# Patient Record
Sex: MALE | Race: WHITE | Employment: OTHER | URBAN - METROPOLITAN AREA
[De-identification: names, ages, dates, MRNs, and addresses within clinical notes are randomized per-mention and may not be internally consistent; named-entity substitution may affect disease eponyms.]

---

## 2017-01-13 ENCOUNTER — OFFICE VISIT (OUTPATIENT)
Dept: PALLIATIVE MEDICINE | Facility: CLINIC | Age: 62
End: 2017-01-13
Payer: COMMERCIAL

## 2017-01-13 VITALS — HEART RATE: 69 BPM | SYSTOLIC BLOOD PRESSURE: 126 MMHG | OXYGEN SATURATION: 98 % | DIASTOLIC BLOOD PRESSURE: 80 MMHG

## 2017-01-13 DIAGNOSIS — Z98.1 S/P LUMBAR FUSION: Primary | ICD-10-CM

## 2017-01-13 DIAGNOSIS — F41.9 ANXIETY: ICD-10-CM

## 2017-01-13 DIAGNOSIS — R53.81 PHYSICAL DECONDITIONING: ICD-10-CM

## 2017-01-13 DIAGNOSIS — G89.4 CHRONIC PAIN SYNDROME: ICD-10-CM

## 2017-01-13 PROCEDURE — 99214 OFFICE O/P EST MOD 30 MIN: CPT | Performed by: NURSE PRACTITIONER

## 2017-01-13 RX ORDER — MORPHINE SULFATE 30 MG/1
30 TABLET, FILM COATED, EXTENDED RELEASE ORAL EVERY 8 HOURS
Qty: 90 TABLET | Refills: 0 | Status: SHIPPED | OUTPATIENT
Start: 2017-01-13 | End: 2017-02-14

## 2017-01-13 RX ORDER — HYDROMORPHONE HYDROCHLORIDE 4 MG/1
4-6 TABLET ORAL EVERY 4 HOURS PRN
Qty: 210 TABLET | Refills: 0 | Status: SHIPPED | OUTPATIENT
Start: 2017-01-13 | End: 2017-02-14

## 2017-01-13 RX ORDER — HYDROXYZINE PAMOATE 25 MG/1
25-50 CAPSULE ORAL 3 TIMES DAILY PRN
Qty: 60 CAPSULE | Refills: 3 | Status: SHIPPED | OUTPATIENT
Start: 2017-01-13 | End: 2018-11-04

## 2017-01-13 ASSESSMENT — PAIN SCALES - GENERAL: PAINLEVEL: MODERATE PAIN (4)

## 2017-01-13 NOTE — MR AVS SNAPSHOT
After Visit Summary   1/13/2017    Usama Harris    MRN: [id number]           Patient Information     Date Of Birth          1955        Visit Information        Provider Department      1/13/2017 11:00 AM Shital Anderson APRN CNP [facility]        Today's Diagnoses     S/P lumbar fusion    -  1     Anxiety         Chronic pain syndrome         Physical deconditioning           Care Instructions    1. Dilaudid 4 mg take 1- 1.5 mg tablet(s) every 3 hours as needed limit 7 tablet(s) per day. In 2 week if pain is tolerable then reduce to limit of 6 per day. 30 day supply to 01/14/17   2. Then start  Ms Contin to 30 mg every 8 hrs, new RX given today to start 01/19/17  3. Renewed hydroxyzine capsule as needed for pain, anxiety and sleep    4. Narcan nasal spray for unintended overdose of narcotics   5. Continue all other medications    6. Opioid compliance stick to one pharmacy and within dosing instructions on bottle  7.  Aquatic Physical Therapy as is less likely to flare his pain.   8. Return visit with Shital Anderson CNP  4-6 weeks   GOAL setting with activity and pain medication use.      Nurse Triage line:  [phone]   Call this number with any questions or concerns. You may leave a detailed message anytime. Calls are typically returned Monday through Friday between 8 AM and 4:30 PM. We usually get back to you within 2 business days depending on the issue/request.       Medication refills:    For non-narcotic medications, call your pharmacy directly to request a refill. The pharmacy will contact the [facility] for authorization. Please allow 3-4 days for these refills to be processed.     For narcotic refills, call the nurse triage line or send a Razient message. Please contact us 7-10 days before your refill is due. The message MUST include the name of the specific medication(s) requested and how you would like to receive the  prescription(s). The options are as follows:    Pain Clinic staff can mail the prescription to your pharmacy. Please tell us the name of the pharmacy.    You may pick the prescription up at the Pain Clinic (tell us the location) or during a clinic visit with your pain provider    Pain Clinic staff can deliver the prescription to the [place] pharmacy in the clinic building. Please tell us the location.      Scheduling number: [phone].  Call this number to schedule or change appointments.    We believe regular attendance is key to your success in our program.    Any time you are unable to keep your appointment we ask that you call us at least 24 hours in advance to let us know. This will allow us to offer the appointment time to another patient.             Follow-ups after your visit        Your next 10 appointments already scheduled     Jan 17, 2017  1:00 PM   Pool Evaluation with Susan Nichole PT   [facility] ([facility])    [address] [address]   [phone]              Who to contact     If you have questions or need follow up information about today's clinic visit or your schedule please contact [facility] directly at [phone].  Normal or non-critical lab and imaging results will be communicated to you by MyChart, letter or phone within 4 business days after the clinic has received the results. If you do not hear from us within 7 days, please contact the clinic through MyChart or phone. If you have a critical or abnormal lab result, we will notify you by phone as soon as possible.  Submit refill requests through Stickybits or call your pharmacy and they will forward the refill request to us. Please allow 3 business days for your refill to be completed.          Additional Information About Your Visit        MyChart Information     Stickybits gives you secure access to your electronic health record. If you see a primary care  provider, you can also send messages to your care team and make appointments. If you have questions, please call your primary care clinic.  If you do not have a primary care provider, please call [phone] and they will assist you.        Care EveryWhere ID     This is your Care EveryWhere ID. This could be used by other organizations to access your [facility] medical records  [id number]        Your Vitals Were     Pulse Pulse Oximetry                69 98%           Blood Pressure from Last 3 Encounters:   01/13/17 126/80   12/19/16 149/98   12/14/16 110/70    Weight from Last 3 Encounters:   12/19/16 114.306 kg (252 lb)   12/01/16 114.76 kg (253 lb)   11/15/16 112.038 kg (247 lb)              Today, you had the following     No orders found for display         Today's Medication Changes          These changes are accurate as of: 1/13/17 11:56 AM.  If you have any questions, ask your nurse or doctor.               These medicines have changed or have updated prescriptions.        Dose/Directions    HYDROmorphone 4 MG tablet   Commonly known as:  DILAUDID   This may have changed:    - when to take this  - additional instructions   Used for:  S/P lumbar fusion        Dose:  4-6 mg   Take 1-1.5 tablets (4-6 mg) by mouth every 4 hours as needed for moderate to severe pain Limit 7 tablet(s) per day. 30 day. Start 01/14/17. Dispense on or after 01/13/17   Quantity:  210 tablet   Refills:  0       morphine 30 MG 12 hr tablet   Commonly known as:  MS CONTIN   This may have changed:  additional instructions   Used for:  S/P lumbar fusion        Dose:  30 mg   Take 1 tablet (30 mg) by mouth every 8 hours Start 01/19/17 Dispense on or after 01/17/17   Quantity:  90 tablet   Refills:  0       * VISTARIL PO   This may have changed:  Another medication with the same name was added. Make sure you understand how and when to take each.        Dose:  25 mg   Take 25 mg by mouth At Bedtime   Refills:  0       * hydrOXYzine 25 MG  capsule   Commonly known as:  VISTARIL   This may have changed:  You were already taking a medication with the same name, and this prescription was added. Make sure you understand how and when to take each.   Used for:  S/P lumbar fusion, Chronic pain syndrome, Physical deconditioning        Dose:  25-50 mg   Take 1-2 capsules (25-50 mg) by mouth 3 times daily as needed for itching   Quantity:  60 capsule   Refills:  3       * Notice:  This list has 2 medication(s) that are the same as other medications prescribed for you. Read the directions carefully, and ask your doctor or other care provider to review them with you.         Where to get your medicines      These medications were sent to [facility] - [place], [place] - [address], [place] [address]     Phone:  [phone]    - hydrOXYzine 25 MG capsule      Some of these will need a paper prescription and others can be bought over the counter.  Ask your nurse if you have questions.     Bring a paper prescription for each of these medications    - HYDROmorphone 4 MG tablet  - morphine 30 MG 12 hr tablet             Primary Care Provider Office Phone # Fax #    Nikko Tang [phone]       [address] [address]        Thank you!     Thank you for choosing [facility]  for your care. Our goal is always to provide you with excellent care. Hearing back from our patients is one way we can continue to improve our services. Please take a few minutes to complete the written survey that you may receive in the mail after your visit with us. Thank you!             Your Updated Medication List - Protect others around you: Learn how to safely use, store and throw away your medicines at www.disposemymeds.org.          This list is accurate as of: 1/13/17 11:56 AM.  Always use your most recent med list.                   Brand Name Dispense Instructions for use     carboxymethylcellulose 0.5 % Soln ophthalmic solution    REFRESH PLUS     Place 1 drop into both eyes daily as needed for dry eyes       diazepam 5 MG tablet    VALIUM    60 tablet    Take 1 tablet (5 mg) by mouth every 6 hours as needed for other (muscle spasms)       EQL SENNA-S 8.6-50 MG per tablet   Generic drug:  senna-docusate     100 tablet    TAKES 5 TABLETS DAILY.       * GABAPENTIN PO      Take 300 mg by mouth 2 times daily (Morning and Afternoon)       * GABAPENTIN PO      Take 600 mg by mouth every evening       HYDROmorphone 4 MG tablet    DILAUDID    210 tablet    Take 1-1.5 tablets (4-6 mg) by mouth every 4 hours as needed for moderate to severe pain Limit 7 tablet(s) per day. 30 day. Start 01/14/17. Dispense on or after 01/13/17       latanoprost 0.005 % ophthalmic solution    XALATAN     Place 1 drop into both eyes At Bedtime       morphine 30 MG 12 hr tablet    MS CONTIN    90 tablet    Take 1 tablet (30 mg) by mouth every 8 hours Start 01/19/17 Dispense on or after 01/17/17       Multi-vitamin Tabs tablet      Take 1 tablet by mouth daily       NARCAN NA      Spray in nostril daily as needed (opioid reversal)       * order for DME     1 each    Equipment being ordered: Walker () and Walker Extension w/Wheels (, ) Treatment Diagnosis: Difficulty walking       * order for DME     1 Device    Equipment being ordered: Orthofix lumbar bone growth stimulator       * VISTARIL PO      Take 25 mg by mouth At Bedtime       * hydrOXYzine 25 MG capsule    VISTARIL    60 capsule    Take 1-2 capsules (25-50 mg) by mouth 3 times daily as needed for itching       * Notice:  This list has 6 medication(s) that are the same as other medications prescribed for you. Read the directions carefully, and ask your doctor or other care provider to review them with you.

## 2017-01-13 NOTE — PROGRESS NOTES
"                      [facility]  INTERVAL VISIT    This a  follow up examination  for Usama Harris is a 60 year old male.  Primary Care provider:  Prince Tang MD    Today's visit: 01/13/2017  Last visit: 12/04/16      Chief Complaint   Patient presents with     Pain     Feels :\"lost\" Not sure if he should be progressing the way Neurosurgery advised. Saw MIRELLA MOLINA weight restriction lifted to 20 Lbs.  Starts aquatic Physical Therapy in 3 days.  Liver enzyme normal,  Had problem with mood and weepy stopping Gabapentin at last visit ( was on taper). Now mood is improving.    Pain across  low back, right thigh lateral numbness and knee  Standing to sitting increases pain on low back, radiating pain and symptoms episodic   Increase activity with going to opera, now has increase pain    Comfortable sitting today,   Walking tolerance improving, but still sedentary  No new weakness  Taking Dilaudid as scheduled on MS Contin       MEDICAL DECISION MAKING:   Usama Harris is a 60 year anxious old male who present in follow up with a decade plus 2 years of chronic right radicular low back pain with lumbar disc herniation of L4-5 in 2004 and then L3-4 in 2016,both occurring after an inciting injury  He is chronic pain treatment naive in regards sustainable life long management of his pain difficulties.   He is post lumbar laminectomyL3-4 revision 3 month ago.   The patient continues with high medication.  He is  opioid focused with past documented history of self dose escalation and use of multiple pharmacies. Today, he is demonstrating improved compliance and has less pain behaviors   Pain rehabilitation potential and receptiveness to learn more functional and adaptive pain strategies continues to be guarded. The patient's high opioid utilization and use of multiple pharmacies has put him at risk as his insurance has limited his access. He is at risk for unintended side effects such as opioids withdrawal and " respiratory suppression. We will restrict his opioid quantity, require more frequent visit and have order Narcan for home use.  His readiness for change and participation in our program is better today. We will begin gradual activity advancement through aquatic Physical Therapy.  His past level of anxiety and impulsive behavior are significant treatment barriers.  If the patient does not demonstrate readiness for change by lack of participation or payton resistance in further opioid tapering ( more modest dose), then recommendations will be given to the primary care provider for his chronic pain care.       ASSESSMENT:  1. Chronic pain Syndrome    1. Chronic right radicular low back pain L3-4    1. S/p L3-4 decompression laminectomy 5/18/16 persistent right radiculopathy  2. S/p L4-5 laminectomy 2004  2. Degenerative Disc Disease Lumbar L4-5 no central stenosis mild foraminal stenosis.     back pain post op 7/22/16 lumbar discectomy RIGHT L3-L4 REVISION MICRODISCECTOMY    2. Chronic opioid analgesia high opioid tolerance compliance concerns with self dose escalation,resulting in early run out of medication .  3. Limiting treatment factors  1. Medication reliant /chemical coping / self dose escalation.  Better today,    2. Mild to moderate depressive features    3. Anxiety and impulsiveness, better today   4. Non restorative sleep    PLAN:  1. Dilaudid 4 mg take 1- 1.5 mg tablet(s) every 3 hours as needed limit 7 tablet(s) per day. In 2 week if pain is tolerable then reduce to limit of 6 per day. 30 day supply to 01/14/17   2. Then start  Ms Contin to 30 mg every 8 hrs, new RX given today to start 01/19/17  3. Renewed hydroxyzine capsule as needed for pain, anxiety and sleep    4. Narcan nasal spray for unintended overdose of narcotics   5. Continue all other medications    6. Opioid compliance stick to one pharmacy and within dosing instructions on bottle  7.  Aquatic Physical Therapy, as is less likely to flare his  pain.   8. Return visit with Shital Anderson CNP  4-6 weeks   GOAL setting with activity and pain medication use.          PAIN HISTORY:  Patient reports back injury after diving on vacation this past February.  Back pain at that time was not severe, but by that evening he was noting progressively increasing back and right leg pain. He went see his PCP after failing  conservative treatment he was referred to Dr. Prince Govea at [facility] showed a large L3-4 disc herniation.  On 5/18/16  the patient underwent a decompression laminectomy at L3-4.  This is in the setting of a history of chronic right radicular low back pain again after a back injury. He is post  L4-5 microdiscectomy in 2004 and chronic opioid use. He report that since his first back injury he suffered episodic exacerbations, mostly through the pelvis with mild right foot pain and weakness.     He is here under the advise of his PCP to see pain management for his chronic low back and right leg pain and to develop different strategies to manage his pain more effectively.     PAST MEDICATION TRIALS:                                                   OPIOIDS: Tylenol #3, hydrocodone( Vicodin), Morphine ER, percocet, OxyContin, tramadol   NSAID'S/ANALEGESICS: Celbrex, Ibuprofen, naprosyn,      ANTIMIGRAINE: none   STEROIDS: yes dose pack     MUSCLE  RELAXERS:  Flexeril, tizanidine, Skelaxin   ANTIDEPRESSANTS: Bupropion   ANTIANXIETY: Xanax   HYPNOTICS: Hydroxyzine   ANTICONVULSANTS: none , not wanting Gabapentin, Lyrica     TOPICALS:  None     PAST PAIN TREATMENTS:              Y/N       HELPED           NO HELP         WORSE             PREVIOUS PAIN CLINIC:                 Yes                                                      1990's X  SURGERY:                                         Yes                                                      X 2007 L3-4  Microdisectomy                                                                                                                                                                                  2016 decompression lami L3-4 and L4-5  INJECTIONS:                                     Yes                                                      1990, 2007,2015                          PHYSICAL THERAPY:                       Yes                                                      X            EXERCISE:                                        Yes                                                      X  INTEGRATIVE MEDICINE              CHIROPRACTOR:                  No                    ACUPUNCTURE:                    No              AROMATHERAPY:                 No              RELAXATION THERAPY:       No  COUNSELING:                                    No          INTERVAL HISTORY:  Pain Description aching, burning, shooting, sharp, stabbing, numb, tender    Location:  Low back and right leg    Pain rating 4, average 5/10, Range 3-7/10  Quality: worse in morning and at times in evening   Aggravating factors:  Walking and driving   Relieving factors:  Lying down, elevating legs and gabapentin helps burning, but concerned with liver enzymes     Current Pain Medications: MsContin 30 mg tid. Dilaudid 4 mg (8)  , Gabapentin stopped 2 days ago.  Hydroxyzine 25 mg Q 6 hrs.prn, takes at night.  Valium 2.5 mg once a day   Medication side effects: some sleepiness    Progress in pain program:  None   Impact on function:severe  Working  Yes as [employer]    Quality of life:improved, feels he is doing better.   Self care no  Exercise no,relaxation no, body awareness no   Less medication focused.  .    INVESTIGATIONAL:  05/06/16 MRI Lumbar ( pre op)    T12-L1:  No disc herniation or stenosis. Facet joints are  unremarkable.     L1-L2:  No disc herniation or stenosis. Facet joints are unremarkable.         L2-L3:   Mild disc bulge with central annular fissure. No stenosis.  Facet joints are unremarkable.     L3-L4:  Moderate-to-large right central disc extrusion extends into  the subarticular recess with posterior displacement and probable  compression of the descending right L4 nerve root. This is new since  the prior study. Mild-to-moderate central stenosis. Neural foramina  are patent.     L4-L5:  There has likely been previous surgery at this level. I  suspect a previous right laminotomy. There is advanced degenerative  disc disease with mild disc bulge and osteophytic ridging. No central  stenosis. Mild bilateral foraminal stenosis.     L5-S1:  Mild disc dehydration and disc bulge. No central stenosis.  Mild bilateral foraminal stenosis.     Paraspinous soft tissues:  Unremarkable.         IMPRESSION:     1. There is a new moderate-to-large right central disc extrusion at  the L3-L4 level that extends into the right subarticular recess with  posterior displacement and compression of the descending right L4  nerve root. Mild-to-moderate central stenosis.  2. At L5-S1 there is mild bilateral foraminal stenosis    PMHX:  Past Medical History   Diagnosis Date     Backache, unspecified      Acute hepatitis C without mention of hepatic coma      Hep C - Treated     DDD (degenerative disc disease), lumbar      s/p back surgery     Hypertension      PAST SURGICAL HISTORY  Past Surgical History   Procedure Laterality Date     Surgical history of -        ligation of left spermatic vein     Laminect/discectomy, lumbar       Laminectomy/Discectomy Cervical     Decompression lumbar one level Right 5/18/2016     Procedure: DECOMPRESSION LUMBAR ONE LEVEL;  Surgeon: Prince Govea MD;  Location: [facility] OR     Discectomy lumbar posterior microscopic one level Right 7/22/2016     Procedure: DISCECTOMY LUMBAR POSTERIOR MICROSCOPIC ONE LEVEL;  Surgeon: Iggy Issa MD;  Location: [facility] OR     Laminectomy, fusion lumbar two levels,  combined Right 11/15/2016     Procedure: COMBINED LAMINECTOMY, FUSION LUMBAR TWO LEVELS;  Surgeon: Iggy Issa MD;  Location: [facility] OR       CURRENT MEDICATIONS:   Current Outpatient Prescriptions   Medication     HYDROmorphone (DILAUDID) 4 MG tablet     morphine (MS CONTIN) 30 MG 12 hr tablet     multivitamin, therapeutic with minerals (MULTI-VITAMIN) TABS tablet     order for DME     senna-docusate (EQL SENNA-S) 8.6-50 MG per tablet     diazepam (VALIUM) 5 MG tablet     order for DME     Naloxone HCl (NARCAN NA)     carboxymethylcellulose (REFRESH PLUS) 0.5 % SOLN     latanoprost (XALATAN) 0.005 % ophthalmic solution     GABAPENTIN PO     GABAPENTIN PO     HydrOXYzine Pamoate (VISTARIL PO)     No current facility-administered medications for this visit.       ROS: twelve systems review negative and included in interval except for: pain,mental fogginess, ankle swelling   Since last visit: changes in mood: No, suicide thoughts No  Any changes in your medical condition, illness, injury or hospitalizations: Yes elevated liver enzymes.  Sleep: Restorative: No Current  Some better with middle sleep insomnia      PHYSICAL EXAM:  Constitutional:Blood pressure 126/80, pulse 69, SpO2 98 %., : There is no weight on file to calculate BMI.   Psyche:  Fully oriented, Behavioral Observations: Eye contact  Poor. Mood  and spirits are good. Staying on tract better .  Musculoskeletal exam:  Gait:  Steady reciprocating with cane  ROM within normal limits,  MCCORMICK x 4,  Neuro exam:   Alert,  KAM @  3 mm, Speech clear fluent and appropriate. Strength 5/5   Skin/Vascular/ Autonomic:  warm, dry and intact    OTHER: Opioid risk for ongoing opioid management for non malignant chronic pain   DIRE Score for ongoing opioid management is calculated as follows:    Diagnosis =  2    Intractability = 2    Risk: Psych = 1  Chem Hlth = 1  Reliability = 2  Social = 2    Efficacy = 12    Total DIRE Score = 12 (14 or higher predicts good candidate  for ongoing opioid management; 13 or lower predicts poor candidate for opioid management)   Oscar Mckeon 2006,The Journal of  Pain.,The DIRE Score: Predicting Outcomes of Opioid Prescribing for Chronic Pain Vol.7,No.9, pp 671-681.  MNPMP : Reviewed and as expected without evidence of abuse or misuse? No  URINE DRUG SCREEN  Yes, DATE:6/8/16 as expected . OPIOID AGREEMENT: Yes DATE:06/22/16  OPOID TOLERANCE: high, Morphine  EQ:  218 mg/day   Analgesia good  Adverse effects some with Gabapentin  Activity fair ( lack self care)  Adherence poor     Opioid management will continue with [facility]      PROCEDURES none     Time spent: 30 minutes 100 % face to face including  20  minutes counseling and coordinating care for the above identified medical problems    Signed: BASSEM Mckeon, C.N.P.,   [facility]

## 2017-01-13 NOTE — PATIENT INSTRUCTIONS
1. Dilaudid 4 mg take 1- 1.5 mg tablet(s) every 3 hours as needed limit 7 tablet(s) per day. In 2 week if pain is tolerable then reduce to limit of 6 per day. 30 day supply to 01/14/17   2. Then start  Ms Contin to 30 mg every 8 hrs, new RX given today to start 01/19/17  3. Renewed hydroxyzine capsule as needed for pain, anxiety and sleep    4. Narcan nasal spray for unintended overdose of narcotics   5. Continue all other medications    6. Opioid compliance stick to one pharmacy and within dosing instructions on bottle  7.  Aquatic Physical Therapy as is less likely to flare his pain.   8. Return visit with Shital Anderson CNP  4-6 weeks   GOAL setting with activity and pain medication use.      Nurse Triage line:  [phone]   Call this number with any questions or concerns. You may leave a detailed message anytime. Calls are typically returned Monday through Friday between 8 AM and 4:30 PM. We usually get back to you within 2 business days depending on the issue/request.       Medication refills:    For non-narcotic medications, call your pharmacy directly to request a refill. The pharmacy will contact the [facility] for authorization. Please allow 3-4 days for these refills to be processed.     For narcotic refills, call the nurse triage line or send a [employer] message. Please contact us 7-10 days before your refill is due. The message MUST include the name of the specific medication(s) requested and how you would like to receive the prescription(s). The options are as follows:    Pain Clinic staff can mail the prescription to your pharmacy. Please tell us the name of the pharmacy.    You may pick the prescription up at the Pain Clinic (tell us the location) or during a clinic visit with your pain provider    Pain Clinic staff can deliver the prescription to the [facility] in the clinic building. Please tell us the location.      Scheduling number: [phone].  Call this number to  schedule or change appointments.    We believe regular attendance is key to your success in our program.    Any time you are unable to keep your appointment we ask that you call us at least 24 hours in advance to let us know. This will allow us to offer the appointment time to another patient.

## 2017-01-17 ENCOUNTER — HOSPITAL ENCOUNTER (OUTPATIENT)
Dept: PHYSICAL THERAPY | Facility: CLINIC | Age: 62
Setting detail: THERAPIES SERIES
End: 2017-01-17
Attending: PHYSICIAN ASSISTANT
Payer: COMMERCIAL

## 2017-01-17 DIAGNOSIS — Z98.1 HISTORY OF LUMBAR FUSION: Primary | ICD-10-CM

## 2017-01-17 PROCEDURE — 97110 THERAPEUTIC EXERCISES: CPT | Mod: GP | Performed by: PHYSICAL THERAPIST

## 2017-01-17 PROCEDURE — 97112 NEUROMUSCULAR REEDUCATION: CPT | Mod: GP | Performed by: PHYSICAL THERAPIST

## 2017-01-17 PROCEDURE — 40000185 ZZHC STATISTIC PT OUTPT VISIT: Performed by: PHYSICAL THERAPIST

## 2017-01-17 PROCEDURE — 97161 PT EVAL LOW COMPLEX 20 MIN: CPT | Mod: GP | Performed by: PHYSICAL THERAPIST

## 2017-01-23 NOTE — ADDENDUM NOTE
Encounter addended by: Susan Nichole, PT on: 1/23/2017  8:05 AM<BR>     Documentation filed: Clinical Notes, Inpatient Document Flowsheet

## 2017-01-23 NOTE — PROGRESS NOTES
01/17/17 1300   Quick Adds   Type of Visit Initial OP PT Evaluation   General Information   Start of Care Date 01/17/17   Referring Physician Eyad Cm PA-C    Orders Evaluate and Treat as Indicated   Order Date 12/19/16   Medical Diagnosis History of lumbar fusion   Onset of illness/injury or Date of Surgery (11/15/16)   Precautions/Limitations no known precautions/limitations   Special Instructions aquatic therapy   Surgical/Medical history reviewed Yes   Pertinent history of current problem (include personal factors and/or comorbidities that impact the POC) Pt reports 27 years of chronic LBP. He is post lumbar fusion L3-4 revision 11/15/16. Pt reports back injury after trying to stand with weight of diving tank on back while on vacation 02/2016 with progressively worse inc back and R LE pain. Imaging showing large L3-4 disc herniation. L3-4 decompression laminectomy 5/18/16 with persistent R LE radiculopathy. Felt like he was recovering and returned to work but still had pain, then 6 weeks post-op walking down street heard a click and felt sharp pain down leg. He was unable to get of bed in pain with his spouse caring for him.  Post op 7/22/16 lumbar discectomy with R L3-4 revision microdiscectomy. PMH includes lumbar disc herniation L4-5 in 2004 s/p L4-5 laminectomy 2004, L3-4 DDD, chronic opioid analgesia and high opioid tolerance. Continues with high medications. Per chart review, past level of anxiety and impulsive behavior have been significant treatment barriers to learn more functionally adaptive pain strategies. Pt reports pain across low back, R thigh lateral numbness and knee. Received home PT following surgery, was using walker until 1 month ago, now only uses SPC outdoors on icy surfaces. Reports he has been increasing his activity, assisting a friend installing doors yesterday in his new duplex, but avoided heavy lifting. Continues to wear post-op back brace 24/7; most nights doesn't sleep in  brace the past couple of weeks. Standing for cooking big meal, about 1 hour needs to take breaks. Legs feel weak going down stairs uses railing. No falls. Walks a couple blocks a day per day not limited by pain. Used to need cart in stores, doesn t now. Recent outing to [employer] with glut pain afterwords.   Prior level of function comment Working as  for 15 years until 1990. 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 Pt reports he continued to work with pain but decreased Morphine dosage to 2x/day prior to injury in 02/2016.   Diagnostic Tests X-ray;MRI   Previous/Current Treatment Physical Therapy  (home PT Nov-Dec, 2016)   Patient role/Employment history Employed  (RN)   Living environment Apartment/condo  (alone)   Home/Community Accessibility Comments 13 stairs to enter with railing   Current Assistive Devices Standard Cane   Assistive Devices Comments uses SPC for outdoor/slippery surfaces only; started using SPC ~1 month ago; using walker prior to that post-op   Patient/Family Goals Statement normal muscular alignment and improved symptoms with decreased pain   General Information Comments Reports having pool pass in [facility], eager to get gym membership. Interested in filippo chi.   Fall Risk Screen   Fall screen completed by PT   Per patient - Fall 2 or more times in past year? No   Per patient - Fall with injury in past year? No   Is patient a fall risk? No   Functional Scales   Functional Scales and Outcomes Oswestry: 22.22%    Pain   Patient currently in pain Yes   Pain location down R LE, low back   Pain rating Currently: 4-5/10, reports this is higher because of inc activity (8-9 hours) yesterday and nearing pain medications, typically: 2/10. Reports some back pain with repeated sit <> stand, negligible LE pain compared to back.    Pain description Sharp;Ache;Burning;Dull  (tingling)   Pain description comment always   Pain  comments Medications: Morphine long acting 90 mg 3x/day; pt reports 30 mg 2x/day before injury. Pain exacerbated: bending, walking > 3-4 blocks, relief: laying down, sitting for < 3 hours   Cognitive Status Examination   Orientation orientation to person, place and time   Level of Consciousness alert   Follows Commands and Answers Questions 100% of the time;able to follow multistep instructions   Personal Safety and Judgment intact   Memory intact   Integumentary   Integumentary Other   Integumentary Comments healed lumbar surgical scar   Posture   Posture Comments slight L trunk shift   Range of Motion (ROM)   ROM Comment trunk ROM grossly WNL in all planes and pain-free   Strength   Strength Comments B LE MMT grossly WNL B, R ankle 4-/5 L ankle 5/5. Demos good TA actviation, able to sustain with bridge without pain   Bed Mobility   Bed Mobility Comments log roll technique   Transfer Skills   Transfer Comments inc forward trunk lean, no brace, no UE support   Gait   Gait Comments WNL   Balance Special Tests Timed Up and Go   Seconds 9.86 Seconds   Balance Special Tests Single Leg Stance Right,   Right, seconds 2 Seconds   Balance Special Tests Single Leg Stance Left   Left, seconds 13 Seconds   Balance Special Tests Sit to Stand Reps in 30 Seconds   Reps in 30 seconds 6  (60-63 yo M avg 16 reps)   Height 18   Comments reports no significant inc pain, performed without back brace, inc quad muscle fatigue   Sensory Examination   Sensory Perception no deficits were identified   Coordination   Coordination no deficits were identified   Muscle Tone   Muscle Tone no deficits were identified   Planned Therapy Interventions   Planned Therapy Interventions Comment aquatic therapy   Clinical Impression   Criteria for Skilled Therapeutic Interventions Met yes, treatment indicated   PT Diagnosis impaired functional mobility 2/2 chronic pain   Influenced by the following impairments Chronic pain with high opioid use. Dec R  ankle strength. impaired postural stability with dec LIZETH per SLS R LE, impaired functional LE strength per 30 sec sit <> stand.   Functional limitations due to impairments Impaired participation in work activities, sitting, standing, walking tolerance with inc pain   Clinical Presentation Stable/Uncomplicated   Clinical Presentation Rationale pt report of progressively improved mobility/decreased pain since most recent surgery   Clinical Decision Making (Complexity) Low complexity   Therapy Frequency 2 times/Week   Predicted Duration of Therapy Intervention (days/wks) 4 weeks   Risk & Benefits of therapy have been explained Yes   Patient, Family & other staff in agreement with plan of care Yes   Clinical Impression Comments Pt does not demonstrate pain response during assessment or report high pain with gradually inc activity following most recent surgery, however he continues with high pain medications and hx of high opioid tolerance with chronic use. He hopes to continue to taper his reliance on inc medications and post-surgical back brace with inc activity and eventual return to work. Given pt s pain hx he would benefit from initial aquatic therapy monitoring tolerance to exercise with progress towards land based exercise as indicated with plan for incorporating pain neuroscience education into POC.    Education Assessment   Barriers to Learning Emotional   GOALS   PT Eval Goals 1;2;3;4   Goal 1   Goal Identifier HEP   Goal Description Pt to demonstrate (I) with aquatic based HEP for progress towards all goals and continued self-maintenance of chronic condition following d/c from physical therapy.    Target Date 03/10/17   Goal 2   Goal Identifier Oswestry (baseline: 22.22 percent, 21-40% considered moderate disability)   Goal Description Pt to score </= 11.11 percent to achieve min detectable change and be considered lesser range of disability for improved participation in activities of daily living.    Target  Date 03/10/17   Goal 3   Goal Identifier Daily activities and pain management (baseline: Pain exacerbated: bending, walking > 3-4 blocks; wearing post-op back brace 24/7, Morphine long acting 90 mg 3x/day; pt reports 30 mg 2x/day before injury.)   Goal Description Pt to report worst pain </= 2/10 with increased daily activities, no longer reliant on back brace for pain management, walking without AD in community, and continued tapering pain medications as recommended by MD.   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   Target Date 03/10/17   Total Evaluation Time   Total Evaluation Time (Minutes) 70

## 2017-01-23 NOTE — ADDENDUM NOTE
Encounter addended by: Susan Nichole PT on: 1/22/2017 10:46 PM<BR>     Documentation filed: Inpatient Document Flowsheet

## 2017-02-01 ENCOUNTER — HOSPITAL ENCOUNTER (OUTPATIENT)
Dept: PHYSICAL THERAPY | Facility: CLINIC | Age: 62
Setting detail: THERAPIES SERIES
End: 2017-02-01
Attending: PHYSICIAN ASSISTANT
Payer: COMMERCIAL

## 2017-02-01 PROCEDURE — 40000189 ZZH STATISTIC PT POOL VISIT: Performed by: PHYSICAL THERAPIST

## 2017-02-01 PROCEDURE — 97113 AQUATIC THERAPY/EXERCISES: CPT | Mod: GP | Performed by: PHYSICAL THERAPIST

## 2017-02-01 NOTE — PROGRESS NOTES
AQUATIC PHYSICAL THERAPY EXERCISE LOG (TRUNK)  Date  2/1/17  Susan Nichole PT, DPT   Warm Up Ambulation (Forward/Side/Back/March/All) x2 ea (no march) focusing on fluidity of movement             Stretching/ROM Chin Tucks     CROM (Flex/Ext/SB/Rot/All)     Shoulder (Shrugs/Rolls)     Scapular (Retraction/Depression)     Upper Trunk (Levator/Scalene/Upper Trapezius)     Pec Stretch (Unilateral/Bilateral)     Posterior Shoulder     Gluteal     Hamstring (On Step/Cuff) 30 sec ea LE    Calf (In Hole/At Wall) 30 sec ea LE    Quad     Hip Flexor (Kneel)     Piriformis (Seated/Stand)     Trunk ROM (Flex/Ext/SB/Rot/All) 30 sec forward flexion, R/L SB    BAD RAGAZ              Strengthening Abdominal Sets/ Pelvic Tilt Cues throughout exercises    Shoulder (Flex/Ext, Abd/Add, IR/ER, Circles, Alternation flex/ext for core)     Horizon (Abd/Add, Diagonals)     Rowing Arms     UE PNF (D1/D2)     Abdominal Sets (Push/Pull, side to side, push down with board) Push down x10 medium board reports min core challenge    Squat x10 hip hinges to wall    Lunge Squat     Hip (Flex/Ext, Abd/Add, single leg bike, circles, figure 8, All)     Heel/Toe Raises     Step Ups (Forward, Lateral)     Noodle Push Downs with UE(B UE/1 UE) for core strengthening In wall sit x10 B UE     Noodle Push Downs with LE     Stir the Pot/Punches with Dumbells     Sit to Stand at Bench     Prone Plank on step     Side Plank on step              Aerobic Fast Walking     Bike/Ski/Wilber/All x8 minutes in corner with 10 sec intervals of inc speed             Balance Narrow Base of Support     Tandem Stand     Single Leg Stance     Heel/Toe Walking     3 Step and Stop     Braiding      demonstrates lateral upward reaching for filippo chi exercise he does at home, needs repeated self modifications anxious about technique in water; therapist guided forward step x10 ea LE maintaining slightly squatted postion due to pt height in 4' water, forward extension of UEs in water  with guided breathing and cues for coordinating movements and stability             Cool Down Ambulation (I) end of session    Deep Water Dangle     [place]

## 2017-02-03 ENCOUNTER — HOSPITAL ENCOUNTER (OUTPATIENT)
Dept: PHYSICAL THERAPY | Facility: CLINIC | Age: 62
Setting detail: THERAPIES SERIES
End: 2017-02-03
Attending: PHYSICIAN ASSISTANT
Payer: COMMERCIAL

## 2017-02-03 PROCEDURE — 40000189 ZZH STATISTIC PT POOL VISIT: Performed by: PHYSICAL THERAPIST

## 2017-02-03 PROCEDURE — 97113 AQUATIC THERAPY/EXERCISES: CPT | Mod: GP | Performed by: PHYSICAL THERAPIST

## 2017-02-03 NOTE — PROGRESS NOTES
AQUATIC PHYSICAL THERAPY EXERCISE LOG (TRUNK)  Date    2/1/17  Susan Nichole PT, DPT  2/3/17  Susan Nichole PT, DPT     Warm Up  Ambulation (Forward/Side/Back/March/All)  x2 ea (no march) focusing on fluidity of movement  x2 ea (no march) focusing on fluidity of movement and heel/toe gait pattern                     Stretching/ROM  Chin Tucks         CROM (Flex/Ext/SB/Rot/All)         Shoulder (Shrugs/Rolls)         Scapular (Retraction/Depression)         Upper Trunk (Levator/Scalene/Upper Trapezius)         Pec Stretch (Unilateral/Bilateral)         Posterior Shoulder         Gluteal         Hamstring (On Step/Cuff)  30 sec ea LE  30 sec ea LE therapist facil with gentle rocking tisha abd/add     Calf (In Hole/At Wall)  30 sec ea LE  30 sec ea LE      Quad    30 sec ea LE      Hip Flexor (Kneel)         Piriformis (Seated/Stand)    30 sec ea LE      Trunk ROM (Flex/Ext/SB/Rot/All)  30 sec forward flexion, R/L SB  30 sec forward flexion, R/L SB      BAD RAGAZ                         Strengthening  Abdominal Sets/ Pelvic Tilt  Cues throughout exercises  Cues throughout exercises      Shoulder (Flex/Ext, Abd/Add, IR/ER, Circles, Alternation flex/ext for core)    10x ea in wall sit with colored DBs for core activation (no circles)      Horizon (Abd/Add, Diagonals)    x10 abd/add     Rowing Arms         UE PNF (D1/D2)         Abdominal Sets (Push/Pull, side to side, push down with board)  Push down x10 medium board reports min core challenge       Squat  x10 hip hinges to wall  x15 hip hinges to wall      Lunge Squat         Hip (Flex/Ext, Abd/Add, single leg bike, circles, figure 8, All)    x10 ea hand support on wall for bal (no figure 8)     Heel/Toe Raises         Step Ups (Forward, Lateral)         Noodle Push Downs with UE(B UE/1 UE) for core strengthening  In wall sit x10 B UE        Noodle Push Downs with LE    x10 ea LE with colored/white noodle     Stir the Pot/Punches with Dumbells         Sit to Stand at  Bench         Prone Plank on step         Side Plank on step                         Aerobic  Fast Walking         Bike/Ski/Wilber/All  x8 minutes in corner with 10 sec intervals of inc speed  x4 min biking in corner with intervals of forward/backward motions                     Balance  Narrow Base of Support         Tandem Stand         Single Leg Stance         Heel/Toe Walking         3 Step and Stop         Braiding           demonstrates lateral upward reaching for michael chi exercise he does at home, needs repeated self modifications anxious about technique in water; therapist guided forward step x10 ea LE maintaining slightly squatted postion due to pt height in 4' water, forward extension of UEs in water with guided breathing and cues for coordinating movements and stability  Michael chi lateral weight shifting in squatted position, focusing on breathing and fluidity of motion with UEs creating gentle wave in water, demos tendency for stiff movements, difficulty achieving fluid relaxed movements                     Cool Down  Ambulation  (I) end of session       Deep Water Dangle         [place]

## 2017-02-14 ENCOUNTER — OFFICE VISIT (OUTPATIENT)
Dept: PALLIATIVE MEDICINE | Facility: CLINIC | Age: 62
End: 2017-02-14
Payer: COMMERCIAL

## 2017-02-14 VITALS
OXYGEN SATURATION: 93 % | HEART RATE: 84 BPM | DIASTOLIC BLOOD PRESSURE: 90 MMHG | WEIGHT: 245 LBS | SYSTOLIC BLOOD PRESSURE: 150 MMHG | BODY MASS INDEX: 31.46 KG/M2

## 2017-02-14 DIAGNOSIS — R53.81 PHYSICAL DECONDITIONING: ICD-10-CM

## 2017-02-14 DIAGNOSIS — Z79.891 ENCOUNTER FOR LONG-TERM OPIATE ANALGESIC USE: ICD-10-CM

## 2017-02-14 DIAGNOSIS — F11.220 OPIOID DEPENDENCE WITH UNCOMPLICATED INTOXICATION (H): ICD-10-CM

## 2017-02-14 DIAGNOSIS — Z98.1 S/P LUMBAR FUSION: Primary | ICD-10-CM

## 2017-02-14 DIAGNOSIS — G89.29 CHRONIC LOW BACK PAIN WITHOUT SCIATICA, UNSPECIFIED BACK PAIN LATERALITY: ICD-10-CM

## 2017-02-14 DIAGNOSIS — M54.50 CHRONIC LOW BACK PAIN WITHOUT SCIATICA, UNSPECIFIED BACK PAIN LATERALITY: ICD-10-CM

## 2017-02-14 DIAGNOSIS — M62.838 MUSCLE SPASM: ICD-10-CM

## 2017-02-14 PROCEDURE — 99214 OFFICE O/P EST MOD 30 MIN: CPT | Performed by: NURSE PRACTITIONER

## 2017-02-14 RX ORDER — HYDROMORPHONE HYDROCHLORIDE 4 MG/1
4-6 TABLET ORAL EVERY 4 HOURS PRN
Qty: 210 TABLET | Refills: 0 | Status: SHIPPED | OUTPATIENT
Start: 2017-02-14 | End: 2017-03-09

## 2017-02-14 RX ORDER — MORPHINE SULFATE 30 MG/1
30 TABLET, FILM COATED, EXTENDED RELEASE ORAL EVERY 8 HOURS
Qty: 90 TABLET | Refills: 0 | Status: SHIPPED | OUTPATIENT
Start: 2017-02-18 | End: 2017-03-09

## 2017-02-14 ASSESSMENT — PAIN SCALES - GENERAL: PAINLEVEL: MILD PAIN (3)

## 2017-02-14 NOTE — PATIENT INSTRUCTIONS
1. Dilaudid 4 mg take 1- 1.5 mg tablet(s) every 3 hours as needed limit 7 tablet(s) per day. In 2 week if pain is tolerable then reduce to limit of 6 per day. 30 day supply to 02/14/17   2. Then start  Ms Contin to 30 mg every 8 hrs, new RX given today to start 02/18/17  3. Continue with hydroxyzine capsule as needed for pain, anxiety and sleep    4. Tizanidine 4-6 mg 3 x per day as needed for muscle spasm  5. Narcan nasal spray for unintended overdose of narcotics   6. Continue all other medications    7. Opioid compliance stick to one pharmacy and within dosing instructions on bottle  8.  Aquatic Physical Therapy as tolerated.  9. Return visit with Shital Anderson CNP  4-6 weeks   GOAL setting with activity and pain medication use.  10. Ok to discuss concerns with Dr. Issa  11. Michael eddy       Nurse Triage line:  [phone]   Call this number with any questions or concerns. You may leave a detailed message anytime. Calls are typically returned Monday through Friday between 8 AM and 4:30 PM. We usually get back to you within 2 business days depending on the issue/request.       Medication refills:    For non-narcotic medications, call your pharmacy directly to request a refill. The pharmacy will contact the [facility] for authorization. Please allow 3-4 days for these refills to be processed.     For narcotic refills, call the nurse triage line or send a [employer] message. Please contact us 7-10 days before your refill is due. The message MUST include the name of the specific medication(s) requested and how you would like to receive the prescription(s). The options are as follows:    Pain Clinic staff can mail the prescription to your pharmacy. Please tell us the name of the pharmacy.    You may pick the prescription up at the Pain Clinic (tell us the location) or during a clinic visit with your pain provider    Pain Clinic staff can deliver the prescription to the [facility] pharmacy in the  clinic building. Please tell us the location.      Scheduling number: [phone].  Call this number to schedule or change appointments.    We believe regular attendance is key to your success in our program.    Any time you are unable to keep your appointment we ask that you call us at least 24 hours in advance to let us know. This will allow us to offer the appointment time to another patient.

## 2017-02-14 NOTE — NURSING NOTE
"Chief Complaint   Patient presents with     Pain       Initial /90 (BP Location: Left arm, Patient Position: Chair, Cuff Size: Adult Large)  Pulse 84  Wt 111.1 kg (245 lb)  SpO2 93%  BMI 31.46 kg/m2 Estimated body mass index is 31.46 kg/(m^2) as calculated from the following:    Height as of 11/15/16: 1.88 m (6' 2\").    Weight as of this encounter: 111.1 kg (245 lb).  Medication Reconciliation: theresa Hurtado CMA   [facility]    "

## 2017-02-14 NOTE — MR AVS SNAPSHOT
After Visit Summary   2/14/2017    Usama Harris    MRN: [id number]           Patient Information     Date Of Birth          1955        Visit Information        Provider Department      2/14/2017 11:30 AM Shital Anderson APRN CNP [facility]        Today's Diagnoses     S/P lumbar fusion    -  1    Chronic low back pain without sciatica, unspecified back pain laterality        Opioid dependence with uncomplicated intoxication (H)        Encounter for long-term opiate analgesic use        Physical deconditioning        Muscle spasm          Care Instructions    1. Dilaudid 4 mg take 1- 1.5 mg tablet(s) every 3 hours as needed limit 7 tablet(s) per day. In 2 week if pain is tolerable then reduce to limit of 6 per day. 30 day supply to 02/14/17   2. Then start  Ms Contin to 30 mg every 8 hrs, new RX given today to start 02/18/17  3. Continue with hydroxyzine capsule as needed for pain, anxiety and sleep    4. Tizanidine 4-6 mg 3 x per day as needed for muscle spasm  5. Narcan nasal spray for unintended overdose of narcotics   6. Continue all other medications    7. Opioid compliance stick to one pharmacy and within dosing instructions on bottle  8.  Aquatic Physical Therapy as tolerated.  9. Return visit with Shital Anderson CNP  4-6 weeks   GOAL setting with activity and pain medication use.  10. Ok to discuss concerns with Dr. Issa  11. Michael eddy       Nurse Triage line:  [phone]   Call this number with any questions or concerns. You may leave a detailed message anytime. Calls are typically returned Monday through Friday between 8 AM and 4:30 PM. We usually get back to you within 2 business days depending on the issue/request.       Medication refills:    For non-narcotic medications, call your pharmacy directly to request a refill. The pharmacy will contact the [facility] for authorization. Please allow 3-4 days for these refills to  be processed.     For narcotic refills, call the nurse triage line or send a [employer] message. Please contact us 7-10 days before your refill is due. The message MUST include the name of the specific medication(s) requested and how you would like to receive the prescription(s). The options are as follows:    Pain Clinic staff can mail the prescription to your pharmacy. Please tell us the name of the pharmacy.    You may pick the prescription up at the Pain Clinic (tell us the location) or during a clinic visit with your pain provider    Pain Clinic staff can deliver the prescription to the [facility] pharmacy in the clinic building. Please tell us the location.      Scheduling number: [phone].  Call this number to schedule or change appointments.    We believe regular attendance is key to your success in our program.    Any time you are unable to keep your appointment we ask that you call us at least 24 hours in advance to let us know. This will allow us to offer the appointment time to another patient.             Follow-ups after your visit        Your next 10 appointments already scheduled     Feb 17, 2017 11:45 AM CST   Pool Treatment with Susan Nichole, PT   [facility] ([facility])    [address] [address]   [phone]            Feb 27, 2017  8:45 AM CST   Pool Treatment with Sheryl Maxwell PT   [facility] ([facility])    [address] [address]   [phone]            Mar 03, 2017 11:45 AM CST   Pool Treatment with Susan Nichole PT   [facility] ([facility])    [address] [address]   [phone]            Mar 08, 2017 11:45 AM CST   Pool Treatment with Susan Nichole, PT   [facility] ([facility])    [address] [address]   [phone]            Mar 10,  2017 11:00 AM CST   Pool Treatment with Sheryl Maxwell, PT   [facility] ([facility])    [address]  [facility] [address] [phone]              Who to contact     If you have questions or need follow up information about today's clinic visit or your schedule please contact [facility] directly at [phone].  Normal or non-critical lab and imaging results will be communicated to you by [employer] crowd[employer], letter or phone within 4 business days after the clinic has received the results. If you do not hear from us within 7 days, please contact the clinic through [employer] or phone. If you have a critical or abnormal lab result, we will notify you by phone as soon as possible.  Submit refill requests through [employer] or call your pharmacy and they will forward the refill request to us. Please allow 3 business days for your refill to be completed.          Additional Information About Your Visit        [employer] Information     Every[employer] gives you secure access to your electronic health record. If you see a primary care provider, you can also send messages to your care team and make appointments. If you have questions, please call your primary care clinic.  If you do not have a primary care provider, please call [phone] and they will assist you.        Care EveryWhere ID     This is your Care EveryWhere ID. This could be used by other organizations to access your [facility] medical records  [id number]        Your Vitals Were     Pulse Pulse Oximetry BMI (Body Mass Index)             84 93% 31.46 kg/m2          Blood Pressure from Last 3 Encounters:   02/14/17 150/90   01/13/17 126/80   12/19/16 (!) 149/98    Weight from Last 3 Encounters:   02/14/17 111.1 kg (245 lb)   12/19/16 114.3 kg (252 lb)   12/01/16 114.8 kg (253 lb)              Today, you had the following     No orders found for display         Today's Medication Changes          These changes are  accurate as of: 2/14/17 12:17 PM.  If you have any questions, ask your nurse or doctor.               Start taking these medicines.        Dose/Directions    tiZANidine 4 MG tablet   Commonly known as:  ZANAFLEX   Used for:  Muscle spasm        Dose:  4-6 mg   Take 1-1.5 tablets (4-6 mg) by mouth 3 times daily   Quantity:  135 tablet   Refills:  1         These medicines have changed or have updated prescriptions.        Dose/Directions    HYDROmorphone 4 MG tablet   Commonly known as:  DILAUDID   This may have changed:  additional instructions   Used for:  S/P lumbar fusion        Dose:  4-6 mg   Take 1-1.5 tablets (4-6 mg) by mouth every 4 hours as needed for moderate to severe pain Limit 7 tablet(s) per day. 30 day. Start 02/14/17. Dispense on or after 02/14/17   Quantity:  210 tablet   Refills:  0       morphine 30 MG 12 hr tablet   Commonly known as:  MS CONTIN   This may have changed:  additional instructions   Used for:  S/P lumbar fusion        Dose:  30 mg   Start taking on:  2/18/2017   Take 1 tablet (30 mg) by mouth every 8 hours Start 02/18/17 Dispense on or after 02/16/17   Quantity:  90 tablet   Refills:  0            Where to get your medicines      These medications were sent to [facility] [address] [address] [facility]  [address] [address] [address]     Phone:  [phone]     tiZANidine 4 MG tablet         Some of these will need a paper prescription and others can be bought over the counter.  Ask your nurse if you have questions.     Bring a paper prescription for each of these medications     HYDROmorphone 4 MG tablet    morphine 30 MG 12 hr tablet                Primary Care Provider Office Phone # Fax #    Nikko Tang [phone]       [place] [address] [address]  [facility] [address]        Thank you!     Thank you for choosing [facility]  for your care. Our goal is always to provide you with excellent  care. Hearing back from our patients is one way we can continue to improve our services. Please take a few minutes to complete the written survey that you may receive in the mail after your visit with us. Thank you!             Your Updated Medication List - Protect others around you: Learn how to safely use, store and throw away your medicines at www.disposemymeds.org.          This list is accurate as of: 2/14/17 12:17 PM.  Always use your most recent med list.                   Brand Name Dispense Instructions for use    carboxymethylcellulose 0.5 % Soln ophthalmic solution    REFRESH PLUS     Place 1 drop into both eyes daily as needed for dry eyes       diazepam 5 MG tablet    VALIUM    60 tablet    Take 1 tablet (5 mg) by mouth every 6 hours as needed for other (muscle spasms)       EQL SENNA-S 8.6-50 MG per tablet   Generic drug:  senna-docusate     100 tablet    TAKES 5 TABLETS DAILY.       * GABAPENTIN PO      Take 300 mg by mouth 2 times daily Reported on 2/14/2017       * GABAPENTIN PO      Take 600 mg by mouth every evening Reported on 2/14/2017       HYDROmorphone 4 MG tablet    DILAUDID    210 tablet    Take 1-1.5 tablets (4-6 mg) by mouth every 4 hours as needed for moderate to severe pain Limit 7 tablet(s) per day. 30 day. Start 02/14/17. Dispense on or after 02/14/17       latanoprost 0.005 % ophthalmic solution    XALATAN     Place 1 drop into both eyes At Bedtime       morphine 30 MG 12 hr tablet   Start taking on:  2/18/2017    MS CONTIN    90 tablet    Take 1 tablet (30 mg) by mouth every 8 hours Start 02/18/17 Dispense on or after 02/16/17       Multi-vitamin Tabs tablet      Take 1 tablet by mouth daily       NARCAN NA      Spray in nostril daily as needed (opioid reversal)       * order for DME     1 each    Equipment being ordered: Walker () and Walker Extension w/Wheels (, ) Treatment Diagnosis: Difficulty walking       * order for DME     1 Device    Equipment being ordered:  Orthofix lumbar bone growth stimulator       tiZANidine 4 MG tablet    ZANAFLEX    135 tablet    Take 1-1.5 tablets (4-6 mg) by mouth 3 times daily       * VISTARIL PO      Take 25 mg by mouth At Bedtime       * hydrOXYzine 25 MG capsule    VISTARIL    60 capsule    Take 1-2 capsules (25-50 mg) by mouth 3 times daily as needed for itching       * Notice:  This list has 6 medication(s) that are the same as other medications prescribed for you. Read the directions carefully, and ask your doctor or other care provider to review them with you.

## 2017-02-14 NOTE — PROGRESS NOTES
[facility]  INTERVAL VISIT    This a  follow up examination  for Usama Harris is a 60 year old male.  Primary Care provider:  Prince Tang MD    Today's visit: 02/14/2017  Last visit: 011/3/17      Chief Complaint   Patient presents with     Pain   pool flared pain in low back doing repetitive sitting to standing.    Walking 2-3 blocks increase pain does not settle down.  Slipped on ice   Worried he injured back. No leg pain,pain across the back. No popping.   Increase activity with going to opera, now has increase pain    Comfortable sitting today and sitting to standing ok.   Walking tolerance improving, but still sedentary  No new weakness  Taking Dilaudid as scheduled on MS Contin       MEDICAL DECISION MAKING:   Usama Harris is a 60 year anxious old male who present in follow up with a decade plus 2 years of chronic right radicular low back pain with lumbar disc herniation of L4-5 in 2004 and then L3-4 in 2016,both occurring after an inciting injury  He is chronic pain treatment naive in regards sustainable life long management of his pain difficulties.   He is post lumbar laminectomy L3-4 revision 3 month ago.   The patient continues with high medication.  He is  opioid focused with past documented history of self dose escalation and use of multiple pharmacies. Today, he is demonstrating improved compliance and has less pain behaviors   Pain rehabilitation potential and receptiveness to learn more functional and adaptive pain strategies continues to be guarded. The patient's high opioid utilization and use of multiple pharmacies has put him at risk as his insurance has limited his access. He is at risk for unintended side effects such as opioids withdrawal and respiratory suppression. We will restrict his opioid quantity, require more frequent visit and have order Narcan for home use.  His readiness for change and participation in our program is better today. We will  begin gradual activity advancement through aquatic Physical Therapy.  His past level of anxiety and impulsive behavior are significant treatment barriers.  If the patient does not demonstrate readiness for change by lack of participation or payton resistance in further opioid tapering ( more modest dose), then recommendations will be given to the primary care provider for his chronic pain care.       ASSESSMENT:  1. Chronic pain Syndrome    1. Chronic right radicular low back pain L3-4    1. S/p L3-4 decompression laminectomy 5/18/16 persistent right radiculopathy  2. S/p L4-5 laminectomy 2004  2. Degenerative Disc Disease Lumbar L4-5 no central stenosis mild foraminal stenosis.     back pain post op 7/22/16 lumbar discectomy RIGHT L3-L4 REVISION MICRODISCECTOMY    2. Chronic opioid analgesia high opioid tolerance compliance concerns with self dose escalation,resulting in early run out of medication .  3. Limiting treatment factors  1. Medication reliant /chemical coping / self dose escalation.  Better today,    2. Mild to moderate depressive features    3. Anxiety and impulsiveness, better today   4. Non restorative sleep    PLAN:  1. Dilaudid 4 mg take 1- 1.5 mg tablet(s) every 3 hours as needed limit 7 tablet(s) per day. In 2 week if pain is tolerable then reduce to limit of 6 per day. 30 day supply to 02/14/17   2. Then start  Ms Contin to 30 mg every 8 hrs, new RX given today to start 02/18/17  3. Continue with hydroxyzine capsule as needed for pain, anxiety and sleep    4. Tizanidine 4-6 mg 3 x per day as needed for muscle spasm  5. Narcan nasal spray for unintended overdose of narcotics   6. Continue all other medications    7. Opioid compliance stick to one pharmacy and within dosing instructions on bottle  8.  Aquatic Physical Therapy as tolerated.  9. Return visit with Shital Anderson CNP  4-6 weeks   GOAL setting with activity and pain medication use.  10. Ok to discuss concerns with Dr. Issa  11.  Michael chi ok         PAIN HISTORY:  Patient reports back injury after diving on vacation this past February.  Back pain at that time was not severe, but by that evening he was noting progressively increasing back and right leg pain. He went see his PCP after failing  conservative treatment he was referred to Dr. Prince Govea at [facility] showed a large L3-4 disc herniation.  On 5/18/16  the patient underwent a decompression laminectomy at L3-4.  This is in the setting of a history of chronic right radicular low back pain again after a back injury. He is post  L4-5 microdiscectomy in 2004 and chronic opioid use. He report that since his first back injury he suffered episodic exacerbations, mostly through the pelvis with mild right foot pain and weakness.     He is here under the advise of his PCP to see pain management for his chronic low back and right leg pain and to develop different strategies to manage his pain more effectively.     PAST MEDICATION TRIALS:                                                   OPIOIDS: Tylenol #3, hydrocodone( Vicodin), Morphine ER, percocet, OxyContin, tramadol   NSAID'S/ANALEGESICS: Celbrex, Ibuprofen, naprosyn,      ANTIMIGRAINE: none   STEROIDS: yes dose pack     MUSCLE  RELAXERS:  Flexeril, tizanidine, Skelaxin   ANTIDEPRESSANTS: Bupropion   ANTIANXIETY: Xanax   HYPNOTICS: Hydroxyzine   ANTICONVULSANTS: none , not wanting Gabapentin, Lyrica     TOPICALS:  None     PAST PAIN TREATMENTS:              Y/N       HELPED           NO HELP         WORSE             PREVIOUS PAIN CLINIC:                 Yes                                                      1990's X  SURGERY:                                         Yes                                                      X 2007 L3-4 Microdisectomy                                                                                                                                                                                  2016  decompression lami L3-4 and L4-5  INJECTIONS:                                     Yes                                                      1990, 2007,2015                          PHYSICAL THERAPY:                       Yes                                                      X            EXERCISE:                                        Yes                                                      X  INTEGRATIVE MEDICINE              CHIROPRACTOR:                  No                    ACUPUNCTURE:                    No              AROMATHERAPY:                 No              RELAXATION THERAPY:       No  COUNSELING:                                    No          INTERVAL HISTORY:  Pain Description aching, burning, shooting, sharp, stabbing, numb, tender    Location:  Low back and right   Pain rating 3, average 4/10, Range 2-10/10  Quality: worse in morning and at times in evening   Aggravating factors:  Walking and driving   Relieving factors:  Lying down, elevating legs and gabapentin helps burning.    Current Pain Medications: MsContin 30 mg tid. Dilaudid 4 mg (8)  , Gabapentin stopped 2 days ago.  Hydroxyzine 25 mg Q 6 hrs.prn, takes at night.   Medication side effects: no    Progress in pain program: improved  Impact on function:severe  Working  Yes as [employer]    Quality of life:improved, feels he is doing better, but discouraged with set back.   Self care no  Exercise no,relaxation no, body awareness no   Less medication focused.  .    INVESTIGATIONAL:  05/06/16 MRI Lumbar ( pre op)    T12-L1:  No disc herniation or stenosis. Facet joints are  unremarkable.     L1-L2:  No disc herniation or stenosis. Facet joints are unremarkable.         L2-L3:  Mild disc bulge with central annular fissure. No stenosis.  Facet joints are unremarkable.     L3-L4:  Moderate-to-large right central disc extrusion extends into  the subarticular recess with posterior displacement and probable  compression of  the descending right L4 nerve root. This is new since  the prior study. Mild-to-moderate central stenosis. Neural foramina  are patent.     L4-L5:  There has likely been previous surgery at this level. I  suspect a previous right laminotomy. There is advanced degenerative  disc disease with mild disc bulge and osteophytic ridging. No central  stenosis. Mild bilateral foraminal stenosis.     L5-S1:  Mild disc dehydration and disc bulge. No central stenosis.  Mild bilateral foraminal stenosis.     Paraspinous soft tissues:  Unremarkable.         IMPRESSION:     1. There is a new moderate-to-large right central disc extrusion at  the L3-L4 level that extends into the right subarticular recess with  posterior displacement and compression of the descending right L4  nerve root. Mild-to-moderate central stenosis.  2. At L5-S1 there is mild bilateral foraminal stenosis    PMHX:  Past Medical History   Diagnosis Date     Acute hepatitis C without mention of hepatic coma      Hep C - Treated     Backache, unspecified      DDD (degenerative disc disease), lumbar      s/p back surgery     Hypertension      PAST SURGICAL HISTORY  Past Surgical History   Procedure Laterality Date     Surgical history of -        ligation of left spermatic vein     Laminect/discectomy, lumbar       Laminectomy/Discectomy Cervical     Decompression lumbar one level Right 5/18/2016     Procedure: DECOMPRESSION LUMBAR ONE LEVEL;  Surgeon: Prince Govea MD;  Location:  OR     Discectomy lumbar posterior microscopic one level Right 7/22/2016     Procedure: DISCECTOMY LUMBAR POSTERIOR MICROSCOPIC ONE LEVEL;  Surgeon: Iggy Issa MD;  Location:  OR     Laminectomy, fusion lumbar two levels, combined Right 11/15/2016     Procedure: COMBINED LAMINECTOMY, FUSION LUMBAR TWO LEVELS;  Surgeon: Iggy Issa MD;  Location:  OR       CURRENT MEDICATIONS:   Current Outpatient Prescriptions   Medication     HYDROmorphone (DILAUDID) 4 MG  tablet     morphine (MS CONTIN) 30 MG 12 hr tablet     hydrOXYzine (VISTARIL) 25 MG capsule     multivitamin, therapeutic with minerals (MULTI-VITAMIN) TABS tablet     order for DME     senna-docusate (EQL SENNA-S) 8.6-50 MG per tablet     order for DME     HydrOXYzine Pamoate (VISTARIL PO)     Naloxone HCl (NARCAN NA)     carboxymethylcellulose (REFRESH PLUS) 0.5 % SOLN     latanoprost (XALATAN) 0.005 % ophthalmic solution     diazepam (VALIUM) 5 MG tablet     GABAPENTIN PO     GABAPENTIN PO     No current facility-administered medications for this visit.        ROS: twelve systems review negative and included in interval except for: pain,mental fogginess, ankle swelling   Since last visit: changes in mood: No, suicide thoughts No  Any changes in your medical condition, illness, injury or hospitalizations: Yes elevated liver enzymes.  Sleep: Restorative: No Current  Some better with middle sleep insomnia      PHYSICAL EXAM:  Constitutional:Blood pressure 150/90, pulse 84, weight 111.1 kg (245 lb), SpO2 93 %., : Body mass index is 31.46 kg/(m^2).   Psyche:  Fully oriented, Behavioral Observations: Eye contact  Poor. Mood  and spirits are good. Staying on tract better .  Musculoskeletal exam:  Gait:  Steady reciprocating.  ROM within normal limits,  MCCORMICK x 4,  Neuro exam:   Alert,  KAM @  3 mm, Speech clear fluent and appropriate. Strength 5/5   Skin/Vascular/ Autonomic:  warm, dry and intact    OTHER: Opioid risk for ongoing opioid management for non malignant chronic pain   DIRE Score for ongoing opioid management is calculated as follows:    Diagnosis =  2    Intractability = 2    Risk: Psych = 1  Chem Hlth = 1  Reliability = 2  Social = 2    Efficacy = 12    Total DIRE Score = 12 (14 or higher predicts good candidate for ongoing opioid management; 13 or lower predicts poor candidate for opioid management)   Oscar Mckeon 2006,The Journal of  Pain.,The DIRE Score: Predicting Outcomes of Opioid Prescribing for  Chronic Pain Vol.7,No.9, pp 671-681.  MNPMP : Reviewed and as expected without evidence of abuse or misuse? No  URINE DRUG SCREEN  Yes, DATE:6/8/16 as expected . OPIOID AGREEMENT: Yes DATE:06/22/16  OPOID TOLERANCE: high, Morphine  EQ:  220-225 mg/day   Analgesia good  Adverse effects some with Gabapentin  Activity fair ( lack self care)  Adherence poor     Opioid management will continue with [facility]      PROCEDURES none     Time spent: 25 minutes 100 % face to face including  20  minutes counseling and coordinating care for the above identified medical problems    Signed: BASSEM Mckeon, C.N.P.,   [facility]

## 2017-02-15 ENCOUNTER — TELEPHONE (OUTPATIENT)
Dept: PALLIATIVE MEDICINE | Facility: CLINIC | Age: 62
End: 2017-02-15

## 2017-02-15 NOTE — TELEPHONE ENCOUNTER
I already spoke to the pharmacy late morning today. They wanted permission to release both RXs on the same day. One of them was today and one tomorrow. Permission given with reminder to patient that this does NOT change the start date. Med note added to Morphine that it was released one day early.    Princess Carvajal, MSN, RN-BC  Care Coordinator  [facility]

## 2017-02-15 NOTE — TELEPHONE ENCOUNTER
Message left at 0930:    [facility]  pharmacy calling re: Rx just dropped by pt. She would like to discuss dates on the rx. Please call back [phone].   ---  Will route to nurse Florence.     TOMMY LeeN, RN-BC  Patient Care Supervisor/Care Coordinator  [facility] [place]

## 2017-02-17 ENCOUNTER — HOSPITAL ENCOUNTER (OUTPATIENT)
Dept: PHYSICAL THERAPY | Facility: CLINIC | Age: 62
Setting detail: THERAPIES SERIES
End: 2017-02-17
Attending: PHYSICIAN ASSISTANT
Payer: COMMERCIAL

## 2017-02-17 PROCEDURE — 97112 NEUROMUSCULAR REEDUCATION: CPT | Mod: GP | Performed by: PHYSICAL THERAPIST

## 2017-02-17 PROCEDURE — 40000185 ZZHC STATISTIC PT OUTPT VISIT: Performed by: PHYSICAL THERAPIST

## 2017-03-03 ENCOUNTER — HOSPITAL ENCOUNTER (OUTPATIENT)
Dept: PHYSICAL THERAPY | Facility: CLINIC | Age: 62
Setting detail: THERAPIES SERIES
End: 2017-03-03
Attending: PHYSICIAN ASSISTANT
Payer: COMMERCIAL

## 2017-03-03 PROCEDURE — 40000189 ZZH STATISTIC PT POOL VISIT: Performed by: PHYSICAL THERAPIST

## 2017-03-03 PROCEDURE — 97113 AQUATIC THERAPY/EXERCISES: CPT | Mod: GP | Performed by: PHYSICAL THERAPIST

## 2017-03-06 NOTE — PROGRESS NOTES
AQUATIC PHYSICAL THERAPY EXERCISE LOG (TRUNK)  Date     2/1/17  Susan Nichole PT, DPT  2/3/17  Susan Nichole PT, DPT 3/3/17  Susan Nichole PT, DPT     Warm Up  Ambulation (Forward/Side/Back/March/All)  x2 ea (no march) focusing on fluidity of movement  x2 ea (no march) focusing on fluidity of movement and heel/toe gait pattern                                Stretching/ROM  Chin Tucks             CROM (Flex/Ext/SB/Rot/All)             Shoulder (Shrugs/Rolls)             Scapular (Retraction/Depression)             Upper Trunk (Levator/Scalene/Upper Trapezius)             Pec Stretch (Unilateral/Bilateral)             Posterior Shoulder             Gluteal             Hamstring (On Step/Cuff)  30 sec ea LE  30 sec ea LE therapist facil with gentle rocking tisha abd/add       Calf (In Hole/At Wall)  30 sec ea LE  30 sec ea LE        Quad     30 sec ea LE        Hip Flexor (Kneel)             Piriformis (Seated/Stand)     30 sec ea LE        Trunk ROM (Flex/Ext/SB/Rot/All)  30 sec forward flexion, R/L SB  30 sec forward flexion, R/L SB        BAD RAGAZ                                      Strengthening  Abdominal Sets/ Pelvic Tilt  Cues throughout exercises  Cues throughout exercises        Shoulder (Flex/Ext, Abd/Add, IR/ER, Circles, Alternation flex/ext for core)     10x ea in wall sit with colored DBs for core activation (no circles)        Horizon (Abd/Add, Diagonals)     x10 abd/add       Rowing Arms             UE PNF (D1/D2)             Abdominal Sets (Push/Pull, side to side, push down with board)  Push down x10 medium board reports min core challenge          Squat  x10 hip hinges to wall  x15 hip hinges to wall        Lunge Squat             Hip (Flex/Ext, Abd/Add, single leg bike, circles, figure 8, All)     x10 ea hand support on wall for bal (no figure 8)       Heel/Toe Raises             Step Ups (Forward, Lateral)             Noodle Push Downs with UE(B UE/1 UE) for core strengthening  In wall sit x10 B UE  "    In wall sit with colored/white noodle x10 ea B UE and R/L UE, reports feeling good core activation, no back pain in supported position against wall       Noodle Push Downs with LE     x10 ea LE with colored/white noodle       Stir the Pot/Punches with Dumbells             Sit to Stand at Bench             Prone Plank on step       Progressed with alt UE and opposite LE extension x10 ea, good technique but pt reports \" I can't feel it in my core\"      Side Plank on step                                      Aerobic  Fast Walking             Bike/Ski/Wilber/All  x8 minutes in corner with 10 sec intervals of inc speed  x4 min biking in corner with intervals of forward/backward motions x15 min deep water biking - initially with noodle between LEs cues and ed on core stability balance ex, modified to aquatic belt per pr preference but  demos difficulty with skiing exercise with maintaining LE knee extension maintaining buoyancy, attempted jacks but with pt demo poor compliance/disinterest to therapist instruction on technique and discontinued, overall reports min fatigue and < 4/10 LBP thorughout                               Balance  Narrow Base of Support             Tandem Stand             Single Leg Stance             Heel/Toe Walking             3 Step and Stop             Braiding                demonstrates lateral upward reaching for michael chi exercise he does at home, needs repeated self modifications anxious about technique in water; therapist guided forward step x10 ea LE maintaining slightly squatted postion due to pt height in 4' water, forward extension of UEs in water with guided breathing and cues for coordinating movements and stability  Michael chi lateral weight shifting in squatted position, focusing on breathing and fluidity of motion with UEs creating gentle wave in water, demos tendency for stiff movements, difficulty achieving fluid relaxed movements                                Cool Down  " Ambulation  (I) end of session          Deep Water Dangle             Whirlpool

## 2017-03-09 ENCOUNTER — OFFICE VISIT (OUTPATIENT)
Dept: PALLIATIVE MEDICINE | Facility: CLINIC | Age: 62
End: 2017-03-09
Payer: COMMERCIAL

## 2017-03-09 VITALS
OXYGEN SATURATION: 96 % | HEART RATE: 66 BPM | WEIGHT: 245 LBS | SYSTOLIC BLOOD PRESSURE: 112 MMHG | BODY MASS INDEX: 31.46 KG/M2 | DIASTOLIC BLOOD PRESSURE: 73 MMHG

## 2017-03-09 DIAGNOSIS — Z98.1 S/P LUMBAR FUSION: ICD-10-CM

## 2017-03-09 DIAGNOSIS — Z79.891 ENCOUNTER FOR LONG-TERM OPIATE ANALGESIC USE: ICD-10-CM

## 2017-03-09 DIAGNOSIS — R53.81 PHYSICAL DECONDITIONING: ICD-10-CM

## 2017-03-09 DIAGNOSIS — G89.29 CHRONIC LOW BACK PAIN WITHOUT SCIATICA, UNSPECIFIED BACK PAIN LATERALITY: Primary | ICD-10-CM

## 2017-03-09 DIAGNOSIS — F11.220 OPIOID DEPENDENCE WITH UNCOMPLICATED INTOXICATION (H): ICD-10-CM

## 2017-03-09 DIAGNOSIS — M54.50 CHRONIC LOW BACK PAIN WITHOUT SCIATICA, UNSPECIFIED BACK PAIN LATERALITY: Primary | ICD-10-CM

## 2017-03-09 PROCEDURE — 99214 OFFICE O/P EST MOD 30 MIN: CPT | Performed by: NURSE PRACTITIONER

## 2017-03-09 RX ORDER — HYDROMORPHONE HYDROCHLORIDE 4 MG/1
4 TABLET ORAL EVERY 4 HOURS PRN
Qty: 110 TABLET | Refills: 0 | Status: SHIPPED | OUTPATIENT
Start: 2017-03-16 | End: 2017-04-13

## 2017-03-09 RX ORDER — MORPHINE SULFATE 30 MG/1
30 TABLET, FILM COATED, EXTENDED RELEASE ORAL EVERY 8 HOURS
Qty: 90 TABLET | Refills: 0 | Status: SHIPPED | OUTPATIENT
Start: 2017-03-20 | End: 2017-04-13

## 2017-03-09 ASSESSMENT — PAIN SCALES - GENERAL: PAINLEVEL: MODERATE PAIN (5)

## 2017-03-09 NOTE — PROGRESS NOTES
"                      [facility]  INTERVAL VISIT    This a  follow up examination  for Usama Harris is a 60 year old male.  Primary Care provider:  Prince Tang MD    Today's visit: 03/09/2017  Last visit: 2/14/17      Chief Complaint   Patient presents with     Pain     follow up   pool therapy, having trouble with follow up scheduling conflicts     Waiting to see Dr. Issa, thinking of applying for disability   Muscle relaxers help, stopped them to move on, pain returned and now taking tid  Noting runny nose when due for dilaudid   Also noting memory problems  Forgot Morphine ER this am. Had increase pain   Worried he injured back. No leg pain,pain across the back. No popping.    Comfortable sitting today and sitting to standing ok.   Walking tolerance improving, but still sedentary  No new weakness      MEDICAL DECISION MAKING:   Usama Harrsi is a 60 year anxious old male who present in follow up with a decade plus 2 years of chronic right radicular low back pain with lumbar disc herniation of L4-5 in 2004 and then L3-4 in 2016,both occurring after an inciting injury  He is chronic pain treatment naive in regards sustainable life long management of his pain difficulties.   He is post lumbar laminectomy L3-4 revision July 2016.   The patient continues with high medication.  He is  opioid focused with past documented history of self dose escalation and use of multiple pharmacies. Today, he is demonstrating improved compliance. He is concerned with opioid tapering that is starting today.  He is very resistant in reducing long acting opioids, insisting that he must keep a \"steady state\" of pain medication. His lumbar surgery is now 8 months ago and opioid tapering must commence.  We discuss the likelihood that there could be some opioid hyperalgesia and as opioids are reduced he will feel better.  Pain rehabilitation potential and receptiveness to learn more functional and adaptive pain strategies " continues to be guarded. The patient's high opioid utilization and use of multiple pharmacies has put him at risk as his insurance has limited his access. He is at risk for unintended side effects such as opioids withdrawal and respiratory suppression. We will restrict his opioid quantity, require more frequent visit and have order Narcan for home use.  His readiness for change and participation in our program is better today. We will begin gradual activity advancement through aquatic Physical Therapy.  His past level of anxiety and impulsive behavior are significant treatment barriers.  If the patient does not demonstrate readiness for change by lack of participation or payton resistance in further opioid tapering ( more modest dose), then recommendations will be given to the primary care provider for his chronic pain care.       ASSESSMENT:  1. Chronic pain Syndrome    1. Chronic right radicular low back pain L3-4    1. S/p L3-4 decompression laminectomy 5/18/16 persistent right radiculopathy  2. S/p L4-5 laminectomy 2004  2. Degenerative Disc Disease Lumbar L4-5 no central stenosis mild foraminal stenosis.     back pain post op 7/22/16 lumbar discectomy RIGHT L3-L4 REVISION MICRODISCECTOMY    2. Chronic opioid analgesia high opioid tolerance compliance concerns with self dose escalation,resulting in early run out of medication .  3. Limiting treatment factors  1. Medication reliant /chemical coping / self dose escalation.  Better today,    2. Mild to moderate depressive features    3. Anxiety and impulsiveness, better today   4. Non restorative sleep    PLAN:  1. Dilaudid 4 mg take 1- tablet(s) every 3 hours as needed limit 5 tablet(s) per day X 15 days then limit 3 tablet(s) per day. 30 day supply to start 3/16/17  2. Then start  Ms Contin to 30 mg every 8 hrs, new RX given today to start 03/20/17  3. Continue with hydroxyzine capsule as needed for pain, anxiety and sleep    4. Tizanidine 4-6 mg 3 x per day as  needed for muscle spasm  5. Narcan nasal spray for unintended overdose of narcotics   6. Continue all other medications    7. Opioid compliance stick to one pharmacy and within dosing instructions on bottle  8.  Aquatic Physical Therapy as tolerated.  9. Return visit with Shital Anderson CNP  4- weeks   GOAL setting with activity and pain medication use.  10. Follow up  Dr. Issa  11. Michael chi ok         PAIN HISTORY:  Patient reports back injury after diving on vacation this past February.  Back pain at that time was not severe, but by that evening he was noting progressively increasing back and right leg pain. He went see his PCP after failing  conservative treatment he was referred to Dr. Prince Govea at [facility] showed a large L3-4 disc herniation.  On 5/18/16  the patient underwent a decompression laminectomy at L3-4.  This is in the setting of a history of chronic right radicular low back pain again after a back injury. He is post  L4-5 microdiscectomy in 2004 and chronic opioid use. He report that since his first back injury he suffered episodic exacerbations, mostly through the pelvis with mild right foot pain and weakness.     He is here under the advise of his PCP to see pain management for his chronic low back and right leg pain and to develop different strategies to manage his pain more effectively.     PAST MEDICATION TRIALS:                                                   OPIOIDS: Tylenol #3, hydrocodone( Vicodin), Morphine ER, percocet, OxyContin, tramadol   NSAID'S/ANALEGESICS: Celbrex, Ibuprofen, naprosyn,      ANTIMIGRAINE: none   STEROIDS: yes dose pack     MUSCLE  RELAXERS:  Flexeril, tizanidine, Skelaxin   ANTIDEPRESSANTS: Bupropion   ANTIANXIETY: Xanax   HYPNOTICS: Hydroxyzine   ANTICONVULSANTS: none , not wanting Gabapentin, Lyrica     TOPICALS:  None     PAST PAIN TREATMENTS:              Y/N       HELPED           NO HELP         WORSE             PREVIOUS PAIN  CLINIC:                 Yes                                                      1990's X  SURGERY:                                         Yes                                                      X 2007 L3-4 Microdisectomy                                                                                                                                                                                  2016 decompression lami L3-4 and L4-5  INJECTIONS:                                     Yes                                                      1990, 2007,2015                          PHYSICAL THERAPY:                       Yes                                                      X            EXERCISE:                                        Yes                                                      X  INTEGRATIVE MEDICINE              CHIROPRACTOR:                  No                    ACUPUNCTURE:                    No              AROMATHERAPY:                 No              RELAXATION THERAPY:       No  COUNSELING:                                    No          INTERVAL HISTORY:  Pain Description aching, burning, shooting, sharp, stabbing, numb, tender    Location:  Low back and right   Pain rating 3, average 4/10, Range 2-10/10  Quality: worse in morning and at times in evening   Aggravating factors:  Walking and driving   Relieving factors:  Lying down, elevating legs and gabapentin helps burning.    Current Pain Medications: MsContin 30 mg tid. Dilaudid 4 mg (7)  , .  Hydroxyzine 25 mg Q 6 hrs.prn, takes at night.   Medication side effects: no    Progress in pain program: improved  Impact on function:severe  Working  Yes as [employer]    Quality of life:improved, feels he is doing better, but discouraged with set back.   Self care no  Exercise no,relaxation no, body awareness no   Less medication focused.  .    INVESTIGATIONAL:  05/06/16 MRI Lumbar ( pre op)    T12-L1:  No disc herniation or  stenosis. Facet joints are  unremarkable.     L1-L2:  No disc herniation or stenosis. Facet joints are unremarkable.         L2-L3:  Mild disc bulge with central annular fissure. No stenosis.  Facet joints are unremarkable.     L3-L4:  Moderate-to-large right central disc extrusion extends into  the subarticular recess with posterior displacement and probable  compression of the descending right L4 nerve root. This is new since  the prior study. Mild-to-moderate central stenosis. Neural foramina  are patent.     L4-L5:  There has likely been previous surgery at this level. I  suspect a previous right laminotomy. There is advanced degenerative  disc disease with mild disc bulge and osteophytic ridging. No central  stenosis. Mild bilateral foraminal stenosis.     L5-S1:  Mild disc dehydration and disc bulge. No central stenosis.  Mild bilateral foraminal stenosis.     Paraspinous soft tissues:  Unremarkable.         IMPRESSION:     1. There is a new moderate-to-large right central disc extrusion at  the L3-L4 level that extends into the right subarticular recess with  posterior displacement and compression of the descending right L4  nerve root. Mild-to-moderate central stenosis.  2. At L5-S1 there is mild bilateral foraminal stenosis    PMHX:  Past Medical History   Diagnosis Date     Acute hepatitis C without mention of hepatic coma      Hep C - Treated     Backache, unspecified      DDD (degenerative disc disease), lumbar      s/p back surgery     Hypertension      PAST SURGICAL HISTORY  Past Surgical History   Procedure Laterality Date     Surgical history of -        ligation of left spermatic vein     Laminect/discectomy, lumbar       Laminectomy/Discectomy Cervical     Decompression lumbar one level Right 5/18/2016     Procedure: DECOMPRESSION LUMBAR ONE LEVEL;  Surgeon: Prince Govea MD;  Location: [facility] OR     Discectomy lumbar posterior microscopic one level Right 7/22/2016     Procedure: DISCECTOMY  LUMBAR POSTERIOR MICROSCOPIC ONE LEVEL;  Surgeon: Iggy Issa MD;  Location: [facility] OR     Laminectomy, fusion lumbar two levels, combined Right 11/15/2016     Procedure: COMBINED LAMINECTOMY, FUSION LUMBAR TWO LEVELS;  Surgeon: Iggy Issa MD;  Location: [facility] OR       CURRENT MEDICATIONS:   Current Outpatient Prescriptions   Medication     AmLODIPine Besylate (NORVASC PO)     morphine (MS CONTIN) 30 MG 12 hr tablet     HYDROmorphone (DILAUDID) 4 MG tablet     tiZANidine (ZANAFLEX) 4 MG tablet     hydrOXYzine (VISTARIL) 25 MG capsule     multivitamin, therapeutic with minerals (MULTI-VITAMIN) TABS tablet     senna-docusate (EQL SENNA-S) 8.6-50 MG per tablet     Naloxone HCl (NARCAN NA)     carboxymethylcellulose (REFRESH PLUS) 0.5 % SOLN     latanoprost (XALATAN) 0.005 % ophthalmic solution     order for DME     diazepam (VALIUM) 5 MG tablet     order for DME     GABAPENTIN PO     GABAPENTIN PO     HydrOXYzine Pamoate (VISTARIL PO)     No current facility-administered medications for this visit.        ROS: twelve systems review negative and included in interval except for: pain,mental fogginess, ankle swelling   Since last visit: changes in mood: No, suicide thoughts No  Any changes in your medical condition, illness, injury or hospitalizations: Yes elevated liver enzymes.  Sleep: Restorative: No Current  Some better with middle sleep insomnia      PHYSICAL EXAM:  Constitutional:Blood pressure 112/73, pulse 66, weight 111.1 kg (245 lb), SpO2 96 %., : Body mass index is 31.46 kg/(m^2).   Psyche:  Fully oriented, Behavioral Observations: Eye contact  Poor. Mood  and spirits are anxious..  Musculoskeletal exam:  Gait:  Steady reciprocating.  ROM within normal limits,  MCCORMICK x 4,  Neuro exam:   Alert,  KAM @  3 mm, Speech clear fluent and appropriate. Strength 5/5   Skin/Vascular/ Autonomic:  warm, dry and intact    OTHER: Opioid risk for ongoing opioid management for non malignant chronic pain   DIRE Score for  ongoing opioid management is calculated as follows:    Diagnosis =  2    Intractability = 2    Risk: Psych = 1  Chem Hlth = 1  Reliability = 2  Social = 2    Efficacy = 12    Total DIRE Score = 12 (14 or higher predicts good candidate for ongoing opioid management; 13 or lower predicts poor candidate for opioid management)   Oscar Mckeon 2006,The Journal of  Pain.,The DIRE Score: Predicting Outcomes of Opioid Prescribing for Chronic Pain Vol.7,No.9, pp 671-681.  MNPMP : Reviewed and as expected without evidence of abuse or misuse? No  URINE DRUG SCREEN  Yes, DATE:6/8/16 as expected . OPIOID AGREEMENT: Yes DATE:06/22/16  OPOID TOLERANCE: high, Morphine  EQ:  220-225 mg/day   Analgesia good  Adverse effects none  Activity fair ( lack self care)  Adherence poor     Opioid management will continue with [facility]      PROCEDURES none     Time spent: 25 minutes 100 % face to face including  20  minutes counseling and coordinating care for the above identified medical problems    Signed: BASSEM Mckeon, C.N.P.,   [facility]

## 2017-03-09 NOTE — NURSING NOTE
"Chief Complaint   Patient presents with     Pain     follow up       Initial /73  Pulse 66  Wt 111.1 kg (245 lb)  SpO2 96%  BMI 31.46 kg/m2 Estimated body mass index is 31.46 kg/(m^2) as calculated from the following:    Height as of 11/15/16: 1.88 m (6' 2\").    Weight as of this encounter: 111.1 kg (245 lb).  Medication Reconciliation: theresa Zayas [facility]        "

## 2017-03-09 NOTE — PATIENT INSTRUCTIONS
Reduce Dilaudid with the next refill as directed.   Keep the Morphine  long acting the same   See me back by 4/15/17 before your next refill    No other med changes   See Dr. Issa for your follow up   Shital Anderson CNP    [facility]      ----------------------------------------------------------------  Nurse Triage line:  [phone]   Call this number with any questions or concerns. You may leave a detailed message anytime. Calls are typically returned Monday through Friday between 8 AM and 4:30 PM. We usually get back to you within 2 business days depending on the issue/request.       Medication refills:    For non-narcotic medications, call your pharmacy directly to request a refill. The pharmacy will contact the [facility] for authorization. Please allow 3-4 days for these refills to be processed.     For narcotic refills, call the nurse triage line or send a [employer] message. Please contact us 7-10 days before your refill is due. The message MUST include the name of the specific medication(s) requested and how you would like to receive the prescription(s). The options are as follows:    Pain Clinic staff can mail the prescription to your pharmacy. Please tell us the name of the pharmacy.    You may pick the prescription up at the Pain Clinic (tell us the location) or during a clinic visit with your pain provider    Pain Clinic staff can deliver the prescription to the [facility] pharmacy in the clinic building. Please tell us the location.      Scheduling number: [phone].  Call this number to schedule or change appointments.    We believe regular attendance is key to your success in our program.    Any time you are unable to keep your appointment we ask that you call us at least 24 hours in advance to let us know. This will allow us to offer the appointment time to another patient.

## 2017-03-09 NOTE — MR AVS SNAPSHOT
After Visit Summary   3/9/2017    Usama Harris    MRN: [id number]           Patient Information     Date Of Birth          1955        Visit Information        Provider Department      3/9/2017 11:30 AM Shital Anderson APRN CNP [facility]        Today's Diagnoses     Chronic low back pain without sciatica, unspecified back pain laterality    -  1    Opioid dependence with uncomplicated intoxication (H)        Encounter for long-term opiate analgesic use        Physical deconditioning        S/P lumbar fusion          Care Instructions    Reduce Dilaudid with the next refill as directed.   Keep the Morphine  long acting the same   See me back by 4/15/17 before your next refill    No other med changes   See Dr. Issa for your follow up   Shital Anderson CNP    [facility]      ----------------------------------------------------------------  Nurse Triage line:  [phone]   Call this number with any questions or concerns. You may leave a detailed message anytime. Calls are typically returned Monday through Friday between 8 AM and 4:30 PM. We usually get back to you within 2 business days depending on the issue/request.       Medication refills:    For non-narcotic medications, call your pharmacy directly to request a refill. The pharmacy will contact the [facility] for authorization. Please allow 3-4 days for these refills to be processed.     For narcotic refills, call the nurse triage line or send a [employer] message. Please contact us 7-10 days before your refill is due. The message MUST include the name of the specific medication(s) requested and how you would like to receive the prescription(s). The options are as follows:    Pain Clinic staff can mail the prescription to your pharmacy. Please tell us the name of the pharmacy.    You may pick the prescription up at the Pain Clinic (tell us the location) or during a clinic  visit with your pain provider    Pain Clinic staff can deliver the prescription to the [facility] pharmacy in the clinic building. Please tell us the location.      Scheduling number: [phone].  Call this number to schedule or change appointments.    We believe regular attendance is key to your success in our program.    Any time you are unable to keep your appointment we ask that you call us at least 24 hours in advance to let us know. This will allow us to offer the appointment time to another patient.             Follow-ups after your visit        Who to contact     If you have questions or need follow up information about today's clinic visit or your schedule please contact [facility] directly at [phone].  Normal or non-critical lab and imaging results will be communicated to you by MyChart, letter or phone within 4 business days after the clinic has received the results. If you do not hear from us within 7 days, please contact the clinic through [employer] or phone. If you have a critical or abnormal lab result, we will notify you by phone as soon as possible.  Submit refill requests through [employer] or call your pharmacy and they will forward the refill request to us. Please allow 3 business days for your refill to be completed.          Additional Information About Your Visit        [employer] Information     [employer] gives you secure access to your electronic health record. If you see a primary care provider, you can also send messages to your care team and make appointments. If you have questions, please call your primary care clinic.  If you do not have a primary care provider, please call [phone] and they will assist you.        Care EveryWhere ID     This is your Care EveryWhere ID. This could be used by other organizations to access your [facility] medical records  [id number]        Your Vitals Were     Pulse Pulse Oximetry BMI (Body Mass Index)             66 96% 31.46 kg/m2           Blood Pressure from Last 3 Encounters:   03/09/17 112/73   02/14/17 150/90   01/13/17 126/80    Weight from Last 3 Encounters:   03/09/17 111.1 kg (245 lb)   02/14/17 111.1 kg (245 lb)   12/19/16 114.3 kg (252 lb)              Today, you had the following     No orders found for display         Today's Medication Changes          These changes are accurate as of: 3/9/17 12:15 PM.  If you have any questions, ask your nurse or doctor.               These medicines have changed or have updated prescriptions.        Dose/Directions    HYDROmorphone 4 MG tablet   Commonly known as:  DILAUDID   This may have changed:    - how much to take  - additional instructions   Used for:  S/P lumbar fusion        Dose:  4 mg   Start taking on:  3/16/2017   Take 1 tablet (4 mg) by mouth every 4 hours as needed for moderate to severe pain Limit 5 tablet(s) for 15 day and then 3 tablet(s) for the next 15 days. 30 day. Start 03/16/17. Dispense on or after 03/14/17   Quantity:  110 tablet   Refills:  0       morphine 30 MG 12 hr tablet   Commonly known as:  MS CONTIN   This may have changed:  additional instructions   Used for:  S/P lumbar fusion        Dose:  30 mg   Start taking on:  3/20/2017   Take 1 tablet (30 mg) by mouth every 8 hours Start 03/20/17 Dispense on or after 03/18/17   Quantity:  90 tablet   Refills:  0            Where to get your medicines      Some of these will need a paper prescription and others can be bought over the counter.  Ask your nurse if you have questions.     Bring a paper prescription for each of these medications     HYDROmorphone 4 MG tablet    morphine 30 MG 12 hr tablet                Primary Care Provider Office Phone # Fax #    Nikko Tang [phone]       [address] [address]        Thank you!     Thank you for choosing [facility]  for your care. Our goal is always to provide you with excellent care. Hearing back from  our patients is one way we can continue to improve our services. Please take a few minutes to complete the written survey that you may receive in the mail after your visit with us. Thank you!             Your Updated Medication List - Protect others around you: Learn how to safely use, store and throw away your medicines at www.disposemymeds.org.          This list is accurate as of: 3/9/17 12:15 PM.  Always use your most recent med list.                   Brand Name Dispense Instructions for use    carboxymethylcellulose 0.5 % Soln ophthalmic solution    REFRESH PLUS     Place 1 drop into both eyes daily as needed for dry eyes       diazepam 5 MG tablet    VALIUM    60 tablet    Take 1 tablet (5 mg) by mouth every 6 hours as needed for other (muscle spasms)       EQL SENNA-S 8.6-50 MG per tablet   Generic drug:  senna-docusate     100 tablet    TAKES 5 TABLETS DAILY.       * GABAPENTIN PO      Take 300 mg by mouth 2 times daily Reported on 3/9/2017       * GABAPENTIN PO      Take 600 mg by mouth every evening Reported on 3/9/2017       HYDROmorphone 4 MG tablet   Start taking on:  3/16/2017    DILAUDID    110 tablet    Take 1 tablet (4 mg) by mouth every 4 hours as needed for moderate to severe pain Limit 5 tablet(s) for 15 day and then 3 tablet(s) for the next 15 days. 30 day. Start 03/16/17. Dispense on or after 03/14/17       latanoprost 0.005 % ophthalmic solution    XALATAN     Place 1 drop into both eyes At Bedtime       morphine 30 MG 12 hr tablet   Start taking on:  3/20/2017    MS CONTIN    90 tablet    Take 1 tablet (30 mg) by mouth every 8 hours Start 03/20/17 Dispense on or after 03/18/17       Multi-vitamin Tabs tablet      Take 1 tablet by mouth daily       NARCAN NA      Spray in nostril daily as needed (opioid reversal)       NORVASC PO      Take 10 mg by mouth daily       * order for DME     1 each    Equipment being ordered: Walker () and Walker Extension w/Wheels (, ) Treatment  Diagnosis: Difficulty walking       * order for DME     1 Device    Equipment being ordered: Orthofix lumbar bone growth stimulator       tiZANidine 4 MG tablet    ZANAFLEX    135 tablet    Take 1-1.5 tablets (4-6 mg) by mouth 3 times daily       * VISTARIL PO      Take 25 mg by mouth At Bedtime Reported on 3/9/2017       * hydrOXYzine 25 MG capsule    VISTARIL    60 capsule    Take 1-2 capsules (25-50 mg) by mouth 3 times daily as needed for itching       * Notice:  This list has 6 medication(s) that are the same as other medications prescribed for you. Read the directions carefully, and ask your doctor or other care provider to review them with you.

## 2017-03-13 ENCOUNTER — TELEPHONE (OUTPATIENT)
Dept: NEUROSURGERY | Facility: CLINIC | Age: 62
End: 2017-03-13

## 2017-03-13 DIAGNOSIS — Z98.1 S/P LUMBAR FUSION: Primary | ICD-10-CM

## 2017-03-14 NOTE — TELEPHONE ENCOUNTER
Patient already going to PT, but would like to switch to [facility] location instead. Entered updated order. Patient notified.

## 2017-03-17 ENCOUNTER — THERAPY VISIT (OUTPATIENT)
Dept: PHYSICAL THERAPY | Facility: CLINIC | Age: 62
End: 2017-03-17
Payer: COMMERCIAL

## 2017-03-17 DIAGNOSIS — M54.50 LOW BACK PAIN: ICD-10-CM

## 2017-03-17 DIAGNOSIS — Z98.1 S/P LUMBAR FUSION: Primary | ICD-10-CM

## 2017-03-17 DIAGNOSIS — Z47.89 AFTERCARE FOLLOWING SURGERY OF THE MUSCULOSKELETAL SYSTEM: Primary | ICD-10-CM

## 2017-03-17 PROCEDURE — 97161 PT EVAL LOW COMPLEX 20 MIN: CPT | Mod: GP | Performed by: PHYSICAL THERAPIST

## 2017-03-17 PROCEDURE — 97110 THERAPEUTIC EXERCISES: CPT | Mod: GP | Performed by: PHYSICAL THERAPIST

## 2017-03-17 NOTE — MR AVS SNAPSHOT
After Visit Summary   3/17/2017    sUama Harris    MRN: [id number]           Patient Information     Date Of Birth          1955        Visit Information        Provider Department      3/17/2017 2:50 PM Debo Nunn PT [facility]        Today's Diagnoses     Aftercare following surgery of the musculoskeletal system    -  1    Low back pain           Follow-ups after your visit        Your next 10 appointments already scheduled     Mar 20, 2017  9:30 AM CDT   XR LUMBAR SPINE 2/3 VIEWS with SHXR3   [facility] Radiology ([facility])    [address] [address]   [phone]           Please bring a list of your current medicines to your exam. (Include vitamins, minerals and over-thecounter medicines.) Leave your valuables at home.  Tell your doctor if there is a chance you may be pregnant.  You do not need to do anything special for this exam.            Mar 20, 2017 10:20 AM CDT   Return Visit with Iggy Issa MD   [facility] Neurosurgery Clinic ([facility])    [address] [place] [address]   [phone]            Mar 24, 2017 11:00 AM CDT   [facility] Spine with Debo Nunn PT   [facility] Therapy ([facility]  )    [address] [place] [address]   [phone]            Mar 31, 2017 11:00 AM CDT   [facility] Spine with Debo Nunn PT   [facility] ([facility]  )    [address] [place] [address]   [phone]            Apr 07, 2017 11:00 AM CDT   [facility] Spine with Debo Nunn PT   [facility] ([facility]  )    [address] [place] [address]   [phone]            Apr 13, 2017 10:30 AM CDT   Return Visit with Shital Anderson,  APRN CNP   [facility] ([facility])    [address]  [address]  [facility] [address]   [phone]            Apr 21, 2017 11:00 AM CDT   [facility] Spine with Debo Nunn PT   [facility] Therapy ([facility]  )    [address] [place] [address]   [phone]            May 04, 2017 10:50 AM CDT   [facility] Spine with Debo Nunn PT   [facility] ([facility]  )    [address] [place] [address]   [phone]            May 18, 2017 10:50 AM CDT   [facility] Spine with Debo Nunn PT   [facility] ([facility]  )    [address] [place] [address]   [phone]              Future tests that were ordered for you today     Open Future Orders        Priority Expected Expires Ordered    XR Lumbar Spine 2/3 Views Routine 3/17/2017 3/17/2018 3/17/2017            Who to contact     If you have questions or need follow up information about today's clinic visit or your schedule please contact [facility] directly at [phone].  Normal or non-critical lab and imaging results will be communicated to you by [employer], letter or phone within 4 business days after the clinic has received the results. If you do not hear from us within 7 days, please contact the clinic through [employer] or phone. If you have a critical or abnormal lab result, we will notify you by phone as soon as possible.  Submit refill requests through [employer] or call your pharmacy and they will forward the refill request to us. Please allow 3 business days for your refill to be completed.          Additional Information About Your Visit        Any[employer]Times Information     [employer] gives you secure access to your electronic health record. If you see a primary care provider, you can also  send messages to your care team and make appointments. If you have questions, please call your primary care clinic.  If you do not have a primary care provider, please call [phone] and they will assist you.        Care EveryWhere ID     This is your Care EveryWhere ID. This could be used by other organizations to access your [facility] medical records  [id number]         Blood Pressure from Last 3 Encounters:   03/09/17 112/73   02/14/17 150/90   01/13/17 126/80    Weight from Last 3 Encounters:   03/09/17 111.1 kg (245 lb)   02/14/17 111.1 kg (245 lb)   12/19/16 114.3 kg (252 lb)              We Performed the Following     HC PT EVAL, LOW COMPLEXITY     [facility] INITIAL EVAL REPORT     THERAPEUTIC EXERCISES        Primary Care Provider Office Phone # Fax #    Nikko Tang [phone]       [address]  [facility] [address]        Thank you!     Thank you for choosing [facility]  for your care. Our goal is always to provide you with excellent care. Hearing back from our patients is one way we can continue to improve our services. Please take a few minutes to complete the written survey that you may receive in the mail after your visit with us. Thank you!             Your Updated Medication List - Protect others around you: Learn how to safely use, store and throw away your medicines at www.disposemymeds.org.          This list is accurate as of: 3/17/17  4:12 PM.  Always use your most recent med list.                   Brand Name Dispense Instructions for use    carboxymethylcellulose 0.5 % Soln ophthalmic solution    REFRESH PLUS     Place 1 drop into both eyes daily as needed for dry eyes       diazepam 5 MG tablet    VALIUM    60 tablet    Take 1 tablet (5 mg) by mouth every 6 hours as needed for other (muscle spasms)       EQL SENNA-S 8.6-50 MG per tablet   Generic drug:  senna-docusate     100 tablet    TAKES 5  TABLETS DAILY.       * GABAPENTIN PO      Take 300 mg by mouth 2 times daily Reported on 3/9/2017       * GABAPENTIN PO      Take 600 mg by mouth every evening Reported on 3/9/2017       HYDROmorphone 4 MG tablet    DILAUDID    110 tablet    Take 1 tablet (4 mg) by mouth every 4 hours as needed for moderate to severe pain Limit 5 tablet(s) for 15 day and then 3 tablet(s) for the next 15 days. 30 day. Start 03/16/17. Dispense on or after 03/14/17       latanoprost 0.005 % ophthalmic solution    XALATAN     Place 1 drop into both eyes At Bedtime       morphine 30 MG 12 hr tablet   Start taking on:  3/20/2017    MS CONTIN    90 tablet    Take 1 tablet (30 mg) by mouth every 8 hours Start 03/20/17 Dispense on or after 03/18/17       Multi-vitamin Tabs tablet      Take 1 tablet by mouth daily       NARCAN NA      Spray in nostril daily as needed (opioid reversal)       NORVASC PO      Take 10 mg by mouth daily       * order for DME     1 each    Equipment being ordered: Walker () and Walker Extension w/Wheels (, ) Treatment Diagnosis: Difficulty walking       * order for DME     1 Device    Equipment being ordered: Orthofix lumbar bone growth stimulator       tiZANidine 4 MG tablet    ZANAFLEX    135 tablet    Take 1-1.5 tablets (4-6 mg) by mouth 3 times daily       * VISTARIL PO      Take 25 mg by mouth At Bedtime Reported on 3/9/2017       * hydrOXYzine 25 MG capsule    VISTARIL    60 capsule    Take 1-2 capsules (25-50 mg) by mouth 3 times daily as needed for itching       * Notice:  This list has 6 medication(s) that are the same as other medications prescribed for you. Read the directions carefully, and ask your doctor or other care provider to review them with you.

## 2017-03-17 NOTE — PROGRESS NOTES
"Subjective:    Usama Harris is a 61 year old male with a lumbar condition.  Condition occurred with:  Insidious onset.  Condition occurred: for unknown reasons.  This is a chronic condition  11/15/17 lumbar fusion of L3-4, L4-5.  Pt tried aquatic physical therapy, would like to change to land based PT now.  Pt reports 2 discectomy surgeries without relief of symptoms, then fusion, feels that it went well.  Did leg raises and \"stomach crunches\" with PT.  [place]man he was doing well at 2 months post-op, went to aquatic PT which he did not feel was a good fit.  Feels like he's \"grinding in the muscle\" where the rods are.  Pt reports difficulty sleeping on his side, which is his preferred position..    Patient reports pain:  Lower lumbar spine.  Radiates to:  Thigh right and foot right (lateral R thigh numbness, R great toe pain comes and goes,  R foot drop. L LE is fine).  Pain is described as aching and sharp and is intermittent and reported as 3/10.  Associated symptoms:  Loss of motion, loss of strength and numbness. Pain is the same all the time.  Symptoms are exacerbated by walking and other (reaching overhead) and relieved by muscle relaxants, analgesics and ice.  Since onset symptoms are gradually improving.    Previous treatment includes physical therapy.  There was mild improvement following previous treatment.  General health as reported by patient is good.  Pertinent medical history includes:  Other (hx of smoking).  Medical allergies: no.  Other surgeries include:  Orthopedic surgery (two discectomies).  Current medications:  High blood pressure medication and pain medication.  Current occupation is RN.  Patient is currently not working due to present treatment problem.      Barriers include:  None as reported by the patient.    Red flags:  Foot drop.                      Objective:    Standing Alignment:        Lumbar:  Lordosis decr and sway back  Pelvis deviations alignment: CCW rotation.  Hip deviations " alignment: B femoral medial rotation.                  Physical Exam     Functional Tests:  Forward bend: tightness, limited lumbar and hip motion  Back bend: no change in sxs  L trunk rotation: motion primarily from hips, no change in sxs  R trunk rotation: motion primarily from hips, no change in sxs  L trunk lateral flexion: increased pain on return  R trunk lateral flexion: increased pain on return    Sensation:  Light touch intact and symmetrical B LEs except increased sensation at medial knee and lower leg R compared to L    Strength:  Hip flexion: B 5/5 slight pain in back   Knee extension: B 4+/5 pain free  Dorsiflexion: L 5/5, R 3+/5 pain free  Glut med: L 4+/5, R 3+/5 pain free    Trunk AROM (%):  Motion L R Comments   flexion   33   extension   50   rotation 50 50    Lateral flexion 75 75      Visual Appraisal:  Incision is well healing    Edema:  Not assessed    Gait:  L>R Trendelenberg, B femoral medial rotation, B foot abduction, decreased dorsiflexion R LE        General     ROS    Assessment/Plan:      Patient is a 61 year old male with lumbar complaints s/p lumbar fusion 11/15/16.  The pt expressed concern throughout the session regarding his progress and lingering pain level.  He stated that he has not had good experiences with physical therapy in the past.  He would like to decrease his use of narcotic pain medications and return to physical activity.  Patient has the following significant findings with corresponding treatment plan.                Diagnosis 1:  S/p L3-4, L4-5 lumbar fusion  Pain -  hot/cold therapy, manual therapy, education and home program  Decreased ROM/flexibility - manual therapy, therapeutic exercise, therapeutic activity and home program  Decreased strength - therapeutic exercise, therapeutic activities and home program  Impaired gait - gait training and home program  Decreased function - therapeutic activities and home program  Impaired posture - neuro re-education,  therapeutic activities and home program    Therapy Evaluation Codes:   1) History comprised of:   Personal factors that impact the plan of care:      Anxiety, Time since onset of symptoms and Work status.    Comorbidity factors that impact the plan of care are:      None.     Medications impacting care: Pain.  2) Examination of Body Systems comprised of:   Body structures and functions that impact the plan of care:      Lumbar spine.   Activity limitations that impact the plan of care are:      Walking, Sleeping and reaching overhead.  3) Clinical presentation characteristics are:   Stable/Uncomplicated.  4) Decision-Making    Low complexity using standardized patient assessment instrument and/or measureable assessment of functional outcome.  Cumulative Therapy Evaluation is: Low complexity.    Previous and current functional limitations:  (See Goal Flow Sheet for this information)    Short term and Long term goals: (See Goal Flow Sheet for this information)     Communication ability:  Patient appears to be able to clearly communicate and understand verbal and written communication and follow directions correctly.  Treatment Explanation - The following has been discussed with the patient:   RX ordered/plan of care  Anticipated outcomes  Possible risks and side effects  This patient would benefit from PT intervention to resume normal activities.   Rehab potential is fair.    Frequency:  1 X week, once daily  Duration:  for 3 weeks tapering to 1 X every 2 weeks over 6 weeks  Discharge Plan:  Achieve all LTG.  Independent in home treatment program.  Reach maximal therapeutic benefit.    Please refer to the daily flowsheet for treatment today, total treatment time and time spent performing 1:1 timed codes.

## 2017-03-20 ENCOUNTER — OFFICE VISIT (OUTPATIENT)
Dept: NEUROSURGERY | Facility: CLINIC | Age: 62
End: 2017-03-20
Attending: NEUROLOGICAL SURGERY
Payer: COMMERCIAL

## 2017-03-20 ENCOUNTER — HOSPITAL ENCOUNTER (OUTPATIENT)
Dept: GENERAL RADIOLOGY | Facility: CLINIC | Age: 62
Discharge: HOME | End: 2017-03-20
Attending: NEUROLOGICAL SURGERY | Admitting: NEUROLOGICAL SURGERY
Payer: COMMERCIAL

## 2017-03-20 VITALS
BODY MASS INDEX: 32.1 KG/M2 | OXYGEN SATURATION: 96 % | SYSTOLIC BLOOD PRESSURE: 145 MMHG | HEART RATE: 78 BPM | DIASTOLIC BLOOD PRESSURE: 87 MMHG | WEIGHT: 250 LBS | TEMPERATURE: 97.6 F

## 2017-03-20 DIAGNOSIS — Z98.1 S/P LUMBAR FUSION: Primary | ICD-10-CM

## 2017-03-20 DIAGNOSIS — Z98.1 S/P LUMBAR FUSION: ICD-10-CM

## 2017-03-20 PROCEDURE — 99213 OFFICE O/P EST LOW 20 MIN: CPT | Performed by: NEUROLOGICAL SURGERY

## 2017-03-20 PROCEDURE — 40000269 ZZH STATISTIC NO CHARGE FACILITY FEE

## 2017-03-20 PROCEDURE — 72100 X-RAY EXAM L-S SPINE 2/3 VWS: CPT

## 2017-03-20 PROCEDURE — 40000269 ZZH STATISTIC NO CHARGE FACILITY FEE: Performed by: NEUROLOGICAL SURGERY

## 2017-03-20 NOTE — MR AVS SNAPSHOT
After Visit Summary   3/20/2017    Usama Harris    MRN: [id number]           Patient Information     Date Of Birth          1955        Visit Information        Provider Department      3/20/2017 10:20 AM Iggy Issa MD [facility]        Today's Diagnoses     S/P lumbar fusion    -  1      Care Instructions    Jewitt brace through Orthotics         Follow-ups after your visit        Additional Services     ORTHOTICS REFERRAL       **This referral order prints off in the [facility]  (Orthotics & Prosthetics) Central Scheduling Office**    The [facility] Central Scheduling Staff will contact the patient to schedule appointments.     Central Scheduling Contact Information: [phone] ([place])    Orthotics: Jewitt brace     Please be aware that coverage of these services is subject to the terms and limitations of your health insurance plan.  Call member services at your health plan with any benefit or coverage questions.      Please bring the following to your appointment:    >>   Any x-rays, CTs or MRIs which have been performed.  Contact the facility where they were done to arrange for  prior to your scheduled appointment.    >>   List of current medications   >>   This referral request   >>   Any documents/labs given to you for this referral                  Your next 10 appointments already scheduled     Mar 24, 2017 11:00 AM CDT   JUANI Spine with Debo Nunn PT   [place] [facility] ([address] [address]   [phone]            Mar 31, 2017 11:00 AM CDT   JUANI Spine with Debo Nunn PT   [place] [facility] ([address] [address]   [phone]            Apr 07, 2017 11:00 AM CDT   JUANI Spine with Debo Nunn PT   [place] [facility]  - [facility] ([facility]  )    [address] [place] [address]   [phone]            Apr 13, 2017 10:30 AM CDT   Return Visit with MARIA ESTHER Nazario CNP   [facility] ([facility])    [address]  [address]  [facility] [address]   [phone]            Apr 21, 2017 11:00 AM CDT   JUANI Spine with Debo Nunn PT   [place] [facility] ([facility]  )    [address] [place] [address]   [phone]            May 04, 2017 10:50 AM CDT   JUANI Spine with Debo Nunn PT   [facility] ([facility]  )    [address] [place] [address]   [phone]            May 18, 2017 10:50 AM CDT   JUANI Spine with Debo Nunn PT   [facility] ([facility]  )    [address] [place] [address]   [phone]              Who to contact     If you have questions or need follow up information about today's clinic visit or your schedule please contact [facility] directly at [phone].  Normal or non-critical lab and imaging results will be communicated to you by [email or web address], letter or phone within 4 business days after the clinic has received the results. If you do not hear from us within 7 days, please contact the clinic through [email or web address] or phone. If you have a critical or abnormal lab result, we will notify you by phone as soon as possible.  Submit refill requests through [facility] or call your pharmacy and they will forward the refill request to us. Please allow 3 business days for your refill to be completed.          Additional Information About Your Visit        [email or web address] Information     [facility] gives you secure access to your electronic health record. If you see a primary care provider, you can also send  messages to your care team and make appointments. If you have questions, please call your primary care clinic.  If you do not have a primary care provider, please call [phone] and they will assist you.        Care EveryWhere ID     This is your Care EveryWhere ID. This could be used by other organizations to access your [place] medical records  [id number]        Your Vitals Were     Pulse Temperature Pulse Oximetry BMI (Body Mass Index)          78 97.6  F (36.4  C) (Oral) 96% 32.1 kg/m2         Blood Pressure from Last 3 Encounters:   03/20/17 145/87   03/09/17 112/73   02/14/17 150/90    Weight from Last 3 Encounters:   03/20/17 250 lb (113.4 kg)   03/09/17 245 lb (111.1 kg)   02/14/17 245 lb (111.1 kg)              We Performed the Following     ORTHOTICS REFERRAL        Primary Care Provider Office Phone # Fax #    Nikko Tang [phone]       [address] [address]        Thank you!     Thank you for choosing [facility]  for your care. Our goal is always to provide you with excellent care. Hearing back from our patients is one way we can continue to improve our services. Please take a few minutes to complete the written survey that you may receive in the mail after your visit with us. Thank you!             Your Updated Medication List - Protect others around you: Learn how to safely use, store and throw away your medicines at www.disposemymeds.org.          This list is accurate as of: 3/20/17 10:20 AM.  Always use your most recent med list.                   Brand Name Dispense Instructions for use    carboxymethylcellulose 0.5 % Soln ophthalmic solution    REFRESH PLUS     Place 1 drop into both eyes daily as needed for dry eyes       diazepam 5 MG tablet    VALIUM    60 tablet    Take 1 tablet (5 mg) by mouth every 6 hours as needed for other (muscle spasms)       EQL SENNA-S 8.6-50 MG per tablet   Generic drug:   senna-docusate     100 tablet    TAKES 5 TABLETS DAILY.       * GABAPENTIN PO      Take 300 mg by mouth 2 times daily Reported on 3/20/2017       * GABAPENTIN PO      Take 600 mg by mouth every evening Reported on 3/20/2017       HYDROmorphone 4 MG tablet    DILAUDID    110 tablet    Take 1 tablet (4 mg) by mouth every 4 hours as needed for moderate to severe pain Limit 5 tablet(s) for 15 day and then 3 tablet(s) for the next 15 days. 30 day. Start 03/16/17. Dispense on or after 03/14/17       latanoprost 0.005 % ophthalmic solution    XALATAN     Place 1 drop into both eyes At Bedtime       morphine 30 MG 12 hr tablet    MS CONTIN    90 tablet    Take 1 tablet (30 mg) by mouth every 8 hours Start 03/20/17 Dispense on or after 03/18/17       Multi-vitamin Tabs tablet      Take 1 tablet by mouth daily       NARCAN NA      Spray in nostril daily as needed (opioid reversal)       NORVASC PO      Take 10 mg by mouth daily       * order for DME     1 each    Equipment being ordered: Walker () and Walker Extension w/Wheels (, ) Treatment Diagnosis: Difficulty walking       * order for DME     1 Device    Equipment being ordered: Orthofix lumbar bone growth stimulator       tiZANidine 4 MG tablet    ZANAFLEX    135 tablet    Take 1-1.5 tablets (4-6 mg) by mouth 3 times daily       * VISTARIL PO      Take 25 mg by mouth At Bedtime Reported on 3/9/2017       * hydrOXYzine 25 MG capsule    VISTARIL    60 capsule    Take 1-2 capsules (25-50 mg) by mouth 3 times daily as needed for itching       * Notice:  This list has 6 medication(s) that are the same as other medications prescribed for you. Read the directions carefully, and ask your doctor or other care provider to review them with you.

## 2017-03-20 NOTE — NURSING NOTE
"Usama Harris is a 61 year old male who presents for:  Chief Complaint   Patient presents with     Neurologic Problem     s/p lumbar fusion 11-15-16        Initial Vitals:  There were no vitals taken for this visit. Estimated body mass index is 31.46 kg/(m^2) as calculated from the following:    Height as of 11/15/16: 6' 2\" (1.88 m).    Weight as of 3/9/17: 245 lb (111.1 kg).. There is no height or weight on file to calculate BSA. BP completed using cuff size: regular  Data Unavailable    Do you feel safe in your environment?  Yes  Do you need any refills today? No    Nursing Comments: S/P LUMBAR FUSION 11-15-16.  Patient rates HIS pain today as 4 with movement      5 min. nursing intake time  Azra Stout CMA, AAS      Discharge plan:    See providers dictation  2 min. nursing discharge time  Azra Stout CMA, AAS     "

## 2017-03-20 NOTE — PROGRESS NOTES
61-year-old male status post L3 to 5 posterior spinal fusion in November.  He was recovering very well from surgery with near complete resolution of pain at two months.  Last month, she was doing physical therapy when he felt an acute popping sensation in his back.  Since then he's had seven out of 10, aching pain radiating side to side in the low back, worse with walking.  New x-rays lumbar spine demonstrate excellent positioning of the hardware, with a superior endplate fracture at L1.         Past Medical History   Diagnosis Date     Acute hepatitis C without mention of hepatic coma      Hep C - Treated     Backache, unspecified      DDD (degenerative disc disease), lumbar      s/p back surgery     Hypertension      Past Surgical History   Procedure Laterality Date     Surgical history of -        ligation of left spermatic vein     Laminect/discectomy, lumbar       Laminectomy/Discectomy Cervical     Decompression lumbar one level Right 5/18/2016     Procedure: DECOMPRESSION LUMBAR ONE LEVEL;  Surgeon: Prince Govea MD;  Location: [facility] OR     Discectomy lumbar posterior microscopic one level Right 7/22/2016     Procedure: DISCECTOMY LUMBAR POSTERIOR MICROSCOPIC ONE LEVEL;  Surgeon: Iggy Issa MD;  Location: [facility] OR     Laminectomy, fusion lumbar two levels, combined Right 11/15/2016     Procedure: COMBINED LAMINECTOMY, FUSION LUMBAR TWO LEVELS;  Surgeon: Iggy Issa MD;  Location:  OR     Social History     Social History     Marital status:      Spouse name: N/A     Number of children: 2     Years of education: 16     Occupational History     RN None      in [employer]     Social History Main Topics     Smoking status: Former Smoker     Packs/day: 1.00     Years: 29.00     Types: Cigarettes     Quit date: 1/7/2000     Smokeless tobacco: Never Used     Alcohol use No      Comment: rarely     Drug use: No     Sexual activity: Yes     Partners: Female     Other Topics Concern      Parent/Sibling W/ Cabg, Mi Or Angioplasty Before 65f 55m? No     Social History Narrative    Dairy/d 3 servings/d.     Caffeine 8 servings/d pot of coffee    Exercise not regular but active x week    Sunscreen used - No    Seatbelts used - Yes    Working smoke/CO detectors in the home - Yes    Guns stored in the home - Yes    Self Breast Exams - NA    Self Testicular Exam - sometimes    Eye Exam up to date - Yes 2006    Dental Exam up to date - Yes    Pap Smear up to date - NA    Mammogram up to date - NA    PSA up to date - No not seen on record    Dexa Scan up to date - NA    Flex Sig / Colonoscopy up to date - Not yet and due    Immunizations up to date - No  Tdap today  No record of any given    Abuse: Current or Past(Physical, Sexual or Emotional)- No    Do you feel safe in your environment - Yes    Dee Scruggs RN    10/10/08    Has 2 associates degrees on in nursing              Family History   Problem Relation Age of Onset     C.A.D. Mother      Circulatory Mother      DVT     Cardiovascular Mother      DVT     HEART DISEASE Mother      a-fib     Hypertension Mother      CEREBROVASCULAR DISEASE Mother      diseased        ROS: 10 point ROS neg other than the symptoms noted above in the HPI.    Physical Exam  /87 (BP Location: Right arm, Cuff Size: Adult Regular)  Pulse 78  Temp 97.6  F (36.4  C) (Oral)  Wt 113.4 kg (250 lb)  SpO2 96%  BMI 32.1 kg/m2  HEENT:  Normocephalic, atraumatic.  PERRLA.  EOM s intact.  Visual fields full to gross exam  Neck:  Supple, non-tender, without lymphadenopathy.  Heart:  No peripheral edema  Lungs:  No SOB  Abdomen:  Non-distended.   Skin:  Warm and dry.  Extremities:  No edema, cyanosis or clubbing.    NEUROLOGICAL EXAMINATION:     Mental status:  Alert and Oriented x 3, speech is fluent.  Cranial nerves:  II-XII intact.   Motor:    Shoulder Abduction:  Right:  5/5   Left:  5/5  Biceps:                      Right:  5/5   Left:  5/5  Triceps:                      Right:  5/5   Left:  5/5  Wrist Extensors:       Right:  5/5   Left:  5/5  Wrist Flexors:           Right:  5/5   Left:  5/5  interosseus :            Right:  5/5   Left:  5/5   Hip Flexor:                Right: 5/5  Left:  5/5  Hip Adductor:             Right:  5/5  Left:  5/5  Hip Abductor:             Right:  5/5  Left:  5/5  Gastroc Soleus:        Right:  5/5  Left:  5/5  Tib/Ant:                      Right:  5/5  Left:  5/5  EHL:                     Right:  5/5  Left:  5/5  Sensation:  Intact  Reflexes:  Negative Babinski.  Negative Clonus.  Negative Burk's.  Coordination:  Smooth finger to nose testing.   Negative pronator drift.  Smooth tandem walking.    A/P:  61-year-old male status post L3 to 5 posterior spinal fusion in November    Unfortunately, his has developed a new endplate fracture at L1  We will get him set up with a Lynne brace  We instructed him to wear this during activity  He will continue physical therapy  We'll see him back in three months with new x-rays

## 2017-03-24 ENCOUNTER — THERAPY VISIT (OUTPATIENT)
Dept: PHYSICAL THERAPY | Facility: CLINIC | Age: 62
End: 2017-03-24
Payer: COMMERCIAL

## 2017-03-24 DIAGNOSIS — Z47.89 AFTERCARE FOLLOWING SURGERY OF THE MUSCULOSKELETAL SYSTEM: ICD-10-CM

## 2017-03-24 DIAGNOSIS — M54.50 LOW BACK PAIN: ICD-10-CM

## 2017-03-24 PROCEDURE — 97530 THERAPEUTIC ACTIVITIES: CPT | Mod: GP | Performed by: PHYSICAL THERAPIST

## 2017-03-24 PROCEDURE — 97110 THERAPEUTIC EXERCISES: CPT | Mod: GP | Performed by: PHYSICAL THERAPIST

## 2017-03-31 ENCOUNTER — THERAPY VISIT (OUTPATIENT)
Dept: PHYSICAL THERAPY | Facility: CLINIC | Age: 62
End: 2017-03-31
Payer: COMMERCIAL

## 2017-03-31 DIAGNOSIS — Z47.89 AFTERCARE FOLLOWING SURGERY OF THE MUSCULOSKELETAL SYSTEM: ICD-10-CM

## 2017-03-31 DIAGNOSIS — M54.50 LOW BACK PAIN: ICD-10-CM

## 2017-03-31 PROCEDURE — 97530 THERAPEUTIC ACTIVITIES: CPT | Mod: GP | Performed by: PHYSICAL THERAPIST

## 2017-04-07 ENCOUNTER — THERAPY VISIT (OUTPATIENT)
Dept: PHYSICAL THERAPY | Facility: CLINIC | Age: 62
End: 2017-04-07
Payer: COMMERCIAL

## 2017-04-07 DIAGNOSIS — M54.50 LOW BACK PAIN: ICD-10-CM

## 2017-04-07 DIAGNOSIS — Z47.89 AFTERCARE FOLLOWING SURGERY OF THE MUSCULOSKELETAL SYSTEM: ICD-10-CM

## 2017-04-07 PROCEDURE — 97110 THERAPEUTIC EXERCISES: CPT | Mod: GP | Performed by: PHYSICAL THERAPIST

## 2017-04-07 PROCEDURE — 97530 THERAPEUTIC ACTIVITIES: CPT | Mod: GP | Performed by: PHYSICAL THERAPIST

## 2017-04-13 ENCOUNTER — OFFICE VISIT (OUTPATIENT)
Dept: PALLIATIVE MEDICINE | Facility: CLINIC | Age: 62
End: 2017-04-13
Payer: COMMERCIAL

## 2017-04-13 VITALS
DIASTOLIC BLOOD PRESSURE: 90 MMHG | BODY MASS INDEX: 31.97 KG/M2 | SYSTOLIC BLOOD PRESSURE: 153 MMHG | OXYGEN SATURATION: 95 % | WEIGHT: 249 LBS | HEART RATE: 68 BPM

## 2017-04-13 DIAGNOSIS — Z98.1 S/P LUMBAR FUSION: ICD-10-CM

## 2017-04-13 PROCEDURE — 99213 OFFICE O/P EST LOW 20 MIN: CPT | Performed by: NURSE PRACTITIONER

## 2017-04-13 RX ORDER — MORPHINE SULFATE 30 MG/1
30 TABLET, FILM COATED, EXTENDED RELEASE ORAL EVERY 8 HOURS
Qty: 90 TABLET | Refills: 0 | Status: SHIPPED | OUTPATIENT
Start: 2017-04-13 | End: 2017-05-12

## 2017-04-13 RX ORDER — HYDROMORPHONE HYDROCHLORIDE 4 MG/1
4 TABLET ORAL EVERY 4 HOURS PRN
Qty: 150 TABLET | Refills: 0 | Status: SHIPPED | OUTPATIENT
Start: 2017-04-13 | End: 2017-05-12

## 2017-04-13 ASSESSMENT — PAIN SCALES - GENERAL: PAINLEVEL: MODERATE PAIN (5)

## 2017-04-13 NOTE — Clinical Note
Patient has new L1 fracture.  I have discharged him back to you. He is not able to participate at this point with Chronic pain therapies.  He should follow up with us after he is out of the [id number] Brace and has clearance from Dr. Issa.  He has enough opioids until about 5/15 and I asked him to f/u with you before he runs out.  At next refill if he is compliant and spine stable  I would start with opioid taper at 10 % every 2 weeks using the long acting for taper first.  He will likely resist but part of his pain is due to opioid hyperalgesia.  I would also advise sleep referral with his c/o lowering P02 at night and more shallow respirations.   Contact me with questions or concerns Shital Anderson, CNP   [facility]    dig page

## 2017-04-13 NOTE — PROGRESS NOTES
[facility]  INTERVAL VISIT    This a  follow up examination  for Usama Harris is a 60 year old male.  Primary Care provider:  Nikko Tang MD    Today's visit: 04/13/2017  Last visit: 3/09/17      Chief Complaint   Patient presents with     Pain     F/U   pool therapy, having trouble with follow up scheduling conflicts     Saw  Dr. Issa, has new compression fracture at L1    thinking of applying for disability   Muscle relaxers help, stopped them to move on, pain returned and now taking tid  Is very depressed   Wonders if ok that he has lower O2 levels and shallower breathing at rest.     Comfortable sitting today and sitting to standing ok.   Walking tolerance improving, but still sedentary, has Jewlett brace on today, must wear with activity    No new weakness      MEDICAL DECISION MAKING:   Usama Harris is a 60 year anxious old male who present in follow up with a decade plus 2 years of chronic right radicular low back pain with lumbar disc herniation of L4-5 in 2004 and then L3-4 in 2016,both occurring after an inciting injury  He is chronic pain treatment naive in regards sustainable life long management of his pain difficulties.   He is post lumbar laminectomy L3-4 revision July 2016. He has developed a new endplate compression fracture at left. He is very discouraged.It seems his pain control is somewhat improved with the brace on.    The patient continues with high medication.  He is  opioid focused with past documented history of self dose escalation and use of multiple pharmacies. Since his last visit he has been compliant with opioids.  As patient is in the acute pain phase,  We are unable to offer him chronic pain therapies.  We discuss the likelihood that there could be some opioid hyperalgesia but for now I will not taper further and have transferred his care back to his primary care provider.  We did discuss what safe tapering schedule would look like with  minimal to no withdrawal of opioids.  Generally, it is safe to reduce by 10 % per week. If patient's struggle with this then I have increased the time period of opioid reduction to every 2 weeks at 10 %.  He is aware that aberrant behaviors such as over use may lead to a faster taper.  I asked him to see me back after he is out of his Lynne brace and ready for rehabilitation. He has been instructed to continue his current Physical Therapy ordered by Dr. Issa at this point.  Pain rehabilitation potential and receptiveness to learn more functional and adaptive pain strategies continues to be guarded. The patient's high opioid utilization and use of multiple pharmacies has put him at risk as his insurance has limited his access. He is at risk for unintended side effects such as opioids withdrawal and respiratory suppression. We will restrict his opioid quantity, require more frequent visit and have order Narcan for home use.      ASSESSMENT:  1. Chronic pain Syndrome    1. Chronic right radicular low back pain L3-4    1. S/p L3-4 decompression laminectomy 5/18/16 persistent right radiculopathy  2. S/p L4-5 laminectomy 2004  2. Degenerative Disc Disease Lumbar L4-5 no central stenosis mild foraminal stenosis.     back pain post op 7/22/16 lumbar discectomy RIGHT L3-L4 REVISION    MICRODISCECTOMY          3.  New L1 endplate fracture    2. Chronic opioid analgesia high opioid tolerance compliance concerns with self dose escalation,resulting in early run out of medication . He has been compliant since visit   1. Advised sleep study with concerns for hyponoic with rest    3. Limiting treatment factors  1. Medication reliant /chemical coping / self dose escalation.  Better today,    2. Mild to moderate depressive features    3. Anxiety and impulsiveness, better today   4. Non restorative sleep  5. New endplate fracture     PLAN:  1. Dilaudid 4 mg take 1- tablet(s) every 3 hours as needed limit 5 tablet(s) per day Start  4/15/17  2. Then start  Ms Contin to 30 mg every 8 hrs, new RX given today to start 04/17/17   1. Ok to safely taper at 10 % per week of the total daily opioid intake as outpatient  and will usually not produce opioid withdrawal.  2. Consider over night sleep study if concerned about hypoventilation syndrome associated with opioid use.    3. Continue with hydroxyzine capsule as needed for pain, anxiety and sleep    4. Tizanidine 4-6 mg 3 x per day as needed for muscle spasm  5. Narcan nasal spray for unintended overdose of narcotics   6. Continue all other medications    7. Opioid compliance stick to one pharmacy and within dosing instructions on bottle  8. Physical Therapy as tolerated.  9. Return visit with Shital nAderson CNP after out of brace and clearance from    GOAL setting with activity and pain medication use.  10. Follow up  Dr. Issa and your primary care           PAIN HISTORY:  Patient reports back injury after diving on vacation this past February.  Back pain at that time was not severe, but by that evening he was noting progressively increasing back and right leg pain. He went see his PCP after failing  conservative treatment he was referred to Dr. Prince Govea at [facility] showed a large L3-4 disc herniation.  On 5/18/16  the patient underwent a decompression laminectomy at L3-4.  This is in the setting of a history of chronic right radicular low back pain again after a back injury. He is post  L4-5 microdiscectomy in 2004 and chronic opioid use. He report that since his first back injury he suffered episodic exacerbations, mostly through the pelvis with mild right foot pain and weakness.     He is here under the advise of his PCP to see pain management for his chronic low back and right leg pain and to develop different strategies to manage his pain more effectively.     PAST MEDICATION TRIALS:                                                   OPIOIDS: Tylenol #3,  hydrocodone( Vicodin), Morphine ER, percocet, OxyContin, tramadol   NSAID'S/ANALEGESICS: Celbrex, Ibuprofen, naprosyn,      ANTIMIGRAINE: none   STEROIDS: yes dose pack     MUSCLE  RELAXERS:  Flexeril, tizanidine, Skelaxin   ANTIDEPRESSANTS: Bupropion   ANTIANXIETY: Xanax   HYPNOTICS: Hydroxyzine   ANTICONVULSANTS: none , not wanting Gabapentin, Lyrica     TOPICALS:  None     PAST PAIN TREATMENTS:              Y/N       HELPED           NO HELP         WORSE             PREVIOUS PAIN CLINIC:                 Yes                                                      1990's X  SURGERY:                                         Yes                                                      X 2007 L3-4 Microdisectomy                                                                                                                                                                                  2016 decompression lami L3-4 and L4-5  INJECTIONS:                                     Yes                                                      1990, 2007,2015                          PHYSICAL THERAPY:                       Yes                                                      X            EXERCISE:                                        Yes                                                      X  INTEGRATIVE MEDICINE              CHIROPRACTOR:                  No                    ACUPUNCTURE:                    No              AROMATHERAPY:                 No              RELAXATION THERAPY:       No  COUNSELING:                                    No          INTERVAL HISTORY:  Pain Description aching, burning, shooting, sharp, stabbing, numb, tender    Location:  Low back and right   Pain rating 3, average 4/10, Range 2-10/10  Quality: worse in morning and at times in evening   Aggravating factors:  Walking and driving   Relieving factors:  Lying down, elevating legs and gabapentin helps burning.    Current Pain Medications: MsContin 30  mg tid. Dilaudid 4 mg (5) , .  Hydroxyzine 25 mg Q 6 hrs.prn, takes at night.   Medication side effects: no    Progress in pain program: improved  Impact on function:severe  Working  Yes as [employer]    Quality of life:improved, feels he is doing better, but discouraged with set back.   Self care no  Exercise no,relaxation no, body awareness no   Less medication focused.  .    INVESTIGATIONAL:  05/06/16 MRI Lumbar ( pre op)    T12-L1:  No disc herniation or stenosis. Facet joints are  unremarkable.     L1-L2:  No disc herniation or stenosis. Facet joints are unremarkable.         L2-L3:  Mild disc bulge with central annular fissure. No stenosis.  Facet joints are unremarkable.     L3-L4:  Moderate-to-large right central disc extrusion extends into  the subarticular recess with posterior displacement and probable  compression of the descending right L4 nerve root. This is new since  the prior study. Mild-to-moderate central stenosis. Neural foramina  are patent.     L4-L5:  There has likely been previous surgery at this level. I  suspect a previous right laminotomy. There is advanced degenerative  disc disease with mild disc bulge and osteophytic ridging. No central  stenosis. Mild bilateral foraminal stenosis.     L5-S1:  Mild disc dehydration and disc bulge. No central stenosis.  Mild bilateral foraminal stenosis.     Paraspinous soft tissues:  Unremarkable.         IMPRESSION:     1. There is a new moderate-to-large right central disc extrusion at  the L3-L4 level that extends into the right subarticular recess with  posterior displacement and compression of the descending right L4  nerve root. Mild-to-moderate central stenosis.  2. At L5-S1 there is mild bilateral foraminal stenosis    Xray lumbar spine 3/20/17  Posterior fusion of L3-L5 with screw and wei fixation.  Compression of the superior endplate of L1 which has appeared since  12/19/2016. Remainder of the lumbar spine is  unchanged.    PMHX:  Past Medical History:   Diagnosis Date     Acute hepatitis C without mention of hepatic coma     Hep C - Treated     Backache, unspecified      DDD (degenerative disc disease), lumbar     s/p back surgery     Hypertension      PAST SURGICAL HISTORY  Past Surgical History:   Procedure Laterality Date     DECOMPRESSION LUMBAR ONE LEVEL Right 5/18/2016    Procedure: DECOMPRESSION LUMBAR ONE LEVEL;  Surgeon: Prince Govea MD;  Location: [facility] OR     DISCECTOMY LUMBAR POSTERIOR MICROSCOPIC ONE LEVEL Right 7/22/2016    Procedure: DISCECTOMY LUMBAR POSTERIOR MICROSCOPIC ONE LEVEL;  Surgeon: Iggy Issa MD;  Location: [facility] OR     LAMINECT/DISCECTOMY, LUMBAR      Laminectomy/Discectomy Cervical     LAMINECTOMY, FUSION LUMBAR TWO LEVELS, COMBINED Right 11/15/2016    Procedure: COMBINED LAMINECTOMY, FUSION LUMBAR TWO LEVELS;  Surgeon: Iggy Issa MD;  Location: [facility] OR     SURGICAL HISTORY OF -       ligation of left spermatic vein       CURRENT MEDICATIONS:   Current Outpatient Prescriptions   Medication     AmLODIPine Besylate (NORVASC PO)     HYDROmorphone (DILAUDID) 4 MG tablet     morphine (MS CONTIN) 30 MG 12 hr tablet     hydrOXYzine (VISTARIL) 25 MG capsule     multivitamin, therapeutic with minerals (MULTI-VITAMIN) TABS tablet     senna-docusate (EQL SENNA-S) 8.6-50 MG per tablet     HydrOXYzine Pamoate (VISTARIL PO)     Naloxone HCl (NARCAN NA)     carboxymethylcellulose (REFRESH PLUS) 0.5 % SOLN     latanoprost (XALATAN) 0.005 % ophthalmic solution     tiZANidine (ZANAFLEX) 4 MG tablet     order for DME     diazepam (VALIUM) 5 MG tablet     order for DME     GABAPENTIN PO     GABAPENTIN PO     No current facility-administered medications for this visit.        ROS: twelve systems review negative and included in interval except for: pain,mental fogginess, ankle swelling   Since last visit: changes in mood: No, suicide thoughts No  Any changes in your medical condition, illness,  injury or hospitalizations: Yes elevated liver enzymes.  Sleep: Restorative: No Current  Some better with middle sleep insomnia      PHYSICAL EXAM:  Constitutional:Blood pressure 153/90, pulse 68, weight 112.9 kg (249 lb), SpO2 95 %., : Body mass index is 31.97 kg/(m^2).   Psyche:  Fully oriented, Behavioral Observations: Eye contact  Poor. Mood  and spirits are anxious..  Musculoskeletal exam:  Gait:  Steady reciprocating.  ROM within normal limits,  MCCORMICK x 4,  Neuro exam:   Alert,  KAM @  3 mm, Speech clear fluent and appropriate. Strength 5/5   Skin/Vascular/ Autonomic:  warm, dry and intact    OTHER: Opioid risk for ongoing opioid management for non malignant chronic pain   DIRE Score for ongoing opioid management is calculated as follows:    Diagnosis =  2    Intractability = 2    Risk: Psych = 1  Chem Hlth = 1  Reliability = 2  Social = 2    Efficacy = 12    Total DIRE Score = 12 (14 or higher predicts good candidate for ongoing opioid management; 13 or lower predicts poor candidate for opioid management)   Oscar Mckeon 2006,The Journal of  Pain.,The DIRE Score: Predicting Outcomes of Opioid Prescribing for Chronic Pain Vol.7,No.9, pp 671-681.  MNPMP : Reviewed and as expected without evidence of abuse or misuse? No  URINE DRUG SCREEN  Yes, DATE:6/8/16 as expected . OPIOID AGREEMENT: Yes DATE:06/22/16  OPOID TOLERANCE: high, Morphine  EQ:  220-225 mg/day   Analgesia good  Adverse effects none  Activity fair ( lack self care)  Adherence poor     Opioid management will continue with [facility]      PROCEDURES none     Time spent: 25 minutes 100 % face to face including  20  minutes counseling and coordinating care for the above identified medical problems    Signed: BASSEM Mckeon, C.N.P.,   [facility]

## 2017-04-13 NOTE — PATIENT INSTRUCTIONS
1. Dilaudid 4 mg take 1- tablet(s) every 3 hours as needed limit 5 tablet(s) per day Start 4/15/17  2. Then start  Ms Contin to 30 mg every 8 hrs, new RX given today to start 04/17/17   1. Ok to safely taper at 10 % per week of the total daily opioid intake as outpatient  and will usually not produce opioid withdrawal.  2. Consider over night sleep study if concerned about hypoventilation syndrome associated with opioid use.    3. Continue with hydroxyzine capsule as needed for pain, anxiety and sleep    4. Tizanidine 4-6 mg 3 x per day as needed for muscle spasm  5. Narcan nasal spray for unintended overdose of narcotics   6. Continue all other medications    7. Opioid compliance stick to one pharmacy and within dosing instructions on bottle  8. Physical Therapy as tolerated.  9.  Follow up  Dr. Issa and your primary care   10. Follow up with pain clinic once out of brace and have clearance with Dr. Issa      ----------------------------------------------------------------  Nurse Triage line:  [phone]   Call this number with any questions or concerns. You may leave a detailed message anytime. Calls are typically returned Monday through Friday between 8 AM and 4:30 PM. We usually get back to you within 2 business days depending on the issue/request.       Medication refills:    For non-narcotic medications, call your pharmacy directly to request a refill. The pharmacy will contact the [facility] for authorization. Please allow 3-4 days for these refills to be processed.     For narcotic refills, call the nurse triage line or send a [employer] message. Please contact us 7-10 days before your refill is due. The message MUST include the name of the specific medication(s) requested and how you would like to receive the prescription(s). The options are as follows:    Pain Clinic staff can mail the prescription to your pharmacy. Please tell us the name of the pharmacy.    You may pick the prescription up at  the Pain Clinic (tell us the location) or during a clinic visit with your pain provider    Pain Clinic staff can deliver the prescription to the [facility] in the clinic building. Please tell us the location.      Scheduling number: [phone].  Call this number to schedule or change appointments.    We believe regular attendance is key to your success in our program.    Any time you are unable to keep your appointment we ask that you call us at least 24 hours in advance to let us know. This will allow us to offer the appointment time to another patient.

## 2017-04-13 NOTE — MR AVS SNAPSHOT
After Visit Summary   4/13/2017    Usama Harris    MRN: [id number]           Patient Information     Date Of Birth          1955        Visit Information        Provider Department      4/13/2017 10:30 AM Shital Anderson APRN CNP [facility]        Today's Diagnoses     S/P lumbar fusion          Care Instructions    1. Dilaudid 4 mg take 1- tablet(s) every 3 hours as needed limit 5 tablet(s) per day Start 4/15/17  2. Then start  Ms Contin to 30 mg every 8 hrs, new RX given today to start 04/17/17   1. Ok to safely taper at 10 % per week of the total daily opioid intake as outpatient  and will usually not produce opioid withdrawal.  2. Consider over night sleep study if concerned about hypoventilation syndrome associated with opioid use.    3. Continue with hydroxyzine capsule as needed for pain, anxiety and sleep    4. Tizanidine 4-6 mg 3 x per day as needed for muscle spasm  5. Narcan nasal spray for unintended overdose of narcotics   6. Continue all other medications    7. Opioid compliance stick to one pharmacy and within dosing instructions on bottle  8. Physical Therapy as tolerated.  9. Return visit with Shital Anderson CNP  4- weeks   GOAL setting with activity and pain medication use.  10. Follow up  Dr. Issa and your primary care       ----------------------------------------------------------------  Nurse Triage line:  [phone]   Call this number with any questions or concerns. You may leave a detailed message anytime. Calls are typically returned Monday through Friday between 8 AM and 4:30 PM. We usually get back to you within 2 business days depending on the issue/request.       Medication refills:    For non-narcotic medications, call your pharmacy directly to request a refill. The pharmacy will contact the [facility] for authorization. Please allow 3-4 days for these refills to be processed.     For narcotic refills, call  the nurse triage line or send a [employer] message. Please contact us 7-10 days before your refill is due. The message MUST include the name of the specific medication(s) requested and how you would like to receive the prescription(s). The options are as follows:    Pain Clinic staff can mail the prescription to your pharmacy. Please tell us the name of the pharmacy.    You may pick the prescription up at the Pain Clinic (tell us the location) or during a clinic visit with your pain provider    Pain Clinic staff can deliver the prescription to the [facility] pharmacy in the clinic building. Please tell us the location.      Scheduling number: [phone].  Call this number to schedule or change appointments.    We believe regular attendance is key to your success in our program.    Any time you are unable to keep your appointment we ask that you call us at least 24 hours in advance to let us know. This will allow us to offer the appointment time to another patient.             Follow-ups after your visit        Your next 10 appointments already scheduled     Apr 21, 2017 11:00 AM CDT   JUANI Spine with Debo Nunn PT   [facility] ([facility]  )    [address] [place] [address]   [phone]            Apr 21, 2017  2:40 PM CDT   PHYSICAL with Nikko Tang MD   [facility] ([address] [address]   [phone]            May 10, 2017 10:50 AM CDT   JUANI Spine with Debo Nunn PT   [facility] ([address] [address]   [phone]              Who to contact     If you have questions or need follow up information about today's clinic visit or your schedule please contact [facility] directly at [phone].  Normal or non-critical lab and imaging results will be  communicated to you by Sohalohart, letter or phone within 4 business days after the clinic has received the results. If you do not hear from us within 7 days, please contact the clinic through [employer] or phone. If you have a critical or abnormal lab result, we will notify you by phone as soon as possible.  Submit refill requests through [employer] or call your pharmacy and they will forward the refill request to us. Please allow 3 business days for your refill to be completed.          Additional Information About Your Visit        [employer] Information     [employer] gives you secure access to your electronic health record. If you see a primary care provider, you can also send messages to your care team and make appointments. If you have questions, please call your primary care clinic.  If you do not have a primary care provider, please call [phone] and they will assist you.        Care EveryWhere ID     This is your Care EveryWhere ID. This could be used by other organizations to access your [place] medical records  [id number]        Your Vitals Were     Pulse Pulse Oximetry BMI (Body Mass Index)             68 95% 31.97 kg/m2          Blood Pressure from Last 3 Encounters:   04/13/17 153/90   03/20/17 145/87   03/09/17 112/73    Weight from Last 3 Encounters:   04/13/17 112.9 kg (249 lb)   03/20/17 113.4 kg (250 lb)   03/09/17 111.1 kg (245 lb)              Today, you had the following     No orders found for display         Today's Medication Changes          These changes are accurate as of: 4/13/17 11:18 AM.  If you have any questions, ask your nurse or doctor.               These medicines have changed or have updated prescriptions.        Dose/Directions    HYDROmorphone 4 MG tablet   Commonly known as:  DILAUDID   This may have changed:  additional instructions   Used for:  S/P lumbar fusion        Dose:  4 mg   Take 1 tablet (4 mg) by mouth every 4 hours as needed for moderate to severe pain Limit 5  tablet(s). 30 day. Start 04/15/17. Dispense on or after 04/13/17   Quantity:  150 tablet   Refills:  0       morphine 30 MG 12 hr tablet   Commonly known as:  MS CONTIN   This may have changed:  additional instructions   Used for:  S/P lumbar fusion        Dose:  30 mg   Take 1 tablet (30 mg) by mouth every 8 hours Start 04/17/17 Dispense on or after 04/15/17   Quantity:  90 tablet   Refills:  0       tiZANidine 4 MG tablet   Commonly known as:  ZANAFLEX   This may have changed:    - when to take this  - reasons to take this   Used for:  Muscle spasm        Dose:  4-6 mg   Take 1-1.5 tablets (4-6 mg) by mouth 3 times daily   Quantity:  135 tablet   Refills:  1            Where to get your medicines      Some of these will need a paper prescription and others can be bought over the counter.  Ask your nurse if you have questions.     Bring a paper prescription for each of these medications     HYDROmorphone 4 MG tablet    morphine 30 MG 12 hr tablet                Primary Care Provider Office Phone # Fax #    Nikko Tang [phone]       [address] [address]        Thank you!     Thank you for choosing [facility]  for your care. Our goal is always to provide you with excellent care. Hearing back from our patients is one way we can continue to improve our services. Please take a few minutes to complete the written survey that you may receive in the mail after your visit with us. Thank you!             Your Updated Medication List - Protect others around you: Learn how to safely use, store and throw away your medicines at www.disposemymeds.org.          This list is accurate as of: 4/13/17 11:18 AM.  Always use your most recent med list.                   Brand Name Dispense Instructions for use    carboxymethylcellulose 0.5 % Soln ophthalmic solution    REFRESH PLUS     Place 1 drop into both eyes daily as needed for dry eyes       diazepam 5 MG  tablet    VALIUM    60 tablet    Take 1 tablet (5 mg) by mouth every 6 hours as needed for other (muscle spasms)       EQL SENNA-S 8.6-50 MG per tablet   Generic drug:  senna-docusate     100 tablet    TAKES 5 TABLETS DAILY.       * GABAPENTIN PO      Take 300 mg by mouth 2 times daily Reported on 4/13/2017       * GABAPENTIN PO      Take 600 mg by mouth every evening Reported on 4/13/2017       HYDROmorphone 4 MG tablet    DILAUDID    150 tablet    Take 1 tablet (4 mg) by mouth every 4 hours as needed for moderate to severe pain Limit 5 tablet(s). 30 day. Start 04/15/17. Dispense on or after 04/13/17       latanoprost 0.005 % ophthalmic solution    XALATAN     Place 1 drop into both eyes At Bedtime       morphine 30 MG 12 hr tablet    MS CONTIN    90 tablet    Take 1 tablet (30 mg) by mouth every 8 hours Start 04/17/17 Dispense on or after 04/15/17       Multi-vitamin Tabs tablet      Take 1 tablet by mouth daily       NARCAN NA      Spray in nostril daily as needed (opioid reversal)       NORVASC PO      Take 10 mg by mouth daily       * order for DME     1 each    Equipment being ordered: Walker () and Walker Extension w/Wheels (, ) Treatment Diagnosis: Difficulty walking       * order for DME     1 Device    Equipment being ordered: Orthofix lumbar bone growth stimulator       tiZANidine 4 MG tablet    ZANAFLEX    135 tablet    Take 1-1.5 tablets (4-6 mg) by mouth 3 times daily       * VISTARIL PO      Take 25 mg by mouth At Bedtime Reported on 4/13/2017       * hydrOXYzine 25 MG capsule    VISTARIL    60 capsule    Take 1-2 capsules (25-50 mg) by mouth 3 times daily as needed for itching       * Notice:  This list has 6 medication(s) that are the same as other medications prescribed for you. Read the directions carefully, and ask your doctor or other care provider to review them with you.

## 2017-04-21 ENCOUNTER — OFFICE VISIT (OUTPATIENT)
Dept: FAMILY MEDICINE | Facility: CLINIC | Age: 62
End: 2017-04-21
Payer: COMMERCIAL

## 2017-04-21 VITALS
DIASTOLIC BLOOD PRESSURE: 80 MMHG | WEIGHT: 242 LBS | RESPIRATION RATE: 16 BRPM | HEART RATE: 80 BPM | OXYGEN SATURATION: 99 % | BODY MASS INDEX: 31.06 KG/M2 | TEMPERATURE: 98.8 F | SYSTOLIC BLOOD PRESSURE: 124 MMHG | HEIGHT: 74 IN

## 2017-04-21 DIAGNOSIS — Z00.01 ENCOUNTER FOR ROUTINE ADULT MEDICAL EXAM WITH ABNORMAL FINDINGS: Primary | ICD-10-CM

## 2017-04-21 DIAGNOSIS — M81.0 OSTEOPOROSIS: ICD-10-CM

## 2017-04-21 DIAGNOSIS — S32.010D COMPRESSION FRACTURE OF L1 LUMBAR VERTEBRA, WITH ROUTINE HEALING, SUBSEQUENT ENCOUNTER: ICD-10-CM

## 2017-04-21 DIAGNOSIS — Z23 ENCOUNTER FOR IMMUNIZATION: ICD-10-CM

## 2017-04-21 DIAGNOSIS — D64.9 ANEMIA, UNSPECIFIED TYPE: ICD-10-CM

## 2017-04-21 LAB — HGB BLD-MCNC: 14.8 G/DL (ref 13.3–17.7)

## 2017-04-21 PROCEDURE — 85018 HEMOGLOBIN: CPT | Performed by: FAMILY MEDICINE

## 2017-04-21 PROCEDURE — 99396 PREV VISIT EST AGE 40-64: CPT | Mod: 25 | Performed by: FAMILY MEDICINE

## 2017-04-21 PROCEDURE — 90736 HZV VACCINE LIVE SUBQ: CPT | Performed by: FAMILY MEDICINE

## 2017-04-21 PROCEDURE — 80061 LIPID PANEL: CPT | Performed by: FAMILY MEDICINE

## 2017-04-21 PROCEDURE — G0103 PSA SCREENING: HCPCS | Performed by: FAMILY MEDICINE

## 2017-04-21 PROCEDURE — 82306 VITAMIN D 25 HYDROXY: CPT | Performed by: FAMILY MEDICINE

## 2017-04-21 PROCEDURE — 99212 OFFICE O/P EST SF 10 MIN: CPT | Mod: 25 | Performed by: FAMILY MEDICINE

## 2017-04-21 PROCEDURE — 90471 IMMUNIZATION ADMIN: CPT | Performed by: FAMILY MEDICINE

## 2017-04-21 PROCEDURE — 36415 COLL VENOUS BLD VENIPUNCTURE: CPT | Performed by: FAMILY MEDICINE

## 2017-04-21 ASSESSMENT — ANXIETY QUESTIONNAIRES
7. FEELING AFRAID AS IF SOMETHING AWFUL MIGHT HAPPEN: NEARLY EVERY DAY
6. BECOMING EASILY ANNOYED OR IRRITABLE: SEVERAL DAYS
GAD7 TOTAL SCORE: 9
IF YOU CHECKED OFF ANY PROBLEMS ON THIS QUESTIONNAIRE, HOW DIFFICULT HAVE THESE PROBLEMS MADE IT FOR YOU TO DO YOUR WORK, TAKE CARE OF THINGS AT HOME, OR GET ALONG WITH OTHER PEOPLE: NOT DIFFICULT AT ALL
2. NOT BEING ABLE TO STOP OR CONTROL WORRYING: SEVERAL DAYS
3. WORRYING TOO MUCH ABOUT DIFFERENT THINGS: MORE THAN HALF THE DAYS
5. BEING SO RESTLESS THAT IT IS HARD TO SIT STILL: NOT AT ALL
1. FEELING NERVOUS, ANXIOUS, OR ON EDGE: MORE THAN HALF THE DAYS

## 2017-04-21 ASSESSMENT — PATIENT HEALTH QUESTIONNAIRE - PHQ9: 5. POOR APPETITE OR OVEREATING: NOT AT ALL

## 2017-04-21 NOTE — NURSING NOTE
"Chief Complaint   Patient presents with     Physical       Initial /80 (BP Location: Left arm, Patient Position: Chair, Cuff Size: Adult Large)  Pulse 80  Temp 98.8  F (37.1  C) (Oral)  Resp 16  Ht 6' 1.75\" (1.873 m)  Wt 242 lb (109.8 kg)  SpO2 99%  BMI 31.28 kg/m2 Estimated body mass index is 31.28 kg/(m^2) as calculated from the following:    Height as of this encounter: 6' 1.75\" (1.873 m).    Weight as of this encounter: 242 lb (109.8 kg).  Medication Reconciliation: complete     Cady Valencia CMA      "

## 2017-04-21 NOTE — MR AVS SNAPSHOT
After Visit Summary   4/21/2017    Usama Harris    MRN: [id number]           Patient Information     Date Of Birth          1955        Visit Information        Provider Department      4/21/2017 2:40 PM Nikko Tang MD [facility]        Today's Diagnoses     Encounter for routine adult medical exam with abnormal findings    -  1    Compression fracture        Compression fracture of L1 lumbar vertebra, with routine healing, subsequent encounter        Encounter for immunization        Anemia, unspecified type          Care Instructions      Preventive Health Recommendations  Male Ages 50 - 64    Yearly exam:             See your health care provider every year in order to  o   Review health changes.   o   Discuss preventive care.    o   Review your medicines if your doctor has prescribed any.     Have a cholesterol test every 5 years, or more frequently if you are at risk for high cholesterol/heart disease.     Have a diabetes test (fasting glucose) every three years. If you are at risk for diabetes, you should have this test more often.     Have a colonoscopy at age 50, or have a yearly FIT test (stool test). These exams will check for colon cancer.      Talk with your health care provider about whether or not a prostate cancer screening test (PSA) is right for you.    You should be tested each year for STDs (sexually transmitted diseases), if you re at risk.     Shots: Get a flu shot each year. Get a tetanus shot every 10 years.     Nutrition:    Eat at least 5 servings of fruits and vegetables daily.     Eat whole-grain bread, whole-wheat pasta and brown rice instead of white grains and rice.     Talk to your provider about Calcium and Vitamin D.     Lifestyle    Exercise for at least 150 minutes a week (30 minutes a day, 5 days a week). This will help you control your weight and prevent disease.     Limit alcohol to one drink per day.     No smoking.     Wear  sunscreen to prevent skin cancer.     See your dentist every six months for an exam and cleaning.     See your eye doctor every 1 to 2 years.          Follow-ups after your visit        Additional Services     ENDOCRINOLOGY ADULT REFERRAL       Your provider has referred you to: [facility]: [facility] [phone]   [email or web address]  [facility]: [facility] [phone]   [email or web address]  N: Endocrinology Clinic [facility] [phone]   [email or web address]      Please be aware that coverage of these services is subject to the terms and limitations of your health insurance plan.  Call member services at your health plan with any benefit or coverage questions.      Please bring the following to your appointment:    >>   Any x-rays, CTs or MRIs which have been performed.  Contact the facility where they were done to arrange for  prior to your scheduled appointment.    >>   List of current medications   >>   This referral request   >>   Any documents/labs given to you for this referral                  Your next 10 appointments already scheduled     May 10, 2017 10:50 AM CDT   JUANI Spine with Debo Nunn PT   [facility] ([facility]  )    [address] [place] [address] [phone]              Who to contact     If you have questions or need follow up information about today's clinic visit or your schedule please contact [facility] directly at [phone].  Normal or non-critical lab and imaging results will be communicated to you by MyChart, letter or phone within 4 business days after the clinic has received the results. If you do not hear from us within 7 days, please contact the clinic through MyChart or phone. If you have a critical or abnormal lab result, we will notify  "you by phone as soon as possible.  Submit refill requests through [employer] or call your pharmacy and they will forward the refill request to us. Please allow 3 business days for your refill to be completed.          Additional Information About Your Visit        [employer] Information     [employer] gives you secure access to your electronic health record. If you see a primary care provider, you can also send messages to your care team and make appointments. If you have questions, please call your primary care clinic.  If you do not have a primary care provider, please call [phone] and they will assist you.        Care EveryWhere ID     This is your Care EveryWhere ID. This could be used by other organizations to access your [place] medical records  [id number]        Your Vitals Were     Pulse Temperature Respirations Height Pulse Oximetry BMI (Body Mass Index)    80 98.8  F (37.1  C) (Oral) 16 6' 1.75\" (1.873 m) 99% 31.28 kg/m2       Blood Pressure from Last 3 Encounters:   04/21/17 124/80   04/13/17 153/90   03/20/17 145/87    Weight from Last 3 Encounters:   04/21/17 242 lb (109.8 kg)   04/13/17 249 lb (112.9 kg)   03/20/17 250 lb (113.4 kg)              We Performed the Following     ENDOCRINOLOGY ADULT REFERRAL     Hemoglobin     JUST IN CASE     Lipid Profile with reflex to direct LDL     PSA, screen     Vitamin D Deficiency     ZOSTER VACC LIVE SUBQ NJX          Today's Medication Changes          These changes are accurate as of: 4/21/17  3:09 PM.  If you have any questions, ask your nurse or doctor.               Start taking these medicines.        Dose/Directions    zoster vaccine live (PF) injection   Commonly known as:  ZOSTAVAX   Used for:  Encounter for immunization   Started by:  Nikko Tang MD        Dose:  1 each   Inject 0.65 mLs Subcutaneous once for 1 dose   Quantity:  0.65 mL   Refills:  0         These medicines have changed or have updated prescriptions.        Dose/Directions    " tiZANidine 4 MG tablet   Commonly known as:  ZANAFLEX   This may have changed:    - when to take this  - reasons to take this   Used for:  Muscle spasm        Dose:  4-6 mg   Take 1-1.5 tablets (4-6 mg) by mouth 3 times daily   Quantity:  135 tablet   Refills:  1            Where to get your medicines      These medications were sent to [facility] [place], [place] - [address]  [address], [place] [address]     Phone:  [phone]     zoster vaccine live (PF) injection                Primary Care Provider Office Phone # Fax #    Nikko Tang [phone]       [facility] [address]  [facility] [address]        Thank you!     Thank you for choosing [facility]  for your care. Our goal is always to provide you with excellent care. Hearing back from our patients is one way we can continue to improve our services. Please take a few minutes to complete the written survey that you may receive in the mail after your visit with us. Thank you!             Your Updated Medication List - Protect others around you: Learn how to safely use, store and throw away your medicines at www.disposemymeds.org.          This list is accurate as of: 4/21/17  3:09 PM.  Always use your most recent med list.                   Brand Name Dispense Instructions for use    carboxymethylcellulose 0.5 % Soln ophthalmic solution    REFRESH PLUS     Place 1 drop into both eyes daily as needed for dry eyes       diazepam 5 MG tablet    VALIUM    60 tablet    Take 1 tablet (5 mg) by mouth every 6 hours as needed for other (muscle spasms)       EQL SENNA-S 8.6-50 MG per tablet   Generic drug:  senna-docusate     100 tablet    TAKES 5 TABLETS DAILY.       * GABAPENTIN PO      Take 300 mg by mouth 2 times daily Reported on 4/13/2017       * GABAPENTIN PO      Take 600 mg by mouth every evening Reported on 4/13/2017       HYDROmorphone 4 MG tablet    DILAUDID    150 tablet     Take 1 tablet (4 mg) by mouth every 4 hours as needed for moderate to severe pain Limit 5 tablet(s). 30 day. Start 04/15/17. Dispense on or after 04/13/17       latanoprost 0.005 % ophthalmic solution    XALATAN     Place 1 drop into both eyes At Bedtime       morphine 30 MG 12 hr tablet    MS CONTIN    90 tablet    Take 1 tablet (30 mg) by mouth every 8 hours Start 04/17/17 Dispense on or after 04/15/17       Multi-vitamin Tabs tablet      Take 1 tablet by mouth daily       NARCAN NA      Spray in nostril daily as needed (opioid reversal)       NORVASC PO      Take 10 mg by mouth daily       * order for DME     1 each    Equipment being ordered: Walker () and Walker Extension w/Wheels (, ) Treatment Diagnosis: Difficulty walking       * order for DME     1 Device    Equipment being ordered: Orthofix lumbar bone growth stimulator       tiZANidine 4 MG tablet    ZANAFLEX    135 tablet    Take 1-1.5 tablets (4-6 mg) by mouth 3 times daily       * VISTARIL PO      Take 25 mg by mouth At Bedtime Reported on 4/13/2017       * hydrOXYzine 25 MG capsule    VISTARIL    60 capsule    Take 1-2 capsules (25-50 mg) by mouth 3 times daily as needed for itching       zoster vaccine live (PF) injection    ZOSTAVAX    0.65 mL    Inject 0.65 mLs Subcutaneous once for 1 dose       * Notice:  This list has 6 medication(s) that are the same as other medications prescribed for you. Read the directions carefully, and ask your doctor or other care provider to review them with you.

## 2017-04-21 NOTE — PROGRESS NOTES
SUBJECTIVE:     CC: Usama Harris is an 61 year old male who presents for preventative health visit.     Physical   Annual:     Getting at least 3 servings of Calcium per day::  Yes    Bi-annual eye exam::  Yes    Dental care twice a year::  NO    Sleep apnea or symptoms of sleep apnea::  None    Diet::  Regular (no restrictions)    Frequency of exercise::  6-7 days/week    Duration of exercise::  45-60 minutes    Taking medications regularly::  Yes    Medication side effects::  None    Additional concerns today::  YES  chronic back pain-he wants me to take over prescribing from the pain clinic who recommended a taper down of his narcotics. His chronic pain has been exacerbated acutely by an vertebral compression fracture of the L1 superior end plate. Occurred during physical therapy. On high dose dilaudid and ms contin.     He had a dexa some time ago that showed osteopenia and now with a compression fracture this would be diagnostic of osteoporosis    Today's PHQ-2 Score:   PHQ-2 ( 1999 Pfizer) 4/21/2017   Q1: Little interest or pleasure in doing things -   Q2: Feeling down, depressed or hopeless -   PHQ-2 Score -   Little interest or pleasure in doing things Not at all   Feeling down, depressed or hopeless Several days   PHQ-2 Score 1       Abuse: Current or Past(Physical, Sexual or Emotional)- No  Do you feel safe in your environment - Yes    Social History   Substance Use Topics     Smoking status: Former Smoker     Packs/day: 1.00     Years: 29.00     Types: Cigarettes     Quit date: 1/7/2000     Smokeless tobacco: Never Used     Alcohol use No      Comment: rarely     The patient does not drink >3 drinks per day nor >7 drinks per week.    Last PSA:   PSA   Date Value Ref Range Status   12/15/2014 0.41 0 - 4 ug/L Final       Recent Labs   Lab Test  05/14/13   1122   CHOL  175   HDL  34*   LDL  110   TRIG  155*   CHOLHDLRATIO  5.1*       Reviewed orders with patient. Reviewed health maintenance and updated  "orders accordingly - Yes    Reviewed and updated as needed this visit by clinical staff         Reviewed and updated as needed this visit by Provider            ROS:  C: NEGATIVE for fever, chills, change in weight  I: NEGATIVE for worrisome rashes, moles or lesions  E: NEGATIVE for vision changes or irritation  ENT: NEGATIVE for ear, mouth and throat problems  R: NEGATIVE for significant cough or SOB  CV: occ episode of afib self limited  GI: constipation controlled with senna S.    male: negative for dysuria, hematuria, decreased urinary stream, erectile dysfunction, urethral discharge  M: NEGATIVE for significant arthralgias or myalgia  N: NEGATIVE for weakness, dizziness or paresthesias  P: NEGATIVE for changes in mood or affect    Problem list, Medication list, Allergies, and Medical/Social/Surgical histories reviewed in EPIC and updated as appropriate.  OBJECTIVE:     /80 (BP Location: Left arm, Patient Position: Chair, Cuff Size: Adult Large)  Pulse 80  Temp 98.8  F (37.1  C) (Oral)  Resp 16  Ht 6' 1.75\" (1.873 m)  Wt 242 lb (109.8 kg)  SpO2 99%  BMI 31.28 kg/m2  EXAM:  GENERAL: healthy, alert and no distress  EYES: Eyes grossly normal to inspection, PERRL and conjunctivae and sclerae normal  HENT: ear canals and TM's normal, nose and mouth without ulcers or lesions  NECK: no adenopathy, no asymmetry, masses, or scars and thyroid normal to palpation  RESP: lungs clear to auscultation - no rales, rhonchi or wheezes  CV: regular rate and rhythm, normal S1 S2, no S3 or S4, no murmur, click or rub, no peripheral edema and peripheral pulses strong  ABDOMEN: soft, nontender, no hepatosplenomegaly, no masses and bowel sounds normal  MS: moves slowly in thoracic-lumbar brace  SKIN: no suspicious lesions or rashes  NEURO: Normal strength and tone, mentation intact and speech normal  PSYCH: mentation appears normal, affect normal/bright    ASSESSMENT/PLAN:         ICD-10-CM    1. Encounter for routine " "adult medical exam with abnormal findings Z00.01 Lipid Profile with reflex to direct LDL     PSA, screen   2. Compression fracture of L1 lumbar vertebra, with routine healing, subsequent encounter S32.010D Vitamin D Deficiency     ENDOCRINOLOGY ADULT REFERRAL   3. Encounter for immunization Z23 ZOSTER VACC LIVE SUBQ NJX     DISCONTINUED: zoster vaccine live, PF, (ZOSTAVAX) injection   4. Anemia, unspecified type D64.9 Hemoglobin     JUST IN CASE   recheck hgb as low last check. Endocrine for compression fracture and testosterone def. Add in fosamax if vit d ok. Consider calcitonin. Follow up for pain in 2-3 weeks. I will talk with the pain clinic regarding their plan.     COUNSELING:   Reviewed preventive health counseling, as reflected in patient instructions       Vaccinated for: Zoster       Prostate cancer screening         reports that he quit smoking about 17 years ago. His smoking use included Cigarettes. He has a 29.00 pack-year smoking history. He has never used smokeless tobacco.    Estimated body mass index is 31.28 kg/(m^2) as calculated from the following:    Height as of this encounter: 6' 1.75\" (1.873 m).    Weight as of this encounter: 242 lb (109.8 kg).   Weight management plan: Discussed healthy diet and exercise guidelines and patient will follow up in 12 months in clinic to re-evaluate.    Counseling Resources:  ATP IV Guidelines  Pooled Cohorts Equation Calculator  FRAX Risk Assessment  ICSI Preventive Guidelines  Dietary Guidelines for Americans, 2010  USDA's MyPlate  ASA Prophylaxis  Lung CA Screening    Nikko Tang MD  [facility]  Answers for HPI/ROS submitted by the patient on 4/21/2017   Q1: Little interest or pleasure in doing things: 0=Not at all  Q2: Feeling down, depressed or hopeless: 1=Several days  PHQ-2 Score: 1    Screening Questionnaire for Adult Immunization    Are you sick today?   No   Do you have allergies to medications, food, a vaccine component or " latex?   No   Have you ever had a serious reaction after receiving a vaccination?   No   Do you have a long-term health problem with heart disease, lung disease, asthma, kidney disease, metabolic disease (e.g. diabetes), anemia, or other blood disorder?   No   Do you have cancer, leukemia, HIV/AIDS, or any other immune system problem?   No   In the past 3 months, have you taken medications that affect  your immune system, such as prednisone, other steroids, or anticancer drugs; drugs for the treatment of rheumatoid arthritis, Crohn s disease, or psoriasis; or have you had radiation treatments?   No   Have you had a seizure, or a brain or other nervous system problem?   No   During the past year, have you received a transfusion of blood or blood     products, or been given immune (gamma) globulin or antiviral drug?   No   For women: Are you pregnant or is there a chance you could become        pregnant during the next month?   No   Have you received any vaccinations in the past 4 weeks?   No     Immunization questionnaire answers were all negative.      MNVFC doesn't apply on this patient    Per orders of Dr. Tang, injection of Zostavax given by Cady Valencia. Patient instructed to remain in clinic for 20 minutes afterwards, and to report any adverse reaction to me immediately.  VIS given     Screening performed by Cady Valencia on 4/21/2017 at 4:32 PM.

## 2017-04-22 LAB
CHOLEST SERPL-MCNC: 212 MG/DL
HDLC SERPL-MCNC: 53 MG/DL
LDLC SERPL CALC-MCNC: 140 MG/DL
NONHDLC SERPL-MCNC: 159 MG/DL
PSA SERPL-ACNC: 0.34 UG/L (ref 0–4)
TRIGL SERPL-MCNC: 96 MG/DL

## 2017-04-22 ASSESSMENT — ANXIETY QUESTIONNAIRES: GAD7 TOTAL SCORE: 9

## 2017-04-23 LAB — DEPRECATED CALCIDIOL+CALCIFEROL SERPL-MC: 30 UG/L (ref 20–75)

## 2017-04-28 RX ORDER — CALCITONIN SALMON 200 [IU]/.09ML
1 SPRAY, METERED NASAL DAILY
Qty: 1 BOTTLE | Refills: 1 | Status: SHIPPED | OUTPATIENT
Start: 2017-04-28 | End: 2017-10-03

## 2017-04-28 RX ORDER — ALENDRONATE SODIUM 70 MG/1
70 TABLET ORAL
Qty: 12 TABLET | Refills: 3 | Status: SHIPPED | OUTPATIENT
Start: 2017-04-28 | End: 2017-07-20

## 2017-05-10 ENCOUNTER — THERAPY VISIT (OUTPATIENT)
Dept: PHYSICAL THERAPY | Facility: CLINIC | Age: 62
End: 2017-05-10
Payer: COMMERCIAL

## 2017-05-10 DIAGNOSIS — Z47.89 AFTERCARE FOLLOWING SURGERY OF THE MUSCULOSKELETAL SYSTEM: ICD-10-CM

## 2017-05-10 DIAGNOSIS — M54.50 LOW BACK PAIN: ICD-10-CM

## 2017-05-10 PROCEDURE — 97110 THERAPEUTIC EXERCISES: CPT | Mod: GP | Performed by: PHYSICAL THERAPIST

## 2017-05-10 PROCEDURE — 97530 THERAPEUTIC ACTIVITIES: CPT | Mod: GP | Performed by: PHYSICAL THERAPIST

## 2017-05-10 NOTE — MR AVS SNAPSHOT
After Visit Summary   5/10/2017    Usama Harris    MRN: [id number]           Patient Information     Date Of Birth          1955        Visit Information        Provider Department      5/10/2017 10:50 AM Debo Nunn PT [facility] Physical Therapy        Today's Diagnoses     Low back pain        Aftercare following surgery of the musculoskeletal system           Follow-ups after your visit        Your next 10 appointments already scheduled     May 12, 2017 11:00 AM CDT   [facility]   [facility]    [address] [place] [address] [id number] [phone]              Who to contact     If you have questions or need follow up information about today's clinic visit or your schedule please contact [place] [facility] directly at [phone].  Normal or non-critical lab and imaging results will be communicated to you by Hou[employer], letter or phone within 4 business days after the clinic has received the results. If you do not hear from us within 7 days, please contact the clinic through Candid iot or phone. If you have a critical or abnormal lab result, we will notify you by phone as soon as possible.  Submit refill requests through X[employer] or call your pharmacy and they will forward the refill request to us. Please allow 3 business days for your refill to be completed.          Additional Information About Your Visit        MyChart Information     X[employer]enty gives you secure access to your electronic health record. If you see a primary care provider, you can also send messages to your care team and make appointments. If you have questions, please call your primary care clinic.  If you do not have a primary care provider, please call [phone] and they will assist you.        Care EveryWhere ID     This is your Care EveryWhere  ID. This could be used by other organizations to access your [place] medical records  [id number]         Blood Pressure from Last 3 Encounters:   04/21/17 124/80   04/13/17 153/90   03/20/17 145/87    Weight from Last 3 Encounters:   04/21/17 109.8 kg (242 lb)   04/13/17 112.9 kg (249 lb)   03/20/17 113.4 kg (250 lb)              We Performed the Following     JUANI PROGRESS NOTES REPORT     THERAPEUTIC ACTIVITIES     THERAPEUTIC EXERCISES          Today's Medication Changes          These changes are accurate as of: 5/10/17 11:31 AM.  If you have any questions, ask your nurse or doctor.               These medicines have changed or have updated prescriptions.        Dose/Directions    tiZANidine 4 MG tablet   Commonly known as:  ZANAFLEX   This may have changed:    - when to take this  - reasons to take this   Used for:  Muscle spasm        Dose:  4-6 mg   Take 1-1.5 tablets (4-6 mg) by mouth 3 times daily   Quantity:  135 tablet   Refills:  1                Primary Care Provider Office Phone # Fax #    Nikko Tang [phone]       [address]  [facility] [address]        Thank you!     Thank you for choosing [place] [facility]  for your care. Our goal is always to provide you with excellent care. Hearing back from our patients is one way we can continue to improve our services. Please take a few minutes to complete the written survey that you may receive in the mail after your visit with us. Thank you!             Your Updated Medication List - Protect others around you: Learn how to safely use, store and throw away your medicines at www.disposemymeds.org.          This list is accurate as of: 5/10/17 11:31 AM.  Always use your most recent med list.                   Brand Name Dispense Instructions for use    alendronate 70 MG tablet    FOSAMAX    12 tablet    Take 1 tablet (70 mg) by mouth every 7 days Take 60 minutes  before am meal with 8 oz. water. Remain upright for 30 minutes.       calcitonin (salmon) 200 UNIT/ACT nasal spray    MIACALCIN    1 Bottle    Spray 1 spray into one nostril alternating nostrils daily Alternate nostril each day.       carboxymethylcellulose 0.5 % Soln ophthalmic solution    REFRESH PLUS     Place 1 drop into both eyes daily as needed for dry eyes       EQL SENNA-S 8.6-50 MG per tablet   Generic drug:  senna-docusate     100 tablet    TAKES 5 TABLETS DAILY.       HYDROmorphone 4 MG tablet    DILAUDID    150 tablet    Take 1 tablet (4 mg) by mouth every 4 hours as needed for moderate to severe pain Limit 5 tablet(s). 30 day. Start 04/15/17. Dispense on or after 04/13/17       hydrOXYzine 25 MG capsule    VISTARIL    60 capsule    Take 1-2 capsules (25-50 mg) by mouth 3 times daily as needed for itching       latanoprost 0.005 % ophthalmic solution    XALATAN     Place 1 drop into both eyes At Bedtime       morphine 30 MG 12 hr tablet    MS CONTIN    90 tablet    Take 1 tablet (30 mg) by mouth every 8 hours Start 04/17/17 Dispense on or after 04/15/17       Multi-vitamin Tabs tablet      Take 1 tablet by mouth daily       NARCAN NA      Spray in nostril daily as needed (opioid reversal)       NORVASC PO      Take 10 mg by mouth daily       * order for DME     1 each    Equipment being ordered: Walker () and Walker Extension w/Wheels (, ) Treatment Diagnosis: Difficulty walking       * order for DME     1 Device    Equipment being ordered: Orthofix lumbar bone growth stimulator       tiZANidine 4 MG tablet    ZANAFLEX    135 tablet    Take 1-1.5 tablets (4-6 mg) by mouth 3 times daily       * Notice:  This list has 2 medication(s) that are the same as other medications prescribed for you. Read the directions carefully, and ask your doctor or other care provider to review them with you.

## 2017-05-10 NOTE — PROGRESS NOTES
Subjective:    HPI                    Objective:    System    Physical Exam    General     ROS    Assessment/Plan:      DISCHARGE REPORT    Progress reporting period is from 3/17/17 to 5/10/17.       SUBJECTIVE  Subjective changes noted by patient: Pt stated his pain level feels that it is improving.  Not sure how much PT is helping.  Went to [place], was able to swim slowly, felt that it helped a lot.  Getting out of bed felt better than usual the next day.  Pt would like to discharge today.    Current pain level is 2/10.     Previous pain level was 3/10.   Changes in function:  Yes (See Goal flowsheet attached for changes in current functional level)  Adverse reaction to treatment or activity: None    OBJECTIVE  Pt has presented to PT in his brace since discussing the importance of its use, however he still reports activity out of the brace.  Pt's walking endurance has improved, unable to progress abdominal strengthening much.  Pt has returned to biking for cardiovascular exercises as well.    ASSESSMENT/PLAN  STG/LTGs have been met or progress has been made towards goals:  Yes (See Goal flow sheet completed today.)  Assessment of Progress: The patient's progress has slowed.  Self Management Plans:  Patient has been instructed in a home treatment program.  Usama continues to require the following intervention to meet STG and LTG's:  PT intervention is no longer required to meet STG/LTG.    Recommendations:  This patient is ready to be discharged from therapy and continue their home treatment program.  Once approved by his surgeon, I believe the patient will benefit from independent aquatic exercise and/or swimming.    Please refer to the daily flowsheet for treatment today, total treatment time and time spent performing 1:1 timed codes.

## 2017-05-11 NOTE — PROGRESS NOTES
SUBJECTIVE:                                                    Usama Harris is a 61 year old male who presents to clinic today for the following health issues:    Pain medication refills, says he has been getting better    He has been cutting back on the dilaudid. He finds this causes more dysphoria than the MS contin. He has been cutting the pills in half using 1/2 pill. Up to about 4 pills daily    He feels the flexibility in his back in improving. He has been walking and been in the pool regularly.     He just started the miacalcin and foxamax for compression fracture and osteoporosis. Has tolerated thes 2. Taking in good amounts of calcium and d through dairy in his diet.     He has noted some cramping in his lower legs when he does more.     OBJECTIVE: BP (!) 120/92 (BP Location: Left arm, Patient Position: Chair, Cuff Size: Adult Large)  Pulse 64  Temp 98.2  F (36.8  C) (Oral)  Resp 16  Wt 249 lb (112.9 kg)  SpO2 99%  BMI 32.19 kg/m2 no apparent distress   Mental status is normal.       ICD-10-CM    1. Chronic low back pain, unspecified back pain laterality, with sciatica presence unspecified M54.5     G89.29    2. S/P lumbar fusion Z98.1 HYDROmorphone (DILAUDID) 4 MG tablet     morphine (MS CONTIN) 30 MG 12 hr tablet     DISCONTINUED: HYDROmorphone (DILAUDID) 4 MG tablet     DISCONTINUED: morphine (MS CONTIN) 30 MG 12 hr tablet   3. Chronic pain syndrome G89.4    4. Vertebral compression fracture, with routine healing, subsequent encounter M48.50XD     discussed meds in detail. Plan to taper about 10 every 2 weeks. He has already begun this process as the pain of the compression fracture has decreased. Will taper down on the dilaudid initially decreasing by one pill/day every 2 weeks. Initially starting at 4 daily. He will follow up in 2 months. We did not change the ms continuee dose. Currently he is on 150 ms equivalents. This will decrease to 135 then 120 then 105. Which is just over the 10%.  He has a number of extra dilaudid that he can take 1/2 of at the transitions from one level to the next. Time of visit > 25 minutes greater than half in counseling and coordination of care.

## 2017-05-12 ENCOUNTER — OFFICE VISIT (OUTPATIENT)
Dept: FAMILY MEDICINE | Facility: CLINIC | Age: 62
End: 2017-05-12
Payer: COMMERCIAL

## 2017-05-12 VITALS
BODY MASS INDEX: 32.19 KG/M2 | SYSTOLIC BLOOD PRESSURE: 120 MMHG | TEMPERATURE: 98.2 F | RESPIRATION RATE: 16 BRPM | DIASTOLIC BLOOD PRESSURE: 92 MMHG | WEIGHT: 249 LBS | HEART RATE: 64 BPM | OXYGEN SATURATION: 99 %

## 2017-05-12 DIAGNOSIS — M54.5 CHRONIC LOW BACK PAIN, UNSPECIFIED BACK PAIN LATERALITY, WITH SCIATICA PRESENCE UNSPECIFIED: Primary | ICD-10-CM

## 2017-05-12 DIAGNOSIS — Z98.1 S/P LUMBAR FUSION: ICD-10-CM

## 2017-05-12 DIAGNOSIS — G89.29 CHRONIC LOW BACK PAIN, UNSPECIFIED BACK PAIN LATERALITY, WITH SCIATICA PRESENCE UNSPECIFIED: Primary | ICD-10-CM

## 2017-05-12 DIAGNOSIS — G89.4 CHRONIC PAIN SYNDROME: ICD-10-CM

## 2017-05-12 PROCEDURE — 99214 OFFICE O/P EST MOD 30 MIN: CPT | Performed by: FAMILY MEDICINE

## 2017-05-12 RX ORDER — MORPHINE SULFATE 30 MG/1
30 TABLET, FILM COATED, EXTENDED RELEASE ORAL EVERY 8 HOURS
Qty: 90 TABLET | Refills: 0 | Status: SHIPPED | OUTPATIENT
Start: 2017-06-09 | End: 2017-07-20

## 2017-05-12 RX ORDER — HYDROMORPHONE HYDROCHLORIDE 4 MG/1
2-4 TABLET ORAL EVERY 4 HOURS PRN
Qty: 42 TABLET | Refills: 0 | Status: SHIPPED | OUTPATIENT
Start: 2017-06-09 | End: 2017-07-20

## 2017-05-12 RX ORDER — HYDROMORPHONE HYDROCHLORIDE 4 MG/1
2-4 TABLET ORAL EVERY 4 HOURS PRN
Qty: 98 TABLET | Refills: 0 | Status: SHIPPED | OUTPATIENT
Start: 2017-05-12 | End: 2017-05-12

## 2017-05-12 RX ORDER — MORPHINE SULFATE 30 MG/1
30 TABLET, FILM COATED, EXTENDED RELEASE ORAL EVERY 8 HOURS
Qty: 90 TABLET | Refills: 0 | Status: SHIPPED | OUTPATIENT
Start: 2017-05-12 | End: 2017-05-12

## 2017-05-12 NOTE — MR AVS SNAPSHOT
After Visit Summary   5/12/2017    Usama Harris    MRN: [id number]           Patient Information     Date Of Birth          1955        Visit Information        Provider Department      5/12/2017 11:00 AM Nikko Tang MD [facility]        Today's Diagnoses     Chronic low back pain, unspecified back pain laterality, with sciatica presence unspecified    -  1    S/P lumbar fusion        Chronic pain syndrome        Vertebral compression fracture, with routine healing, subsequent encounter          Care Instructions    For the next two week take  of MS Contin and up to  pills of the dilaudid daily.     For the following two week take  of MS Contin and up to  pills of the dilaudid daily.     follow up in 4 weeks on Friday.         Follow-ups after your visit        Who to contact     If you have questions or need follow up information about today's clinic visit or your schedule please contact [facility] directly at [phone].  Normal or non-critical lab and imaging results will be communicated to you by [employer] Systemshart, letter or phone within 4 business days after the clinic has received the results. If you do not hear from us within 7 days, please contact the clinic through Star[employer] Systemshart or phone. If you have a critical or abnormal lab result, we will notify you by phone as soon as possible.  Submit refill requests through [employer] or call your pharmacy and they will forward the refill request to us. Please allow 3 business days for your refill to be completed.          Additional Information About Your Visit        MyChart Information     [employer] gives you secure access to your electronic health record. If you see a primary care provider, you can also send messages to your care team and make appointments. If you have questions, please call your primary care clinic.  If you do not have a primary care provider, please call [phone] and they will assist you.         Care EveryWhere ID     This is your Care EveryWhere ID. This could be used by other organizations to access your [place] medical records  [id number]        Your Vitals Were     Pulse Temperature Respirations Pulse Oximetry BMI (Body Mass Index)       64 98.2  F (36.8  C) (Oral) 16 99% 32.19 kg/m2        Blood Pressure from Last 3 Encounters:   05/12/17 (!) 120/92   04/21/17 124/80   04/13/17 153/90    Weight from Last 3 Encounters:   05/12/17 249 lb (112.9 kg)   04/21/17 242 lb (109.8 kg)   04/13/17 249 lb (112.9 kg)              Today, you had the following     No orders found for display         Today's Medication Changes          These changes are accurate as of: 5/12/17  1:45 PM.  If you have any questions, ask your nurse or doctor.               Start taking these medicines.        Dose/Directions    HYDROmorphone 4 MG tablet   Commonly known as:  DILAUDID   Used for:  S/P lumbar fusion   Started by:  Nikko Tang MD        Dose:  2-4 mg   Start taking on:  6/9/2017   Take 0.5-1 tablets (2-4 mg) by mouth every 4 hours as needed for moderate to severe pain Limit 2 tablets/ day for 14 days then for the next 14 days limit 1 pills /day.   Quantity:  42 tablet   Refills:  0       morphine 30 MG 12 hr tablet   Commonly known as:  MS CONTIN   Used for:  S/P lumbar fusion   Started by:  Nikko Tang MD        Dose:  30 mg   Start taking on:  6/9/2017   Take 1 tablet (30 mg) by mouth every 8 hours   Quantity:  90 tablet   Refills:  0         These medicines have changed or have updated prescriptions.        Dose/Directions    tiZANidine 4 MG tablet   Commonly known as:  ZANAFLEX   This may have changed:    - when to take this  - reasons to take this   Used for:  Muscle spasm        Dose:  4-6 mg   Take 1-1.5 tablets (4-6 mg) by mouth 3 times daily   Quantity:  135 tablet   Refills:  1            Where to get your medicines      Some of these will need a paper prescription and others can be bought over  the counter.  Ask your nurse if you have questions.     Bring a paper prescription for each of these medications     HYDROmorphone 4 MG tablet    morphine 30 MG 12 hr tablet                Primary Care Provider Office Phone # Fax #    Nikko Tang [phone]       [facility] [address]  [facility] [address]        Thank you!     Thank you for choosing [facility]  for your care. Our goal is always to provide you with excellent care. Hearing back from our patients is one way we can continue to improve our services. Please take a few minutes to complete the written survey that you may receive in the mail after your visit with us. Thank you!             Your Updated Medication List - Protect others around you: Learn how to safely use, store and throw away your medicines at www.disposemymeds.org.          This list is accurate as of: 5/12/17  1:45 PM.  Always use your most recent med list.                   Brand Name Dispense Instructions for use    alendronate 70 MG tablet    FOSAMAX    12 tablet    Take 1 tablet (70 mg) by mouth every 7 days Take 60 minutes before am meal with 8 oz. water. Remain upright for 30 minutes.       calcitonin (salmon) 200 UNIT/ACT nasal spray    MIACALCIN    1 Bottle    Spray 1 spray into one nostril alternating nostrils daily Alternate nostril each day.       carboxymethylcellulose 0.5 % Soln ophthalmic solution    REFRESH PLUS     Place 1 drop into both eyes daily as needed for dry eyes       EQL SENNA-S 8.6-50 MG per tablet   Generic drug:  senna-docusate     100 tablet    TAKES 5 TABLETS DAILY.       HYDROmorphone 4 MG tablet   Start taking on:  6/9/2017    DILAUDID    42 tablet    Take 0.5-1 tablets (2-4 mg) by mouth every 4 hours as needed for moderate to severe pain Limit 2 tablets/ day for 14 days then for the next 14 days limit 1 pills /day.       hydrOXYzine 25 MG capsule    VISTARIL    60 capsule    Take 1-2 capsules (25-50  mg) by mouth 3 times daily as needed for itching       latanoprost 0.005 % ophthalmic solution    XALATAN     Place 1 drop into both eyes At Bedtime       morphine 30 MG 12 hr tablet   Start taking on:  6/9/2017    MS CONTIN    90 tablet    Take 1 tablet (30 mg) by mouth every 8 hours       Multi-vitamin Tabs tablet      Take 1 tablet by mouth daily       NARCAN NA      Spray in nostril daily as needed (opioid reversal)       NORVASC PO      Take 10 mg by mouth daily       * order for DME     1 each    Equipment being ordered: Walker () and Walker Extension w/Wheels (, ) Treatment Diagnosis: Difficulty walking       * order for DME     1 Device    Equipment being ordered: Orthofix lumbar bone growth stimulator       tiZANidine 4 MG tablet    ZANAFLEX    135 tablet    Take 1-1.5 tablets (4-6 mg) by mouth 3 times daily       * Notice:  This list has 2 medication(s) that are the same as other medications prescribed for you. Read the directions carefully, and ask your doctor or other care provider to review them with you.

## 2017-05-12 NOTE — NURSING NOTE
"Chief Complaint   Patient presents with     Refill Request     Pain meds       Initial BP (!) 120/92 (BP Location: Left arm, Patient Position: Chair, Cuff Size: Adult Large)  Pulse 64  Temp 98.2  F (36.8  C) (Oral)  Resp 16  Wt 249 lb (112.9 kg)  SpO2 99%  BMI 32.19 kg/m2 Estimated body mass index is 32.19 kg/(m^2) as calculated from the following:    Height as of 4/21/17: 6' 1.75\" (1.873 m).    Weight as of this encounter: 249 lb (112.9 kg).  Medication Reconciliation: complete     Cady Valencia CMA      "

## 2017-05-12 NOTE — PATIENT INSTRUCTIONS
For the next two week take  of MS Contin and up to  pills of the dilaudid daily.     For the following two week take  of MS Contin and up to  pills of the dilaudid daily.     follow up in 4 weeks on Friday.

## 2017-07-12 DIAGNOSIS — Z98.1 S/P LUMBAR FUSION: Primary | ICD-10-CM

## 2017-07-14 ENCOUNTER — TELEPHONE (OUTPATIENT)
Dept: NEUROSURGERY | Facility: CLINIC | Age: 62
End: 2017-07-14

## 2017-07-14 ENCOUNTER — HOSPITAL ENCOUNTER (OUTPATIENT)
Dept: GENERAL RADIOLOGY | Facility: CLINIC | Age: 62
Discharge: HOME | End: 2017-07-14
Attending: NEUROLOGICAL SURGERY | Admitting: NEUROLOGICAL SURGERY
Payer: COMMERCIAL

## 2017-07-14 DIAGNOSIS — Z98.1 S/P LUMBAR FUSION: ICD-10-CM

## 2017-07-14 PROCEDURE — 72100 X-RAY EXAM L-S SPINE 2/3 VWS: CPT

## 2017-07-19 ENCOUNTER — E-VISIT (OUTPATIENT)
Dept: FAMILY MEDICINE | Facility: CLINIC | Age: 62
End: 2017-07-19
Payer: COMMERCIAL

## 2017-07-19 DIAGNOSIS — Z98.1 S/P LUMBAR FUSION: ICD-10-CM

## 2017-07-19 DIAGNOSIS — M81.0 OSTEOPOROSIS, UNSPECIFIED OSTEOPOROSIS TYPE, UNSPECIFIED PATHOLOGICAL FRACTURE PRESENCE: ICD-10-CM

## 2017-07-19 PROCEDURE — 99444 ZZC PHYSICIAN ONLINE EVALUATION & MANAGEMENT SERVICE: CPT | Performed by: FAMILY MEDICINE

## 2017-07-19 NOTE — MR AVS SNAPSHOT
After Visit Summary   7/19/2017    Usama Harris    MRN: [id number]           Patient Information     Date Of Birth          1955        Visit Information        Provider Department      7/19/2017 11:55 AM Nikko Tang MD [facility]        Today's Diagnoses     S/P lumbar fusion        Osteoporosis, unspecified osteoporosis type, unspecified pathological fracture presence           Follow-ups after your visit        Your next 10 appointments already scheduled     Jul 24, 2017 10:10 AM CDT   Return Visit with Kiesha Franklin NP   [facility] ([facility])    [address] [place] [id number] [phone]              Who to contact     If you have questions or need follow up information about today's clinic visit or your schedule please contact [facility] directly at [phone].  Normal or non-critical lab and imaging results will be communicated to you by MyChart, letter or phone within 4 business days after the clinic has received the results. If you do not hear from us within 7 days, please contact the clinic through [employer] or phone. If you have a critical or abnormal lab result, we will notify you by phone as soon as possible.  Submit refill requests through [employer] or call your pharmacy and they will forward the refill request to us. Please allow 3 business days for your refill to be completed.          Additional Information About Your Visit        MyChart Information     [employer] gives you secure access to your electronic health record. If you see a primary care provider, you can also send messages to your care team and make appointments. If you have questions, please call your primary care clinic.  If you do not have a primary care provider, please call [phone] and they will assist you.        Care EveryWhere ID     This is your Care EveryWhere ID. This could be  used by other organizations to access your [place] medical records  [id number]         Blood Pressure from Last 3 Encounters:   05/12/17 (!) 120/92   04/21/17 124/80   04/13/17 153/90    Weight from Last 3 Encounters:   05/12/17 249 lb (112.9 kg)   04/21/17 242 lb (109.8 kg)   04/13/17 249 lb (112.9 kg)              Today, you had the following     No orders found for display         Today's Medication Changes          These changes are accurate as of: 7/19/17 11:59 PM.  If you have any questions, ask your nurse or doctor.               These medicines have changed or have updated prescriptions.        Dose/Directions    * morphine 30 MG 12 hr tablet   Commonly known as:  MS CONTIN   This may have changed:  Another medication with the same name was added. Make sure you understand how and when to take each.   Used for:  S/P lumbar fusion        Dose:  30 mg   Take 1 tablet (30 mg) by mouth every 8 hours   Quantity:  90 tablet   Refills:  0       * morphine 30 MG 12 hr tablet   Commonly known as:  MS CONTIN   This may have changed:  You were already taking a medication with the same name, and this prescription was added. Make sure you understand how and when to take each.   Used for:  S/P lumbar fusion        Dose:  30 mg   Start taking on:  8/20/2017   Take 1 tablet (30 mg) by mouth every 8 hours   Quantity:  90 tablet   Refills:  0       tiZANidine 4 MG tablet   Commonly known as:  ZANAFLEX   This may have changed:    - when to take this  - reasons to take this   Used for:  Muscle spasm        Dose:  4-6 mg   Take 1-1.5 tablets (4-6 mg) by mouth 3 times daily   Quantity:  135 tablet   Refills:  1       * Notice:  This list has 2 medication(s) that are the same as other medications prescribed for you. Read the directions carefully, and ask your doctor or other care provider to review them with you.      Stop taking these medicines if you haven't already. Please contact your care team if you have questions.      HYDROmorphone 4 MG tablet   Commonly known as:  DILAUDID                Where to get your medicines      These medications were sent to [facility] [place], [place] - [address]  [address] [facility] [place] [address]     Phone:  [phone]     alendronate 70 MG tablet         Some of these will need a paper prescription and others can be bought over the counter.  Ask your nurse if you have questions.     Bring a paper prescription for each of these medications     morphine 30 MG 12 hr tablet    morphine 30 MG 12 hr tablet                Primary Care Provider Office Phone # Fax #    Nikko Tang [phone]       [facility] [address]  [facility] [address]        Equal Access to Services     VIDYA GALEAS : Hadii aminta mejia hadasho Sosterlingali, waaxda luqadaha, qaybta kaalmada adeegyada, leonardo burnett. So [facility] [phone].    ATENCIÓN: Si habla español, tiene a osman disposición servicios gratuitos de asistencia lingüística. [facility] [phone].    We comply with applicable federal civil rights laws and [place] laws. We do not discriminate on the basis of race, color, national origin, age, disability sex, sexual orientation or gender identity.            Thank you!     Thank you for choosing [facility]  for your care. Our goal is always to provide you with excellent care. Hearing back from our patients is one way we can continue to improve our services. Please take a few minutes to complete the written survey that you may receive in the mail after your visit with us. Thank you!             Your Updated Medication List - Protect others around you: Learn how to safely use, store and throw away your medicines at www.disposemymeds.org.          This list is accurate as of: 7/19/17 11:59 PM.  Always use your most recent med list.                   Brand Name Dispense Instructions for use Diagnosis    alendronate 70 MG tablet     FOSAMAX    12 tablet    Take 1 tablet (70 mg) by mouth every 7 days Take 60 minutes before am meal with 8 oz. water. Remain upright for 30 minutes.    Osteoporosis, unspecified osteoporosis type, unspecified pathological fracture presence       calcitonin (salmon) 200 UNIT/ACT nasal spray    MIACALCIN    1 Bottle    Spray 1 spray into one nostril alternating nostrils daily Alternate nostril each day.    Compression fracture of L1 lumbar vertebra, with routine healing, subsequent encounter       carboxymethylcellulose 0.5 % Soln ophthalmic solution    REFRESH PLUS     Place 1 drop into both eyes daily as needed for dry eyes        EQL SENNA-S 8.6-50 MG per tablet   Generic drug:  senna-docusate     100 tablet    TAKES 5 TABLETS DAILY.        hydrOXYzine 25 MG capsule    VISTARIL    60 capsule    Take 1-2 capsules (25-50 mg) by mouth 3 times daily as needed for itching    S/P lumbar fusion, Chronic pain syndrome, Physical deconditioning       latanoprost 0.005 % ophthalmic solution    XALATAN     Place 1 drop into both eyes At Bedtime        * morphine 30 MG 12 hr tablet    MS CONTIN    90 tablet    Take 1 tablet (30 mg) by mouth every 8 hours    S/P lumbar fusion       * morphine 30 MG 12 hr tablet   Start taking on:  8/20/2017    MS CONTIN    90 tablet    Take 1 tablet (30 mg) by mouth every 8 hours    S/P lumbar fusion       Multi-vitamin Tabs tablet      Take 1 tablet by mouth daily        NARCAN NA      Spray in nostril daily as needed (opioid reversal)        NORVASC PO      Take 10 mg by mouth daily        * order for DME     1 each    Equipment being ordered: Walker () and Walker Extension w/Wheels (, ) Treatment Diagnosis: Difficulty walking    S/P lumbar fusion       * order for DME     1 Device    Equipment being ordered: Orthofix lumbar bone growth stimulator    S/P lumbar fusion       tiZANidine 4 MG tablet    ZANAFLEX    135 tablet    Take 1-1.5 tablets (4-6 mg) by mouth 3 times daily     Muscle spasm       * Notice:  This list has 4 medication(s) that are the same as other medications prescribed for you. Read the directions carefully, and ask your doctor or other care provider to review them with you.

## 2017-07-20 RX ORDER — MORPHINE SULFATE 30 MG/1
30 TABLET, FILM COATED, EXTENDED RELEASE ORAL EVERY 8 HOURS
Qty: 90 TABLET | Refills: 0 | Status: SHIPPED | OUTPATIENT
Start: 2017-08-20 | End: 2018-05-04

## 2017-07-20 RX ORDER — ALENDRONATE SODIUM 70 MG/1
70 TABLET ORAL
Qty: 12 TABLET | Refills: 3 | Status: SHIPPED | OUTPATIENT
Start: 2017-07-20 | End: 2018-08-26

## 2017-07-20 RX ORDER — MORPHINE SULFATE 30 MG/1
30 TABLET, FILM COATED, EXTENDED RELEASE ORAL EVERY 8 HOURS
Qty: 90 TABLET | Refills: 0 | Status: SHIPPED | OUTPATIENT
Start: 2017-07-20 | End: 2017-09-25

## 2017-07-20 NOTE — TELEPHONE ENCOUNTER
Ok to stop the calcitonin. Fosamax put in. Ok to stay on the ms contin for now. I wonder if the neurosurgeon might recommend restarting physical therapy at some point again.     Can I bring the ms contin to the pharmacy here?     Nikko Tang

## 2017-07-24 ENCOUNTER — OFFICE VISIT (OUTPATIENT)
Dept: NEUROSURGERY | Facility: CLINIC | Age: 62
End: 2017-07-24
Attending: NURSE PRACTITIONER
Payer: COMMERCIAL

## 2017-07-24 VITALS
DIASTOLIC BLOOD PRESSURE: 78 MMHG | SYSTOLIC BLOOD PRESSURE: 146 MMHG | OXYGEN SATURATION: 97 % | HEART RATE: 71 BPM | TEMPERATURE: 97.6 F

## 2017-07-24 DIAGNOSIS — Z98.1 S/P LUMBAR FUSION: Primary | ICD-10-CM

## 2017-07-24 PROCEDURE — 99213 OFFICE O/P EST LOW 20 MIN: CPT | Performed by: NURSE PRACTITIONER

## 2017-07-24 PROCEDURE — 99211 OFF/OP EST MAY X REQ PHY/QHP: CPT | Performed by: NURSE PRACTITIONER

## 2017-07-24 ASSESSMENT — PAIN SCALES - GENERAL: PAINLEVEL: MILD PAIN (2)

## 2017-07-24 NOTE — NURSING NOTE
".  Usama Harris is a 61 year old male who presents for:  Chief Complaint   Patient presents with     Neurologic Problem     low back and right leg pain with tingling and numbness         Initial Vitals:  /78  Pulse 71  Temp 97.6  F (36.4  C) (Oral)  SpO2 97% Estimated body mass index is 32.19 kg/(m^2) as calculated from the following:    Height as of 4/21/17: 6' 1.75\" (1.873 m).    Weight as of 5/12/17: 249 lb (112.9 kg).. There is no height or weight on file to calculate BSA. BP completed using cuff size: large  Mild Pain (2)    Do you feel safe in your environment?  Yes  Do you need any refills today? No    Nursing Comments: patient c/o of low back and right leg pain with tingling and numbness. S/p lumbar fusion with Dr Issa 11/15/16.      5 minutes nursing intake time  Jenny Esquivel RN     Discharge plan:   -Followup in November with  xray prior   - Call the clinic at [phone] for increased pain or any other questions and concerns.    2 minutes nursing discharge time   Jenny Esquivel RN           "

## 2017-07-24 NOTE — PATIENT INSTRUCTIONS
-Followup in November with  xray prior   - Call the clinic at [phone] for increased pain or any other questions and concerns.

## 2017-07-24 NOTE — MR AVS SNAPSHOT
After Visit Summary   7/24/2017    Usama Harris    MRN: [id number]           Patient Information     Date Of Birth          1955        Visit Information        Provider Department      7/24/2017 10:10 AM Kiesha Franklin NP [facility] Neurosurgery [facility]        Today's Diagnoses     S/P lumbar fusion    -  1      Care Instructions     -Followup in November with  xray prior   - Call the clinic at [phone] for increased pain or any other questions and concerns.          Follow-ups after your visit        Future tests that were ordered for you today     Open Future Orders        Priority Expected Expires Ordered    XR Lumbar Spine 2/3 Views Routine 7/24/2017 7/24/2018 7/24/2017            Who to contact     If you have questions or need follow up information about today's clinic visit or your schedule please contact [facility] directly at [phone].  Normal or non-critical lab and imaging results will be communicated to you by [employer], letter or phone within 4 business days after the clinic has received the results. If you do not hear from us within 7 days, please contact the clinic through [employer] or phone. If you have a critical or abnormal lab result, we will notify you by phone as soon as possible.  Submit refill requests through [employer] or call your pharmacy and they will forward the refill request to us. Please allow 3 business days for your refill to be completed.          Additional Information About Your Visit        MyChart Information     [employer] gives you secure access to your electronic health record. If you see a primary care provider, you can also send messages to your care team and make appointments. If you have questions, please call your primary care clinic.  If you do not have a primary care provider, please call [phone] and they will assist you.        Care EveryWhere ID     This is your Care EveryWhere ID. This could be  used by other organizations to access your [place] medical records  [id number]        Your Vitals Were     Pulse Temperature Pulse Oximetry             71 97.6  F (36.4  C) (Oral) 97%          Blood Pressure from Last 3 Encounters:   07/24/17 146/78   05/12/17 (!) 120/92   04/21/17 124/80    Weight from Last 3 Encounters:   05/12/17 249 lb (112.9 kg)   04/21/17 242 lb (109.8 kg)   04/13/17 249 lb (112.9 kg)                 Today's Medication Changes          These changes are accurate as of: 7/24/17 10:52 AM.  If you have any questions, ask your nurse or doctor.               These medicines have changed or have updated prescriptions.        Dose/Directions    tiZANidine 4 MG tablet   Commonly known as:  ZANAFLEX   This may have changed:    - when to take this  - reasons to take this   Used for:  Muscle spasm        Dose:  4-6 mg   Take 1-1.5 tablets (4-6 mg) by mouth 3 times daily   Quantity:  135 tablet   Refills:  1                Primary Care Provider Office Phone # Fax #    Nikko Jeffrey Tang [phone]       [address] [address]        Equal Access to Services     VIDYA GALEAS AH: Hadii aminta mejia hadasho Soomaali, waaxda luqadaha, qaybta kaalmada adeegyada, leonardo burnett. So [facility] [phone].    ATENCIÓN: Si habla español, tiene a osman disposición servicios gratuitos de asistencia lingüística. Llame al [phone].    We comply with applicable federal civil rights laws and [place] laws. We do not discriminate on the basis of race, color, national origin, age, disability sex, sexual orientation or gender identity.            Thank you!     Thank you for choosing [facility]  for your care. Our goal is always to provide you with excellent care. Hearing back from our patients is one way we can continue to improve our services. Please take a few minutes to complete the written survey that you may receive in  the mail after your visit with us. Thank you!             Your Updated Medication List - Protect others around you: Learn how to safely use, store and throw away your medicines at www.disposemymeds.org.          This list is accurate as of: 7/24/17 10:52 AM.  Always use your most recent med list.                   Brand Name Dispense Instructions for use Diagnosis    alendronate 70 MG tablet    FOSAMAX    12 tablet    Take 1 tablet (70 mg) by mouth every 7 days Take 60 minutes before am meal with 8 oz. water. Remain upright for 30 minutes.    Osteoporosis, unspecified osteoporosis type, unspecified pathological fracture presence       calcitonin (salmon) 200 UNIT/ACT nasal spray    MIACALCIN    1 Bottle    Spray 1 spray into one nostril alternating nostrils daily Alternate nostril each day.    Compression fracture of L1 lumbar vertebra, with routine healing, subsequent encounter       carboxymethylcellulose 0.5 % Soln ophthalmic solution    REFRESH PLUS     Place 1 drop into both eyes daily as needed for dry eyes        EQL SENNA-S 8.6-50 MG per tablet   Generic drug:  senna-docusate     100 tablet    TAKES 5 TABLETS DAILY.        hydrOXYzine 25 MG capsule    VISTARIL    60 capsule    Take 1-2 capsules (25-50 mg) by mouth 3 times daily as needed for itching    S/P lumbar fusion, Chronic pain syndrome, Physical deconditioning       latanoprost 0.005 % ophthalmic solution    XALATAN     Place 1 drop into both eyes At Bedtime        * morphine 30 MG 12 hr tablet    MS CONTIN    90 tablet    Take 1 tablet (30 mg) by mouth every 8 hours    S/P lumbar fusion       * morphine 30 MG 12 hr tablet   Start taking on:  8/20/2017    MS CONTIN    90 tablet    Take 1 tablet (30 mg) by mouth every 8 hours    S/P lumbar fusion       Multi-vitamin Tabs tablet      Take 1 tablet by mouth daily        NARCAN NA      Spray in nostril daily as needed (opioid reversal)        NORVASC PO      Take 10 mg by mouth daily        * order for  DME     1 each    Equipment being ordered: Walker () and Walker Extension w/Wheels (, ) Treatment Diagnosis: Difficulty walking    S/P lumbar fusion       * order for DME     1 Device    Equipment being ordered: Orthofix lumbar bone growth stimulator    S/P lumbar fusion       tiZANidine 4 MG tablet    ZANAFLEX    135 tablet    Take 1-1.5 tablets (4-6 mg) by mouth 3 times daily    Muscle spasm       * Notice:  This list has 4 medication(s) that are the same as other medications prescribed for you. Read the directions carefully, and ask your doctor or other care provider to review them with you.

## 2017-07-24 NOTE — PROGRESS NOTES
"[facility]  Neurosurgery followup:    HPI: Usama Harris is a 61-year-old male status post L3 to 5 posterior spinal fusion in November 2016 with Dr. Iggy Issa.  He did well following surgery with his pain significantly improved, and then he states \"after physical therapy\" he started to have increased back pain.  Lumbar Xray on 3/20/17 showed L1 endplate fracture and he was ordered a [place] brace to wear with activity.  He states he did reasonably well up until recently.  He describes pain to the mid to right lower back, increasing with various activities.  He states he continues to have numbness to the right thigh, but also notes it is remarkably improved since surgery.  He denies weakness of BLE or changes to bowel or bladder.  He has taken MS Contin prn and finds it helpful.    Exam:  Constitutional:  Alert, well nourished, NAD.  HEENT: Normocephalic, atraumatic.   Pulm:  Without shortness of breath   CV:  No pitting edema of BLE.      Neurological:  Awake  Alert  Oriented x 3  Motor exam:        IP Q DF PF EHL  R   5  5   5   5    5  L   5  5   5   5    5     Able to spontaneously move L/E bilaterally  Sensation intact throughout all L/E dermatomes     Imaging:   Per Xray fusion intact.  Compression of superior endplate of L1 unchanged.    A/P: S/p L3-L5 fusion  L1 compression fracture    Usama Harris is a 61-year-old male status post L3 to 5 posterior spinal fusion in November 2016 with Dr. Iggy Issa.  He did well following surgery with his pain significantly improved, and then he states \"after physical therapy\" he started to have increased back pain.  Lumbar Xray on 3/20/17 showed L1 endplate fracture and he was ordered a [place] brace to wear with activity.  He states he did reasonably well up until recently.  He describes pain to the mid to right lower back, increasing with various activities.  He states he continues to have numbness to the right thigh, but also notes it is remarkably " improved since surgery.  We reviewed Xray results today and discussed normal expectations following fusion surgery, specifically the first year.  He will continue activities as tolerated and if he experiences discomfort he agrees to decrease what he is doing.  We also discussed that some fractures do not fully heal.  He is currently on Fosamax thru his PCP.  If his pain increases or further questions he will contact us, otherwise he will f/u for his one year appointment in November.        Patient Instructions    -Followup in November with  xray prior   - Call the clinic at [phone] for increased pain or any other questions and concerns.        [facility]  Spine and Brain Clinic  [address] [address]    Tel [phone]  Pager [phone]

## 2017-07-28 ENCOUNTER — HOSPITAL ENCOUNTER (OUTPATIENT)
Dept: ULTRASOUND IMAGING | Facility: CLINIC | Age: 62
Discharge: HOME | End: 2017-07-28
Attending: FAMILY MEDICINE | Admitting: FAMILY MEDICINE
Payer: COMMERCIAL

## 2017-07-28 DIAGNOSIS — R79.89 ELEVATED LIVER FUNCTION TESTS: ICD-10-CM

## 2017-07-28 PROCEDURE — 76705 ECHO EXAM OF ABDOMEN: CPT

## 2017-08-24 ENCOUNTER — E-VISIT (OUTPATIENT)
Dept: FAMILY MEDICINE | Facility: CLINIC | Age: 62
End: 2017-08-24

## 2017-08-24 DIAGNOSIS — M54.5 CHRONIC LOW BACK PAIN, UNSPECIFIED BACK PAIN LATERALITY, WITH SCIATICA PRESENCE UNSPECIFIED: Primary | ICD-10-CM

## 2017-08-24 DIAGNOSIS — G89.29 CHRONIC LOW BACK PAIN, UNSPECIFIED BACK PAIN LATERALITY, WITH SCIATICA PRESENCE UNSPECIFIED: Primary | ICD-10-CM

## 2017-09-25 ENCOUNTER — MYC MEDICAL ADVICE (OUTPATIENT)
Dept: FAMILY MEDICINE | Facility: CLINIC | Age: 62
End: 2017-09-25

## 2017-09-25 DIAGNOSIS — Z98.1 S/P LUMBAR FUSION: ICD-10-CM

## 2017-09-25 NOTE — TELEPHONE ENCOUNTER
Per [employer] message below, pt requesting refill for pain medication before 10/3/17 office visit with Dr Tang. [facility] pharmacy.    Sheryl Nava, CHARLA Referral Rep

## 2017-09-26 RX ORDER — MORPHINE SULFATE 30 MG/1
30 TABLET, FILM COATED, EXTENDED RELEASE ORAL EVERY 8 HOURS
Qty: 90 TABLET | Refills: 0 | Status: SHIPPED | OUTPATIENT
Start: 2017-09-26 | End: 2018-05-04

## 2017-10-03 ENCOUNTER — OFFICE VISIT (OUTPATIENT)
Dept: FAMILY MEDICINE | Facility: CLINIC | Age: 62
End: 2017-10-03
Payer: COMMERCIAL

## 2017-10-03 VITALS
OXYGEN SATURATION: 94 % | RESPIRATION RATE: 14 BRPM | BODY MASS INDEX: 33.87 KG/M2 | SYSTOLIC BLOOD PRESSURE: 122 MMHG | WEIGHT: 262 LBS | HEART RATE: 79 BPM | TEMPERATURE: 97.2 F | DIASTOLIC BLOOD PRESSURE: 84 MMHG

## 2017-10-03 DIAGNOSIS — G89.4 CHRONIC PAIN SYNDROME: Primary | ICD-10-CM

## 2017-10-03 DIAGNOSIS — M54.5 CHRONIC LOW BACK PAIN, UNSPECIFIED BACK PAIN LATERALITY, WITH SCIATICA PRESENCE UNSPECIFIED: ICD-10-CM

## 2017-10-03 DIAGNOSIS — Z23 NEED FOR PROPHYLACTIC VACCINATION AND INOCULATION AGAINST INFLUENZA: ICD-10-CM

## 2017-10-03 DIAGNOSIS — G89.29 CHRONIC LOW BACK PAIN, UNSPECIFIED BACK PAIN LATERALITY, WITH SCIATICA PRESENCE UNSPECIFIED: ICD-10-CM

## 2017-10-03 DIAGNOSIS — I10 BENIGN ESSENTIAL HYPERTENSION: ICD-10-CM

## 2017-10-03 PROCEDURE — 90471 IMMUNIZATION ADMIN: CPT | Performed by: FAMILY MEDICINE

## 2017-10-03 PROCEDURE — 90686 IIV4 VACC NO PRSV 0.5 ML IM: CPT | Performed by: FAMILY MEDICINE

## 2017-10-03 PROCEDURE — 99214 OFFICE O/P EST MOD 30 MIN: CPT | Mod: 25 | Performed by: FAMILY MEDICINE

## 2017-10-03 RX ORDER — MORPHINE SULFATE 15 MG/1
15 TABLET, FILM COATED, EXTENDED RELEASE ORAL EVERY 12 HOURS
Qty: 60 TABLET | Refills: 0 | Status: SHIPPED | OUTPATIENT
Start: 2017-10-31 | End: 2017-12-12

## 2017-10-03 RX ORDER — DULOXETIN HYDROCHLORIDE 20 MG/1
CAPSULE, DELAYED RELEASE ORAL
Qty: 60 CAPSULE | Refills: 1 | Status: SHIPPED | OUTPATIENT
Start: 2017-10-03 | End: 2017-12-12

## 2017-10-03 RX ORDER — IRBESARTAN 150 MG/1
150 TABLET ORAL DAILY
Qty: 90 TABLET | Refills: 1 | Status: SHIPPED | OUTPATIENT
Start: 2017-10-03 | End: 2017-12-12

## 2017-10-03 RX ORDER — MORPHINE SULFATE 15 MG/1
15 TABLET, FILM COATED, EXTENDED RELEASE ORAL EVERY 8 HOURS
Qty: 90 TABLET | Refills: 0 | Status: SHIPPED | OUTPATIENT
Start: 2017-10-03 | End: 2017-12-08

## 2017-10-03 ASSESSMENT — PATIENT HEALTH QUESTIONNAIRE - PHQ9: SUM OF ALL RESPONSES TO PHQ QUESTIONS 1-9: 12

## 2017-10-03 NOTE — PATIENT INSTRUCTIONS
Let me know if the cymbalta is tolerated well. If tolerated well when you are running low we can switch to a 60mg pill.     Follow up by office or e-visit in 2 months. I will work on figuring out what would work as an add on med as you cut back on the morphine.

## 2017-10-03 NOTE — NURSING NOTE
"Chief Complaint   Patient presents with     Refill Request     pain medication        Initial /84 (BP Location: Left arm, Patient Position: Sitting, Cuff Size: Adult Regular)  Pulse 79  Temp 97.2  F (36.2  C) (Oral)  Resp 14  Wt 262 lb (118.8 kg)  SpO2 94%  BMI 33.87 kg/m2 Estimated body mass index is 33.87 kg/(m^2) as calculated from the following:    Height as of 4/21/17: 6' 1.75\" (1.873 m).    Weight as of this encounter: 262 lb (118.8 kg).  Medication Reconciliation: complete     Annie Cerrato MA      "

## 2017-10-03 NOTE — PROGRESS NOTES
SUBJECTIVE:   Usama Harris is a 61 year old male who presents to clinic today for the following health issues:      Medication Followup of  Morphine     Taking Medication as prescribed: yes    Side Effects: itch all the time.    Medication Helping Symptoms:  yes   He has tapered down from 90 mg morphie to 60mg morphine. He reports this has been hard but he also reports he has done this and overall the pain is better than it was a few months ago when I last saw him and he is just as active if not more so. He does have some trouble pacing hs work/activity.     He would like to contiue to taper down on these meds.     He wouldinow like to try cymbalta or similar. He has a daughter who takes this with some success. He also feels his mood is down due to the slow recovery. He is not in Physical therapy.     Hypertension Follow-up      Outpatient blood pressures are not being checked.    Low Salt Diet: not monitoring salt        Amount of exercise or physical activity: 4-5 days/week for an average of greater than 60 minutes    Problems taking medications regularly: No    Medication side effects: amlodipine makes him sleepy towards the afternoon    Diet: regular (no restrictions)eq    med hx, family hx, and soc hx reviewed and updated     Ros negative for cv, neur neuro symptoms. He reports gaining a bit of weight     OBJECTIVE: /84 (BP Location: Left arm, Patient Position: Sitting, Cuff Size: Adult Regular)  Pulse 79  Temp 97.2  F (36.2  C) (Oral)  Resp 14  Wt 262 lb (118.8 kg)  SpO2 94%  BMI 33.87 kg/m2   Mental status exam:  Well-groomed, well-dressed, normal affect, no evidence of formal thought disorder, No SI, Speech normal.     PHQ-9 score:    PHQ-9 SCORE 10/3/2017   Total Score 12     DUC-7 SCORE 4/19/2016 4/21/2017   Total Score 1 9         ICD-10-CM    1. Chronic pain syndrome G89.4    2. Benign essential hypertension I10 irbesartan (AVAPRO) 150 MG tablet     **Basic metabolic panel FUTURE 14d    3. Chronic low back pain, unspecified back pain laterality, with sciatica presence unspecified M54.5     G89.29    4. Need for prophylactic vaccination and inoculation against influenza Z23 HC FLU VAC PRESRV FREE QUAD SPLIT VIR 3+YRS IM  [24406]          ADMIN VACCINE, FIRST [60845]     morphine (MS CONTIN) 15 MG 12 hr tablet     morphine (MS CONTIN) 15 MG 12 hr tablet     DULoxetine (CYMBALTA) 20 MG EC capsule    avapro for bp. Trial cymbalta for pain/mood. Increase to 60mg if toelrated or even 120mg. follow up in about a month. Trial tapering down morphine. Initially at 15 mg tid then 15 mg bid. will consult pain regarding the taper.     Injectable Influenza Immunization Documentation     1.  Is the person to be vaccinated sick today?   No    2. Does the person to be vaccinated have an allergy to a component   of the vaccine?   No    3. Has the person to be vaccinated ever had a serious reaction   to influenza vaccine in the past?   No    4. Has the person to be vaccinated ever had Guillain-Barré syndrome?   No    Form completed by Annie Cerrato MA

## 2017-10-03 NOTE — MR AVS SNAPSHOT
After Visit Summary   10/3/2017    Usama Harris    MRN: [id number]           Patient Information     Date Of Birth          1955        Visit Information        Provider Department      10/3/2017 4:00 PM Nikko Tang MD [facility]        Today's Diagnoses     Need for prophylactic vaccination and inoculation against influenza          Care Instructions    Let me know if the cymbalta is tolerated well. If tolerated well when you are running low we can switch to a 60mg pill.     Follow up by office or e-visit in 2 months. I will work on figuring out what would work as an add on med as you cut back on the morphine.               Follow-ups after your visit        Who to contact     If you have questions or need follow up information about today's clinic visit or your schedule please contact [facility] directly at [phone].  Normal or non-critical lab and imaging results will be communicated to you by [employer], letter or phone within 4 business days after the clinic has received the results. If you do not hear from us within 7 days, please contact the clinic through [employer] or phone. If you have a critical or abnormal lab result, we will notify you by phone as soon as possible.  Submit refill requests through zulily or call your pharmacy and they will forward the refill request to us. Please allow 3 business days for your refill to be completed.          Additional Information About Your Visit        MyChart Information     zulily gives you secure access to your electronic health record. If you see a primary care provider, you can also send messages to your care team and make appointments. If you have questions, please call your primary care clinic.  If you do not have a primary care provider, please call [phone] and they will assist you.        Care EveryWhere ID     This is your Care EveryWhere ID. This could be used by other organizations  to access your [facility] medical records  [id number]        Your Vitals Were     Pulse Temperature Respirations Pulse Oximetry BMI (Body Mass Index)       79 97.2  F (36.2  C) (Oral) 14 94% 33.87 kg/m2        Blood Pressure from Last 3 Encounters:   10/03/17 122/84   07/24/17 146/78   05/12/17 (!) 120/92    Weight from Last 3 Encounters:   10/03/17 262 lb (118.8 kg)   05/12/17 249 lb (112.9 kg)   04/21/17 242 lb (109.8 kg)              We Performed the Following          ADMIN VACCINE, FIRST [97753]     HC FLU VAC PRESRV FREE QUAD SPLIT VIR 3+YRS IM  [86981]          Today's Medication Changes          These changes are accurate as of: 10/3/17  4:30 PM.  If you have any questions, ask your nurse or doctor.               Start taking these medicines.        Dose/Directions    DULoxetine 20 MG EC capsule   Commonly known as:  CYMBALTA   Used for:  Need for prophylactic vaccination and inoculation against influenza   Started by:  Nikko Tang MD        One nightly for 6-8 days then one twice daily   Quantity:  60 capsule   Refills:  1         These medicines have changed or have updated prescriptions.        Dose/Directions    * morphine 30 MG 12 hr tablet   Commonly known as:  MS CONTIN   This may have changed:  Another medication with the same name was added. Make sure you understand how and when to take each.   Used for:  S/P lumbar fusion   Changed by:  Nikko Tang MD        Dose:  30 mg   Take 1 tablet (30 mg) by mouth every 8 hours   Quantity:  90 tablet   Refills:  0       * morphine 30 MG 12 hr tablet   Commonly known as:  MS CONTIN   This may have changed:  Another medication with the same name was added. Make sure you understand how and when to take each.   Used for:  S/P lumbar fusion   Changed by:  Nikko Tang MD        Dose:  30 mg   Take 1 tablet (30 mg) by mouth every 8 hours   Quantity:  90 tablet   Refills:  0       * morphine 15 MG 12 hr tablet   Commonly known as:  MS CONTIN   This  may have changed:  You were already taking a medication with the same name, and this prescription was added. Make sure you understand how and when to take each.   Used for:  Need for prophylactic vaccination and inoculation against influenza   Changed by:  Nikko Tang MD        Dose:  15 mg   Take 1 tablet (15 mg) by mouth every 8 hours   Quantity:  90 tablet   Refills:  0       * morphine 15 MG 12 hr tablet   Commonly known as:  MS CONTIN   This may have changed:  You were already taking a medication with the same name, and this prescription was added. Make sure you understand how and when to take each.   Used for:  Need for prophylactic vaccination and inoculation against influenza   Changed by:  Nikko Tang MD        Dose:  15 mg   Start taking on:  10/31/2017   Take 1 tablet (15 mg) by mouth every 12 hours maximum 2 tablet(s) per day   Quantity:  60 tablet   Refills:  0       tiZANidine 4 MG tablet   Commonly known as:  ZANAFLEX   This may have changed:    - when to take this  - reasons to take this   Used for:  Muscle spasm        Dose:  4-6 mg   Take 1-1.5 tablets (4-6 mg) by mouth 3 times daily   Quantity:  135 tablet   Refills:  1       * Notice:  This list has 4 medication(s) that are the same as other medications prescribed for you. Read the directions carefully, and ask your doctor or other care provider to review them with you.      Stop taking these medicines if you haven't already. Please contact your care team if you have questions.     calcitonin (salmon) 200 UNIT/ACT nasal spray   Commonly known as:  MIACALCIN   Stopped by:  Nikko Tang MD                Where to get your medicines      These medications were sent to [facility] - [place], [place] - [address]  [address], [place] [address]     Phone:  [phone]     DULoxetine 20 MG EC capsule         Some of these will need a paper prescription and others can be bought over the counter.  Ask your  nurse if you have questions.     Bring a paper prescription for each of these medications     morphine 15 MG 12 hr tablet    morphine 15 MG 12 hr tablet                Primary Care Provider Office Phone # Fax #    Nikko Tang [phone] [address]  [facility] [address]        Equal Access to Services     VIDYA GALEAS : Hadii aad ku hadasho Soomaali, waaxda luqadaha, qaybta kaalmada adeegyada, waxay nagel haymaximen kaykay khananettejaden burnett. So [facility] [phone].    ATENCIÓN: Si habla español, tiene a osman disposición servicios gratuitos de asistencia lingüística. [facility] [phone].    We comply with applicable federal civil rights laws and [place] laws. We do not discriminate on the basis of race, color, national origin, age, disability, sex, sexual orientation, or gender identity.            Thank you!     Thank you for choosing [facility]  for your care. Our goal is always to provide you with excellent care. Hearing back from our patients is one way we can continue to improve our services. Please take a few minutes to complete the written survey that you may receive in the mail after your visit with us. Thank you!             Your Updated Medication List - Protect others around you: Learn how to safely use, store and throw away your medicines at www.disposemymeds.org.          This list is accurate as of: 10/3/17  4:30 PM.  Always use your most recent med list.                   Brand Name Dispense Instructions for use Diagnosis    alendronate 70 MG tablet    FOSAMAX    12 tablet    Take 1 tablet (70 mg) by mouth every 7 days Take 60 minutes before am meal with 8 oz. water. Remain upright for 30 minutes.    Osteoporosis, unspecified osteoporosis type, unspecified pathological fracture presence       carboxymethylcellulose 0.5 % Soln ophthalmic solution    REFRESH PLUS     Place 1 drop into both eyes daily as needed for dry eyes        DULoxetine 20 MG EC capsule     CYMBALTA    60 capsule    One nightly for 6-8 days then one twice daily    Need for prophylactic vaccination and inoculation against influenza       EQL SENNA-S 8.6-50 MG per tablet   Generic drug:  senna-docusate     100 tablet    TAKES 5 TABLETS DAILY.        hydrOXYzine 25 MG capsule    VISTARIL    60 capsule    Take 1-2 capsules (25-50 mg) by mouth 3 times daily as needed for itching    S/P lumbar fusion, Chronic pain syndrome, Physical deconditioning       latanoprost 0.005 % ophthalmic solution    XALATAN     Place 1 drop into both eyes At Bedtime        * morphine 30 MG 12 hr tablet    MS CONTIN    90 tablet    Take 1 tablet (30 mg) by mouth every 8 hours    S/P lumbar fusion       * morphine 30 MG 12 hr tablet    MS CONTIN    90 tablet    Take 1 tablet (30 mg) by mouth every 8 hours    S/P lumbar fusion       * morphine 15 MG 12 hr tablet    MS CONTIN    90 tablet    Take 1 tablet (15 mg) by mouth every 8 hours    Need for prophylactic vaccination and inoculation against influenza       * morphine 15 MG 12 hr tablet   Start taking on:  10/31/2017    MS CONTIN    60 tablet    Take 1 tablet (15 mg) by mouth every 12 hours maximum 2 tablet(s) per day    Need for prophylactic vaccination and inoculation against influenza       Multi-vitamin Tabs tablet      Take 1 tablet by mouth daily        NARCAN NA      Spray in nostril daily as needed (opioid reversal)        NORVASC PO      Take 10 mg by mouth daily        * order for DME     1 each    Equipment being ordered: Walker () and Walker Extension w/Wheels (, ) Treatment Diagnosis: Difficulty walking    S/P lumbar fusion       * order for DME     1 Device    Equipment being ordered: Orthofix lumbar bone growth stimulator    S/P lumbar fusion       tiZANidine 4 MG tablet    ZANAFLEX    135 tablet    Take 1-1.5 tablets (4-6 mg) by mouth 3 times daily    Muscle spasm       * Notice:  This list has 6 medication(s) that are the same as other medications  prescribed for you. Read the directions carefully, and ask your doctor or other care provider to review them with you.

## 2017-11-06 DIAGNOSIS — I10 BENIGN ESSENTIAL HYPERTENSION: ICD-10-CM

## 2017-11-06 LAB
ANION GAP SERPL CALCULATED.3IONS-SCNC: 7 MMOL/L (ref 3–14)
BUN SERPL-MCNC: 12 MG/DL (ref 7–30)
CALCIUM SERPL-MCNC: 9.1 MG/DL (ref 8.5–10.1)
CHLORIDE SERPL-SCNC: 107 MMOL/L (ref 94–109)
CO2 SERPL-SCNC: 25 MMOL/L (ref 20–32)
CREAT SERPL-MCNC: 0.76 MG/DL (ref 0.66–1.25)
GFR SERPL CREATININE-BSD FRML MDRD: >90 ML/MIN/1.7M2
GLUCOSE SERPL-MCNC: 90 MG/DL (ref 70–99)
POTASSIUM SERPL-SCNC: 4 MMOL/L (ref 3.4–5.3)
SODIUM SERPL-SCNC: 139 MMOL/L (ref 133–144)

## 2017-11-06 PROCEDURE — 36415 COLL VENOUS BLD VENIPUNCTURE: CPT | Performed by: FAMILY MEDICINE

## 2017-11-06 PROCEDURE — 80048 BASIC METABOLIC PNL TOTAL CA: CPT | Performed by: FAMILY MEDICINE

## 2017-11-15 ENCOUNTER — HOSPITAL ENCOUNTER (OUTPATIENT)
Dept: GENERAL RADIOLOGY | Facility: CLINIC | Age: 62
Discharge: HOME | End: 2017-11-15
Attending: NURSE PRACTITIONER | Admitting: NURSE PRACTITIONER
Payer: COMMERCIAL

## 2017-11-15 DIAGNOSIS — Z98.1 S/P LUMBAR FUSION: ICD-10-CM

## 2017-11-15 PROCEDURE — 72100 X-RAY EXAM L-S SPINE 2/3 VWS: CPT

## 2017-12-07 ENCOUNTER — MYC MEDICAL ADVICE (OUTPATIENT)
Dept: FAMILY MEDICINE | Facility: CLINIC | Age: 62
End: 2017-12-07

## 2017-12-12 ENCOUNTER — E-VISIT (OUTPATIENT)
Dept: FAMILY MEDICINE | Facility: CLINIC | Age: 62
End: 2017-12-12
Payer: COMMERCIAL

## 2017-12-12 DIAGNOSIS — G89.4 CHRONIC PAIN SYNDROME: Primary | ICD-10-CM

## 2017-12-12 DIAGNOSIS — Z23 NEED FOR PROPHYLACTIC VACCINATION AND INOCULATION AGAINST INFLUENZA: ICD-10-CM

## 2017-12-12 DIAGNOSIS — I10 ESSENTIAL HYPERTENSION: ICD-10-CM

## 2017-12-12 PROCEDURE — 99444 ZZC PHYSICIAN ONLINE EVALUATION & MANAGEMENT SERVICE: CPT | Performed by: FAMILY MEDICINE

## 2017-12-12 RX ORDER — DULOXETIN HYDROCHLORIDE 30 MG/1
30 CAPSULE, DELAYED RELEASE ORAL DAILY
Qty: 90 CAPSULE | Refills: 1 | Status: SHIPPED | OUTPATIENT
Start: 2017-12-12 | End: 2018-06-07

## 2017-12-12 RX ORDER — IRBESARTAN 300 MG/1
300 TABLET ORAL DAILY
Qty: 90 TABLET | Refills: 1 | Status: SHIPPED | OUTPATIENT
Start: 2017-12-12 | End: 2018-05-09

## 2017-12-12 RX ORDER — MORPHINE SULFATE 15 MG/1
15 TABLET, FILM COATED, EXTENDED RELEASE ORAL EVERY 12 HOURS
Qty: 60 TABLET | Refills: 0 | Status: SHIPPED | OUTPATIENT
Start: 2017-12-12 | End: 2018-11-04

## 2017-12-13 ENCOUNTER — TELEPHONE (OUTPATIENT)
Dept: PALLIATIVE MEDICINE | Facility: CLINIC | Age: 62
End: 2017-12-13

## 2018-01-08 ENCOUNTER — OFFICE VISIT (OUTPATIENT)
Dept: PALLIATIVE MEDICINE | Facility: CLINIC | Age: 63
End: 2018-01-08
Payer: COMMERCIAL

## 2018-01-08 VITALS
SYSTOLIC BLOOD PRESSURE: 153 MMHG | BODY MASS INDEX: 34.9 KG/M2 | HEART RATE: 68 BPM | DIASTOLIC BLOOD PRESSURE: 88 MMHG | WEIGHT: 270 LBS | OXYGEN SATURATION: 96 %

## 2018-01-08 DIAGNOSIS — Z79.891 ENCOUNTER FOR LONG-TERM OPIATE ANALGESIC USE: Primary | ICD-10-CM

## 2018-01-08 DIAGNOSIS — Z79.891 ENCOUNTER FOR LONG-TERM OPIATE ANALGESIC USE: ICD-10-CM

## 2018-01-08 DIAGNOSIS — M47.817 LUMBOSACRAL SPONDYLOSIS WITHOUT MYELOPATHY: ICD-10-CM

## 2018-01-08 DIAGNOSIS — M96.1 FAILED BACK SYNDROME: ICD-10-CM

## 2018-01-08 PROCEDURE — 99214 OFFICE O/P EST MOD 30 MIN: CPT | Performed by: PAIN MEDICINE

## 2018-01-08 PROCEDURE — 80307 DRUG TEST PRSMV CHEM ANLYZR: CPT | Mod: 90 | Performed by: PAIN MEDICINE

## 2018-01-08 PROCEDURE — 99000 SPECIMEN HANDLING OFFICE-LAB: CPT | Performed by: PAIN MEDICINE

## 2018-01-08 ASSESSMENT — PAIN SCALES - GENERAL: PAINLEVEL: MILD PAIN (2)

## 2018-01-08 NOTE — NURSING NOTE
Obtained urine specimen.  Tracking number [id number], temp 96 degrees.  Specimen sent to the lab.     Princess Hurtado [facility]

## 2018-01-08 NOTE — PATIENT INSTRUCTIONS
- Further procedures recommended:    - consider Lumbar MEDIAL BRANCH BLOCK  Above and below fusion   - Medication Management:    - Plan to provide recommendations. PCP will have to continue prescribing for now.    - Interested in medical marijuana- Will discuss with PCP   - Weaning Opioid Plan: Would discontinue am MScontin and cont PM MScontin 15mg for 1 month with added IR morphine 7.5mg daily--> then would completely discontinue  MScontin and increase IR 7.5mg BID for 1 month--> IR 7.5mg daily 1 month-->  IR 7.5 every 72 hours-->discontinue opioids   - consider adding robaxin    - consider adding a different NSAID  - Clinical Health Psychologist to address issues of relaxation, behavioral change, coping style, and other factors important to improvement: consider in the future  - Diagnostic Studies: no  - Urine toxicology screen today: yes  - Follow up: TBD since pt was referred for consult only.   ----------------------------------------------------------------  Nurse Triage line:  [phone]   Call this number with any questions or concerns. You may leave a detailed message anytime. Calls are typically returned Monday through Friday between 8 AM and 4:30 PM. We usually get back to you within 2 business days depending on the issue/request.      Scheduling number: [phone].  Call this number to schedule or change appointments.    We believe regular attendance is key to your success in our program.    Any time you are unable to keep your appointment we ask that you call us at least 24 hours in advance to let us know. This will allow us to offer the appointment time to another patient.

## 2018-01-08 NOTE — MR AVS SNAPSHOT
After Visit Summary   1/8/2018    Usama Harris    MRN: [id number]           Patient Information     Date Of Birth          1955        Visit Information        Provider Department      1/8/2018 1:00 PM Taran Sifuentes MD [facility]        Today's Diagnoses     Encounter for long-term opiate analgesic use    -  1      Care Instructions    - Further procedures recommended:    - consider Lumbar MEDIAL BRANCH BLOCK  Above and below fusion   - Medication Management:    - Plan to provide recommendations. PCP will have to continue prescribing for now.    - Interested in medical marijuana- Will discuss with PCP   - Weaning Opioid Plan: Would discontinue am MScontin and cont PM MScontin 15mg for 1 month with added IR morphine 7.5mg daily--> then would completely discontinue  MScontin and increase IR 7.5mg BID for 1 month--> IR 7.5mg daily 1 month-->  IR 7.5 every 72 hours-->discontinue opioids   - consider adding robaxin    - consider adding a different NSAID  - Clinical Health Psychologist to address issues of relaxation, behavioral change, coping style, and other factors important to improvement: consider in the future  - Diagnostic Studies: no  - Urine toxicology screen today: yes  - Follow up: TBD since pt was referred for consult only.   ----------------------------------------------------------------  Nurse Triage line:  [phone]   Call this number with any questions or concerns. You may leave a detailed message anytime. Calls are typically returned Monday through Friday between 8 AM and 4:30 PM. We usually get back to you within 2 business days depending on the issue/request.      Scheduling number: [phone].  Call this number to schedule or change appointments.    We believe regular attendance is key to your success in our program.    Any time you are unable to keep your appointment we ask that you call us at least 24 hours in advance to let us know. This  will allow us to offer the appointment time to another patient.                 Follow-ups after your visit        Who to contact     If you have questions or need follow up information about today's clinic visit or your schedule please contact [facility] directly at [phone].  Normal or non-critical lab and imaging results will be communicated to you by Jiberishhart, letter or phone within 4 business days after the clinic has received the results. If you do not hear from us within 7 days, please contact the clinic through Yododot or phone. If you have a critical or abnormal lab result, we will notify you by phone as soon as possible.  Submit refill requests through Bridgefy or call your pharmacy and they will forward the refill request to us. Please allow 3 business days for your refill to be completed.          Additional Information About Your Visit        [facility] Information     Bridge[employer] gives you secure access to your electronic health record. If you see a primary care provider, you can also send messages to your care team and make appointments. If you have questions, please call your primary care clinic.  If you do not have a primary care provider, please call [phone] and they will assist you.        Care EveryWhere ID     This is your Care EveryWhere ID. This could be used by other organizations to access your [facility] medical records  [id number]        Your Vitals Were     Pulse Pulse Oximetry BMI (Body Mass Index)             68 96% 34.9 kg/m2          Blood Pressure from Last 3 Encounters:   01/08/18 153/88   10/03/17 122/84   07/24/17 146/78    Weight from Last 3 Encounters:   01/08/18 122.5 kg (270 lb)   10/03/17 118.8 kg (262 lb)   05/12/17 112.9 kg (249 lb)              We Performed the Following     Drug Screen Comprehensive , Urine with Reported Meds (Pain Care Package)          Today's Medication Changes          These changes are accurate as of: 1/8/18  2:28 PM.  If you have any  questions, ask your nurse or doctor.               These medicines have changed or have updated prescriptions.        Dose/Directions    tiZANidine 4 MG tablet   Commonly known as:  ZANAFLEX   This may have changed:    - when to take this  - reasons to take this   Used for:  Muscle spasm        Dose:  4-6 mg   Take 1-1.5 tablets (4-6 mg) by mouth 3 times daily   Quantity:  135 tablet   Refills:  1                Primary Care Provider Office Phone # Fax #    Nikko Tang [phone] [address]  [facility] [address]        Equal Access to Services     [facility]: Hadii aminta mejia hadasho Soomaali, waaxda luqadaha, qaybta kaalmada velia, leonardo chen . So [facility] [phone].    ATENCIÓN: Si habla español, tiene a osman disposición servicios gratuitos de asistencia lingüística. [facility] [phone].    We comply with applicable federal civil rights laws and [place] laws. We do not discriminate on the basis of race, color, national origin, age, disability, sex, sexual orientation, or gender identity.            Thank you!     Thank you for choosing [facility]  for your care. Our goal is always to provide you with excellent care. Hearing back from our patients is one way we can continue to improve our services. Please take a few minutes to complete the written survey that you may receive in the mail after your visit with us. Thank you!             Your Updated Medication List - Protect others around you: Learn how to safely use, store and throw away your medicines at www.disposemymeds.org.          This list is accurate as of: 1/8/18  2:28 PM.  Always use your most recent med list.                   Brand Name Dispense Instructions for use Diagnosis    alendronate 70 MG tablet    FOSAMAX    12 tablet    Take 1 tablet (70 mg) by mouth every 7 days Take 60 minutes before am meal with 8 oz. water. Remain upright for 30 minutes.    Osteoporosis,  unspecified osteoporosis type, unspecified pathological fracture presence       carboxymethylcellulose 0.5 % Soln ophthalmic solution    REFRESH PLUS     Place 1 drop into both eyes daily as needed for dry eyes        DULoxetine 30 MG EC capsule    CYMBALTA    90 capsule    Take 1 capsule (30 mg) by mouth daily    Chronic pain syndrome       EQL SENNA-S 8.6-50 MG per tablet   Generic drug:  senna-docusate     100 tablet    TAKES 5 TABLETS DAILY.        hydrOXYzine 25 MG capsule    VISTARIL    60 capsule    Take 1-2 capsules (25-50 mg) by mouth 3 times daily as needed for itching    S/P lumbar fusion, Chronic pain syndrome, Physical deconditioning       irbesartan 300 MG tablet    AVAPRO    90 tablet    Take 1 tablet (300 mg) by mouth daily    Essential hypertension       latanoprost 0.005 % ophthalmic solution    XALATAN     Place 1 drop into both eyes At Bedtime        * morphine 30 MG 12 hr tablet    MS CONTIN    90 tablet    Take 1 tablet (30 mg) by mouth every 8 hours    S/P lumbar fusion       * morphine 30 MG 12 hr tablet    MS CONTIN    90 tablet    Take 1 tablet (30 mg) by mouth every 8 hours    S/P lumbar fusion       * morphine 15 MG 12 hr tablet    MS CONTIN    60 tablet    Take 1 tablet (15 mg) by mouth every 12 hours maximum 2 tablet(s) per day    Need for prophylactic vaccination and inoculation against influenza       Multi-vitamin Tabs tablet      Take 1 tablet by mouth daily        NARCAN NA      Spray in nostril daily as needed (opioid reversal)        NORVASC PO      Take 10 mg by mouth daily        * order for DME     1 each    Equipment being ordered: Walker () and Walker Extension w/Wheels (, ) Treatment Diagnosis: Difficulty walking    S/P lumbar fusion       * order for DME     1 Device    Equipment being ordered: Orthofix lumbar bone growth stimulator    S/P lumbar fusion       tiZANidine 4 MG tablet    ZANAFLEX    135 tablet    Take 1-1.5 tablets (4-6 mg) by mouth 3 times  daily    Muscle spasm       * Notice:  This list has 5 medication(s) that are the same as other medications prescribed for you. Read the directions carefully, and ask your doctor or other care provider to review them with you.

## 2018-01-08 NOTE — NURSING NOTE
"Chief Complaint   Patient presents with     Pain     pain management evaluation       Initial /88  Pulse 68  Wt 122.5 kg (270 lb)  SpO2 96%  BMI 34.9 kg/m2 Estimated body mass index is 34.9 kg/(m^2) as calculated from the following:    Height as of 4/21/17: 1.873 m (6' 1.75\").    Weight as of this encounter: 122.5 kg (270 lb).    "

## 2018-01-08 NOTE — PROGRESS NOTES
[facility]    Date of visit: 1/8/2018    Chief complaint:   Chief Complaint   Patient presents with     Pain     pain management evaluation     Hx: S/p L3-4 decompression laminectomy 5/18/16 persistent right radiculopathy. S/p L4-5 laminectomy 2004, 7/22/16 lumbar discectomy RIGHT L3-L4 REVISION    MICRODISCECTOMY. L3 to 5 posterior spinal fusion in November 2016 with Dr. Iggy Issa. Subsequent L1 compression fracture  Interval history:  Usama Norrisura was last seen by [facility] 4/17.      Recommendations/plan at the last visit included:  1. Dilaudid 4 mg take 1- tablet(s) every 3 hours as needed limit 5 tablet(s) per day Start 4/15/17  2. Then start  Ms Contin to 30 mg every 8 hrs, new RX given today to start 04/17/17   1. Ok to safely taper at 10 % per week of the total daily opioid intake as outpatient  and will usually not produce opioid withdrawal.  2. Consider over night sleep study if concerned about hypoventilation syndrome associated with opioid use.    3. Continue with hydroxyzine capsule as needed for pain, anxiety and sleep    4. Tizanidine 4-6 mg 3 x per day as needed for muscle spasm  5. Narcan nasal spray for unintended overdose of narcotics   6. Continue all other medications    7. Opioid compliance stick to one pharmacy and within dosing instructions on bottle  8. Physical Therapy as tolerated.  9. Return visit with Shital Anderson CNP after out of brace and clearance from                         GOAL setting with activity and pain medication use.  10. Follow up  Dr. Issa and your primary care           Since his last visit, Usama Norrisura reports:  The pt reports cont improvement since last visit. The pt is currently on MScontin 15mg bid. He additionally is now on cymbalta 30mg with some benefit, but feels may be affecting his memory. The plan is to cont to wean down the opioids.  The pt cont to have numbness in L ant thigh and ant foot. Occasionally, has  radiating sharp shooting pain, but overall well controlled. The pain is worse when going from a sitting to standing to position. The pain is worse when sitting for extended periods of time.  He denies any progressive weakness.  He denies any incontinence.  He denies any symptoms consistent with withdrawal.  The patient is interested in medical marijuana to see if it can help while decreasing his opioids.  Of note the patient reports running out of his prescription in a couple days and that he needs a refill ASAP.  Additionally, the patient is not interested in gabapentin/Lyrica.  He stopped taking the Zanaflex because he did not feel it helped.  He noted some improvement with Flexeril in the past, but was too sedating.  Overall patient is here today because he wants me to take over the weaning process of his opioids.  Pain scores:  Pain intensity on average is 5 on a scale of 0-10.     Current pain treatments:   mscontin 15mg bid  zanaflex - not taking   cymbalta 30mg  fosamax  Past pain treatments:  PAST MEDICATION TRIALS:                                                   OPIOIDS: Tylenol #3, hydrocodone( Vicodin), Morphine ER, percocet, OxyContin, tramadol   NSAID'S/ANALEGESICS: Celbrex, Ibuprofen, naprosyn,      ANTIMIGRAINE: none   STEROIDS: yes dose pack     MUSCLE  RELAXERS:  Flexeril, tizanidine, Skelaxin   ANTIDEPRESSANTS: Bupropion   ANTIANXIETY: Xanax   HYPNOTICS: Hydroxyzine   ANTICONVULSANTS: none , not wanting Gabapentin, Lyrica     TOPICALS:  None     Side Effects: no side effect    Medications:  Current Outpatient Prescriptions   Medication Sig Dispense Refill     morphine (MS CONTIN) 15 MG 12 hr tablet Take 1 tablet (15 mg) by mouth every 12 hours maximum 2 tablet(s) per day 60 tablet 0     irbesartan (AVAPRO) 300 MG tablet Take 1 tablet (300 mg) by mouth daily 90 tablet 1     DULoxetine (CYMBALTA) 30 MG EC capsule Take 1 capsule (30 mg) by mouth daily 90 capsule 1     alendronate (FOSAMAX) 70 MG  tablet Take 1 tablet (70 mg) by mouth every 7 days Take 60 minutes before am meal with 8 oz. water. Remain upright for 30 minutes. 12 tablet 3     AmLODIPine Besylate (NORVASC PO) Take 10 mg by mouth daily       tiZANidine (ZANAFLEX) 4 MG tablet Take 1-1.5 tablets (4-6 mg) by mouth 3 times daily (Patient taking differently: Take 4-6 mg by mouth 3 times daily as needed ) 135 tablet 1     hydrOXYzine (VISTARIL) 25 MG capsule Take 1-2 capsules (25-50 mg) by mouth 3 times daily as needed for itching 60 capsule 3     multivitamin, therapeutic with minerals (MULTI-VITAMIN) TABS tablet Take 1 tablet by mouth daily       senna-docusate (EQL SENNA-S) 8.6-50 MG per tablet TAKES 5 TABLETS DAILY. 100 tablet      order for DME Equipment being ordered: Walker () and Walker Extension w/Wheels (, )  Treatment Diagnosis: Difficulty walking 1 each 0     Naloxone HCl (NARCAN NA) Spray in nostril daily as needed (opioid reversal)        carboxymethylcellulose (REFRESH PLUS) 0.5 % SOLN Place 1 drop into both eyes daily as needed for dry eyes       latanoprost (XALATAN) 0.005 % ophthalmic solution Place 1 drop into both eyes At Bedtime   11     morphine (MS CONTIN) 30 MG 12 hr tablet Take 1 tablet (30 mg) by mouth every 8 hours (Patient not taking: Reported on 1/8/2018) 90 tablet 0     morphine (MS CONTIN) 30 MG 12 hr tablet Take 1 tablet (30 mg) by mouth every 8 hours (Patient not taking: Reported on 1/8/2018) 90 tablet 0     order for DME Equipment being ordered: Orthofix lumbar bone growth stimulator (Patient not taking: Reported on 10/3/2017) 1 Device 0       Medical History: any changes in medical history since they were last seen? No    Review of Systems:  The 14 system ROS was reviewed from the intake questionnaire*  Any bowel or bladder problems: no  Mood: good    Physical Exam:  /88  Pulse 68  Wt 122.5 kg (270 lb)  SpO2 96%  BMI 34.9 kg/m2  Constitutional: alert and no distress.  Pt noyobese  Head:  Normocephalic  ENT: EOMI, mucosal surfaces moist.  Neck with full ROM   Cardiovascular: No edema or JVD appreciated.  Respiratory: Good diaphragmatic excursion. No wheezes appreciated.  Speaking in complete sentences without shortness of breath.  No accessory muscle use.     Cervical Spine:  wnl    Lumbar spine:     ROM: Decreased extension   Myofascial tenderness: Positive left lower back TTP   Kemps: Slightly positive on the right   Straight leg exam: Negative              SI: Negative    Greater trochanteric bursa: Negative    Neurologic exam:  CN:  Cranial nerves 2-12 are normal  Motor:  5/5 UE and LE strength  Reflexes:       Patella:  +2   Achilles:  +2    Sensory:  (upper and lower extremities):   Light touch: normal    Allodynia: absent    Dysethesia: absent    Hyperalgesia: absent     Skin: no suspicious lesions or rashes appreciated on exposed areas  Psychiatric: mentation appears normal, affect full and good eye contact.        Assessment/Plan:   Usama Harris is a 62 year old male who is seen at the pain clinic for Bilat LBP w/ occasional radiation down his legs  Usama was seen today for pain.    Diagnoses and all orders for this visit:    Encounter for long-term opiate analgesic use  -     Drug Screen Comprehensive , Urine with Reported Meds (Pain Care Package)    Failed back syndrome    Lumbosacral spondylosis without myelopathy    - Further procedures recommended:    - consider Lumbar MEDIAL BRANCH BLOCK  Above and below fusion   - Medication Management:    - Plan to provide recommendations. PCP will have to continue prescribing for now.    - Interested in medical marijuana- Will defer for now    - Weaning Opioid Plan: Would discontinue am MScontin and cont PM MScontin 15mg for 1 month with added IR morphine 7.5mg daily--> then would completely discontinue  MScontin and increase IR 7.5mg BID for 1 month--> IR 7.5mg daily 1 month-->  IR 7.5 every 72 hours-->discontinue opioids   - consider adding  robaxin    - consider adding a different NSAID  - Clinical Health Psychologist to address issues of relaxation, behavioral change, coping style, and other factors important to improvement: consider in the future  - Diagnostic Studies: no  - Urine toxicology screen today: yes  - Follow up: 3 months or for procedure    Total time spent was 60 minutes, and more than 50% of face to face time was spent in counseling and/or coordination of care regarding  Bilat LBP w/ occasional radiation down his legs..    Taran Sifuentes MD  [facility]

## 2018-01-08 NOTE — NURSING NOTE
"Pt believes that his PCP wanted to transfer medication management to a provider in the pain clinic even though the referral specifically said \"consult only.\"  Let him know that Dr. Sifuentes and Dr. Tang will need to discuss to determine the most appropriate plan, and that someone will get back to him about next steps. Instructed patient to connect with Dr. Tang for a refill request of his opioid medication at this time.  Pt is weaning off of opioids and wants further discussions about other medications to manage his pain. States that he is interested in medical marijuana. Let him know that Dr. Sifuentes and his PCP will need to discuss various details before going forward.     Luana Coleman, TOMMYN, RN-BC  Patient Care Supervisor/Care Coordinator  [facility]    "

## 2018-01-15 LAB — PAIN DRUG SCR UR W RPTD MEDS: NORMAL

## 2018-01-15 ASSESSMENT — PATIENT HEALTH QUESTIONNAIRE - PHQ9: SUM OF ALL RESPONSES TO PHQ QUESTIONS 1-9: 10

## 2018-01-17 ENCOUNTER — MYC MEDICAL ADVICE (OUTPATIENT)
Dept: PALLIATIVE MEDICINE | Facility: CLINIC | Age: 63
End: 2018-01-17

## 2018-01-18 NOTE — TELEPHONE ENCOUNTER
From: Usama Harris  To: Taran Sifuentes MD  Sent: 1/17/2018 5:17 PM CST  Subject: Question about a normal test result    Wow Dr. Sifuentes, I am a little shocked at the lab result but after considering the result, I do dispute findings to a point, but been taking prescribed left over Hydromorphone for a fast acting narcotic while saving the MS Contin 15mg. I wanted to stretch the morphine for having to do withdrawal protocol on my own and not be running short. While on vacation I had been overly active and at home had some strenuous home repairs fr [facility]. I did explain this to you in having this event and having taking extra breakthrough opioids, but it didn't register that it was not just morphine. I did have .5mg Zanex in my system and they failed to detect that as well. I have enough narcotics left for the next two weeks, and can do this on my own, but enjoyed meeting you and would open to your care if this lab result doesn't some how chad me as a criminal. I don't blame you but seriously doubt the labs findings, they missed the actual drugs in my system, and I have no comment on the quantification  there of. Thank you for taking the time to discuss my health needs and read this lengthy note. Kevyn Harris

## 2018-01-19 NOTE — TELEPHONE ENCOUNTER
Routing to provider to review.     Wow Dr. Sifuentes, I am a little shocked at the lab result but after considering the result, I do dispute findings to a point, but been taking prescribed left over Hydromorphone for a fast acting narcotic while saving the MS Contin 15mg. I wanted to stretch the morphine for having to do withdrawal protocol on my own and not be running short. While on vacation I had been overly active and at home had some strenuous home repairs fr tenant. I did explain this to you in having this event and having taking extra breakthrough opioids, but it didn't register that it was not just morphine. I did have .5mg Zanex in my system and they failed to detect that as well. I have enough narcotics left for the next two weeks, and can do this on my own, but enjoyed meeting you and would open to your care if this lab result doesn't some how chad me as a criminal. I don't blame you but seriously doubt the labs findings, they missed the actual drugs in my system, and I have no comment on the quantification  there of. Thank you for taking the time to discuss my health needs and read this lengthy note. Kevyn Nelson  BSN-RN Care Coordinator  [facility]

## 2018-01-29 NOTE — TELEPHONE ENCOUNTER
Mychart below sent to patient. Will leave encounter open for patient response/chart review by nursing.     Dr. Maria Sawant reviewed your urinary drug screen.  Your findings were within normal limits. Our screens look for metabolites of medications so sometimes you may have taken a medication that may show up in other forms as they metabolize.  Every medication has a different half life or speed at which it exits your body, this may be why some of what you marked did not appear. Did you have questions regarding the results?     Ana Nelson  BSN-RN Care Coordinator  [facility]

## 2018-02-05 NOTE — TELEPHONE ENCOUNTER
My chart message from pt:    Jackson, sorry for misinterpreting drug screen, but it appeared that heroin use was insinuated. I was under the impression that opioids tested all the same, and forgot about the hydromorphone, it effects me about the same as morphine. I resisted having to contact Dr. Tang for approval to be seen at your clinic.  My insurance doesn't require a referral and I blow off contacting primary clinic. I am using the last of my 15mg morphine QD HS, and ASA during the day for the last two weeks and doing well with this regime. There is enough for one more week and then that's that. I would like to F/U on the marijuana referral to bridge away from opioids as the pain during the day is unpleasant, but hopefully manageable in the future. If I manage the opioids on my own can I obtain a prescription for the marijuana from your office? If necessary I can contact Dr. Tang, but seems unnecessary. Please advise.  Thank you for your consideration Bill Harris     Bill    Your insurance may not require a referral but since your last visit was a consult only you are not considered an ongoing patient with us. In order to be seen regularly the referral from your primary must have said a comprehensive evaluation with ongoing management. So unfortunately you do need to discuss your current care with Dr. Tang.     If he feels that you need to be seen for full pain management which would include PT and possible injections along with medication management then he can refer you back and Dr. Sifuentes can decide on a full plan of care. Until then Dr. Sifuentes gave you and Dr. Tang his recommendations.     Thank you.     Blessing Sheehan RN, [facility]  Pain Clinic Care Coordinator

## 2018-05-03 ENCOUNTER — TELEPHONE (OUTPATIENT)
Dept: FAMILY MEDICINE | Facility: CLINIC | Age: 63
End: 2018-05-03

## 2018-05-03 NOTE — TELEPHONE ENCOUNTER
Usama Harris is a 62 year old male who calls with right foot problem.    NURSING ASSESSMENT:  Description:  [place] foot in [place] - 2-3 inches long - is now showing worsening symptoms of red streaks - saw eye doctor and he advised patient he may need to have it looked at    Onset/duration:  4/12/2018  Precip. factors:  [place]  Associated symptoms:   1. Right leg - behind toes - red lines traveling up to ankle   2. Denies swelling - fever, drainage, symptoms of DVT, pain      Last exam/Treatment:  10/3/2017  Allergies:   Allergies   Allergen Reactions     No Known Drug Allergies          NURSING PLAN: Nursing advice to patient please be seen in clinic - encouraged office visit today - patient declined - requested Dr. Tang specifically - scheduled patient within 24 hours - he verbalized understanding and agrees with plan    RECOMMENDED DISPOSITION:  See in 24 hours - see above  Will comply with recommendation: Yes  If further questions/concerns or if symptoms do not improve, worsen or new symptoms develop, call your PCP or [facility] as soon as possible.      Guideline used:  Telephone Triage Protocols for Nurses, Fifth Edition, Harmony Segundo RN

## 2018-05-04 ENCOUNTER — OFFICE VISIT (OUTPATIENT)
Dept: FAMILY MEDICINE | Facility: CLINIC | Age: 63
End: 2018-05-04
Payer: COMMERCIAL

## 2018-05-04 ENCOUNTER — MYC MEDICAL ADVICE (OUTPATIENT)
Dept: PALLIATIVE MEDICINE | Facility: CLINIC | Age: 63
End: 2018-05-04

## 2018-05-04 ENCOUNTER — TELEPHONE (OUTPATIENT)
Dept: PALLIATIVE MEDICINE | Facility: CLINIC | Age: 63
End: 2018-05-04

## 2018-05-04 VITALS
WEIGHT: 267.8 LBS | BODY MASS INDEX: 34.62 KG/M2 | TEMPERATURE: 98.6 F | HEART RATE: 68 BPM | DIASTOLIC BLOOD PRESSURE: 100 MMHG | SYSTOLIC BLOOD PRESSURE: 152 MMHG | OXYGEN SATURATION: 95 %

## 2018-05-04 DIAGNOSIS — M47.816 LUMBAR FACET ARTHROPATHY: Primary | ICD-10-CM

## 2018-05-04 DIAGNOSIS — M54.9 CHRONIC BACK PAIN, UNSPECIFIED BACK LOCATION, UNSPECIFIED BACK PAIN LATERALITY: Primary | ICD-10-CM

## 2018-05-04 DIAGNOSIS — I10 ESSENTIAL HYPERTENSION: ICD-10-CM

## 2018-05-04 DIAGNOSIS — L81.0 POSTINFLAMMATORY HYPERPIGMENTATION: ICD-10-CM

## 2018-05-04 DIAGNOSIS — G89.29 CHRONIC BACK PAIN, UNSPECIFIED BACK LOCATION, UNSPECIFIED BACK PAIN LATERALITY: Primary | ICD-10-CM

## 2018-05-04 PROCEDURE — 99214 OFFICE O/P EST MOD 30 MIN: CPT | Performed by: FAMILY MEDICINE

## 2018-05-04 RX ORDER — METOPROLOL SUCCINATE 25 MG/1
TABLET, EXTENDED RELEASE ORAL
Qty: 30 TABLET | Refills: 3 | Status: SHIPPED | OUTPATIENT
Start: 2018-05-04 | End: 2018-10-03

## 2018-05-04 NOTE — TELEPHONE ENCOUNTER
Routing to provider to review message below. Would you like to place L-MBB order? Or would you like patient seen for eval/MM eval beforehand?  Nursing to contact patient accordingly.     Bryce below from patient.   Hi I had spoken with a Nurse regarding beginning a regime of marijuana at my last visit with Dr. Sifuentes but needed to get the correct referral from my primary Dr. ISABEL Tang. I discussed the use of marijuana with Dr. Tang this morning and he changed my referral to allow for treatment from your clinic for marijuana. If I am correct I need to follow up with the nurse from your clinic, and not Dr. Sifuentes at this time.  Please advise regarding moving ahead with the discussed treatment plan last discussed. I have discontinued narcotic use three months ago and have recently weathered a relapse in pain and mobility, and wondered if  could have helped me with the other treatment he had recommended using injected ablation. It took a month to resolve the exacerbation leading to correcting the referral and so here I  am again.  Thank you and please contact me with instructions for moving forward.    Per OV 01/08/18:  Further procedures recommended:                         - consider Lumbar MEDIAL BRANCH BLOCK  Above and below fusion   - Medication Management:                         - Plan to provide recommendations. PCP will have to continue prescribing for now.                         - Interested in medical marijuana- Will discuss with PCP                        - Weaning Opioid Plan: Would discontinue am MScontin and cont PM MScontin 15mg for 1 month with added IR morphine 7.5mg daily--> then would completely discontinue  MScontin and increase IR 7.5mg BID for 1 month--> IR 7.5mg daily 1 month-->  IR 7.5 every 72 hours-->discontinue opioids                        - consider adding robaxin                         - consider adding a different NSAID  - Clinical Health Psychologist to address issues of  relaxation, behavioral change, coping style, and other factors important to improvement: consider in the future  - Diagnostic Studies: no  - Urine toxicology screen today: yes  - Follow up: TBD since pt was referred for consult only.

## 2018-05-04 NOTE — PROGRESS NOTES
SUBJECTIVE:                                                    Usama Harris is a 62 year old male who presents to clinic today for the following health issues:      Musculoskeletal problem/pain      Duration: April 12    Description  Location: anterior rt foot, radiating pain, numbness, it looks like a little scab, some discoloration looked like a burn at first (it happened when he was [place] and later noticed this on his leg, it may also be due to his hiking)     Intensity:  moderate    Accompanying signs and symptoms: radiation of pain through the foot, numbness, warmth, swelling and redness    History  Previous similar problem: no   Previous evaluation:  none    Precipitating or alleviating factors:  Trauma or overuse: no   Aggravating factors include: none    Therapies tried and outcome: antibiotic lotion    ADDITIONAL:  Wants to talk about referral that was given to him   Would noa to see the pain clinic for comprehensive approach. Overall has been doing well but has had a bad last month [place] which flared back pain. This finally resolved over the past few weeks. Taking next to zero morphine.     htn-at home the values are usually above 140/90 not taking amlodipine. Made his tired.     med hx, family hx, and soc hx reviewed and updated     OBJECTIVE: BP (!) 152/100  Pulse 68  Temp 98.6  F (37  C) (Oral)  Wt 267 lb 12.8 oz (121.5 kg)  SpO2 95%  BMI 34.62 kg/m2 no apparent distress   Exam:  GENERAL APPEARANCE: healthy, alert and no distress  NECK: no adenopathy, no asymmetry, masses, or scars and thyroid normal to palpation  RESP: lungs clear to auscultation - no rales, rhonchi or wheezes  CV: regular rates and rhythm, normal S1 S2, no S3 or S4 and no murmur, click or rub -  ABDOMEN:  soft, nontender, no HSM or masses and bowel sounds normal  SKIN: luann erythema over the 4th toe dorsum foot with a few areas of scab in the  center. Up the leg there is a scratch that is scabbed. No sign infection.       ICD-10-CM    1. Chronic back pain, unspecified back location, unspecified back pain laterality M54.9 PAIN MANAGEMENT REFERRAL    G89.29    2. Essential hypertension I10 metoprolol succinate (TOPROL-XL) 25 MG 24 hr tablet   3. Postinflammatory hyperpigmentation L81.0     to pain for comprehensive apporach with dr holden. Add in low dose toprol, home monitoring for bp. May increase from 12.5mg to 25mg.  Reassurrance for hyperpigmented spot on foot.

## 2018-05-04 NOTE — TELEPHONE ENCOUNTER
Left voicemail for patient to schedule new evaluation.           Adriana MONREAL    [facility]

## 2018-05-04 NOTE — MR AVS SNAPSHOT
After Visit Summary   5/4/2018    Usama Harris    MRN: [id number]           Patient Information     Date Of Birth          1955        Visit Information        Provider Department      5/4/2018 10:40 AM Nikko Tang MD [facility]        Today's Diagnoses     Chronic back pain, unspecified back location, unspecified back pain laterality    -  1    Essential hypertension        Postinflammatory hyperpigmentation           Follow-ups after your visit        Additional Services     PAIN MANAGEMENT REFERRAL       Your provider has referred you to: [facility]: [facility] -    Reason for Referral: Evaluation for comprehensive services- patients will be evaluated if appropriate for comprehensive service including medication changes, procedures, pain psychology, and pain physical therapy.  While involved with comprehensive services, pain providers will work with referring provider/PCP to stabilize appropriate medication management, with long-term plan of transition of prescribing back to referring provider/PCP upon completion of comprehensive services.      Please complete the following questions:    Do you have any specific questions for the pain specialist? No    Are there any red flags that may impact the assessment or management of the patient? None      What is your diagnosis for the patient's pain? Chronic back pain      For any questions, contact the [facility] at [phone].     **ANY DIAGNOSTIC TESTS THAT ARE NOT IN EPIC SHOULD BE SENT TO THE PAIN CENTER**    REGARDING OPIOID MEDICATIONS:  The discussion of opioids management, appropriateness of therapy, and dosing will be discussed in patients being seen for evaluation.  The pain management clinics are not long-term prescribing clinics, with transition of prescribing of medications ultimately going back to the referring provider/PCP.  If prescribing is taken over at the pain  clinic, it is in actively involved patients whom are appropriate for opioids, urine drug screening is completed, and long-term prescribing plan has been determined.  Therefore, we will not be automatically taking over prescribing at the patient's first visit.  Is this agreeable to you? agrees.     Please be aware that coverage of these services is subject to the terms and limitations of your health insurance plan.  Call member services at your health plan with any benefit or coverage questions.      Please bring the following with you to your appointment:    (1) Any X-Rays, CTs or MRIs which have been performed.  Contact the facility where they were done to arrange for  prior to your scheduled appointment.    (2) List of current medications   (3) This referral request   (4) Any documents/labs given to you for this referral                  Who to contact     If you have questions or need follow up information about today's clinic visit or your schedule please contact [facility] directly at [phone].  Normal or non-critical lab and imaging results will be communicated to you by [employer], letter or phone within 4 business days after the clinic has received the results. If you do not hear from us within 7 days, please contact the clinic through [employer] or phone. If you have a critical or abnormal lab result, we will notify you by phone as soon as possible.  Submit refill requests through [employer] or call your pharmacy and they will forward the refill request to us. Please allow 3 business days for your refill to be completed.          Additional Information About Your Visit        [employer] Information     [employer] gives you secure access to your electronic health record. If you see a primary care provider, you can also send messages to your care team and make appointments. If you have questions, please call your primary care clinic.  If you do not have a primary care provider, please call  [phone] and they will assist you.        Care EveryWhere ID     This is your Care EveryWhere ID. This could be used by other organizations to access your [facility] medical records  [id number]        Your Vitals Were     Pulse Temperature Pulse Oximetry BMI (Body Mass Index)          68 98.6  F (37  C) (Oral) 95% 34.62 kg/m2         Blood Pressure from Last 3 Encounters:   05/04/18 (!) 152/100   01/08/18 153/88   10/03/17 122/84    Weight from Last 3 Encounters:   05/04/18 267 lb 12.8 oz (121.5 kg)   01/08/18 270 lb (122.5 kg)   10/03/17 262 lb (118.8 kg)              We Performed the Following     PAIN MANAGEMENT REFERRAL          Today's Medication Changes          These changes are accurate as of 5/4/18  1:59 PM.  If you have any questions, ask your nurse or doctor.               Start taking these medicines.        Dose/Directions    metoprolol succinate 25 MG 24 hr tablet   Commonly known as:  TOPROL-XL   Used for:  Essential hypertension   Started by:  Nikko Tang MD        1/2 pill initially for 1-2 weeks then one daily   Quantity:  30 tablet   Refills:  3         These medicines have changed or have updated prescriptions.        Dose/Directions    morphine 15 MG 12 hr tablet   Commonly known as:  MS CONTIN   This may have changed:  Another medication with the same name was removed. Continue taking this medication, and follow the directions you see here.   Used for:  Need for prophylactic vaccination and inoculation against influenza   Changed by:  Nikko Tang MD        Dose:  15 mg   Take 1 tablet (15 mg) by mouth every 12 hours maximum 2 tablet(s) per day   Quantity:  60 tablet   Refills:  0         Stop taking these medicines if you haven't already. Please contact your care team if you have questions.     NARCAN NA   Stopped by:  Nikko Tang MD           NORVASC PO   Stopped by:  Nikko Tang MD                Where to get your medicines      These medications were sent to  [facility]/pharmacy #[id number] - [place], [place] - [address] AT [facility]  [address] [place] [address]    Hours:  24-hours Phone:  [phone]     metoprolol succinate 25 MG 24 hr tablet                Primary Care Provider Office Phone # Fax #    Nikko Jeffrey Tang [phone]       [address]  [facility] [address]        Equal Access to Services     VIDYA GALEAS : Hadii aad ku hadasho Soomaali, waaxda luqadaha, qaybta kaalmada adeegyada, waxay idiin hayaan adeeg kharash la'aan . So [facility] [phone].    ATENCIÓN: Si habla español, tiene a osman disposición servicios gratuitos de asistencia lingüística. [facility] [phone].    We comply with applicable federal civil rights laws and [place] laws. We do not discriminate on the basis of race, color, national origin, age, disability, sex, sexual orientation, or gender identity.            Thank you!     Thank you for choosing [facility]  for your care. Our goal is always to provide you with excellent care. Hearing back from our patients is one way we can continue to improve our services. Please take a few minutes to complete the written survey that you may receive in the mail after your visit with us. Thank you!             Your Updated Medication List - Protect others around you: Learn how to safely use, store and throw away your medicines at www.disposemymeds.org.          This list is accurate as of 5/4/18  1:59 PM.  Always use your most recent med list.                   Brand Name Dispense Instructions for use Diagnosis    alendronate 70 MG tablet    FOSAMAX    12 tablet    Take 1 tablet (70 mg) by mouth every 7 days Take 60 minutes before am meal with 8 oz. water. Remain upright for 30 minutes.    Osteoporosis, unspecified osteoporosis type, unspecified pathological fracture presence       carboxymethylcellulose 0.5 % Soln ophthalmic solution    REFRESH PLUS     Place 1 drop into both eyes daily as  needed for dry eyes        DULoxetine 30 MG EC capsule    CYMBALTA    90 capsule    Take 1 capsule (30 mg) by mouth daily    Chronic pain syndrome       EQL SENNA-S 8.6-50 MG per tablet   Generic drug:  senna-docusate     100 tablet    TAKES 5 TABLETS DAILY.        hydrOXYzine 25 MG capsule    VISTARIL    60 capsule    Take 1-2 capsules (25-50 mg) by mouth 3 times daily as needed for itching    S/P lumbar fusion, Chronic pain syndrome, Physical deconditioning       irbesartan 300 MG tablet    AVAPRO    90 tablet    Take 1 tablet (300 mg) by mouth daily    Essential hypertension       latanoprost 0.005 % ophthalmic solution    XALATAN     Place 1 drop into both eyes At Bedtime        metoprolol succinate 25 MG 24 hr tablet    TOPROL-XL    30 tablet    1/2 pill initially for 1-2 weeks then one daily    Essential hypertension       morphine 15 MG 12 hr tablet    MS CONTIN    60 tablet    Take 1 tablet (15 mg) by mouth every 12 hours maximum 2 tablet(s) per day    Need for prophylactic vaccination and inoculation against influenza       Multi-vitamin Tabs tablet      Take 1 tablet by mouth daily        * order for DME     1 each    Equipment being ordered: Walker () and Walker Extension w/Wheels (, ) Treatment Diagnosis: Difficulty walking    S/P lumbar fusion       * order for DME     1 Device    Equipment being ordered: Orthofix lumbar bone growth stimulator    S/P lumbar fusion       tiZANidine 4 MG tablet    ZANAFLEX    135 tablet    Take 1-1.5 tablets (4-6 mg) by mouth 3 times daily    Muscle spasm       * Notice:  This list has 2 medication(s) that are the same as other medications prescribed for you. Read the directions carefully, and ask your doctor or other care provider to review them with you.

## 2018-05-07 NOTE — TELEPHONE ENCOUNTER
Left VM for patient to schedule a new evaluation.        Shazia LOZANO    [facility]

## 2018-05-08 NOTE — TELEPHONE ENCOUNTER
After discussion with provider. OK to place order for both  L-MBB and eval for MM.   Routing to , please contact patient for scheduling of both Lumbar MBB and eval for medical marijuana  Routing to provider to sign order for lumbar MBB.    Brooke Bagley,    I reviewed your message with Dr. Sifuentes.  He is placing orders for you to come in for a lumbar medial branch block injection.  This is the first step needed in order for us to complete the radiofrequency ablation. Our scheduling desk should be calling you to schedule this once the order is officially signed.  If you do not hear anything in the next day or two, I would call scheduling at [phone].    In regards to the medical marijuana evaluation, Dr. Sifuentes said you can also schedule that appointment as well (same scheduling number as above).  You may do either the injection or the evaluation first so, no particular order in which these need to be scheduled.     Ana SANDERSN, RN Care Coordinator  [facility]

## 2018-05-08 NOTE — TELEPHONE ENCOUNTER
My chart sent from patient:    Bryan lets not get ahead of me here; although I am interested in an ablation injection there is a lot fear here for this procedure.  I am concerned about losing further function with the injection. Please lets wait until after appointment on Monday next to further explore this option. See you on the  fourteenth.  thanks     Tara SANDERSN-RN Care Coordinator  [facility]

## 2018-05-09 DIAGNOSIS — I10 ESSENTIAL HYPERTENSION: ICD-10-CM

## 2018-05-09 NOTE — TELEPHONE ENCOUNTER
"Requested Prescriptions   Pending Prescriptions Disp Refills     irbesartan (AVAPRO) 300 MG tablet  Last Written Prescription Date:  12-12-17  Last Fill Quantity: 90 tab,  # refills: 1   Last office visit: 5/4/2018 with prescribing provider:  Nikko Tang    Future Office Visit:   Next 5 appointments (look out 90 days)     May 14, 2018  2:00 PM CDT   Return Visit with Taran Sifuentes MD   [facility] ([facility])    [address] [address] [address]   [phone]               90 tablet 1     Sig: Take 1 tablet (300 mg) by mouth daily    Angiotensin-II Receptors Failed    5/9/2018 10:19 AM       Failed - Blood pressure under 140/90 in past 12 months    BP Readings from Last 3 Encounters:   05/04/18 (!) 152/100   01/08/18 153/88   10/03/17 122/84          Passed - Recent (12 mo) or future (30 days) visit within the authorizing provider's specialty    Patient had office visit in the last 12 months or has a visit in the next 30 days with authorizing provider or within the authorizing provider's specialty.  See \"Patient Info\" tab in inbasket, or \"Choose Columns\" in Meds & Orders section of the refill encounter.           Passed - Patient is age 18 or older       Passed - Normal serum creatinine on file in past 12 months    Recent Labs   Lab Test  11/06/17   1108   CR  0.76          Passed - Normal serum potassium on file in past 12 months    Recent Labs   Lab Test  11/06/17   1108   POTASSIUM  4.0             "

## 2018-05-10 NOTE — TELEPHONE ENCOUNTER
Bryce below sent to patient.     Lawandaaidan Bagley,     There is no problem in waiting to discuss things with Dr. Sifuentes beforehand.  You mentioned it in your initial message so I thought we could at least get an order in place incase you did decide to go ahead with it.  Nevertheless, we will see you the 14th.  Take care!    Ana SANDERSN, RN Care Coordinator  [facility]

## 2018-05-11 NOTE — TELEPHONE ENCOUNTER
Dr. Tang -- please review. BPs above protocol parameters.    Please sign/advise. Thank you    TOMMY PintoN, RN  [facility]     BP Readings from Last 3 Encounters:   05/04/18 (!) 152/100   01/08/18 153/88   10/03/17 122/84

## 2018-05-14 ENCOUNTER — OFFICE VISIT (OUTPATIENT)
Dept: PALLIATIVE MEDICINE | Facility: CLINIC | Age: 63
End: 2018-05-14
Payer: COMMERCIAL

## 2018-05-14 VITALS
DIASTOLIC BLOOD PRESSURE: 100 MMHG | HEART RATE: 70 BPM | OXYGEN SATURATION: 94 % | WEIGHT: 268 LBS | SYSTOLIC BLOOD PRESSURE: 169 MMHG | BODY MASS INDEX: 34.64 KG/M2

## 2018-05-14 DIAGNOSIS — M96.1 FAILED BACK SYNDROME, LUMBAR: Primary | ICD-10-CM

## 2018-05-14 DIAGNOSIS — M47.817 LUMBOSACRAL SPONDYLOSIS WITHOUT MYELOPATHY: ICD-10-CM

## 2018-05-14 PROCEDURE — 99214 OFFICE O/P EST MOD 30 MIN: CPT | Performed by: PAIN MEDICINE

## 2018-05-14 RX ORDER — IRBESARTAN 300 MG/1
300 TABLET ORAL DAILY
Qty: 90 TABLET | Refills: 1 | Status: SHIPPED | OUTPATIENT
Start: 2018-05-14 | End: 2018-12-21

## 2018-05-14 ASSESSMENT — PAIN SCALES - GENERAL: PAINLEVEL: MILD PAIN (3)

## 2018-05-14 NOTE — PATIENT INSTRUCTIONS
- Further procedures recommended:    - consider Lumbar MEDIAL BRANCH BLOCK  above and below fusion   - Medication Management:     - medical marijuana- [email or web address]  - Follow up: 3-6 months or for procedure      ----------------------------------------------------------------  Nurse Triage line:  [phone]   Call this number with any questions or concerns. You may leave a detailed message anytime. Calls are typically returned Monday through Friday between 8 AM and 4:30 PM. We usually get back to you within 2 business days depending on the issue/request.       Medication refills:    For non-narcotic medications, call your pharmacy directly to request a refill. The pharmacy will contact the Pain Management Center for authorization. Please allow 3-4 days for these refills to be processed.     For narcotic refills, call the nurse triage line or send a [employer] message. Please contact us 7-10 days before your refill is due. The message MUST include the name of the specific medication(s) requested and how you would like to receive the prescription(s). The options are as follows:    Pain Clinic staff can mail the prescription to your pharmacy. Please tell us the name of the pharmacy.    You may pick the prescription up at the Pain Clinic (tell us the location) or during a clinic visit with your pain provider    Pain Clinic staff can deliver the prescription to the [place] pharmacy in the clinic building. Please tell us the location.      Scheduling number: [phone].  Call this number to schedule or change appointments.    We believe regular attendance is key to your success in our program.    Any time you are unable to keep your appointment we ask that you call us at least 24 hours in advance to let us know. This will allow us to offer the appointment time to another patient.

## 2018-05-14 NOTE — PROGRESS NOTES
[facility]    Date of visit: 1/8/2018    Chief complaint:   Chief Complaint   Patient presents with     Pain     Hx: S/p L3-4 decompression laminectomy 5/18/16 persistent right radiculopathy. S/p L4-5 laminectomy 2004, 7/22/16 lumbar discectomy RIGHT L3-L4 REVISION    MICRODISCECTOMY. L3 to 5 posterior spinal fusion in November 2016 with Dr. Iggy Issa. Subsequent L1 compression fracture  Interval history:  Usama Harris was last seen by [facility] 4/17.      - Further procedures recommended:    - consider Lumbar MEDIAL BRANCH BLOCK  Above and below fusion   - Medication Management:    - Plan to provide recommendations. PCP will have to continue prescribing for now.    - Interested in medical marijuana- Will defer for now    - Weaning Opioid Plan: Would discontinue am MScontin and cont PM MScontin 15mg for 1 month with added IR morphine 7.5mg daily--> then would completely discontinue  MScontin and increase IR 7.5mg BID for 1 month--> IR 7.5mg daily 1 month-->  IR 7.5 every 72 hours-->discontinue opioids   - consider adding robaxin    - consider adding a different NSAID  - Clinical Health Psychologist to address issues of relaxation, behavioral change, coping style, and other factors important to improvement: consider in the future  - Diagnostic Studies: no  - Urine toxicology screen today: yes  - Follow up: 3 months or for procedure      Since his last visit, Usama Harris reports: Of note pt is currently under stress   The pt reports trial of CBD in [place] with significant benefit in his pain. The pt reports mostly axial LBP. The pain is bilateral. The pain occasionally radiates to the her buttocks. Currently, the pt reports his legs fill heavy all the time. The pain is worse when sitting for prolonged periods of time. The pain is worse when standing for ext periods of time. The pain is worse with reaching above his head. The pain is slightly better when lying flat. The pain is worse when  getting up from a lying position.   He denies any progressive weakness. He denies any incontinence         Pain scores:  Pain intensity on average is 5 on a scale of 0-10.     Current pain treatments:   zanaflex - not taking   cymbalta 30mg  fosamax  Past pain treatments:  Dilaudid, morphine, oxycodone, Opana, Celebrex, Motrin, naproxen, prednisone, Soma, Flexeril, Robaxin, Zanaflex, gabapentin, Xanax, MS Contin knee  PAST MEDICATION TRIALS:                                                   OPIOIDS: Tylenol #3, hydrocodone( Vicodin), Morphine ER, percocet, OxyContin, tramadol   NSAID'S/ANALEGESICS: Celbrex, Ibuprofen, naprosyn,      ANTIMIGRAINE: none   STEROIDS: yes dose pack     MUSCLE  RELAXERS:  Flexeril, tizanidine, Skelaxin   ANTIDEPRESSANTS: Bupropion   ANTIANXIETY: Xanax   HYPNOTICS: Hydroxyzine   ANTICONVULSANTS: none , not wanting Gabapentin, Lyrica     TOPICALS:  None     Side Effects: no side effect    Medications:  Current Outpatient Prescriptions   Medication Sig Dispense Refill     alendronate (FOSAMAX) 70 MG tablet Take 1 tablet (70 mg) by mouth every 7 days Take 60 minutes before am meal with 8 oz. water. Remain upright for 30 minutes. 12 tablet 3     carboxymethylcellulose (REFRESH PLUS) 0.5 % SOLN Place 1 drop into both eyes daily as needed for dry eyes       DULoxetine (CYMBALTA) 30 MG EC capsule Take 1 capsule (30 mg) by mouth daily 90 capsule 1     hydrOXYzine (VISTARIL) 25 MG capsule Take 1-2 capsules (25-50 mg) by mouth 3 times daily as needed for itching 60 capsule 3     irbesartan (AVAPRO) 300 MG tablet Take 1 tablet (300 mg) by mouth daily 90 tablet 1     latanoprost (XALATAN) 0.005 % ophthalmic solution Place 1 drop into both eyes At Bedtime   11     metoprolol succinate (TOPROL-XL) 25 MG 24 hr tablet 1/2 pill initially for 1-2 weeks then one daily 30 tablet 3     morphine (MS CONTIN) 15 MG 12 hr tablet Take 1 tablet (15 mg) by mouth every 12 hours maximum 2 tablet(s) per day 60 tablet 0      multivitamin, therapeutic with minerals (MULTI-VITAMIN) TABS tablet Take 1 tablet by mouth daily       order for DME Equipment being ordered: Walker () and Walker Extension w/Wheels (, )  Treatment Diagnosis: Difficulty walking (Patient not taking: Reported on 5/4/2018) 1 each 0     order for DME Equipment being ordered: Orthofix lumbar bone growth stimulator (Patient not taking: Reported on 10/3/2017) 1 Device 0     senna-docusate (EQL SENNA-S) 8.6-50 MG per tablet TAKES 5 TABLETS DAILY. 100 tablet      tiZANidine (ZANAFLEX) 4 MG tablet Take 1-1.5 tablets (4-6 mg) by mouth 3 times daily 135 tablet 1       Medical History: any changes in medical history since they were last seen? No    Review of Systems:  The 14 system ROS was reviewed from the intake questionnaire*  Any bowel or bladder problems: no  Mood: good    Physical Exam:  BP (!) 169/100  Pulse 70  Wt 121.6 kg (268 lb)  SpO2 94%  BMI 34.64 kg/m2  Constitutional: alert and no distress.  Pt not obese  Head: Normocephalic  ENT: EOMI, mucosal surfaces moist.  Neck with full ROM   Cardiovascular: No edema or JVD appreciated.  Respiratory: Speaking in complete sentences without shortness of breath.    Cervical Spine:  wnl    Lumbar spine:     ROM: Decreased extension   Myofascial tenderness: Positive left lower back TTP   Kemps:positive on the right    Neurologic exam:  CN:  Cranial nerves 2-12 are normal  Motor:  5/5 LE strength  Reflexes:       Patella:  +2   Achilles:  +2    Sensory:  (upper and lower extremities):   Light touch: normal    Allodynia: absent    Dysethesia: absent    Hyperalgesia: absent     Skin: no suspicious lesions or rashes appreciated on exposed areas  Psychiatric: mentation appears normal, affect full and good eye contact.        Assessment/Plan:   Usama Harris is a 62 year old male who is seen at the pain clinic for Bilat LBP w/ occasional radiation down his legs  Usama was seen today for pain.    Diagnoses  and all orders for this visit:    Failed back syndrome, lumbar    Lumbosacral spondylosis without myelopathy    - Further procedures recommended:    - consider Lumbar MEDIAL BRANCH BLOCK  Above and below fusion   - Medication Management:     - medical marijuana- [email or web address]   - consider adding robaxin    - consider adding a different NSAID  -Consider pain PT  - Clinical Health Psychologist to address issues of relaxation, behavioral change, coping style, and other factors important to improvement: consider in the future  - Diagnostic Studies: no  - Urine toxicology screen today: yes  - Follow up: 3 months or for procedure    Total time spent was 60 minutes, and more than 50% of face to face time was spent in counseling and/or coordination of care regarding  Bilat LBP.    Taran Sifuentes MD  [facility]

## 2018-05-14 NOTE — MR AVS SNAPSHOT
After Visit Summary   5/14/2018    Usama Harris    MRN: [id number]           Patient Information     Date Of Birth          1955        Visit Information        Provider Department      5/14/2018 2:00 PM Taran Sifuentes MD [facility]        Today's Diagnoses     Failed back syndrome, lumbar    -  1    Lumbosacral spondylosis without myelopathy          Care Instructions    - Further procedures recommended:    - consider Lumbar MEDIAL BRANCH BLOCK  above and below fusion   - Medication Management:     - medical marijuana- [email or web address]  - Follow up: 3-6 months or for procedure      ----------------------------------------------------------------  Nurse Triage line:  [phone]   Call this number with any questions or concerns. You may leave a detailed message anytime. Calls are typically returned Monday through Friday between 8 AM and 4:30 PM. We usually get back to you within 2 business days depending on the issue/request.       Medication refills:    For non-narcotic medications, call your pharmacy directly to request a refill. The pharmacy will contact the [facility] for authorization. Please allow 3-4 days for these refills to be processed.     For narcotic refills, call the nurse triage line or send a [facility] message. Please contact us 7-10 days before your refill is due. The message MUST include the name of the specific medication(s) requested and how you would like to receive the prescription(s). The options are as follows:    Pain Clinic staff can mail the prescription to your pharmacy. Please tell us the name of the pharmacy.    You may pick the prescription up at the Pain Clinic (tell us the location) or during a clinic visit with your pain provider    Pain Clinic staff can deliver the prescription to the [facility] in the clinic building. Please tell us the location.      Scheduling number: [phone].  Call this number to  schedule or change appointments.    We believe regular attendance is key to your success in our program.    Any time you are unable to keep your appointment we ask that you call us at least 24 hours in advance to let us know. This will allow us to offer the appointment time to another patient.               Follow-ups after your visit        Who to contact     If you have questions or need follow up information about today's clinic visit or your schedule please contact [facility] directly at [phone].  Normal or non-critical lab and imaging results will be communicated to you by DreamHearthart, letter or phone within 4 business days after the clinic has received the results. If you do not hear from us within 7 days, please contact the clinic through [employer] or phone. If you have a critical or abnormal lab result, we will notify you by phone as soon as possible.  Submit refill requests through Bunkr or call your pharmacy and they will forward the refill request to us. Please allow 3 business days for your refill to be completed.          Additional Information About Your Visit        Bunkr Information     Bunkr gives you secure access to your electronic health record. If you see a primary care provider, you can also send messages to your care team and make appointments. If you have questions, please call your primary care clinic.  If you do not have a primary care provider, please call [phone] and they will assist you.        Your Vitals Were     Pulse Pulse Oximetry BMI (Body Mass Index)             70 94% 34.64 kg/m2          Blood Pressure from Last 3 Encounters:   05/14/18 (!) 169/100   05/04/18 (!) 152/100   01/08/18 153/88    Weight from Last 3 Encounters:   05/14/18 121.6 kg (268 lb)   05/04/18 121.5 kg (267 lb 12.8 oz)   01/08/18 122.5 kg (270 lb)              Today, you had the following     No orders found for display       Primary Care Provider Office Phone # Fax #    Nikko Murphy  MD [place] [phone]       [address]  [facility] [address]        Equal Access to Services     VIDYA GALEAS : Hadii aminta mejia parag Nazario, watarasda luqalex, nirav kaelenitada velia, leonardo burnett. So [facility] [phone].    ATENCIÓN: Si habla español, tiene a osman disposición servicios gratuitos de asistencia lingüística. Llame al [phone].    We comply with applicable federal civil rights laws and [place] laws. We do not discriminate on the basis of race, color, national origin, age, disability, sex, sexual orientation, or gender identity.            Thank you!     Thank you for choosing [facility]  for your care. Our goal is always to provide you with excellent care. Hearing back from our patients is one way we can continue to improve our services. Please take a few minutes to complete the written survey that you may receive in the mail after your visit with us. Thank you!             Your Updated Medication List - Protect others around you: Learn how to safely use, store and throw away your medicines at www.disposemymeds.org.          This list is accurate as of 5/14/18  2:42 PM.  Always use your most recent med list.                   Brand Name Dispense Instructions for use Diagnosis    alendronate 70 MG tablet    FOSAMAX    12 tablet    Take 1 tablet (70 mg) by mouth every 7 days Take 60 minutes before am meal with 8 oz. water. Remain upright for 30 minutes.    Osteoporosis, unspecified osteoporosis type, unspecified pathological fracture presence       carboxymethylcellulose 0.5 % Soln ophthalmic solution    REFRESH PLUS     Place 1 drop into both eyes daily as needed for dry eyes        DULoxetine 30 MG EC capsule    CYMBALTA    90 capsule    Take 1 capsule (30 mg) by mouth daily    Chronic pain syndrome       EQL SENNA-S 8.6-50 MG per tablet   Generic drug:  senna-docusate     100 tablet    TAKES 5 TABLETS DAILY.        hydrOXYzine 25 MG capsule     VISTARIL    60 capsule    Take 1-2 capsules (25-50 mg) by mouth 3 times daily as needed for itching    S/P lumbar fusion, Chronic pain syndrome, Physical deconditioning       irbesartan 300 MG tablet    AVAPRO    90 tablet    Take 1 tablet (300 mg) by mouth daily    Essential hypertension       latanoprost 0.005 % ophthalmic solution    XALATAN     Place 1 drop into both eyes At Bedtime        metoprolol succinate 25 MG 24 hr tablet    TOPROL-XL    30 tablet    1/2 pill initially for 1-2 weeks then one daily    Essential hypertension       morphine 15 MG 12 hr tablet    MS CONTIN    60 tablet    Take 1 tablet (15 mg) by mouth every 12 hours maximum 2 tablet(s) per day    Need for prophylactic vaccination and inoculation against influenza       Multi-vitamin Tabs tablet      Take 1 tablet by mouth daily        * order for DME     1 each    Equipment being ordered: Walker () and Walker Extension w/Wheels (, ) Treatment Diagnosis: Difficulty walking    S/P lumbar fusion       * order for DME     1 Device    Equipment being ordered: Orthofix lumbar bone growth stimulator    S/P lumbar fusion       tiZANidine 4 MG tablet    ZANAFLEX    135 tablet    Take 1-1.5 tablets (4-6 mg) by mouth 3 times daily    Muscle spasm       * Notice:  This list has 2 medication(s) that are the same as other medications prescribed for you. Read the directions carefully, and ask your doctor or other care provider to review them with you.

## 2018-05-14 NOTE — NURSING NOTE
"Chief Complaint   Patient presents with     Pain       Initial BP (!) 169/100  Pulse 70  Wt 121.6 kg (268 lb)  SpO2 94%  BMI 34.64 kg/m2 Estimated body mass index is 34.64 kg/(m^2) as calculated from the following:    Height as of 4/21/17: 1.873 m (6' 1.75\").    Weight as of this encounter: 121.6 kg (268 lb).        Beatrice HALL LPN  [facility] [place]     "

## 2018-05-16 ENCOUNTER — TELEPHONE (OUTPATIENT)
Dept: PALLIATIVE MEDICINE | Facility: CLINIC | Age: 63
End: 2018-05-16

## 2018-05-16 NOTE — TELEPHONE ENCOUNTER
Pt is calling to check status of certification of medical marijuana.   He states he never got an email yet and is wondering if Dr. Sifuentes started the process for him.   He kept asking writer the steps to take after certification as well.     [phone] (home)         Adriana MONREAL    [facility] [place]

## 2018-05-17 ENCOUNTER — MYC MEDICAL ADVICE (OUTPATIENT)
Dept: PALLIATIVE MEDICINE | Facility: CLINIC | Age: 63
End: 2018-05-17

## 2018-05-17 NOTE — TELEPHONE ENCOUNTER
My chart sent from patient:    Hi [facility], I saw Dr. Guerra last Monday and he and my Primary Physician recommend Marijuana referral prescription indicating that I would be contacted to proceed with such, yet despite a convoluted follow up conversation with your office yesterday to move this recommendation forward, there has been no F/U.  Please initiate referral to facilitate some relief of symptoms trial for me. I am open to a conversation with R.N. triage, but  will discuss my medical needs again with non-professional staff in your office. Thank you for your understanding. Kevyn Fonseca   BSN-RN Care Coordinator  [facility]

## 2018-05-17 NOTE — TELEPHONE ENCOUNTER
Has this been started?    Tara SANDERSN-RN Care Coordinator  [facility] [place]

## 2018-05-17 NOTE — TELEPHONE ENCOUNTER
My chart sent from patient:    Thanks, now that's what I call a professional response, nurses rock. I had no idea what to expect; ultimately, with the state approval involved, it completely explains the confusion. Now I know the referral didn't fall off the tracks completely, I will wait for the state to respond. Carol SANDERSN-RN Care Coordinator  [facility] [place]

## 2018-05-17 NOTE — TELEPHONE ENCOUNTER
Heidit below sent to patient.  Waiting for response by provider.     Brooke Bagley,     The details of your communication were sent to Dr. Sifuentes to review yesterday. We are currently waiting for his follow up response to verify that he was able to send the email for certification.  Essentially he would send an email for certification to the state.  Per his note, it was [email or web address] that was used.  From there it can take up to 30 days for the state to process the request.  Generally, it is done in a few days but the state works on these in the order in which they are received.     The steps of medical marijuana are as follows: You would log yourself into the website, provide identification and then follow the steps of the self evaluation.  You will likely pay a registration fee and then the state will email you that your certification was approved. You would then be added to the state registry and need to print off the sheet of certification. You would bring this to the manufacture or distributors of the medical cannibus.       Again, we have inquired with Dr. Sifuentes to verify that he was able to start the process for you using the email above. Below are a few websites that you might want to take a look at for more detail.     Http://[email or web address]  Http://"[employer]".[employer]  [email or web address]    Ana SANCHEZ, RN Care Coordinator  [facility]

## 2018-06-01 ENCOUNTER — TRANSFERRED RECORDS (OUTPATIENT)
Dept: HEALTH INFORMATION MANAGEMENT | Facility: CLINIC | Age: 63
End: 2018-06-01

## 2018-06-05 ENCOUNTER — TELEPHONE (OUTPATIENT)
Dept: PALLIATIVE MEDICINE | Facility: CLINIC | Age: 63
End: 2018-06-05

## 2018-06-05 ENCOUNTER — TRANSFERRED RECORDS (OUTPATIENT)
Dept: HEALTH INFORMATION MANAGEMENT | Facility: CLINIC | Age: 63
End: 2018-06-05

## 2018-06-05 NOTE — TELEPHONE ENCOUNTER
Received on 6-5-2018 at [facility] ([facility])    [facility] faxed a letter and JEANNETTE to Dr Sifuentes. Patient saw provider once at the [facility] site on 5-. Fax was placed in Scanning  area for HIM department to process. HIM staff already came by today so it will be picked up tomorrow.    Pam Millan  Patient Representative  [facility] [place]

## 2018-06-07 DIAGNOSIS — G89.4 CHRONIC PAIN SYNDROME: ICD-10-CM

## 2018-06-07 NOTE — TELEPHONE ENCOUNTER
"Requested Prescriptions   Pending Prescriptions Disp Refills     DULoxetine (CYMBALTA) 30 MG EC capsule [Pharmacy Med Name: DULOXETINE HCL DR 30 MG CAP]  Last Written Prescription Date:  12-12-17  Last Fill Quantity: 90 cap,  # refills: 1   Last office visit: 5/4/2018 with prescribing provider:  Nikko Tang    Future Office Visit:   90 capsule 0     Sig: TAKE 1 CAP BY MOUTH DAILY    Serotonin-Norepinephrine Reuptake Inhibitors  Failed    6/7/2018  9:36 AM       Failed - Blood pressure under 140/90 in past 12 months    BP Readings from Last 3 Encounters:   05/14/18 (!) 169/100   05/04/18 (!) 152/100   01/08/18 153/88          Passed - Recent (12 mo) or future (30 days) visit within the authorizing provider's specialty    Patient had office visit in the last 12 months or has a visit in the next 30 days with authorizing provider or within the authorizing provider's specialty.  See \"Patient Info\" tab in inbasket, or \"Choose Columns\" in Meds & Orders section of the refill encounter.           Passed - Patient is age 18 or older          "

## 2018-06-11 RX ORDER — DULOXETIN HYDROCHLORIDE 30 MG/1
CAPSULE, DELAYED RELEASE ORAL
Qty: 90 CAPSULE | Refills: 0 | Status: SHIPPED | OUTPATIENT
Start: 2018-06-11 | End: 2018-09-06

## 2018-08-24 ENCOUNTER — TELEPHONE (OUTPATIENT)
Dept: PALLIATIVE MEDICINE | Facility: CLINIC | Age: 63
End: 2018-08-24

## 2018-08-24 NOTE — TELEPHONE ENCOUNTER
Nursing to contact patient to determine location of choice-Dr. Sifuentes in [place] or establish with new provider in [place].     Last Note:05/14/18:Follow up: 3 months or for procedure    Patient is due to be seen regardless of location decision       Ana SANDERSN, RN Care Coordinator  [facility]

## 2018-08-24 NOTE — TELEPHONE ENCOUNTER
Called patient to discuss future plan with pain clinic. LVM for patient to call back.     Tara SANDERSN-RN Care Coordinator  [facility]

## 2018-08-26 DIAGNOSIS — M81.0 OSTEOPOROSIS, UNSPECIFIED OSTEOPOROSIS TYPE, UNSPECIFIED PATHOLOGICAL FRACTURE PRESENCE: ICD-10-CM

## 2018-08-27 RX ORDER — ALENDRONATE SODIUM 70 MG/1
TABLET ORAL
Qty: 12 TABLET | Refills: 1 | Status: SHIPPED | OUTPATIENT
Start: 2018-08-27 | End: 2019-02-09

## 2018-08-28 NOTE — TELEPHONE ENCOUNTER
Called patient. LM to call triage to discuss. Advised he is also ok to schedule the appt at whatever location he decided.     Ana SANDERSN, RN Care Coordinator  [facility]

## 2018-09-06 DIAGNOSIS — G89.4 CHRONIC PAIN SYNDROME: ICD-10-CM

## 2018-09-07 NOTE — TELEPHONE ENCOUNTER
"Requested Prescriptions   Pending Prescriptions Disp Refills     DULoxetine (CYMBALTA) 30 MG EC capsule [Pharmacy Med Name: DULOXETINE HCL DR 30 MG CAP]  Last Written Prescription Date:  6-11-18  Last Fill Quantity: 90 cap,  # refills: 0   Last office visit: 5/4/2018 with prescribing provider:  Nikko Tang    Future Office Visit:    90 capsule 0     Sig: TAKE 1 CAP BY MOUTH DAILY    Serotonin-Norepinephrine Reuptake Inhibitors  Failed    9/6/2018 11:42 AM       Failed - Blood pressure under 140/90 in past 12 months    BP Readings from Last 3 Encounters:   05/14/18 (!) 169/100   05/04/18 (!) 152/100   01/08/18 153/88          Passed - Recent (12 mo) or future (30 days) visit within the authorizing provider's specialty    Patient had office visit in the last 12 months or has a visit in the next 30 days with authorizing provider or within the authorizing provider's specialty.  See \"Patient Info\" tab in inbasket, or \"Choose Columns\" in Meds & Orders section of the refill encounter.           Passed - Patient is age 18 or older          "

## 2018-09-08 RX ORDER — DULOXETIN HYDROCHLORIDE 30 MG/1
CAPSULE, DELAYED RELEASE ORAL
Qty: 90 CAPSULE | Refills: 0 | Status: SHIPPED | OUTPATIENT
Start: 2018-09-08 | End: 2018-12-21

## 2018-09-08 NOTE — TELEPHONE ENCOUNTER
Routing refill request to provider for review/approval because:  BP elevated      Dr. Tang-Please sign if agree.  Please advise if MA only BP check appropriate, or if prefer office visit.    Thank you!  TOMMY TamayoN, RN

## 2018-10-03 DIAGNOSIS — I10 ESSENTIAL HYPERTENSION: ICD-10-CM

## 2018-10-03 NOTE — TELEPHONE ENCOUNTER
"Requested Prescriptions   Pending Prescriptions Disp Refills     metoprolol succinate (TOPROL-XL) 25 MG 24 hr tablet [Pharmacy Med Name: METOPROLOL SUCC ER 25 MG TAB]  Last Written Prescription Date:  5-4-18  Last Fill Quantity: 30 tab,  # refills: 3   Last office visit: 5/4/2018 with prescribing provider:  Nikko Tang    Future Office Visit:    30 tablet 3     Sig: TAKE HALF A TAB BY MOUTH INITIALLY FOR 1-2 WEEKS THEN ONE DAILY    Beta-Blockers Protocol Failed    10/3/2018  1:45 AM       Failed - Blood pressure under 140/90 in past 12 months    BP Readings from Last 3 Encounters:   05/14/18 (!) 169/100   05/04/18 (!) 152/100   01/08/18 153/88          Passed - Patient is age 6 or older       Passed - Recent (12 mo) or future (30 days) visit within the authorizing provider's specialty    Patient had office visit in the last 12 months or has a visit in the next 30 days with authorizing provider or within the authorizing provider's specialty.  See \"Patient Info\" tab in inbasket, or \"Choose Columns\" in Meds & Orders section of the refill encounter.              "

## 2018-10-04 RX ORDER — METOPROLOL SUCCINATE 25 MG/1
25 TABLET, EXTENDED RELEASE ORAL DAILY
Qty: 90 TABLET | Refills: 1 | Status: SHIPPED | OUTPATIENT
Start: 2018-10-04 | End: 2018-11-13

## 2018-10-04 NOTE — TELEPHONE ENCOUNTER
Refilled. Should be seen by me or MA for bp recheck. Or maybe he is doing regular home checks.     Nikko Tang

## 2018-10-22 NOTE — TELEPHONE ENCOUNTER
Patient did not return calls, we do not prescribe medications. Closing    Ana SANDERSN, RN Care Coordinator  [facility]

## 2018-11-02 ENCOUNTER — E-VISIT (OUTPATIENT)
Dept: FAMILY MEDICINE | Facility: CLINIC | Age: 63
End: 2018-11-02
Payer: COMMERCIAL

## 2018-11-02 DIAGNOSIS — B37.0 THRUSH: Primary | ICD-10-CM

## 2018-11-02 DIAGNOSIS — N52.9 ERECTILE DYSFUNCTION, UNSPECIFIED ERECTILE DYSFUNCTION TYPE: ICD-10-CM

## 2018-11-02 PROCEDURE — 99444 ZZC PHYSICIAN ONLINE EVALUATION & MANAGEMENT SERVICE: CPT | Performed by: FAMILY MEDICINE

## 2018-11-04 RX ORDER — SILDENAFIL CITRATE 20 MG/1
20-100 TABLET ORAL DAILY PRN
Qty: 30 TABLET | Refills: 11 | Status: SHIPPED | OUTPATIENT
Start: 2018-11-04 | End: 2018-11-13

## 2018-11-04 RX ORDER — NYSTATIN 100000/ML
500000 SUSPENSION, ORAL (FINAL DOSE FORM) ORAL 4 TIMES DAILY
Qty: 200 ML | Refills: 0 | Status: SHIPPED | OUTPATIENT
Start: 2018-11-04 | End: 2019-01-01

## 2018-11-13 ENCOUNTER — OFFICE VISIT (OUTPATIENT)
Dept: FAMILY MEDICINE | Facility: CLINIC | Age: 63
End: 2018-11-13
Payer: COMMERCIAL

## 2018-11-13 VITALS
HEART RATE: 78 BPM | OXYGEN SATURATION: 98 % | BODY MASS INDEX: 32.7 KG/M2 | TEMPERATURE: 97.6 F | DIASTOLIC BLOOD PRESSURE: 72 MMHG | WEIGHT: 253 LBS | RESPIRATION RATE: 16 BRPM | SYSTOLIC BLOOD PRESSURE: 142 MMHG

## 2018-11-13 DIAGNOSIS — E29.1 ANDROGEN DEFICIENCY: Primary | ICD-10-CM

## 2018-11-13 DIAGNOSIS — N52.9 ERECTILE DYSFUNCTION, UNSPECIFIED ERECTILE DYSFUNCTION TYPE: ICD-10-CM

## 2018-11-13 DIAGNOSIS — Z11.3 ROUTINE SCREENING FOR STI (SEXUALLY TRANSMITTED INFECTION): ICD-10-CM

## 2018-11-13 DIAGNOSIS — Z12.11 SPECIAL SCREENING FOR MALIGNANT NEOPLASMS, COLON: ICD-10-CM

## 2018-11-13 DIAGNOSIS — M85.80 OSTEOPENIA, UNSPECIFIED LOCATION: ICD-10-CM

## 2018-11-13 DIAGNOSIS — G89.29 CHRONIC LOW BACK PAIN, UNSPECIFIED BACK PAIN LATERALITY, WITH SCIATICA PRESENCE UNSPECIFIED: ICD-10-CM

## 2018-11-13 DIAGNOSIS — E34.9 TESTOSTERONE DEFICIENCY: ICD-10-CM

## 2018-11-13 DIAGNOSIS — M54.5 CHRONIC LOW BACK PAIN, UNSPECIFIED BACK PAIN LATERALITY, WITH SCIATICA PRESENCE UNSPECIFIED: ICD-10-CM

## 2018-11-13 DIAGNOSIS — R73.9 ELEVATED BLOOD SUGAR: ICD-10-CM

## 2018-11-13 LAB
ERYTHROCYTE [DISTWIDTH] IN BLOOD BY AUTOMATED COUNT: 13.3 % (ref 10–15)
HCT VFR BLD AUTO: 45.8 % (ref 40–53)
HGB BLD-MCNC: 15.3 G/DL (ref 13.3–17.7)
MCH RBC QN AUTO: 32.1 PG (ref 26.5–33)
MCHC RBC AUTO-ENTMCNC: 33.4 G/DL (ref 31.5–36.5)
MCV RBC AUTO: 96 FL (ref 78–100)
PLATELET # BLD AUTO: 179 10E9/L (ref 150–450)
RBC # BLD AUTO: 4.77 10E12/L (ref 4.4–5.9)
WBC # BLD AUTO: 9.3 10E9/L (ref 4–11)

## 2018-11-13 PROCEDURE — 86780 TREPONEMA PALLIDUM: CPT | Performed by: FAMILY MEDICINE

## 2018-11-13 PROCEDURE — 87591 N.GONORRHOEAE DNA AMP PROB: CPT | Performed by: FAMILY MEDICINE

## 2018-11-13 PROCEDURE — 87389 HIV-1 AG W/HIV-1&-2 AB AG IA: CPT | Performed by: FAMILY MEDICINE

## 2018-11-13 PROCEDURE — 99214 OFFICE O/P EST MOD 30 MIN: CPT | Performed by: FAMILY MEDICINE

## 2018-11-13 PROCEDURE — 84443 ASSAY THYROID STIM HORMONE: CPT | Performed by: FAMILY MEDICINE

## 2018-11-13 PROCEDURE — 87491 CHLMYD TRACH DNA AMP PROBE: CPT | Performed by: FAMILY MEDICINE

## 2018-11-13 PROCEDURE — 36415 COLL VENOUS BLD VENIPUNCTURE: CPT | Performed by: FAMILY MEDICINE

## 2018-11-13 PROCEDURE — 85027 COMPLETE CBC AUTOMATED: CPT | Performed by: FAMILY MEDICINE

## 2018-11-13 PROCEDURE — 84403 ASSAY OF TOTAL TESTOSTERONE: CPT | Performed by: FAMILY MEDICINE

## 2018-11-13 PROCEDURE — 83036 HEMOGLOBIN GLYCOSYLATED A1C: CPT | Performed by: FAMILY MEDICINE

## 2018-11-13 PROCEDURE — 80053 COMPREHEN METABOLIC PANEL: CPT | Performed by: FAMILY MEDICINE

## 2018-11-13 RX ORDER — SILDENAFIL CITRATE 20 MG/1
20-100 TABLET ORAL DAILY PRN
Qty: 30 TABLET | Refills: 11 | Status: SHIPPED | OUTPATIENT
Start: 2018-11-13 | End: 2019-07-23

## 2018-11-13 NOTE — MR AVS SNAPSHOT
After Visit Summary   11/13/2018    Usama Harris    MRN: [id number]           Patient Information     Date Of Birth          1955        Visit Information        Provider Department      11/13/2018 1:20 PM Nikko Tang MD [facility]        Today's Diagnoses     Androgen deficiency    -  1    Osteopenia, unspecified location        Testosterone deficiency        Chronic low back pain, unspecified back pain laterality, with sciatica presence unspecified        Routine screening for STI (sexually transmitted infection)        Erectile dysfunction, unspecified erectile dysfunction type        Special screening for malignant neoplasms, colon           Follow-ups after your visit        Additional Services     ENDOCRINOLOGY ADULT REFERRAL       Your provider has referred you to: [facility]: [facility] [phone]   [email or web address]  [facility]:  [facility]  [phone]  [email or web address]  [facility]: [facility] [phone]   [email or web address]      Please be aware that coverage of these services is subject to the terms and limitations of your health insurance plan.  Call member services at your health plan with any benefit or coverage questions.      Please bring the following to your appointment:    >>   Any x-rays, CTs or MRIs which have been performed.  Contact the facility where they were done to arrange for  prior to your scheduled appointment.    >>   List of current medications   >>   This referral request   >>   Any documents/labs given to you for this referral            GASTROENTEROLOGY ADULT REF PROCEDURE ONLY [facility] [phone]       Last Lab Result: Creatinine (mg/dL)       Date                     Value                 11/06/2017                0.76             ----------  Body mass index is 32.7 kg/(m^2).     Needed:  No  Language:  English    Patient will be contacted to schedule procedure.     Please be aware that coverage of these services is subject to the terms and limitations of your health insurance plan.  Call member services at your health plan with any benefit or coverage questions.  Any procedures must be performed at a [place] facility OR coordinated by your clinic's referral office.    Please bring the following with you to your appointment:    (1) Any X-Rays, CTs or MRIs which have been performed.  Contact the facility where they were done to arrange for  prior to your scheduled appointment.    (2) List of current medications   (3) This referral request   (4) Any documents/labs given to you for this referral                  Follow-up notes from your care team     Return in about 1 year (around 11/13/2019) for Physical Exam.      Who to contact     If you have questions or need follow up information about today's clinic visit or your schedule please contact [facility] directly at [phone].  Normal or non-critical lab and imaging results will be communicated to you by [employer], letter or phone within 4 business days after the clinic has received the results. If you do not hear from us within 7 days, please contact the clinic through [employer] or phone. If you have a critical or abnormal lab result, we will notify you by phone as soon as possible.  Submit refill requests through [employer] or call your pharmacy and they will forward the refill request to us. Please allow 3 business days for your refill to be completed.          Additional Information About Your Visit        [employer] Information     [employer] gives you secure access to your electronic health record. If you see a primary care provider, you can also send messages to your care team and make appointments. If you have questions, please call your  primary care clinic.  If you do not have a primary care provider, please call [phone] and they will assist you.        Your Vitals Were     Pulse Temperature Respirations Pulse Oximetry BMI (Body Mass Index)       78 97.6  F (36.4  C) (Oral) 16 98% 32.7 kg/m2        Blood Pressure from Last 3 Encounters:   11/13/18 148/82   05/14/18 (!) 169/100   05/04/18 (!) 152/100    Weight from Last 3 Encounters:   11/13/18 253 lb (114.8 kg)   05/14/18 268 lb (121.6 kg)   05/04/18 267 lb 12.8 oz (121.5 kg)              We Performed the Following     CBC with platelets     Chlamydia trachomatis PCR     Comprehensive metabolic panel (BMP + Alb, Alk Phos, ALT, AST, Total. Bili, TP)     ENDOCRINOLOGY ADULT REFERRAL     GASTROENTEROLOGY ADULT REF PROCEDURE ONLY [facility] [phone]     HIV Antigen Antibody Combo     JUST IN CASE     Neisseria gonorrhoeae PCR     Testosterone total     Treponema Abs w Reflex to RPR and Titer     TSH with free T4 reflex          Today's Medication Changes          These changes are accurate as of 11/13/18  1:39 PM.  If you have any questions, ask your nurse or doctor.               Stop taking these medicines if you haven't already. Please contact your care team if you have questions.     order for DME   Stopped by:  Nikko Tang MD                Where to get your medicines      Some of these will need a paper prescription and others can be bought over the counter.  Ask your nurse if you have questions.     Bring a paper prescription for each of these medications     sildenafil 20 MG tablet                Primary Care Provider Office Phone # Fax #    Nikko Tang [phone] [address]  [facility] [address]        Equal Access to Services     [facility] GUDELIA : Hadluna Nazario, sean wick, shelliybleonardo godinez. So [facility] [phone].    ATENCIÓN: Si habla español, tiene a osman disposición  servicios gratuitos de asistencia lingüística. Fariha palacios [phone].    We comply with applicable federal civil rights laws and [place] laws. We do not discriminate on the basis of race, color, national origin, age, disability, sex, sexual orientation, or gender identity.            Thank you!     Thank you for choosing [facility]  for your care. Our goal is always to provide you with excellent care. Hearing back from our patients is one way we can continue to improve our services. Please take a few minutes to complete the written survey that you may receive in the mail after your visit with us. Thank you!             Your Updated Medication List - Protect others around you: Learn how to safely use, store and throw away your medicines at www.disposemymeds.org.          This list is accurate as of 11/13/18  1:39 PM.  Always use your most recent med list.                   Brand Name Dispense Instructions for use Diagnosis    alendronate 70 MG tablet    FOSAMAX    12 tablet    TAKE 1 TAB BY MOUTH EVERY 7 DAYS. TAKE 60MINS BEFORE MEAL, WITH 8OZ WATER. STAY UPRIGHT 30MINS AFTER    Osteoporosis, unspecified osteoporosis type, unspecified pathological fracture presence       carboxymethylcellulose 0.5 % Soln ophthalmic solution    REFRESH PLUS     Place 1 drop into both eyes daily as needed for dry eyes        DULoxetine 30 MG EC capsule    CYMBALTA    90 capsule    TAKE 1 CAP BY MOUTH DAILY    Chronic pain syndrome       EQL SENNA-S 8.6-50 MG per tablet   Generic drug:  senna-docusate     100 tablet    TAKES 5 TABLETS DAILY.        irbesartan 300 MG tablet    AVAPRO    90 tablet    Take 1 tablet (300 mg) by mouth daily    Essential hypertension       latanoprost 0.005 % ophthalmic solution    XALATAN     Place 1 drop into both eyes At Bedtime        Multi-vitamin Tabs tablet      Take 1 tablet by mouth daily        nystatin 708952 UNIT/ML suspension    MYCOSTATIN    200 mL    Take 5 mLs (500,000  Units) by mouth 4 times daily for 10 days    Thrush       sildenafil 20 MG tablet    REVATIO    30 tablet    Take 1-5 tablets ( mg) by mouth daily as needed (take about 30 min before needed for ED.) Never use with nitroglycerin.    Erectile dysfunction, unspecified erectile dysfunction type       tiZANidine 4 MG tablet    ZANAFLEX    135 tablet    Take 1-1.5 tablets (4-6 mg) by mouth 3 times daily    Muscle spasm

## 2018-11-13 NOTE — PROGRESS NOTES
SUBJECTIVE:   Usama Harris is a 62 year old male who presents to clinic today for the following health issues:    1: Bowel symptoms -feel to him to be IBS like he thinks due to fosamax- he quit this med and it has been better since. He is wondering what to do for his osteopenia. He wonders about seeing a specialist.    2: chronic back pain-he is taking cymbalta which he thinks is effective. He also is connected with the medical marijuana program which also helps but he is having dysphoric feeling of being high much of the time.     3: androgen deficiency-his libido and some troubles with ED wax and wane. He was treated with testosterone in the past but did not tolerate well.     med hx, family hx, and soc hx reviewed and updated-he is in a new relationship and would like STI testing. Asymptomatic no known exposure.     Ros negative for other cv, psych, symptoms     OBJECTIVE: /72 (BP Location: Left arm, Patient Position: Sitting, Cuff Size: Adult Regular)  Pulse 78  Temp 97.6  F (36.4  C) (Oral)  Resp 16  Wt 253 lb (114.8 kg)  SpO2 98%  BMI 32.7 kg/m2 Exam:  GENERAL APPEARANCE: healthy, alert and no distress  EYES: Eyes grossly normal to inspection  HENT: ear canals and TM's normal and nose and mouth without ulcers or lesions  RESP: lungs clear to auscultation - no rales, rhonchi or wheezes  CV: regular rates and rhythm, normal S1 S2, no S3 or S4 and no murmur, click or rub -  ABDOMEN:  soft, nontender, no HSM or masses and bowel sounds normal  PSYCH: mentation appears normal and affect normal/bright towards the euphoric side concordant with his report of feeling high.     1. Androgen deficiency  2. Osteopenia, unspecified location  Will recheck today. In the past thought to be related to chronic narcotic usage. We discussed follow up with endocrinology who also could give advice on osteopenia treatment options. We briefly discussed reclast as an alternative to fosamax. This was started due to  bone loss post surgery.   - ENDOCRINOLOGY ADULT REFERRAL  - TSH with free T4 reflex  - Testosterone total  - CBC with platelets    3. Chronic low back pain, unspecified back pain laterality, with sciatica presence unspecified  Will continue the cymbalta. Discussed risk of euphoria with marijuana. Addiction potential.     4. Routine screening for STI (sexually transmitted infection)     - JUST IN CASE  - Comprehensive metabolic panel (BMP + Alb, Alk Phos, ALT, AST, Total. Bili, TP)  - HIV Antigen Antibody Combo  - Treponema Abs w Reflex to RPR and Titer  - Neisseria gonorrhoeae PCR  - Chlamydia trachomatis PCR    5. Erectile dysfunction, unspecified erectile dysfunction type   this seems to be helpful and tolerated well.   - sildenafil (REVATIO) 20 MG tablet; Take 1-5 tablets ( mg) by mouth daily as needed (take about 30 min before needed for ED.) Never use with nitroglycerin.  Dispense: 30 tablet; Refill: 11    6. Special screening for malignant neoplasms, colon     - GASTROENTEROLOGY ADULT REF PROCEDURE ONLY [facility] [phone]

## 2018-11-14 LAB
ALBUMIN SERPL-MCNC: 4.1 G/DL (ref 3.4–5)
ALP SERPL-CCNC: 80 U/L (ref 40–150)
ALT SERPL W P-5'-P-CCNC: 45 U/L (ref 0–70)
ANION GAP SERPL CALCULATED.3IONS-SCNC: 7 MMOL/L (ref 3–14)
AST SERPL W P-5'-P-CCNC: 23 U/L (ref 0–45)
BILIRUB SERPL-MCNC: 0.5 MG/DL (ref 0.2–1.3)
BUN SERPL-MCNC: 13 MG/DL (ref 7–30)
C TRACH DNA SPEC QL NAA+PROBE: NEGATIVE
CALCIUM SERPL-MCNC: 9.1 MG/DL (ref 8.5–10.1)
CHLORIDE SERPL-SCNC: 107 MMOL/L (ref 94–109)
CO2 SERPL-SCNC: 26 MMOL/L (ref 20–32)
CREAT SERPL-MCNC: 0.67 MG/DL (ref 0.66–1.25)
GFR SERPL CREATININE-BSD FRML MDRD: >90 ML/MIN/1.7M2
GLUCOSE SERPL-MCNC: 113 MG/DL (ref 70–99)
HBA1C MFR BLD: 5.7 % (ref 0–5.6)
HIV 1+2 AB+HIV1 P24 AG SERPL QL IA: NONREACTIVE
N GONORRHOEA DNA SPEC QL NAA+PROBE: NEGATIVE
POTASSIUM SERPL-SCNC: 3.9 MMOL/L (ref 3.4–5.3)
PROT SERPL-MCNC: 7.6 G/DL (ref 6.8–8.8)
SODIUM SERPL-SCNC: 140 MMOL/L (ref 133–144)
SPECIMEN SOURCE: NORMAL
SPECIMEN SOURCE: NORMAL
T PALLIDUM AB SER QL: NONREACTIVE
TSH SERPL DL<=0.005 MIU/L-ACNC: 0.72 MU/L (ref 0.4–4)

## 2018-11-15 LAB — TESTOST SERPL-MCNC: 266 NG/DL (ref 240–950)

## 2019-01-01 DIAGNOSIS — B37.0 THRUSH: ICD-10-CM

## 2019-01-02 NOTE — TELEPHONE ENCOUNTER
nystatin (MYCOSTATIN) 101994 UNIT/ML suspension Discontinued    11-14-18  Last Written Prescription Date:  11-4-18  Last Fill Quantity: 200 ml,   # refills: 0  Last Office Visit: Nikko Tang   Future Office visit:       Routing refill request to provider for review/approval because:  Drug not on the [facility] or [facility] refill protocol or controlled substance  Drug not active on patient's medication list

## 2019-01-02 NOTE — TELEPHONE ENCOUNTER
"Dr. Tang-Please review and advise/sign if agree.    Clarification needed whether or not patient requested this refill.    Medication not active on med list.    Writer called patient who stated:  1. Thrush symptoms did not resolve completely  2. Flonase gives him thrush and has needed to use Flonase recently  3. Needs to get in touch with pain doctor, can writer help with that?    Writer reviewed [facility] scheduling number with patient.    Writer also informed patient Dr. Tang is out of clinic for a few days, so request would also be sent to covering providers.  Given that this medication was prescribed for thrush, there is a possibility covering providers would ask patient to schedule office visit.    Patient then began yelling at writer and stated it is a \"simple\" refill he is requesting and \"just put it through\" to Dr. Tang.    Writer informed patient request would be sent to Dr. Tang.    CHAKA Torres, TOMMYN, RN      "

## 2019-01-07 RX ORDER — NYSTATIN 100000/ML
500000 SUSPENSION, ORAL (FINAL DOSE FORM) ORAL 4 TIMES DAILY
Qty: 200 ML | Refills: 0 | Status: ON HOLD | OUTPATIENT
Start: 2019-01-07 | End: 2019-02-10

## 2019-01-10 ENCOUNTER — PATIENT OUTREACH (OUTPATIENT)
Dept: CARE COORDINATION | Facility: CLINIC | Age: 64
End: 2019-01-10

## 2019-01-11 ENCOUNTER — TELEPHONE (OUTPATIENT)
Dept: FAMILY MEDICINE | Facility: CLINIC | Age: 64
End: 2019-01-11

## 2019-01-11 DIAGNOSIS — B37.0 THRUSH: Primary | ICD-10-CM

## 2019-01-11 RX ORDER — CLOTRIMAZOLE 10 MG/1
10 LOZENGE ORAL
Qty: 50 TROCHE | Refills: 0 | Status: ON HOLD | OUTPATIENT
Start: 2019-01-11 | End: 2019-02-10

## 2019-01-11 NOTE — TELEPHONE ENCOUNTER
This Rx for nystatin (MYCOSTATIN) 517137 UNIT/ML suspension cannot be ordered due to backorder.  Please put in a new Rx for an alternative.

## 2019-01-14 ENCOUNTER — OFFICE VISIT (OUTPATIENT)
Dept: PALLIATIVE MEDICINE | Facility: CLINIC | Age: 64
End: 2019-01-14
Payer: COMMERCIAL

## 2019-01-14 VITALS
OXYGEN SATURATION: 98 % | HEART RATE: 82 BPM | SYSTOLIC BLOOD PRESSURE: 146 MMHG | DIASTOLIC BLOOD PRESSURE: 87 MMHG | BODY MASS INDEX: 32.06 KG/M2 | WEIGHT: 248 LBS

## 2019-01-14 DIAGNOSIS — M79.18 MYOFASCIAL MUSCLE PAIN: ICD-10-CM

## 2019-01-14 DIAGNOSIS — G89.4 CHRONIC PAIN SYNDROME: ICD-10-CM

## 2019-01-14 DIAGNOSIS — M96.1 FAILED BACK SYNDROME: Primary | ICD-10-CM

## 2019-01-14 PROCEDURE — 99214 OFFICE O/P EST MOD 30 MIN: CPT | Performed by: PAIN MEDICINE

## 2019-01-14 RX ORDER — CELECOXIB 100 MG/1
100 CAPSULE ORAL 2 TIMES DAILY PRN
Qty: 60 CAPSULE | Refills: 1 | Status: ON HOLD | OUTPATIENT
Start: 2019-01-14 | End: 2019-02-15

## 2019-01-14 RX ORDER — DULOXETIN HYDROCHLORIDE 20 MG/1
20 CAPSULE, DELAYED RELEASE ORAL DAILY
Qty: 30 CAPSULE | Refills: 3 | Status: SHIPPED | OUTPATIENT
Start: 2019-01-14 | End: 2019-04-04

## 2019-01-14 NOTE — PROGRESS NOTES
[facility]    Date of visit: 1/8/2018    Chief complaint:   Chief Complaint   Patient presents with     Pain     Hx: S/p L3-4 decompression laminectomy 5/18/16 persistent right radiculopathy. S/p L4-5 laminectomy 2004, 7/22/16 lumbar discectomy RIGHT L3-L4 REVISION    MICRODISCECTOMY. L3 to 5 posterior spinal fusion in November 2016 with Dr. Iggy Issa. Subsequent L1 compression fracture  Interval history:    - Further procedures recommended:    - consider Lumbar MEDIAL BRANCH BLOCK  Above and below fusion   - Medication Management:     - medical marijuana- [email or web address]   - consider adding robaxin    - consider adding a different NSAID  -Consider pain PT  - Clinical Health Psychologist to address issues of relaxation, behavioral change, coping style, and other factors important to improvement: consider in the future  - Diagnostic Studies: no  - Urine toxicology screen today: yes  - Follow up: 3 months or for procedure      Since his last visit, Usama Harris reports:     The pt reports mostly axial LBP. The pain is bilateral. The pt notes sig benefit with medical cannabis. The pt reports he is going through a lot of stress. The pt feels a lot of more intense and liable. Of note pt experience occasional radiates to his plantar surface of his feet R>L. He does note some weakness.   The pt feels occasional altered mental status.     The pt is concerned about his memory   The pt is currently taking cognizine  The pt is concerned with sexual dysfunction   Pain scores:  Pain intensity on average is 5 on a scale of 0-10.     Current pain treatments:   zanaflex - not taking   cymbalta 30mg  Fosamax  Medical marijuana  Past pain treatments:  Dilaudid, morphine, oxycodone, Opana, Celebrex, Motrin, naproxen, prednisone, Soma, Flexeril, Robaxin, Zanaflex, gabapentin, Xanax, MS Contin knee  PAST MEDICATION TRIALS:                                                   OPIOIDS: Tylenol #3, hydrocodone(  Vicodin), Morphine ER, percocet, OxyContin, tramadol   NSAID'S/ANALEGESICS: Celbrex, Ibuprofen, naprosyn,      ANTIMIGRAINE: none   STEROIDS: yes dose pack     MUSCLE  RELAXERS:  Flexeril, tizanidine, Skelaxin   ANTIDEPRESSANTS: Bupropion   ANTIANXIETY: Xanax   HYPNOTICS: Hydroxyzine   ANTICONVULSANTS: none , not wanting Gabapentin, Lyrica     TOPICALS:  None     Side Effects: no side effect    Medications:  Current Outpatient Medications   Medication Sig Dispense Refill     alendronate (FOSAMAX) 70 MG tablet TAKE 1 TAB BY MOUTH EVERY 7 DAYS. TAKE 60MINS BEFORE MEAL, WITH 8OZ WATER. STAY UPRIGHT 30MINS AFTER 12 tablet 1     carboxymethylcellulose (REFRESH PLUS) 0.5 % SOLN Place 1 drop into both eyes daily as needed for dry eyes       celecoxib (CELEBREX) 100 MG capsule Take 1 capsule (100 mg) by mouth 2 times daily as needed for moderate pain 60 capsule 1     clotrimazole 10 MG gabriel Take 1 Gabriel (10 mg) by mouth 5 times daily for 10 days 50 Gabriel 0     DULoxetine (CYMBALTA) 30 MG capsule TAKE 1 CAP BY MOUTH DAILY 90 capsule 0     irbesartan (AVAPRO) 300 MG tablet TAKE 1 TABLET BY MOUTH EVERY DAY 90 tablet 0     latanoprost (XALATAN) 0.005 % ophthalmic solution Place 1 drop into both eyes At Bedtime   11     multivitamin, therapeutic with minerals (MULTI-VITAMIN) TABS tablet Take 1 tablet by mouth daily       nystatin (MYCOSTATIN) 578046 UNIT/ML suspension TAKE 5 MLS (500,000 UNITS) BY MOUTH 4 TIMES DAILY FOR 10 DAYS 200 mL 0     senna-docusate (EQL SENNA-S) 8.6-50 MG per tablet TAKES 5 TABLETS DAILY. 100 tablet      sildenafil (REVATIO) 20 MG tablet Take 1-5 tablets ( mg) by mouth daily as needed (take about 30 min before needed for ED.) Never use with nitroglycerin. 30 tablet 11     tiZANidine (ZANAFLEX) 4 MG tablet Take 1-1.5 tablets (4-6 mg) by mouth 3 times daily 135 tablet 1       Medical History: any changes in medical history since they were last seen? No    Review of Systems:  The 14 system ROS was  reviewed from the intake questionnaire*  Any bowel or bladder problems: no  Mood: good    Physical Exam:  /87   Pulse 82   Wt 112.5 kg (248 lb)   SpO2 98%   BMI 32.06 kg/m    Constitutional: alert and no distress.  Pt not obese  Head: Normocephalic  ENT: EOMI, mucosal surfaces moist.  Neck with full ROM   Cardiovascular: No edema or JVD appreciated.  Respiratory: Speaking in complete sentences without shortness of breath.    Cervical Spine:  wnl    Lumbar spine:     ROM: Decreased extension   Myofascial tenderness: Positive left lower back TTP   Kemps:positive on the right    Neurologic exam:  CN:  Cranial nerves 2-12 are normal  Motor:  5/5 LE strength  Reflexes:       Patella:  +2   Achilles:  +2    Sensory:  (upper and lower extremities):   Light touch: normal    Allodynia: absent    Dysethesia: absent    Hyperalgesia: absent     Skin: no suspicious lesions or rashes appreciated on exposed areas  Psychiatric: mentation appears normal, affect full and good eye contact.        Assessment/Plan:   Usama Harris is a 62 year old male who is seen at the pain clinic for Bilat LBP w/ occasional radiation down his legs  Usama was seen today for pain.    Diagnoses and all orders for this visit:    Failed back syndrome  -     celecoxib (CELEBREX) 100 MG capsule; Take 1 capsule (100 mg) by mouth 2 times daily as needed for moderate pain  -     PAIN PT EVAL AND TREAT    Myofascial muscle pain      - Further procedures recommended:    - consider lumbar epidural if radiating symptoms worsen   - Medication Management:     - continue medical cannabis   - consider adding robaxin    - celebrex 100mg twice a day as needed   -Discussed with PCP we will decrease Cymbalta to 20 mg daily  -Ordered pain PHYSICAL THERAPY  In [place]  - Clinical Health Psychologist to address issues of relaxation, behavioral change, coping style, and other factors important to improvement: consider in the future  - Follow up: 3-6  Total  time spent was 30 minutes, and more than 50% of face to face time was spent in counseling and/or coordination of care regarding  Bilat LBP worse on the right compared to the left.    Taran Sifuentes MD  [facility]

## 2019-01-14 NOTE — Clinical Note
Of note patient feels significant benefit with medical cannabis.  He is concerned about having to higher levels of serotonin.  Additionally, he feels very labile.  Discussed that we could consider going down on his Cymbalta to 20 mg to see if he has less symptoms.  Did not want to make change without discussion with you prior. Patient to follow up with Primary Care provider regarding elevated blood pressure.

## 2019-01-14 NOTE — PATIENT INSTRUCTIONS
- Further procedures recommended:    - consider lumbar epidural if radiating symptoms worsen   - Medication Management:     - continue medical cannabis   - consider adding robaxin    - celebrex 100mg twice a day as needed   - Consider decreasing cymbalta to 20mg, possible decrease in the side effects- Will defer to PCP   -Ordered pain PHYSICAL THERAPY  In [place]  - Clinical Health Psychologist to address issues of relaxation, behavioral change, coping style, and other factors important to improvement: consider in the future  - Follow up: 3-6    ----------------------------------------------------------------  Clinic Number:  [phone]   Call this number with any questions about your care and for scheduling assistance. Calls are returned Monday through Friday between 8 AM and 4:30 PM. We usually get back to you within 2 business days depending on the issue/request.       Medication refills:    For non-narcotic medications, call your pharmacy directly to request a refill. The pharmacy will contact the [facility] for authorization. Please allow 3-4 days for these refills to be processed.     For narcotic refills, call the clinic number or send a [employer] message. Please contact us 7-10 days before your refill is due. The message MUST include the name of the specific medication(s) requested and how you would like to receive the prescription(s). The options are as follows:    Pain Clinic staff can mail the prescription to your pharmacy. Please tell us the name of the pharmacy.    You may pick the prescription up at the Pain Clinic (tell us the location) or during a clinic visit with your pain provider    Pain Clinic staff can deliver the prescription to the [place] pharmacy in the clinic building. Please tell us the location.      We believe regular attendance is key to your success in our program.    Any time you are unable to keep your appointment we ask that you call us at least 24 hours in advance  to let us know. This will allow us to offer the appointment time to another patient.

## 2019-01-18 ENCOUNTER — MYC MEDICAL ADVICE (OUTPATIENT)
Dept: FAMILY MEDICINE | Facility: CLINIC | Age: 64
End: 2019-01-18

## 2019-01-18 NOTE — TELEPHONE ENCOUNTER
Spoke with pt, whose insurance is [employer] [place] [employer]. Gave pt the following information to schedule a physical therapy evaluation. [facility] [address] [place] [address] First Appointment: [phone] Rehabilitation Clinic:[phone].    Sheryl Nava [facility] Referral Rep

## 2019-01-24 ENCOUNTER — ANCILLARY PROCEDURE (OUTPATIENT)
Dept: BONE DENSITY | Facility: CLINIC | Age: 64
End: 2019-01-24
Payer: COMMERCIAL

## 2019-01-24 ENCOUNTER — OFFICE VISIT (OUTPATIENT)
Dept: ENDOCRINOLOGY | Facility: CLINIC | Age: 64
End: 2019-01-24
Payer: COMMERCIAL

## 2019-01-24 VITALS
BODY MASS INDEX: 32.47 KG/M2 | SYSTOLIC BLOOD PRESSURE: 149 MMHG | HEIGHT: 74 IN | WEIGHT: 253 LBS | HEART RATE: 91 BPM | DIASTOLIC BLOOD PRESSURE: 91 MMHG

## 2019-01-24 DIAGNOSIS — E29.1 HYPOGONADISM MALE: ICD-10-CM

## 2019-01-24 DIAGNOSIS — R11.10 VOMITING AND DIARRHEA: Primary | ICD-10-CM

## 2019-01-24 DIAGNOSIS — M81.6 LOCALIZED OSTEOPOROSIS WITHOUT CURRENT PATHOLOGICAL FRACTURE: ICD-10-CM

## 2019-01-24 DIAGNOSIS — R19.7 VOMITING AND DIARRHEA: Primary | ICD-10-CM

## 2019-01-24 RX ORDER — LORATADINE 10 MG/1
5 TABLET ORAL DAILY
COMMUNITY
End: 2019-02-09

## 2019-01-24 RX ORDER — ACETAMINOPHEN 500 MG
500-1000 TABLET ORAL EVERY 6 HOURS PRN
COMMUNITY
End: 2019-10-21 | Stop reason: DRUGHIGH

## 2019-01-24 RX ORDER — OMEGA-3 FATTY ACIDS/FISH OIL 300-1000MG
200 CAPSULE ORAL EVERY 4 HOURS PRN
Status: ON HOLD | COMMUNITY
End: 2019-02-14

## 2019-01-24 ASSESSMENT — MIFFLIN-ST. JEOR: SCORE: 2025.1

## 2019-01-24 ASSESSMENT — PAIN SCALES - GENERAL: PAINLEVEL: NO PAIN (0)

## 2019-01-24 NOTE — LETTER
1/24/2019       RE: Usama Harris  [address] [place] [address]     Dear Colleague,    Thank you for referring your patient, Usama Harris, to the [facility] ENDOCRINOLOGY at [facility]. Please see a copy of my visit note below.    Endocrinology Clinic Visit 1/24/2019    NAME:  Usama Harris  PCP:  Nikko Tang  MRN:  [id number]  Reason for Consult:  Osteoporosis, hypogonadism  Requesting Provider:  Nikko Tang    Chief Complaint     Chief Complaint   Patient presents with     Consult     OSTEOPENIA       History of Present Illness   Usama Harris is a 63 year old male who is seen in clinic for management of osteoporosis and hypogonadism    Patient was following with endocrinology (Dr. Mckinnon) in 2015 for hypogonadism and was receiving testosterone gel treatement. In 2014, prior to starting replacement, total testosterone was 210; LH was 2.8 and FSH 3.5, both in low-normal range. Pituitary MRI normal. Was having low libido, ED. He was on chronic morphine for chronic pain during this time. Last seen in 08/2015. Symptoms did improve with testosterone replacement, however his liver enzymes became elevated so his endocrinologist stopped testosterone. Over the next 1.5 years his liver enzymes remained elevated and eventually returned to normal. Unclear what the cause of these enzyme elevations were from. 11/2018 total testosterone at 1:45PM was 266    In 03/2017 lumbar x-rays showed an incidental L1 compression fracture.  He had no trauma that he can recall. He has a history of L3-L5 fusion surgery in 2016. In 04/2017, his PCP started him on fosamax and calcitonin. Calcitonin stopped in 10/2017. Fosamax use was continued, but a pharmacist told him to stop it because of some GI symptoms, so he stopped it 3 months ago. He reports having IBS, projectile diarrhea, vomiting. He notes symptoms started when he stopped morphine, started using marijuana  and cymbalta. When he eats food gets overly digested. Has a hot mouth - maybe from thrush which he is being treated for. He gets nauseated and acid feeling in stomach. Doesn't really have heartburn/acid reflux. These symptoms happen most days.     01/2015 DXA:  FINDINGS:  Lumbar Spine (L1-L4) T-score: -0.6  Left Femoral Neck T-score: -1.7  Right Femoral Neck T-score: -0.8      - Previous fractures: none  - Family history of fragility fracture in parent: mom with broken hips  - Current smoking: none, quit 1999  - Steroid Use: systemic: none  - Alcohol: 3-4 beer or glass of wine per week  - Other medications known to affect BMD: none  - Calcium intake: Dietary: 1 dairy unit a day, Supplements: none  - Vitamin D intake: Supplements: none  - GI history: Malabsorption (IBD, Celiac, gastric bypass ): yes  - History of kidney stones: none  - History of thyroid disease: none  - Physical activity: does some weight machines and some cardio, 3 days a week    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    Problem List     Patient Active Problem List   Diagnosis     Chronic low back pain     Anxiety     Advanced directives, counseling/discussion     Testosterone deficiency     HTN (hypertension)     Leukoariosis     Chronic pain syndrome     S/P lumbar discectomy     Herniated lumbar disc without myelopathy     Surgery, elective     Health Care Home     Osteoporosis        Medications     Current Outpatient Medications   Medication     acetaminophen (TYLENOL) 500 MG tablet     carboxymethylcellulose (REFRESH PLUS) 0.5 % SOLN     celecoxib (CELEBREX) 100 MG capsule     DULoxetine (CYMBALTA) 20 MG capsule     ibuprofen (ADVIL/MOTRIN) 200 MG capsule     irbesartan (AVAPRO) 300 MG tablet     latanoprost (XALATAN) 0.005 % ophthalmic solution     loratadine (CLARITIN) 10 MG tablet     multivitamin, therapeutic with minerals (MULTI-VITAMIN) TABS tablet     UNABLE TO FIND     alendronate (FOSAMAX) 70 MG  tablet     senna-docusate (EQL SENNA-S) 8.6-50 MG per tablet     sildenafil (REVATIO) 20 MG tablet     tiZANidine (ZANAFLEX) 4 MG tablet     No current facility-administered medications for this visit.         Allergies     Allergies   Allergen Reactions     No Known Drug Allergies        Medical / Surgical History     Past Medical History:   Diagnosis Date     Acute hepatitis C without mention of hepatic coma(070.51)     Hep C - Treated     Backache, unspecified      DDD (degenerative disc disease), lumbar     s/p back surgery     Hypertension      Past Surgical History:   Procedure Laterality Date     DECOMPRESSION LUMBAR ONE LEVEL Right 5/18/2016    Procedure: DECOMPRESSION LUMBAR ONE LEVEL;  Surgeon: Prince Govea MD;  Location: [facility] OR     DISCECTOMY LUMBAR POSTERIOR MICROSCOPIC ONE LEVEL Right 7/22/2016    Procedure: DISCECTOMY LUMBAR POSTERIOR MICROSCOPIC ONE LEVEL;  Surgeon: Iggy Issa MD;  Location: [facility] OR     LAMINECT/DISCECTOMY, LUMBAR      Laminectomy/Discectomy Cervical     LAMINECTOMY, FUSION LUMBAR TWO LEVELS, COMBINED Right 11/15/2016    Procedure: COMBINED LAMINECTOMY, FUSION LUMBAR TWO LEVELS;  Surgeon: Iggy Issa MD;  Location:  OR     SURGICAL HISTORY OF -       ligation of left spermatic vein       Social History     Social History     Socioeconomic History     Marital status:      Spouse name: Not on file     Number of children: 2     Years of education: 16     Highest education level: Not on file   Social Needs     Financial resource strain: Not on file     Food insecurity - worry: Not on file     Food insecurity - inability: Not on file     Transportation needs - medical: Not on file     Transportation needs - non-medical: Not on file   Occupational History     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 No   Social History Narrative    Dairy/d 3 servings/d.     Caffeine 8 servings/d pot of coffee    Exercise not regular but active x week    Sunscreen used - No    Seatbelts used - Yes    Working smoke/CO detectors in the home - Yes    Guns stored in the home - Yes    Self Breast Exams - NA    Self Testicular Exam - sometimes    Eye Exam up to date - Yes 2006    Dental Exam up to date - Yes    Pap Smear up to date - NA    Mammogram up to date - NA    PSA up to date - No not seen on record    Dexa Scan up to date - NA    Flex Sig / Colonoscopy up to date - Not yet and due    Immunizations up to date - No  Tdap today  No record of any given    Abuse: Current or Past(Physical, Sexual or Emotional)- No    Do you feel safe in your environment - Yes    Dee Scruggs RN    10/10/08    Has 2 associates degrees on in nursing            Family History     Family History   Problem Relation Age of Onset     C.A.D. Mother      Circulatory Mother         DVT     Cardiovascular Mother         DVT     Heart Disease Mother         a-fib     Hypertension Mother      Cerebrovascular Disease Mother         diseased       ROS   10 point ROS negative unless noted above    Physical Exam    Previous Weights:    Wt Readings from Last 2 Encounters:   19 114.8 kg (253 lb)   19 112.5 kg (248 lb)                    BMI:  Body mass index is 32.06 kg/m .  BP (!) 149/91   Pulse 91   Ht 1.892 m (6' 2.49\")   Wt 114.8 kg (253 lb)   BMI 32.06 kg/m     Gen: NAD, sitting in chair, conversant  HEENT:  Normocephalic, atraumatic, PERRL, no scleral icterus   Neck: supple, no LAD, no thyromegaly  CV: RRR, +s1/s2  Resp: " CTAB  Abd: +BS, soft, NT/ND  Ext: WWP.  no edema.    Skin: warm and dry  Neuro: alert and oriented, no gross focal deficits    Labs/Imaging   Labs and imaging were reviewed in EPIC    Summary of recent findings:     TSH   Date Value Ref Range Status   11/13/2018 0.72 0.40 - 4.00 mU/L Final   08/28/2015 1.09 0.40 - 4.00 mU/L Final   12/15/2014 2.39 0.40 - 4.00 mU/L Final     Comment:     Effective 7/30/2014, the reference range for this assay has changed to reflect   new instrumentation/methodology.     02/02/2012 1.66 0.4 - 5.0 mU/L Final   10/06/2011 2.45 0.4 - 5.0 mU/L Final     T4 Free   Date Value Ref Range Status   12/15/2014 0.86 0.76 - 1.46 ng/dL Final     Comment:     Effective 7/30/2014, the reference range for this assay has changed to reflect   new instrumentation/methodology.         Creatinine   Date Value Ref Range Status   11/13/2018 0.67 0.66 - 1.25 mg/dL Final       No results found for: [id number], [id number], [id number]    Recent Labs   Lab Test 11/13/18  1345 11/06/17  1108    139   POTASSIUM 3.9 4.0   CHLORIDE 107 107   CO2 26 25   ANIONGAP 7 7   * 90   BUN 13 12   CR 0.67 0.76   NOY 9.1 9.1         Assessment / Plan     # Osteoporosis  Patient with L1 compression fracture in 2017. Had short-term use of calcitonin, and about 1.5 years of fosamax before he stopped because of the medication potentially causing GI symptoms, however symptoms didn't improve off the medicine. He is having almost daily vomiting, at times after pills, so restarting fosamax would probably not be a good idea regardless. Reclast and restarting testosterone may be reasonable options. His hypogonadism, family history, and prior tobacco use may have contributed to his osteoporosis. Will do additional evaluation today  - BMP, albumin, phosphorus, PTH, vitamin D, 24 hour urine calcium, TTG  - Repeat DXA scan, as it has been 4-5 years since his last one    # Hypogonadotropic hypogonadism  Diagnosed in 2014.  Pituitary MRI normal. Etiology could have been from chronic opioid use. Transferrin saturation was elevated in 2011, but he also had active HCV infection during that time and outpatient GI notes during that time don't really mention these labs. Ferritin was in normal range in 2014. He is now off opioids, but is now a daily marijuana user to help with his chronic pain. He is not interested in quitting marijuana. If AM testosterone is confirmed low or low-normal, then he may benefit from testosterone replacement. We would monitor liver function closely if testosterone is restarted.   - Repeat total and free testosterone in the morning, along with SHBG     # Diarrhea, vomiting, abdominal pain  Unclear etiology, but it seems like he needs GI evaluation, especially considering his HCV history  - TTG today  - Recommended GI evaluation, can be arranged through primary care    Follow up in 1 month after all above testing is completed - will talk about treatment then    Patient was seen and discussed with attending Dr. Villegas    Endocrine Staff Note    The patient was seen and examined by me with .  His note details our mutual findings and plan.    Jenny Friedman MD

## 2019-01-24 NOTE — PROGRESS NOTES
Endocrinology Clinic Visit 1/24/2019    NAME:  Usama Harris  PCP:  ClydeNikko  MRN:  [id number]  Reason for Consult:  Osteoporosis, hypogonadism  Requesting Provider:  Nikko Tang    Chief Complaint     Chief Complaint   Patient presents with     Consult     OSTEOPENIA       History of Present Illness   Usama Harris is a 63 year old male who is seen in clinic for management of osteoporosis and hypogonadism    Patient was following with endocrinology (Dr. Mckinnon) in 2015 for hypogonadism and was receiving testosterone gel treatement. In 2014, prior to starting replacement, total testosterone was 210; LH was 2.8 and FSH 3.5, both in low-normal range. Pituitary MRI normal. Was having low libido, ED. He was on chronic morphine for chronic pain during this time. Last seen in 08/2015. Symptoms did improve with testosterone replacement, however his liver enzymes became elevated so his endocrinologist stopped testosterone. Over the next 1.5 years his liver enzymes remained elevated and eventually returned to normal. Unclear what the cause of these enzyme elevations were from. 11/2018 total testosterone at 1:45PM was 266    In 03/2017 lumbar x-rays showed an incidental L1 compression fracture.  He had no trauma that he can recall. He has a history of L3-L5 fusion surgery in 2016. In 04/2017, his PCP started him on fosamax and calcitonin. Calcitonin stopped in 10/2017. Fosamax use was continued, but a pharmacist told him to stop it because of some GI symptoms, so he stopped it 3 months ago. He reports having IBS, projectile diarrhea, vomiting. He notes symptoms started when he stopped morphine, started using marijuana and cymbalta. When he eats food gets overly digested. Has a hot mouth - maybe from thrush which he is being treated for. He gets nauseated and acid feeling in stomach. Doesn't really have heartburn/acid reflux. These symptoms happen most days.     01/2015 DXA:  FINDINGS:  Lumbar Spine  (L1-L4) T-score: -0.6  Left Femoral Neck T-score: -1.7  Right Femoral Neck T-score: -0.8      - Previous fractures: none  - Family history of fragility fracture in parent: mom with broken hips  - Current smoking: none, quit 1999  - Steroid Use: systemic: none  - Alcohol: 3-4 beer or glass of wine per week  - Other medications known to affect BMD: none  - Calcium intake: Dietary: 1 dairy unit a day, Supplements: none  - Vitamin D intake: Supplements: none  - GI history: Malabsorption (IBD, Celiac, gastric bypass ): yes  - History of kidney stones: none  - History of thyroid disease: none  - Physical activity: does some weight machines and some cardio, 3 days a week    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    Problem List     Patient Active Problem List   Diagnosis     Chronic low back pain     Anxiety     Advanced directives, counseling/discussion     Testosterone deficiency     HTN (hypertension)     Leukoariosis     Chronic pain syndrome     S/P lumbar discectomy     Herniated lumbar disc without myelopathy     Surgery, elective     Health Care Home     Osteoporosis        Medications     Current Outpatient Medications   Medication     acetaminophen (TYLENOL) 500 MG tablet     carboxymethylcellulose (REFRESH PLUS) 0.5 % SOLN     celecoxib (CELEBREX) 100 MG capsule     DULoxetine (CYMBALTA) 20 MG capsule     ibuprofen (ADVIL/MOTRIN) 200 MG capsule     irbesartan (AVAPRO) 300 MG tablet     latanoprost (XALATAN) 0.005 % ophthalmic solution     loratadine (CLARITIN) 10 MG tablet     multivitamin, therapeutic with minerals (MULTI-VITAMIN) TABS tablet     UNABLE TO FIND     alendronate (FOSAMAX) 70 MG tablet     senna-docusate (EQL SENNA-S) 8.6-50 MG per tablet     sildenafil (REVATIO) 20 MG tablet     tiZANidine (ZANAFLEX) 4 MG tablet     No current facility-administered medications for this visit.         Allergies     Allergies   Allergen Reactions     No Known Drug Allergies         Medical / Surgical History     Past Medical History:   Diagnosis Date     Acute hepatitis C without mention of hepatic coma(070.51)     Hep C - Treated     Backache, unspecified      DDD (degenerative disc disease), lumbar     s/p back surgery     Hypertension      Past Surgical History:   Procedure Laterality Date     DECOMPRESSION LUMBAR ONE LEVEL Right 2016    Procedure: DECOMPRESSION LUMBAR ONE LEVEL;  Surgeon: Prince Govea MD;  Location: [facility] OR     DISCECTOMY LUMBAR POSTERIOR MICROSCOPIC ONE LEVEL Right 2016    Procedure: DISCECTOMY LUMBAR POSTERIOR MICROSCOPIC ONE LEVEL;  Surgeon: Iggy Issa MD;  Location: [facility] OR     LAMINECT/DISCECTOMY, LUMBAR      Laminectomy/Discectomy Cervical     LAMINECTOMY, FUSION LUMBAR TWO LEVELS, COMBINED Right 11/15/2016    Procedure: COMBINED LAMINECTOMY, FUSION LUMBAR TWO LEVELS;  Surgeon: Iggy Issa MD;  Location: [facility] OR     SURGICAL HISTORY OF -       ligation of left spermatic vein       Social History     Social History     Socioeconomic History     Marital status:      Spouse name: Not on file     Number of children: 2     Years of education: 16     Highest education level: Not on file   Social Needs     Financial resource strain: Not on file     Food insecurity - worry: Not on file     Food insecurity - inability: Not on file     Transportation needs - medical: Not on file     Transportation needs - non-medical: Not on file   Occupational History     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 No   Social History Narrative    Dairy/d 3 servings/d.     Caffeine 8 servings/d pot of coffee    Exercise not regular but active x week    Sunscreen used - No    Seatbelts used - Yes    Working smoke/CO detectors in the home - Yes    Guns stored in the home - Yes    Self Breast Exams - NA    Self Testicular Exam - sometimes    Eye Exam up to date - Yes 2006    Dental Exam up to date - Yes    Pap Smear up to date - NA    Mammogram up to date - NA    PSA up to date - No not seen on record    Dexa Scan up to date - NA    Flex Sig / Colonoscopy up to date - Not yet and due    Immunizations up to date - No  Tdap today  No record of any given    Abuse: Current or Past(Physical, Sexual or Emotional)- No    Do you feel safe in your environment - Yes    Dee Scruggs RN    10/10/08    Has 2 associates degrees on in nursing            Family History     Family History   Problem Relation Age of Onset     C.A.D. Mother      Circulatory Mother         DVT     Cardiovascular Mother         DVT     Heart Disease Mother         a-fib     Hypertension Mother      Cerebrovascular Disease Mother         diseased       ROS   10 point ROS negative unless noted above    Physical Exam    Previous Weights:    Wt Readings from Last 2 Encounters:   01/24/19 114.8 kg (253 lb)   01/14/19 112.5 kg (248 lb)                    BMI:  Body mass index is 32.06 kg/m .  BP (!) 149/91   Pulse 91   Ht 1.892 m (6' 2.49\")   Wt 114.8 kg (253 lb)   BMI 32.06 kg/m    Gen: NAD, sitting in chair, conversant  HEENT:  Normocephalic, atraumatic, PERRL, no scleral icterus   Neck: supple, no LAD, no thyromegaly  CV: RRR, +s1/s2  Resp: CTAB  Abd: +BS, soft, NT/ND  Ext: WWP.  no edema.    Skin: warm and dry  Neuro: alert and oriented, no gross focal deficits    Labs/Imaging   Labs and imaging were reviewed in EPIC    Summary of recent findings:     TSH   Date Value Ref Range Status   11/13/2018 0.72 0.40 - 4.00 mU/L " Final   08/28/2015 1.09 0.40 - 4.00 mU/L Final   12/15/2014 2.39 0.40 - 4.00 mU/L Final     Comment:     Effective 7/30/2014, the reference range for this assay has changed to reflect   new instrumentation/methodology.     02/02/2012 1.66 0.4 - 5.0 mU/L Final   10/06/2011 2.45 0.4 - 5.0 mU/L Final     T4 Free   Date Value Ref Range Status   12/15/2014 0.86 0.76 - 1.46 ng/dL Final     Comment:     Effective 7/30/2014, the reference range for this assay has changed to reflect   new instrumentation/methodology.         Creatinine   Date Value Ref Range Status   11/13/2018 0.67 0.66 - 1.25 mg/dL Final       No results found for: [id number], [id number], [id number]    Recent Labs   Lab Test 11/13/18  1345 11/06/17  1108    139   POTASSIUM 3.9 4.0   CHLORIDE 107 107   CO2 26 25   ANIONGAP 7 7   * 90   BUN 13 12   CR 0.67 0.76   NOY 9.1 9.1         Assessment / Plan     # Osteoporosis  Patient with L1 compression fracture in 2017. Had short-term use of calcitonin, and about 1.5 years of fosamax before he stopped because of the medication potentially causing GI symptoms, however symptoms didn't improve off the medicine. He is having almost daily vomiting, at times after pills, so restarting fosamax would probably not be a good idea regardless. Reclast and restarting testosterone may be reasonable options. His hypogonadism, family history, and prior tobacco use may have contributed to his osteoporosis. Will do additional evaluation today  - BMP, albumin, phosphorus, PTH, vitamin D, 24 hour urine calcium, TTG  - Repeat DXA scan, as it has been 4-5 years since his last one    # Hypogonadotropic hypogonadism  Diagnosed in 2014. Pituitary MRI normal. Etiology could have been from chronic opioid use. Transferrin saturation was elevated in 2011, but he also had active HCV infection during that time and outpatient GI notes during that time don't really mention these labs. Ferritin was in normal range in 2014. He is now  off opioids, but is now a daily marijuana user to help with his chronic pain. He is not interested in quitting marijuana. If AM testosterone is confirmed low or low-normal, then he may benefit from testosterone replacement. We would monitor liver function closely if testosterone is restarted.   - Repeat total and free testosterone in the morning, along with SHBG     # Diarrhea, vomiting, abdominal pain  Unclear etiology, but it seems like he needs GI evaluation, especially considering his HCV history  - TTG today  - Recommended GI evaluation, can be arranged through primary care    Follow up in 1 month after all above testing is completed - will talk about treatment then    Patient was seen and discussed with attending Dr. Bakari Friedman MD  [id number], Endocrinology Fellow    Endocrine Staff Note    The patient was seen and examined by me with .  His note details our mutual findings and plan.    Jenny Villegas MD

## 2019-01-24 NOTE — PATIENT INSTRUCTIONS
"- Come in the morning at 8AM to get labs and your urine jug collection.     To expedite your medication refill(s), please contact your pharmacy and have them fax a refill request to: [phone].  *Please allow 3 business days for routine medication refills.  *Please allow 5 business days for controlled substance medication refills.  --------------------  To schedule an appointment (including lab work) you can reach our clinic schedulers at [phone], or you can schedule any follow up appointment directly through [facility] by clicking on the \"Visits\" tab and selecting \"Schedule an Appointment.\"    To ask a question to your Endocrine care team, please send them a [facility] message, or reach them by phone at [phone] and press option #3.    For after-hours urgent Endocrine issues, that do not require 911, please dial [phone], and ask to speak with the Endocrinologist On-Call.    For questions related to your bill, please contact:  [place]: [phone]  [facility]: [phone]    If you do not have enough, or have no insurance for your care, or have questions about possible costs and coverage, please reach out to our [facility] Financial Counselors to discuss with them any options you may have. To reach them, please call [phone].    --------------------  Please Note: If you are active on [facility], all future test results will be sent by [facility] message only and will no longer be sent by mail. You may also receive communication directly from your physician.    "

## 2019-01-27 NOTE — ADDENDUM NOTE
Encounter addended by: Susan Nichole, PT on: 1/26/2019 7:18 PM   Actions taken: Sign clinical note, Flowsheet accepted, Episode resolved

## 2019-01-27 NOTE — PROGRESS NOTES
03/03/17 1100   Signing Clinician's Name / Credentials   Signing clinician's name / credentials Susan Nichole PT, DPT   Session Number   Session Number DISCHARGE: Episode of care 1/17/17-3/3/17. Pt particpated in 5 aquatic PT visits including evaluation to address impaired functional mobility 2/2 chronic pain. See note below for details of final visit. Pt discharged to [facility] for land based therapy.   Goal 1   Goal Identifier HEP   Goal Description Pt to demonstrate (I) with aquatic based HEP for progress towards all goals and continued self-maintenance of chronic condition following d/c from physical therapy.    Target Date 03/10/17   Goal 2   Goal Identifier Oswestry (baseline: 22.22 percent, 21-40% considered moderate disability)   Goal Description Pt to score </= 11.11 percent to achieve min detectable change and be considered lesser range of disability for improved participation in activities of daily living.    Target Date 03/10/17   Goal 3   Goal Identifier Daily activities and pain management (baseline: Pain exacerbated: bending, walking > 3-4 blocks; wearing post-op back brace 24/7, Morphine long acting 90 mg 3x/day; pt reports 30 mg 2x/day before injury.)   Goal Description Pt to report worst pain </= 2/10 with increased daily activities, no longer reliant on back brace for pain management, walking without AD in community, and continued tapering pain medications as recommended by MD.   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   Target Date 03/10/17   Subjective Report   Subjective Report Arrives early to session. He does not have previously provided handout with him today, finds it confusing Sleeping better on his side since last seen, walking 3-4 hours a day but with increaed pain the next day and decreased wakling time the next  "day due to inc pain.    Aquatic Therapy   Skilled Intervention progressions of core and aerobic exercises, repeated education on pacing , education on hardware likely contributing to his feeling of lacking lateral movements and that this is a normal response, repeated education that many years of pain is contributed to his recovery time but reassured he is continuing to recover and discuss with michaelon his concerns on procedure and recovery expectations   Patient Response tolerates progressions of aerobic exercises well, pt quick to report \"this isn't doing anythign\" with exercises and needs repeated cues on technique for appropriate effort and fatigue, overall he is concerned recovery is taking too long and is frustrated with conitnued questions he has not f/u with MD on yet. He does not see himself continuing exercise in the pool, doesn't think it is enough of a challenge, and finds the use of equipment like noodles \"for little kids and embarassing\". Agrees with therapist recommendation to transion to land based therapy with conitnued pacing, motivation and compliance with exercises reports 4/10 pain in water feels concerned he is lacking lateral movement   Treatment Detail see flowsheet   Progress poor compliance with pacing recommendations and continued anxiety around pain = injury preventing continued progress   Education   Learner Patient   Readiness Acceptance   Method Explanation   Response Needs Reinforcement   Education Comments (as indicated)   Plan   Homework f/u with surgeon to get questions and concerns about recovery time and method of surgery answered   Home program land: daily walking with brace, michael chi, meditation, TA sets in supine   Updates to plan of care transition to continue therapy on land   Plan for next session initate land based therapy. extend goals prn. Continue to progress postural stability challenges and relaxation techniques (incorporate Michael chi), Continue discussion of tissue " injury vs pain  sore but safe /  hurt does not equal harm . Reinforce pacing of activity and brace wear for pain management with inc activity. f/u with pt on reaching out to surgeon for any continued concern about the surgery itself. Progress land based HEP as appropriate for core strengthening and trunk stretching   Total Session Time   Total Treatment Time (sum of timed and untimed services) 38

## 2019-01-28 DIAGNOSIS — M81.6 LOCALIZED OSTEOPOROSIS WITHOUT CURRENT PATHOLOGICAL FRACTURE: ICD-10-CM

## 2019-01-28 DIAGNOSIS — R11.10 VOMITING AND DIARRHEA: ICD-10-CM

## 2019-01-28 DIAGNOSIS — E29.1 HYPOGONADISM MALE: ICD-10-CM

## 2019-01-28 DIAGNOSIS — R19.7 VOMITING AND DIARRHEA: ICD-10-CM

## 2019-01-28 LAB
ALBUMIN SERPL-MCNC: 3.8 G/DL (ref 3.4–5)
ANION GAP SERPL CALCULATED.3IONS-SCNC: 5 MMOL/L (ref 3–14)
BUN SERPL-MCNC: 17 MG/DL (ref 7–30)
CALCIUM 24H UR-MRATE: 0.19 G/24 H (ref 0.1–0.3)
CALCIUM SERPL-MCNC: 8.5 MG/DL (ref 8.5–10.1)
CALCIUM UR-MCNC: 15.2 MG/DL
CALCIUM/CREAT UR: 0.08 G/G CR
CHLORIDE SERPL-SCNC: 108 MMOL/L (ref 94–109)
CO2 SERPL-SCNC: 27 MMOL/L (ref 20–32)
COLLECT DURATION TIME UR: 24 H
CREAT 24H UR-MRATE: 2.37 G/(24.H) (ref 1–2)
CREAT SERPL-MCNC: 0.69 MG/DL (ref 0.66–1.25)
CREAT UR-MCNC: 191 MG/DL
GFR SERPL CREATININE-BSD FRML MDRD: >90 ML/MIN/{1.73_M2}
GLUCOSE SERPL-MCNC: 106 MG/DL (ref 70–99)
PHOSPHATE SERPL-MCNC: 2 MG/DL (ref 2.5–4.5)
POTASSIUM SERPL-SCNC: 3.8 MMOL/L (ref 3.4–5.3)
PTH-INTACT SERPL-MCNC: 78 PG/ML (ref 18–80)
SODIUM SERPL-SCNC: 140 MMOL/L (ref 133–144)
SPECIMEN VOL UR: 1240 ML

## 2019-01-29 LAB
SHBG SERPL-SCNC: 30 NMOL/L (ref 11–80)
TESTOST FREE SERPL-MCNC: 4.51 NG/DL (ref 4.7–24.4)
TESTOST SERPL-MCNC: 223 NG/DL (ref 240–950)
TTG IGA SER-ACNC: 1 U/ML
TTG IGG SER-ACNC: <1 U/ML

## 2019-01-31 LAB
DEPRECATED CALCIDIOL+CALCIFEROL SERPL-MC: <32 UG/L (ref 20–75)
VITAMIN D2 SERPL-MCNC: <5 UG/L
VITAMIN D3 SERPL-MCNC: 27 UG/L

## 2019-02-08 ENCOUNTER — TELEPHONE (OUTPATIENT)
Dept: PALLIATIVE MEDICINE | Facility: CLINIC | Age: 64
End: 2019-02-08

## 2019-02-08 NOTE — TELEPHONE ENCOUNTER
Tara from [facility] calling to get the Physical Thereapy order from Dr Sifuentes for the pt. Pt is scheduled on Monday 2/11 @ 11:00. They are requesting the order and notes be faxed to [phone]. Any questions please call Tara at [phone].      Ashley DE LA PAZ    [facility] [place]

## 2019-02-09 ENCOUNTER — HOSPITAL ENCOUNTER (OUTPATIENT)
Facility: CLINIC | Age: 64
Setting detail: OBSERVATION
Discharge: OTHER INSTITUTION | End: 2019-02-10
Attending: EMERGENCY MEDICINE | Admitting: INTERNAL MEDICINE
Payer: COMMERCIAL

## 2019-02-09 ENCOUNTER — APPOINTMENT (OUTPATIENT)
Dept: MRI IMAGING | Facility: CLINIC | Age: 64
End: 2019-02-09
Attending: EMERGENCY MEDICINE
Payer: COMMERCIAL

## 2019-02-09 DIAGNOSIS — M51.26 LUMBAR DISC HERNIATION: ICD-10-CM

## 2019-02-09 LAB
ANION GAP SERPL CALCULATED.3IONS-SCNC: 7 MMOL/L (ref 3–14)
BASOPHILS # BLD AUTO: 0 10E9/L (ref 0–0.2)
BASOPHILS NFR BLD AUTO: 0.1 %
BUN SERPL-MCNC: 20 MG/DL (ref 7–30)
CALCIUM SERPL-MCNC: 8.9 MG/DL (ref 8.5–10.1)
CHLORIDE SERPL-SCNC: 108 MMOL/L (ref 94–109)
CO2 SERPL-SCNC: 26 MMOL/L (ref 20–32)
CREAT SERPL-MCNC: 0.67 MG/DL (ref 0.66–1.25)
DIFFERENTIAL METHOD BLD: ABNORMAL
EOSINOPHIL # BLD AUTO: 0 10E9/L (ref 0–0.7)
EOSINOPHIL NFR BLD AUTO: 0 %
ERYTHROCYTE [DISTWIDTH] IN BLOOD BY AUTOMATED COUNT: 13 % (ref 10–15)
GFR SERPL CREATININE-BSD FRML MDRD: >90 ML/MIN/{1.73_M2}
GLUCOSE SERPL-MCNC: 125 MG/DL (ref 70–99)
HCT VFR BLD AUTO: 46.8 % (ref 40–53)
HGB BLD-MCNC: 15.2 G/DL (ref 13.3–17.7)
IMM GRANULOCYTES # BLD: 0 10E9/L (ref 0–0.4)
IMM GRANULOCYTES NFR BLD: 0.4 %
LYMPHOCYTES # BLD AUTO: 0.9 10E9/L (ref 0.8–5.3)
LYMPHOCYTES NFR BLD AUTO: 8.9 %
MCH RBC QN AUTO: 32.1 PG (ref 26.5–33)
MCHC RBC AUTO-ENTMCNC: 32.5 G/DL (ref 31.5–36.5)
MCV RBC AUTO: 99 FL (ref 78–100)
MONOCYTES # BLD AUTO: 0.4 10E9/L (ref 0–1.3)
MONOCYTES NFR BLD AUTO: 4.2 %
NEUTROPHILS # BLD AUTO: 8.9 10E9/L (ref 1.6–8.3)
NEUTROPHILS NFR BLD AUTO: 86.4 %
NRBC # BLD AUTO: 0 10*3/UL
NRBC BLD AUTO-RTO: 0 /100
PLATELET # BLD AUTO: 224 10E9/L (ref 150–450)
POTASSIUM SERPL-SCNC: 4.1 MMOL/L (ref 3.4–5.3)
RBC # BLD AUTO: 4.74 10E12/L (ref 4.4–5.9)
SODIUM SERPL-SCNC: 141 MMOL/L (ref 133–144)
WBC # BLD AUTO: 10.3 10E9/L (ref 4–11)

## 2019-02-09 PROCEDURE — 99285 EMERGENCY DEPT VISIT HI MDM: CPT | Mod: 25

## 2019-02-09 PROCEDURE — 85025 COMPLETE CBC W/AUTO DIFF WBC: CPT | Performed by: EMERGENCY MEDICINE

## 2019-02-09 PROCEDURE — 96376 TX/PRO/DX INJ SAME DRUG ADON: CPT

## 2019-02-09 PROCEDURE — 25500064 ZZH RX 255 OP 636: Performed by: EMERGENCY MEDICINE

## 2019-02-09 PROCEDURE — A9585 GADOBUTROL INJECTION: HCPCS | Performed by: EMERGENCY MEDICINE

## 2019-02-09 PROCEDURE — G0378 HOSPITAL OBSERVATION PER HR: HCPCS

## 2019-02-09 PROCEDURE — 80048 BASIC METABOLIC PNL TOTAL CA: CPT | Performed by: EMERGENCY MEDICINE

## 2019-02-09 PROCEDURE — 99220 ZZC INITIAL OBSERVATION CARE,LEVL III: CPT | Performed by: INTERNAL MEDICINE

## 2019-02-09 PROCEDURE — 25000132 ZZH RX MED GY IP 250 OP 250 PS 637: Performed by: INTERNAL MEDICINE

## 2019-02-09 PROCEDURE — 72158 MRI LUMBAR SPINE W/O & W/DYE: CPT

## 2019-02-09 PROCEDURE — 25000128 H RX IP 250 OP 636: Performed by: INTERNAL MEDICINE

## 2019-02-09 PROCEDURE — 96374 THER/PROPH/DIAG INJ IV PUSH: CPT | Mod: 59

## 2019-02-09 PROCEDURE — 25000128 H RX IP 250 OP 636: Performed by: PHYSICIAN ASSISTANT

## 2019-02-09 RX ORDER — LATANOPROST 50 UG/ML
1 SOLUTION/ DROPS OPHTHALMIC AT BEDTIME
Status: DISCONTINUED | OUTPATIENT
Start: 2019-02-09 | End: 2019-02-10 | Stop reason: HOSPADM

## 2019-02-09 RX ORDER — CYCLOBENZAPRINE HCL 10 MG
10 TABLET ORAL 3 TIMES DAILY PRN
Status: DISCONTINUED | OUTPATIENT
Start: 2019-02-09 | End: 2019-02-10 | Stop reason: HOSPADM

## 2019-02-09 RX ORDER — CARBOXYMETHYLCELLULOSE SODIUM 5 MG/ML
1 SOLUTION/ DROPS OPHTHALMIC DAILY PRN
Status: DISCONTINUED | OUTPATIENT
Start: 2019-02-09 | End: 2019-02-10 | Stop reason: HOSPADM

## 2019-02-09 RX ORDER — TIZANIDINE 2 MG/1
4-6 TABLET ORAL 3 TIMES DAILY
Status: DISCONTINUED | OUTPATIENT
Start: 2019-02-09 | End: 2019-02-10 | Stop reason: HOSPADM

## 2019-02-09 RX ORDER — ACETAMINOPHEN 325 MG/1
650 TABLET ORAL EVERY 4 HOURS PRN
Status: DISCONTINUED | OUTPATIENT
Start: 2019-02-09 | End: 2019-02-09

## 2019-02-09 RX ORDER — GADOBUTROL 604.72 MG/ML
10 INJECTION INTRAVENOUS ONCE
Status: COMPLETED | OUTPATIENT
Start: 2019-02-09 | End: 2019-02-09

## 2019-02-09 RX ORDER — NALOXONE HYDROCHLORIDE 0.4 MG/ML
.1-.4 INJECTION, SOLUTION INTRAMUSCULAR; INTRAVENOUS; SUBCUTANEOUS
Status: DISCONTINUED | OUTPATIENT
Start: 2019-02-09 | End: 2019-02-10 | Stop reason: HOSPADM

## 2019-02-09 RX ORDER — MULTIPLE VITAMINS W/ MINERALS TAB 9MG-400MCG
1 TAB ORAL DAILY
Status: DISCONTINUED | OUTPATIENT
Start: 2019-02-10 | End: 2019-02-10 | Stop reason: HOSPADM

## 2019-02-09 RX ORDER — DULOXETIN HYDROCHLORIDE 20 MG/1
20 CAPSULE, DELAYED RELEASE ORAL DAILY
Status: DISCONTINUED | OUTPATIENT
Start: 2019-02-10 | End: 2019-02-10 | Stop reason: HOSPADM

## 2019-02-09 RX ORDER — MORPHINE SULFATE 15 MG/1
15-30 TABLET ORAL EVERY 4 HOURS PRN
Status: DISCONTINUED | OUTPATIENT
Start: 2019-02-09 | End: 2019-02-10 | Stop reason: HOSPADM

## 2019-02-09 RX ORDER — HYDROMORPHONE HYDROCHLORIDE 1 MG/ML
.5-1 INJECTION, SOLUTION INTRAMUSCULAR; INTRAVENOUS; SUBCUTANEOUS
Status: DISCONTINUED | OUTPATIENT
Start: 2019-02-09 | End: 2019-02-10 | Stop reason: HOSPADM

## 2019-02-09 RX ORDER — HYDROMORPHONE HYDROCHLORIDE 1 MG/ML
1 SOLUTION ORAL
Status: DISCONTINUED | OUTPATIENT
Start: 2019-02-09 | End: 2019-02-09

## 2019-02-09 RX ORDER — IRBESARTAN 150 MG/1
300 TABLET ORAL DAILY
Status: DISCONTINUED | OUTPATIENT
Start: 2019-02-10 | End: 2019-02-10 | Stop reason: HOSPADM

## 2019-02-09 RX ORDER — ACETAMINOPHEN 500 MG
500-1000 TABLET ORAL EVERY 6 HOURS PRN
Status: DISCONTINUED | OUTPATIENT
Start: 2019-02-09 | End: 2019-02-10 | Stop reason: HOSPADM

## 2019-02-09 RX ORDER — HYDROMORPHONE HYDROCHLORIDE 1 MG/ML
.3-.5 INJECTION, SOLUTION INTRAMUSCULAR; INTRAVENOUS; SUBCUTANEOUS
Status: DISCONTINUED | OUTPATIENT
Start: 2019-02-09 | End: 2019-02-09

## 2019-02-09 RX ORDER — ONDANSETRON 2 MG/ML
4 INJECTION INTRAMUSCULAR; INTRAVENOUS EVERY 6 HOURS PRN
Status: DISCONTINUED | OUTPATIENT
Start: 2019-02-09 | End: 2019-02-10 | Stop reason: HOSPADM

## 2019-02-09 RX ORDER — CELECOXIB 100 MG/1
100 CAPSULE ORAL 2 TIMES DAILY PRN
Status: DISCONTINUED | OUTPATIENT
Start: 2019-02-09 | End: 2019-02-10 | Stop reason: HOSPADM

## 2019-02-09 RX ORDER — ACETAMINOPHEN 650 MG/1
650 SUPPOSITORY RECTAL EVERY 4 HOURS PRN
Status: DISCONTINUED | OUTPATIENT
Start: 2019-02-09 | End: 2019-02-10 | Stop reason: HOSPADM

## 2019-02-09 RX ORDER — ONDANSETRON 4 MG/1
4 TABLET, ORALLY DISINTEGRATING ORAL EVERY 6 HOURS PRN
Status: DISCONTINUED | OUTPATIENT
Start: 2019-02-09 | End: 2019-02-10 | Stop reason: HOSPADM

## 2019-02-09 RX ADMIN — Medication 0.5 MG: at 22:34

## 2019-02-09 RX ADMIN — CELECOXIB 100 MG: 100 CAPSULE ORAL at 21:12

## 2019-02-09 RX ADMIN — Medication 0.5 MG: at 21:50

## 2019-02-09 RX ADMIN — MORPHINE SULFATE 15 MG: 15 TABLET ORAL at 20:20

## 2019-02-09 RX ADMIN — GADOBUTROL 10 ML: 604.72 INJECTION INTRAVENOUS at 16:15

## 2019-02-09 RX ADMIN — LATANOPROST 1 DROP: 50 SOLUTION OPHTHALMIC at 21:51

## 2019-02-09 RX ADMIN — TIZANIDINE 4 MG: 2 TABLET ORAL at 21:12

## 2019-02-09 ASSESSMENT — ENCOUNTER SYMPTOMS
BACK PAIN: 1
NUMBNESS: 1
WEAKNESS: 1

## 2019-02-09 ASSESSMENT — MIFFLIN-ST. JEOR: SCORE: 2002.84

## 2019-02-09 NOTE — ED PROVIDER NOTES
History     Chief Complaint:  Decreased lower extremity function    HPI:   The history is provided by the patient.      Usama Harris is a 63 year old male with a history of chronic back pain, chronic right foot drop, anxiety, and hypertension who presents to the emergency department with a female  for evaluation of decreased leg function. The patient reports that he has a history of three discectomies and one spinal fusion two years ago with Dr. Issa. He has lately been increasing his rehab post-fusion which has mildly increased his back pain. He saw his pain specialist with concern of his increasing pain and was recommended a physical therapy evaluation for this coming Monday. Today, the patient was doing Michael Chi stretches and while he was reaching he had sudden onset of severe debilitating back pain. This pain brought on nausea and an episode of vomiting. He tried to find comfortable positions but none were helpful. Eventually, he stood up to urinate into a urinal and his pain subsided but his right leg gave way and he fell to the ground. He has since had numbness throughout the leg and decreased function. He is currently without any back pain or leg pain.     Allergies:  No known drug allergies      Medications:    Tylenol   Fosamax   Celebrex   Cymbalta   Ibuprofen   Avapro   Senna-docusate   Revatio   Zanaflex   Medical cannabis     Past Medical History:    Acute hepatitis C without mention of hepatic coma   DDD, lumbar   Hypertension   Chronic low back pain   Osteoporosis   Anxiety   Herniated lumbar disc without myelopathy   Leukoariosis   Testosterone deficiency     Past Surgical History:    Decompression lumbar one level right   Discectomy lumbar posterior microscopic one level right   Laminectomy/discectomy lumbar   Laminectomy, fusion lumbar two levels, right   Ligation of left spermatic vein     Family History:    Mother - CAD, DVT, atrial fibrillation, hypertension, cerebrovascular  disease     Social History:  Presents with female    Tobacco use: Former smoker, quit 2000  Alcohol use: No   PCP: Nikko Tang    Marital Status:        Review of Systems   Musculoskeletal: Positive for back pain and gait problem.   Neurological: Positive for weakness and numbness.   All other systems reviewed and are negative.      Physical Exam     Patient Vitals for the past 24 hrs:   BP Temp Temp src Heart Rate Resp SpO2 Weight   02/09/19 1422 (!) 161/95 98.3  F (36.8  C) Oral 77 16 97 % 111.1 kg (245 lb)        Physical Exam  Constitutional: Alert, attentive  HENT:    Nose: Nose normal.    Mouth/Throat: Oropharynx is clear, mucous membranes are moist  Eyes: EOM are normal.    CV: brisk capillary refill to the distal extremities, 2+ DP pulses bilaterally.  Chest: Effort normal and breath sounds normal.   GI: No distension. There is no tenderness to the RUQ, RLQ or LLQ. No Lopez's sign or McBurney's point tenderness. No palpable, pulsatile mass.  MSK: Normal range of motion. No midline T/L/S spine tenderness  Neurological: Alert, attentive.  GCS 15;   5/5 strength throughout the bilateral lower extremities except for known foot drop on the right (hip flexion/extension, knee flexion/extension, Dorsiflexion, EHL/FHL);   Decreased sensation to the right lower leg below the knee, vague;   Unable to ambulate due to reported weakness, numbness  Skin: Skin is warm and dry.       Emergency Department Course     Imaging:  Radiographic findings were communicated with the patient and family who voiced understanding of the findings.    Lumbar spine MRI with and without contrast - surgery <10 yrs:  IMPRESSION:    1. L2-L3 large right posterior paramedian extruded disc herniation  extending inferior to the disc level down to mid L3. This causes  severe compression of the thecal sac. This is new since 2016.  2. Anterior and posterior spinal fusion from L3 to L5 with components  in good position. Resection  of previous L3-L4 disc herniation. No  definite effusion is seen across the disc spaces, however.  3. L1-L2 new mild degenerative disc disease.  4. Old compression fractures with Schmorl's nodes in the superior  endplates of L1 and L2, new since 2016 but unchanged compared with  radiographs 11/15/2017.  Per radiology report     Laboratory:  CBC: WNL (WBC 10.3, HGB 15.2, )   BMP: Glucose 125 (H), ow WNL (Creatinine 0.67)     Emergency Department Course:  Past medical records, nursing notes, and vitals reviewed.  1436: I performed an exam of the patient and obtained history, as documented above.    Blood drawn. This was sent to the lab for further testing, results above.     The patient was sent for a MR while in the emergency department, findings above.     1704: I rechecked the patient. Explained findings to patient and spouse.    1712: I spoke with Dr. Pato Heard of neurosurgery regarding this patient.     Findings and plan explained to the Patient and female  who consents to admission.     1745: Discussed the patient with TOAN Giles for Dr. Escobar, who will admit the patient to a medical bed for further monitoring, evaluation, and treatment.        Impression & Plan      Medical Decision Making:  This is a 63-year-old male with complex spine history and chronic pain syndrome who presents for evaluation of acute right back and lower leg pain with reported weakness.  Exam here shows no strength deficits but vague decreased sensation below the right knee.  He does have significant pain with attempts to transition or ambulate but has minimal at rest.  MRI shows significant thecal sac compression corresponding to his symptoms.  As he is unable to ambulate, he will be admitted to the observation unit with plan for neurosurgery spine consult in the morning.  He does not have symptoms of cauda equina at this time and I believe is safe for admission at this time.     Diagnosis:    ICD-10-CM    1. Lumbar  disc herniation M51.26        Disposition:  Admitted to [facility] Giles for Dr. Escobar, for further evaluation and care.      Scribe Disclosure:   I, Sohail Larios, am serving as a scribe at 2:36 PM on 2/9/2019 to document services personally performed by Jeffrey Caputo MD based on my observations and the provider's statements to me.       Jeffrey Caputo MD  2/9/2019   [facility] EMERGENCY DEPARTMENT       Jeffrey Caputo MD  02/09/19 2224

## 2019-02-09 NOTE — ED NOTES
"[facility]  ED Nurse Handoff Report    Usama Harris is a 63 year old male with a history of chronic back pain, chronic right foot drop, anxiety, and hypertension who presents to the emergency department with a female  for evaluation of decreased leg function. The patient reports that he has a history of three discectomies and one spinal fusion two years ago with Dr. Issa. He has lately been increasing his rehab post-fusion which has mildly increased his back pain. He saw his pain specialist with concern of his increasing pain and was recommended a physical therapy evaluation for this coming Monday. Today, the patient was doing Bouju-Chi stretches and while he was reaching he had sudden onset of severe debilitating back pain. This pain brought on nausea and an episode of vomiting. He tried to find comfortable positions but none were helpful. Eventually, he stood up to urinate into a urinal and his pain subsided but his right leg gave way and he fell to the ground. He has since had numbness throughout the leg and decreased function, though has been without pain.        ED Chief complaint: Leg Pain  . ED Diagnosis:   Final diagnoses:   Lumbar disc herniation     Allergies:   Allergies   Allergen Reactions     No Known Drug Allergies        Code Status: Full Code  Activity level - Baseline/Home:  Independent. Activity Level - Current:   Stand with Assist. Lift room needed: No. Bariatric: No   Needed: No   Isolation: No. Infection: Not Applicable.     Vital Signs:   Vitals:    02/09/19 1422   BP: (!) 161/95   Resp: 16   Temp: 98.3  F (36.8  C)   TempSrc: Oral   SpO2: 97%   Weight: 111.1 kg (245 lb)       Cardiac Rhythm:  ,      Pain level: 0-10 Pain Scale: 3(\"12/10 with movement\")  Patient confused: No. Patient Falls Risk: Yes.   Elimination Status: Has voided   Patient Report - Initial Complaint: leg pain and weakness. Focused Assessment:   15:04 Neurological Cognitive - " "Cognitive/Neuro/Behavioral WDL: WDL; -WDL except; motor response Orientation: oriented x 4 Follows Commands: yes Speech: clear; spontaneous; logical Best Language: 0 - No aphasia  Lupe Coma Scale - Best Eye Response: 4-->(E4) spontaneous Best Motor Response: 6-->(M6) obeys commands Best Verbal Response: 5-->(V5) oriented Lupe Coma Scale Score: 15  Motor Response - RLE Motor Response:  (pt states that his leg is \"paralyzed\". having difficulty bearing weight and movement. shooting pains earlier today.)  Sensory Assessment - Sensation - All Extremities: no impairment       Tests Performed: blood work, MRI. Abnormal Results:   Labs Ordered and Resulted from Time of ED Arrival Up to the Time of Departure from the ED   CBC WITH PLATELETS DIFFERENTIAL - Abnormal; Notable for the following components:       Result Value    Absolute Neutrophil 8.9 (*)     All other components within normal limits   BASIC METABOLIC PANEL - Abnormal; Notable for the following components:    Glucose 125 (*)     All other components within normal limits     Lumbar spine MRI w & w/o contrast - surgery <10yrs   Final Result   IMPRESSION:     1. L2-L3 large right posterior paramedian extruded disc herniation   extending inferior to the disc level down to mid L3. This causes   severe compression of the thecal sac. This is new since 2016.   2. Anterior and posterior spinal fusion from L3 to L5 with components   in good position. Resection of previous L3-L4 disc herniation. No   definite effusion is seen across the disc spaces, however.   3. L1-L2 new mild degenerative disc disease.   4. Old compression fractures with Schmorl's nodes in the superior   endplates of L1 and L2, new since 2016 but unchanged compared with   radiographs 11/15/2017.      LEOBARDO RÍOS MD        .   Treatments provided: continued monitoring  Family Comments: sister went home  OBS brochure/video discussed/provided to patient:  Yes  ED Medications:   Medications "   gadobutrol (GADAVIST) injection 10 mL (10 mLs Intravenous Given 2/9/19 3147)     Drips infusing:  No  For the majority of the shift, the patient's behavior Green. Interventions performed were frequent rounding.     Severe Sepsis OR Septic Shock Diagnosis Present: No      ED Nurse Name/Phone Number: Scarlett Espinoza,   5:24 PM    RECEIVING UNIT ED HANDOFF REVIEW    Above ED Nurse Handoff Report was reviewed: Yes  Reviewed by: Razia Liz on February 9, 2019 at 6:15 PM

## 2019-02-09 NOTE — ED TRIAGE NOTES
"Pt aox4, abcs intact. Pt states that this AM around 6 am when he was doing his stretches he got a sharp shooting pain that went down his right leg to eventual \"paralysis\" of his right leg. Pt took morphine 30mg this AM for pain. Pt states that he is having increasing numbness and pain in his lower back and saw his back doctor and started PT.  "

## 2019-02-10 ENCOUNTER — HOSPITAL ENCOUNTER (INPATIENT)
Facility: CLINIC | Age: 64
LOS: 5 days | Discharge: HOME | DRG: 454 | End: 2019-02-15
Attending: INTERNAL MEDICINE | Admitting: INTERNAL MEDICINE
Payer: COMMERCIAL

## 2019-02-10 VITALS
BODY MASS INDEX: 32.06 KG/M2 | HEIGHT: 74 IN | WEIGHT: 249.8 LBS | SYSTOLIC BLOOD PRESSURE: 116 MMHG | RESPIRATION RATE: 18 BRPM | DIASTOLIC BLOOD PRESSURE: 78 MMHG | OXYGEN SATURATION: 93 % | TEMPERATURE: 98.1 F

## 2019-02-10 DIAGNOSIS — R30.0 DYSURIA: ICD-10-CM

## 2019-02-10 DIAGNOSIS — M62.838 MUSCLE SPASM: ICD-10-CM

## 2019-02-10 DIAGNOSIS — Z98.1 S/P LUMBAR FUSION: Primary | ICD-10-CM

## 2019-02-10 DIAGNOSIS — N30.00 ACUTE CYSTITIS WITHOUT HEMATURIA: ICD-10-CM

## 2019-02-10 PROBLEM — M51.26 LUMBAR HERNIATED DISC: Status: ACTIVE | Noted: 2019-02-10

## 2019-02-10 PROBLEM — M85.80 OSTEOPENIA: Status: ACTIVE | Noted: 2019-02-10

## 2019-02-10 PROCEDURE — 25000131 ZZH RX MED GY IP 250 OP 636 PS 637: Performed by: INTERNAL MEDICINE

## 2019-02-10 PROCEDURE — 12000000 ZZH R&B MED SURG/OB

## 2019-02-10 PROCEDURE — 25000132 ZZH RX MED GY IP 250 OP 250 PS 637: Performed by: INTERNAL MEDICINE

## 2019-02-10 PROCEDURE — 0SG1071 FUSION OF 2 OR MORE LUMBAR VERTEBRAL JOINTS WITH AUTOLOGOUS TISSUE SUBSTITUTE, POSTERIOR APPROACH, POSTERIOR COLUMN, OPEN APPROACH: ICD-10-PCS | Performed by: NEUROLOGICAL SURGERY

## 2019-02-10 PROCEDURE — 25000131 ZZH RX MED GY IP 250 OP 636 PS 637: Performed by: PHYSICIAN ASSISTANT

## 2019-02-10 PROCEDURE — 25000128 H RX IP 250 OP 636: Performed by: INTERNAL MEDICINE

## 2019-02-10 PROCEDURE — 99217 ZZC OBSERVATION CARE DISCHARGE: CPT | Performed by: PHYSICIAN ASSISTANT

## 2019-02-10 PROCEDURE — 8E0WXBF COMPUTER ASSISTED PROCEDURE OF TRUNK REGION, WITH FLUOROSCOPY: ICD-10-PCS | Performed by: NEUROLOGICAL SURGERY

## 2019-02-10 PROCEDURE — 0ST20ZZ RESECTION OF LUMBAR VERTEBRAL DISC, OPEN APPROACH: ICD-10-PCS | Performed by: NEUROLOGICAL SURGERY

## 2019-02-10 PROCEDURE — 93005 ELECTROCARDIOGRAM TRACING: CPT

## 2019-02-10 PROCEDURE — 93010 ELECTROCARDIOGRAM REPORT: CPT | Performed by: INTERNAL MEDICINE

## 2019-02-10 PROCEDURE — G0378 HOSPITAL OBSERVATION PER HR: HCPCS

## 2019-02-10 PROCEDURE — 99222 1ST HOSP IP/OBS MODERATE 55: CPT | Mod: AI | Performed by: INTERNAL MEDICINE

## 2019-02-10 PROCEDURE — 0SG00AJ FUSION OF LUMBAR VERTEBRAL JOINT WITH INTERBODY FUSION DEVICE, POSTERIOR APPROACH, ANTERIOR COLUMN, OPEN APPROACH: ICD-10-PCS | Performed by: NEUROLOGICAL SURGERY

## 2019-02-10 PROCEDURE — 96375 TX/PRO/DX INJ NEW DRUG ADDON: CPT

## 2019-02-10 PROCEDURE — 8E0WXBG COMPUTER ASSISTED PROCEDURE OF TRUNK REGION, WITH COMPUTERIZED TOMOGRAPHY: ICD-10-PCS | Performed by: NEUROLOGICAL SURGERY

## 2019-02-10 PROCEDURE — 0SP004Z REMOVAL OF INTERNAL FIXATION DEVICE FROM LUMBAR VERTEBRAL JOINT, OPEN APPROACH: ICD-10-PCS | Performed by: NEUROLOGICAL SURGERY

## 2019-02-10 PROCEDURE — 96376 TX/PRO/DX INJ SAME DRUG ADON: CPT

## 2019-02-10 RX ORDER — CALCIUM CARBONATE 500 MG/1
1000 TABLET, CHEWABLE ORAL
Status: DISCONTINUED | OUTPATIENT
Start: 2019-02-10 | End: 2019-02-10 | Stop reason: HOSPADM

## 2019-02-10 RX ORDER — CALCIUM CARBONATE 500 MG/1
1000 TABLET, CHEWABLE ORAL 3 TIMES DAILY PRN
Status: DISCONTINUED | OUTPATIENT
Start: 2019-02-10 | End: 2019-02-12

## 2019-02-10 RX ORDER — POLYETHYLENE GLYCOL 3350 17 G/17G
17 POWDER, FOR SOLUTION ORAL DAILY PRN
Status: DISCONTINUED | OUTPATIENT
Start: 2019-02-10 | End: 2019-02-15 | Stop reason: HOSPADM

## 2019-02-10 RX ORDER — DEXAMETHASONE 2 MG/1
6 TABLET ORAL ONCE
Status: COMPLETED | OUTPATIENT
Start: 2019-02-10 | End: 2019-02-10

## 2019-02-10 RX ORDER — ONDANSETRON 2 MG/ML
4 INJECTION INTRAMUSCULAR; INTRAVENOUS EVERY 6 HOURS PRN
Status: DISCONTINUED | OUTPATIENT
Start: 2019-02-10 | End: 2019-02-12

## 2019-02-10 RX ORDER — DEXAMETHASONE 4 MG/1
4 TABLET ORAL EVERY 12 HOURS SCHEDULED
Status: DISCONTINUED | OUTPATIENT
Start: 2019-02-10 | End: 2019-02-11

## 2019-02-10 RX ORDER — NALOXONE HYDROCHLORIDE 0.4 MG/ML
.1-.4 INJECTION, SOLUTION INTRAMUSCULAR; INTRAVENOUS; SUBCUTANEOUS
Status: DISCONTINUED | OUTPATIENT
Start: 2019-02-10 | End: 2019-02-12

## 2019-02-10 RX ORDER — AMOXICILLIN 250 MG
2 CAPSULE ORAL 2 TIMES DAILY
Status: DISCONTINUED | OUTPATIENT
Start: 2019-02-10 | End: 2019-02-12

## 2019-02-10 RX ORDER — ACETAMINOPHEN 325 MG/1
650 TABLET ORAL EVERY 4 HOURS PRN
Status: DISCONTINUED | OUTPATIENT
Start: 2019-02-10 | End: 2019-02-15 | Stop reason: HOSPADM

## 2019-02-10 RX ORDER — CALCIUM CARBONATE 500 MG/1
1000 TABLET, CHEWABLE ORAL DAILY PRN
Status: DISCONTINUED | OUTPATIENT
Start: 2019-02-10 | End: 2019-02-10

## 2019-02-10 RX ORDER — ONDANSETRON 4 MG/1
4 TABLET, ORALLY DISINTEGRATING ORAL EVERY 6 HOURS PRN
Status: DISCONTINUED | OUTPATIENT
Start: 2019-02-10 | End: 2019-02-12

## 2019-02-10 RX ORDER — OXYCODONE AND ACETAMINOPHEN 5; 325 MG/1; MG/1
1-2 TABLET ORAL EVERY 4 HOURS PRN
Status: DISCONTINUED | OUTPATIENT
Start: 2019-02-10 | End: 2019-02-11

## 2019-02-10 RX ADMIN — MORPHINE SULFATE 30 MG: 15 TABLET ORAL at 06:38

## 2019-02-10 RX ADMIN — IRBESARTAN 300 MG: 150 TABLET ORAL at 08:33

## 2019-02-10 RX ADMIN — CALCIUM CARBONATE (ANTACID) CHEW TAB 500 MG 1000 MG: 500 CHEW TAB at 18:44

## 2019-02-10 RX ADMIN — MORPHINE SULFATE 30 MG: 15 TABLET ORAL at 00:28

## 2019-02-10 RX ADMIN — TIZANIDINE 4 MG: 2 TABLET ORAL at 08:33

## 2019-02-10 RX ADMIN — SENNOSIDES AND DOCUSATE SODIUM 2 TABLET: 8.6; 5 TABLET ORAL at 19:31

## 2019-02-10 RX ADMIN — MORPHINE SULFATE 30 MG: 15 TABLET ORAL at 12:16

## 2019-02-10 RX ADMIN — CALCIUM CARBONATE (ANTACID) CHEW TAB 500 MG 1000 MG: 500 CHEW TAB at 20:49

## 2019-02-10 RX ADMIN — OXYCODONE HYDROCHLORIDE AND ACETAMINOPHEN 2 TABLET: 5; 325 TABLET ORAL at 20:48

## 2019-02-10 RX ADMIN — ONDANSETRON 4 MG: 2 INJECTION INTRAMUSCULAR; INTRAVENOUS at 04:14

## 2019-02-10 RX ADMIN — CALCIUM CARBONATE (ANTACID) CHEW TAB 500 MG 1000 MG: 500 CHEW TAB at 16:34

## 2019-02-10 RX ADMIN — DULOXETINE HYDROCHLORIDE 20 MG: 20 CAPSULE, DELAYED RELEASE ORAL at 08:33

## 2019-02-10 RX ADMIN — OXYCODONE HYDROCHLORIDE AND ACETAMINOPHEN 2 TABLET: 5; 325 TABLET ORAL at 16:34

## 2019-02-10 RX ADMIN — Medication 1 MG: at 08:26

## 2019-02-10 RX ADMIN — DEXAMETHASONE 4 MG: 4 TABLET ORAL at 19:31

## 2019-02-10 RX ADMIN — DEXAMETHASONE 6 MG: 2 TABLET ORAL at 12:16

## 2019-02-10 ASSESSMENT — ACTIVITIES OF DAILY LIVING (ADL)
ADLS_ACUITY_SCORE: 14
ADLS_ACUITY_SCORE: 14

## 2019-02-10 NOTE — PROVIDER NOTIFICATION
MD Notification    Notified Person: MD    Notified Person Name:nicholas    Notification Date/Time:2/10 1608    Notification Interaction:text    Purpose of Notification:FYI Pt. very angry and refusing to only start with 1 tab of the Percocet. States he must talk to you in person if you're not willing to allow this. Please advise. Thanks

## 2019-02-10 NOTE — PLAN OF CARE
"Acute Traumatic Pain     1.  Pain controlled with oral analgesia: Yes      2.  Vital signs stable: Yes      3.  Diagnotic testing complete: Yes, MRI shows herniated disk      4.  Cleared from consultants (if applicable): No      5. Return to near baseline physical activity: No  /72 (BP Location: Right arm)   Temp 98.9  F (37.2  C) (Oral)   Resp 16   Ht 1.88 m (6' 2\")   Wt 113.3 kg (249 lb 12.8 oz)   SpO2 96%   BMI 32.07 kg/m     Pt is NPO at midnight. Pain is 3/10. Given morphine. Pt is due to have surgery in AM for herniated disk. Is ambulating A x 1 w/ cane. Voiding well. Continue with cares.  "

## 2019-02-10 NOTE — PLAN OF CARE
PRIMARY DIAGNOSIS: ACUTE PAIN  OUTPATIENT/OBSERVATION GOALS TO BE MET BEFORE DISCHARGE:  1. Pain Status: Improved but still requiring IV narcotics.    2. Return to near baseline physical activity: Able to ambulate to bathroom.  R leg with numbness or tingling present, + pedal pulse.     3. Cleared for discharge by consultants (if involved): Yes, plan to admit to [facility] for Dr. Issa to consult  Pt stated pain to lower back and right leg rated 5/10.  Gave 1mg IV dilaudid.  Pt now sleeping.  Plan to transfer to [facility] via family/friend by vehicle.  Pt discharged to [facility] at 1225.  Report given to TRENT Dawson at station 73.  OBSERVATION patient END time: 1225  Discharge Planner Nurse   Safe discharge environment identified: Yes  Barriers to discharge: No       Entered by: Mary Jovel 02/10/2019 11:02 AM     Please review provider order for any additional goals.   Nurse to notify provider when observation goals have been met and patient is ready for discharge.

## 2019-02-10 NOTE — PROVIDER NOTIFICATION
Writer pageliborio Lemon, notified patient arrived from [facility]. Pharmacy notified to verify meds.

## 2019-02-10 NOTE — PHARMACY-ADMISSION MEDICATION HISTORY
Admission medication history interview status for the 2/10/2019  admission is complete. See EPIC admission navigator for prior to admission medications     Medication history source reliability:Good    Actions taken by pharmacist (provider contacted, etc):None     Additional medication history information not noted on PTA med list :None    Medication reconciliation/reorder completed by provider prior to medication history? No    Time spent in this activity: Reviewed [facility] note from 2/9 and inpatient MAR records. Outpatient meds have not changed since med rec done on 2/9.    Prior to Admission medications    Medication Sig Last Dose Taking? Auth Provider   acetaminophen (TYLENOL) 500 MG tablet Take 500-1,000 mg by mouth every 6 hours as needed for mild pain (every other day rotate days with ibuprofen) prn at prn Yes Reported, Patient   carboxymethylcellulose (REFRESH PLUS) 0.5 % SOLN Place 1 drop into both eyes daily as needed for dry eyes prn at prn Yes Reported, Patient   celecoxib (CELEBREX) 100 MG capsule Take 1 capsule (100 mg) by mouth 2 times daily as needed for moderate pain 2/9/2019 at AM Yes Taran Sifuentes MD   DULoxetine (CYMBALTA) 20 MG capsule Take 1 capsule (20 mg) by mouth daily 2/10/2019 at AM Yes Taran Sifuentes MD   ibuprofen (ADVIL/MOTRIN) 200 MG capsule Take 200 mg by mouth every 4 hours as needed for fever (every other day) prn at prn Yes Reported, Patient   irbesartan (AVAPRO) 300 MG tablet TAKE 1 TABLET BY MOUTH EVERY DAY 2/9/2019 at AM Yes Nikko Tang MD   latanoprost (XALATAN) 0.005 % ophthalmic solution Place 1 drop into both eyes At Bedtime  2/9/2019 at HS Yes Reported, Patient   multivitamin, therapeutic with minerals (MULTI-VITAMIN) TABS tablet Take 1 tablet by mouth daily 2/9/2019 at Unknown time Yes Reported, Patient   sildenafil (REVATIO) 20 MG tablet Take 1-5 tablets ( mg) by mouth daily as needed (take about 30 min before needed for ED.)  Never use with nitroglycerin. prn at prn Yes Nikko Tang MD   tiZANidine (ZANAFLEX) 4 MG tablet Take 1-1.5 tablets (4-6 mg) by mouth 3 times daily 2/9/2019 at prn Yes Shital Anderson, MARIA ESTHER CROWDER   UNABLE TO FIND MEDICATION NAME: Medicinal marijuana (vaping formulation) --patient was informed vaping formulation is not allowed in the hospital. He was also provided a sheet with formulation that is allowed for use inpatient  [facility]  2-9-19 Past Week at Unknown time Yes Reported, Patient

## 2019-02-10 NOTE — DISCHARGE SUMMARY
Formerly [facility] Outpatient / Observation Unit  Discharge Summary        Usama Harris MRN# [id number]   YOB: 1955 Age: 63 year old     Date of Admission:  2/9/2019  Date of Discharge:  2/10/2019  Admitting Physician:  Aldair Escobar MD  Discharge Physician: Lucia Negron PA-C  Discharging Service: Hospitalist      Primary Provider: Nikko Tang  Primary Care Physician Phone Number: [phone]         Primary Discharge Diagnoses:    Usama Harris was admitted on 2/9/2019 for acute on chronic back pain.     1. Acute on chronic back pain: MRI of the lumbar spine shows a large L2-3 right posterior disc herniation causing severe thecal sac compression, new since 2016. Neurosurgery consulted, pt has been able to ambulate to the bathroom independently and is able to move the right leg, despite claims of paralysis, neurosurg felt that this was not a surgical emergency and recommended pt follow up with his primary surgeon, Dr. Issa, for further recommendations. They did speak to Dr. Issa who agreed to see the patient in the clinic on Monday vs transfer to [facility] for consultation and further management. Pt opted for transfer to [facility] and this was arranged. Pt was accepted by Dr. Lemon.         Secondary Discharge Diagnoses:     Past Medical History:   Diagnosis Date     Acute hepatitis C without mention of hepatic coma(070.51)     Hep C - Treated     Backache, unspecified      DDD (degenerative disc disease), lumbar     s/p back surgery     Hypertension                 Code Status:      Full Code        Brief Hospital Summary:        Reason for your hospital stay      Back pain. MRI of the lumbar spine showed a large disc herniation at L2-3   causing severe thecal sac compression. Neurosurgery was consulted and   recommend transfer to [facility] for further surgical management   by patient's previous surgeon, Dr. Issa. Dose of oral Decadron given prior   to transfer.              Please refer to initial admission history and physical for further details.   Briefly, Usama Harris was admitted on 2/9/2019 for acute on chronic back pain.   Initial work up in the ED did not reveal evidence of significant neurologic deficits concerning for cauda equina syndrome. No infectious or malignant process was identified. MRI of the lumbar spine was obtained which did show a large disc herniation at L2-3 with severe compression of the thecal sac. Pt was registered to the Observation Unit for further evaluation and pain control.   Pt was placed on oral multi-modal pain regiment and was ambulated in the vera. Neurosurgery was consulted, please see their notes. Pt has been able to ambulate and move his right leg independently, despite claims of paralysis, and it was felt his condition could be managed on an outpatient basis by Dr. Issa, who has performed surgery on him in the past. Dr. Issa was contacted by Dr. Heard' group and he agreed to see the pt in clinic on Monday vs transfer to [facility], pt opted for transfer. Bed was arranged at [facility] and pt transferred via private car.           Significant Lab During Hospitalization:      Recent Labs   Lab 02/09/19  1458   WBC 10.3   HGB 15.2   HCT 46.8   MCV 99        Recent Labs   Lab 02/09/19  1458      POTASSIUM 4.1   CHLORIDE 108   CO2 26   ANIONGAP 7   *   BUN 20   CR 0.67   GFRESTIMATED >90   GFRESTBLACK >90   NOY 8.9                Significant Imaging During Hospitalization:      Recent Results (from the past 48 hour(s))   Lumbar spine MRI w & w/o contrast - surgery <10yrs    Narrative    MRI LUMBAR SPINE WITHOUT AND WITH CONTRAST   2/9/2019 4:40 PM     HISTORY: Right leg weakness, numbness. History of lumbar fusion.    TECHNIQUE: Multiplanar multisequence MRI of the lumbar spine without  and with 10 mL Gadavist.    COMPARISON: 11/15/2017 radiographs. MRI 10/14/2016.    FINDINGS: Previous radiographs show five  lumbar-type vertebral bodies,  with slight scoliosis convex to the right L1-L4.  The distal spinal  cord and cauda equina appear normal.  No abnormal gadolinium  enhancement is seen in the thecal sac, cauda equina or distal spinal  cord.    T12-L1:   Normal disc, facet joints, spinal canal and neural foramina.       L1-L2:  Left posterior lateral disc bulge, decreased T2 signal in the  disc and loss of disc height, all new since the previous MRI.  Otherwise normal.    L2-L3:  There is large right posterior paramedian inferiorly extruded  disc herniation causing severe compression of the thecal sac. This  extends down to mid L3. As seen on T2-weighted images the extruded  fragment measures 2.3 cm craniocaudal by 1.5 cm AP by 1.6 cm  transverse diameter. There is decreased T2 signal in the disc and  slight loss of disc height. Normal facet joints and neural foramina.    L3-L5:  Instrumented anterior and posterior spinal fusion with  posterior rods and pedicle screws from L3 to L5 bilaterally and with  interbody grafts at both disc levels that appear to be in good  position. Previous L4 laminectomy bilaterally with resection of the  spinous process, good decompression of the spinal canal. Previous  L3-L4 disc herniation has been resected. There is no definite evidence  of fusion across the L3-L4 and L4-L5 disc spaces.    L5-S1:  Mild degenerative disc disease with posterior disc bulge and  high intensity zone indicating annular fissure posteriorly. Mild  degeneration right facet joint, otherwise normal. No change.    Paraspinous soft tissues:   Normal.      Bone marrow:  Old fractures of the superior endplates of L1 and L2,  unchanged. Nonedematous reactive changes in the bone marrow at L4-L5,  unchanged.      Impression    IMPRESSION:    1. L2-L3 large right posterior paramedian extruded disc herniation  extending inferior to the disc level down to mid L3. This causes  severe compression of the thecal sac. This is new  since 2016.  2. Anterior and posterior spinal fusion from L3 to L5 with components  in good position. Resection of previous L3-L4 disc herniation. No  definite effusion is seen across the disc spaces, however.  3. L1-L2 new mild degenerative disc disease.  4. Old compression fractures with Schmorl's nodes in the superior  endplates of L1 and L2, new since 2016 but unchanged compared with  radiographs 11/15/2017.    LEOBARDO RÍOS MD              Pending Results:        Unresulted Labs Ordered in the Past 30 Days of this Admission     No orders found for last 61 day(s).              Consultations This Hospital Stay:      Consultation during this admission received from neurosurgery          Discharge Instructions and Follow-Up:      Follow-up Appointments     Follow-up and recommended labs and tests       Transfer to [facility]. Follow up will be determined by clinical   course at [facility].           Pt transferred to [facility] by private car        Discharge Disposition:      Discharged to home         Discharge Medications:        Current Discharge Medication List      CONTINUE these medications which have NOT CHANGED    Details   acetaminophen (TYLENOL) 500 MG tablet Take 500-1,000 mg by mouth every 6 hours as needed for mild pain (every other day rotate days with ibuprofen)      carboxymethylcellulose (REFRESH PLUS) 0.5 % SOLN Place 1 drop into both eyes daily as needed for dry eyes      celecoxib (CELEBREX) 100 MG capsule Take 1 capsule (100 mg) by mouth 2 times daily as needed for moderate pain  Qty: 60 capsule, Refills: 1    Associated Diagnoses: Failed back syndrome      DULoxetine (CYMBALTA) 20 MG capsule Take 1 capsule (20 mg) by mouth daily  Qty: 30 capsule, Refills: 3    Associated Diagnoses: Chronic pain syndrome; Failed back syndrome      ibuprofen (ADVIL/MOTRIN) 200 MG capsule Take 200 mg by mouth every 4 hours as needed for fever (every other day)      irbesartan (AVAPRO) 300 MG  "tablet TAKE 1 TABLET BY MOUTH EVERY DAY  Qty: 90 tablet, Refills: 0    Associated Diagnoses: Essential hypertension      latanoprost (XALATAN) 0.005 % ophthalmic solution Place 1 drop into both eyes At Bedtime   Refills: 11      multivitamin, therapeutic with minerals (MULTI-VITAMIN) TABS tablet Take 1 tablet by mouth daily      sildenafil (REVATIO) 20 MG tablet Take 1-5 tablets ( mg) by mouth daily as needed (take about 30 min before needed for ED.) Never use with nitroglycerin.  Qty: 30 tablet, Refills: 11    Associated Diagnoses: Erectile dysfunction, unspecified erectile dysfunction type      tiZANidine (ZANAFLEX) 4 MG tablet Take 1-1.5 tablets (4-6 mg) by mouth 3 times daily  Qty: 135 tablet, Refills: 1    Associated Diagnoses: Muscle spasm      UNABLE TO FIND MEDICATION NAME: Medicinal marijuana (vaping formulation) --patient was informed vaping formulation is not allowed in the hospital. He was also provided a sheet with formulation that is allowed for use inpatient  [facility]  2-9-19         STOP taking these medications       clotrimazole 10 MG saud Comments:   Reason for Stopping:         nystatin (MYCOSTATIN) [id number] UNIT/ML suspension Comments:   Reason for Stopping:                   Allergies:         Allergies   Allergen Reactions     No Known Drug Allergies            Condition and Physical on Discharge:      Discharge condition: Stable   Vitals: Blood pressure 116/78, temperature 98.1  F (36.7  C), temperature source Oral, resp. rate 18, height 1.88 m (6' 2\"), weight 113.3 kg (249 lb 12.8 oz), SpO2 93 %.  249 lbs 12.8 oz      GENERAL:  Comfortable.  PSYCH: pleasant, oriented, No acute distress..  HEART:  RRR  LUNGS:  Normal Respiratory effort.   EXTREMITIES: able to ambulate to bathroom on his own.  SKIN:  Dry to touch, No rash, wound or ulcerations.  NEUROLOGIC:  Grossly intact, known R foot drop.       Lucia Negron PA-C  "

## 2019-02-10 NOTE — PLAN OF CARE
"Acute Traumatic Pain     1.  Pain controlled with oral analgesia: Yes      2.  Vital signs stable: Yes      3.  Diagnotic testing complete: Yes, MRI shows herniated disk      4.  Cleared from consultants (if applicable): No      5. Return to near baseline physical activity: No  BP (!) 155/94 (BP Location: Right arm)   Temp 99.2  F (37.3  C) (Temporal)   Resp 16   Ht 1.88 m (6' 2\")   Wt 113.3 kg (249 lb 12.8 oz)   SpO2 96%   BMI 32.07 kg/m    Pt is NPO at midnight. Pain is 3/10. Given morphine once. Pt is due to have surgery in AM for herniated disk. Is ambulating A x 1 w/ cane. Was nauseous, given zofran.  Voiding well. Continue with cares.  "

## 2019-02-10 NOTE — H&P
"[facility]    History and Physical  Hospitalist       Date of Admission:  2/10/2019    Assessment & Plan   Usama Harris is a 63 year old male with HTN, glaucoma, and low testosterone level with associated osteopenia -- and 4 previous back surgeries, including an L3-5 fusion on 11/2016, and subsequent L1 compression fracture on 2/2017, who presents with back pain and progressive right leg pain with weakness to [facility] on 2/9 and observed overnight, and transferred to [facility] today to see Dr. Issa with neurosurgery on AM:      Summary:    Principal Problem:    Lumbar disc joe, Large Right L2-3 w Thecal Sac compression   -- has L3-5 fusion, and now has herniated disc just above the area that was previously fused   -- absent right knee reflex, he says this is new for him (?new weakness)     Active Problems:    Testosterone deficiency   -- follows with Endocrinology at [facility] and plans to restart Testosterone replacement   (has appointment scheduled for 2/24/19)      HTN (hypertension)   -- on Irbesartan 300 mg daily      Osteopenia      Has chronic pain -- in pain clinic ([facility], Dr. Sifuentes)    -- currently not on narcotics, but previously was so he is requesting \"high doses from the start\"   -- had a vapor for Marijuana which he was upset was taken away    -- Cymbalta 20 mg daily   -- Celebrex 100 mg bid prn      Chronic right foot drop    -- related to prior herniated disc      Personality issues   -- wants his bed alarms turned off, doesn't want to be treated like a \"baby\".  Explained it is a precautionary measure and asked him if he could cooperate with the policy, at which point he threatened to leave.  Will have him speak with the charge nurse, but this is concerning considering his demanding behavior ... And this is prior to any surgery.      Plan:  Admitted, orders placed, will make NPO in AM for possible surgery on 2/10, but will need to wait for Dr. Issa's opinion and " then a surgical time.  No prior problems with anesthesia, and will get EKG but no medical contraindications to proceeding with surgery.  Will treat with Decadron now to help decrease swelling.        DVT Prophylaxis: Pneumatic Compression Devices  Code Status: Full Code    Disposition: Expected discharge in several days if proceeds with back surgery.     Ghassan Mojica MD  Pager: [phone]  Cell Phone:  [phone]     Primary Care Physician   Nikko Tang    Chief Complaint   Right leg pain    History is obtained from Patient    History of Present Illness   63 year old male with HTN, glaucoma, and low testosterone level with associated osteopenia -- and 4 previous back surgeries, including an L3-5 fusion on 11/2016, and subsequent L1 compression fracture on 2/2017, who presents with back pain which he says started about a week ago, he joined a new gym to help strengthen his back and he was stretching and noticed severe pain which he stated radiated down both legs, and since then has had persistent right leg pain with possible right leg weakness so he went to [facility] on 2/9 and observed overnight, and obtained MRI which showed:    1. L2-L3 large right posterior paramedian extruded disc herniation  extending inferior to the disc level down to mid L3. This causes  severe compression of the thecal sac. This is new since 2016.  2. Anterior and posterior spinal fusion from L3 to L5 with components  in good position. Resection of previous L3-L4 disc herniation. No  definite effusion is seen across the disc spaces, however.  3. L1-L2 new mild degenerative disc disease.  4. Old compression fractures with Schmorl's nodes in the superior  endplates of L1 and L2, new since 2016 but unchanged compared with  radiographs 11/15/2017.    He was given the option of following up with Dr. Issa as an outpatient, or be seen in the hospital which is what he chose to do, and was transferred to [facility]  "today to see Dr. Issa, neurosurgery, on AM.  No bowel or bladder problems.  He does notice \"pins and needles\" in his right calf area, and reports he has \"always had a right foot drop\" related to prior disc herniation.     Past Medical History    I have reviewed this patient's medical history and updated it with pertinent information if needed.   Past Medical History:   Diagnosis Date     Acute hepatitis C without mention of hepatic coma(070.51)     Hep C - Treated     Backache, unspecified      DDD (degenerative disc disease), lumbar     s/p back surgery     Glaucoma      Hypertension        Past Surgical History   I have reviewed this patient's surgical history and updated it with pertinent information if needed.  Past Surgical History:   Procedure Laterality Date     BACK SURGERY  2007    Herniated disc     CATARACT IOL, RT/LT Left      DECOMPRESSION LUMBAR ONE LEVEL Right 5/18/2016    Procedure: DECOMPRESSION LUMBAR ONE LEVEL;  Surgeon: Prince Govea MD;  Location: [facility] OR     DISCECTOMY LUMBAR POSTERIOR MICROSCOPIC ONE LEVEL Right 7/22/2016    Procedure: DISCECTOMY LUMBAR POSTERIOR MICROSCOPIC ONE LEVEL;  Surgeon: Iggy Issa MD;  Location:  OR     LAMINECT/DISCECTOMY, LUMBAR      Laminectomy/Discectomy Cervical     LAMINECTOMY, FUSION LUMBAR TWO LEVELS, COMBINED Right 11/15/2016    Procedure: COMBINED LAMINECTOMY, FUSION LUMBAR TWO LEVELS;  Surgeon: Iggy Issa MD;  Location:  OR     SURGICAL HISTORY OF -       ligation of left spermatic vein       Prior to Admission Medications   Prior to Admission Medications   Prescriptions Last Dose Informant Patient Reported? Taking?   DULoxetine (CYMBALTA) 20 MG capsule   No No   Sig: Take 1 capsule (20 mg) by mouth daily   UNABLE TO FIND   Yes No   Sig: MEDICATION NAME: Medicinal marijuana (vaping formulation) --patient was informed vaping formulation is not allowed in the hospital. He was also provided a sheet with formulation that is allowed for " use inpatient  [facility]  19   acetaminophen (TYLENOL) 500 MG tablet   Yes No   Sig: Take 500-1,000 mg by mouth every 6 hours as needed for mild pain (every other day rotate days with ibuprofen)   carboxymethylcellulose (REFRESH PLUS) 0.5 % SOLN  Self Yes No   Sig: Place 1 drop into both eyes daily as needed for dry eyes   celecoxib (CELEBREX) 100 MG capsule   No No   Sig: Take 1 capsule (100 mg) by mouth 2 times daily as needed for moderate pain   ibuprofen (ADVIL/MOTRIN) 200 MG capsule   Yes No   Sig: Take 200 mg by mouth every 4 hours as needed for fever (every other day)   irbesartan (AVAPRO) 300 MG tablet   No No   Sig: TAKE 1 TABLET BY MOUTH EVERY DAY   latanoprost (XALATAN) 0.005 % ophthalmic solution  Self Yes No   Sig: Place 1 drop into both eyes At Bedtime    multivitamin, therapeutic with minerals (MULTI-VITAMIN) TABS tablet   Yes No   Sig: Take 1 tablet by mouth daily   sildenafil (REVATIO) 20 MG tablet   No No   Sig: Take 1-5 tablets ( mg) by mouth daily as needed (take about 30 min before needed for ED.) Never use with nitroglycerin.   tiZANidine (ZANAFLEX) 4 MG tablet   No No   Sig: Take 1-1.5 tablets (4-6 mg) by mouth 3 times daily      Facility-Administered Medications: None     Allergies   Allergies   Allergen Reactions     No Known Drug Allergies        Social History   I have reviewed this patient's social history and updated it with pertinent information if needed. Usama SHAHRAM Harris  reports that he quit smoking about 19 years ago. His smoking use included cigarettes. He has a 29.00 pack-year smoking history. he has never used smokeless tobacco. He reports that he drinks alcohol. He reports that he uses drugs. Drug: Marijuana.    Family History   I have reviewed this patient's family history and updated it with pertinent information if needed.   Family History   Problem Relation Age of Onset     C.A.D. Mother          age 95     Circulatory Mother         DVT     Cardiovascular Mother          DVT     Heart Disease Mother         a-fib     Hypertension Mother      Cerebrovascular Disease Mother         diseased     Heart Failure Father          age 83       Review of Systems   The 10 point Review of Systems is negative other than noted in the HPI or here.     # Pain Assessment:  Current Pain Score 2/10/2019   Patient currently in pain? yes   Pain score (0-10) 5   Pain location -   Pain descriptors -   Usama zamudio pain level was assessed and he currently denies pain.        Physical Exam   Temp: 98.1  F (36.7  C) Temp src: Oral BP: 102/66   Heart Rate: 63 Resp: 18 SpO2: 93 % O2 Device: None (Room air)    Vital Signs with Ranges  Temp:  [98.1  F (36.7  C)-99.2  F (37.3  C)] 98.1  F (36.7  C)  Heart Rate:  [60-83] 63  Resp:  [16-18] 18  BP: (102-159)/(66-94) 102/66  SpO2:  [93 %-96 %] 93 %  0 lbs 0 oz    Constitutional: Awake, alert, cooperative, no apparent distress.  Eyes: Conjunctiva and pupils examined and normal.  HEENT: Moist mucous membranes, normal dentition.  Respiratory: Clear to auscultation bilaterally, no crackles or wheezing.  Cardiovascular: Regular rate and rhythm, normal S1 and S2, and no murmur noted,   No carotid bruits.  No ankle edema.   GI: Soft, non-distended, non-tender, normal bowel sounds.  Lymph/Hematologic: No anterior cervical, supraclavicular or axillary adenopathy.  Skin: No rashes, no cyanosis.   Musculoskeletal: No joint swelling, erythema or tenderness.  Neurologic: Cranial nerves 2-12 intact, mild right foot drop, and 2+ reflex at left knee but absent on right knee, 2+ bilateral ankle reflexes.  Right posterior lower leg with numb sensation   Psychiatric: Alert, Ox3, no obvious anxiety or depression, quite verbose    Data   Data reviewed today:  I personally reviewed no EKG's today.  Recent Labs   Lab 19  1458   WBC 10.3   HGB 15.2   MCV 99         POTASSIUM 4.1   CHLORIDE 108   CO2 26   BUN 20   CR 0.67   ANIONGAP 7   NOY 8.9   *        Imaging:  Recent Results (from the past 24 hour(s))   Lumbar spine MRI w & w/o contrast - surgery <10yrs    Narrative    MRI LUMBAR SPINE WITHOUT AND WITH CONTRAST   2/9/2019 4:40 PM     HISTORY: Right leg weakness, numbness. History of lumbar fusion.    TECHNIQUE: Multiplanar multisequence MRI of the lumbar spine without  and with 10 mL Gadavist.    COMPARISON: 11/15/2017 radiographs. MRI 10/14/2016.    FINDINGS: Previous radiographs show five lumbar-type vertebral bodies,  with slight scoliosis convex to the right L1-L4.  The distal spinal  cord and cauda equina appear normal.  No abnormal gadolinium  enhancement is seen in the thecal sac, cauda equina or distal spinal  cord.    T12-L1:   Normal disc, facet joints, spinal canal and neural foramina.       L1-L2:  Left posterior lateral disc bulge, decreased T2 signal in the  disc and loss of disc height, all new since the previous MRI.  Otherwise normal.    L2-L3:  There is large right posterior paramedian inferiorly extruded  disc herniation causing severe compression of the thecal sac. This  extends down to mid L3. As seen on T2-weighted images the extruded  fragment measures 2.3 cm craniocaudal by 1.5 cm AP by 1.6 cm  transverse diameter. There is decreased T2 signal in the disc and  slight loss of disc height. Normal facet joints and neural foramina.    L3-L5:  Instrumented anterior and posterior spinal fusion with  posterior rods and pedicle screws from L3 to L5 bilaterally and with  interbody grafts at both disc levels that appear to be in good  position. Previous L4 laminectomy bilaterally with resection of the  spinous process, good decompression of the spinal canal. Previous  L3-L4 disc herniation has been resected. There is no definite evidence  of fusion across the L3-L4 and L4-L5 disc spaces.    L5-S1:  Mild degenerative disc disease with posterior disc bulge and  high intensity zone indicating annular fissure posteriorly. Mild  degeneration right  facet joint, otherwise normal. No change.    Paraspinous soft tissues:   Normal.      Bone marrow:  Old fractures of the superior endplates of L1 and L2,  unchanged. Nonedematous reactive changes in the bone marrow at L4-L5,  unchanged.      Impression    IMPRESSION:    1. L2-L3 large right posterior paramedian extruded disc herniation  extending inferior to the disc level down to mid L3. This causes  severe compression of the thecal sac. This is new since 2016.  2. Anterior and posterior spinal fusion from L3 to L5 with components  in good position. Resection of previous L3-L4 disc herniation. No  definite effusion is seen across the disc spaces, however.  3. L1-L2 new mild degenerative disc disease.  4. Old compression fractures with Schmorl's nodes in the superior  endplates of L1 and L2, new since 2016 but unchanged compared with  radiographs 11/15/2017.    LEOBARDO RÍOS MD

## 2019-02-10 NOTE — PLAN OF CARE
"Direct transfer from [facility] arrived on unit 1330. Pt A&O x4, VSS, on RA. LS clear. RLE numbness/tinngling, mod plantar/dorsi flexion, 4/5 strength otherwise CMS intact. Received oral Morphine at 1215 prior leaving, rated 5/10 R lower back and R leg pain. Pivot transfer SBA with GB and cane, patient stated RLE is \"non wt-bearing\". +BS, passing flatus, BM yesterday. Patient stated difficulty with voiding, bladder scanned 15cc. NSG consulted. Nrsg will continue to monitor.    "

## 2019-02-10 NOTE — PHARMACY-ADMISSION MEDICATION HISTORY
Admission medication history interview status for this patient is complete. See [facility] admission navigator for allergy information, prior to admission medications and immunization status.     Medication history interview source(s):Patient  Medication history resources (including written lists, pill bottles, clinic record):None    Changes made to PTA medication list:  Added: none  Deleted: claritin, senna-s, fosamax  Changed: none    Actions taken by pharmacist (provider contacted, etc):   MEDICATION NAME: Medicinal marijuana (vaping formulation) --patient was informed vaping formulation is not allowed in the hospital. He was also provided a sheet with formulation that is allowed for use inpatient  [facility]  2-9-19    Additional medication history information:none    Medication reconciliation/reorder completed by provider prior to medication history? No    For patients on insulin therapy: no (Yes/No)   Lantus/levemir/NPH/Mix 70/30 dose: ___ in AM/PM or twice daily   Sliding scale Novolog Y/N   If Yes, do you have a baseline novolog pre-meal dose: ______units with meals   Patients eat three meals a day: Y/N ---  How many episodes of hypoglycemia (low blood glucose) do you have weekly: ---   How many missed doses do you have a week: ---  How many times do you check your blood glucose per day: ---  Any Barriers to therapy: cost of medications/comfortable with giving injections (if applicable)/ comfortable and confident with current diabetes regimen ---      Prior to Admission medications    Medication Sig Last Dose Taking? Auth Provider   acetaminophen (TYLENOL) 500 MG tablet Take 500-1,000 mg by mouth every 6 hours as needed for mild pain (every other day rotate days with ibuprofen)  Yes Reported, Patient   carboxymethylcellulose (REFRESH PLUS) 0.5 % SOLN Place 1 drop into both eyes daily as needed for dry eyes  Yes Reported, Patient   celecoxib (CELEBREX) 100 MG capsule Take 1 capsule (100 mg) by mouth 2 times daily as  needed for moderate pain 2/9/2019 at am Yes Taran Sifuentes MD   DULoxetine (CYMBALTA) 20 MG capsule Take 1 capsule (20 mg) by mouth daily 2/9/2019 at am Yes Taran Sifuentes MD   ibuprofen (ADVIL/MOTRIN) 200 MG capsule Take 200 mg by mouth every 4 hours as needed for fever (every other day)  Yes Reported, Patient   irbesartan (AVAPRO) 300 MG tablet TAKE 1 TABLET BY MOUTH EVERY DAY 2/9/2019 at am Yes Nikko Tang MD   latanoprost (XALATAN) 0.005 % ophthalmic solution Place 1 drop into both eyes At Bedtime  2/8/2019 at Unknown time Yes Reported, Patient   multivitamin, therapeutic with minerals (MULTI-VITAMIN) TABS tablet Take 1 tablet by mouth daily  Yes Reported, Patient   sildenafil (REVATIO) 20 MG tablet Take 1-5 tablets ( mg) by mouth daily as needed (take about 30 min before needed for ED.) Never use with nitroglycerin.  Yes Nikko Tang MD   tiZANidine (ZANAFLEX) 4 MG tablet Take 1-1.5 tablets (4-6 mg) by mouth 3 times daily  Yes Shital Anderson APRN CNP   UNABLE TO FIND MEDICATION NAME: Medicinal marijuana (vaping formulation) --patient was informed vaping formulation is not allowed in the hospital. He was also provided a sheet with formulation that is allowed for use inpatient  [facility]  2-9-19 see comments Yes Reported, Patient

## 2019-02-10 NOTE — CONSULTS
"[facility]    Neurosurgery Consultation     Date of Admission:  2/9/2019  Date of Consult (When I saw the patient): 02/10/19    Assessment & Plan   Usama Harris is a 63 year old male who was admitted on 2/9/2019. I was asked to see the patient for lumbar radicular pain on the right.  He notes that he had previous lumbar fusion surgery with Dr. Iggy Issa. He reports that his right leg went paralyzed yesterday.  He is lying in bed today. He reports that his leg is \"paralyzed\" but he is up walking to bathroom independently and able to move his leg.  He has a noted L2-3 right disc extrusion.  He notes his pain is controlled with pain medication.  His scans were reviewed with Dr. Luis Heard and he agrees that since he is neurologically intact it is recommended that he follow-up with Dr. Iggy Issa in clinic tomorrow. Recommend pain control and medrol dose pack. We will cancel the CT scan today.      The pt was educated about the POC and he became very mad and started yelling.  He states that \"the nurses are lying and my leg is parazlyed\". He states that he has known foot drop for 15 years.  He is moving his leg in bed wihtout difficulty.  Pt then began yelling profanities at me and call me \"stupid\". It was explained that there is no reason to be yelling.  He states that he is really mad.  Pt was offered another consult by a different spine surgery. He continued to yell out that this  Is \"bullshit\" and I am paralyzed.  Nursing verified again he is up walking in the room without any assistance. He said \"that bullshit and a lie\". I updated Dr. Luis Heard.  Due to his behavior alone Dr. Luis Heard will not be doing any surgery on this patient. He can follow-up with another spine group or Dr. Iggy Issa.  The [facility] reported that he continues to call the  yelling profanities on the phone.        Active Problems:    Lumbar disc herniation    Assessment: stable     Plan: return to " see Dr. Iggy Issa in clinic tomorrow.  Please call the clinic at [phone] to schedule your appointment.         I have discussed the following assessment and plan with Dr. Luis Heard  who is in agreement with initial plan and will follow up with further consultation recommendations.    [facility]  [facility]  [address]  [address] [place] [id number]    Tel [phone]  Pager [phone]        Code Status    Full Code    Reason for Consult   Lumbar radicular pain     Primary Care Physician   Nikko Tang    Chief Complaint   Low back and right leg pain     History is obtained from the patient    History of Present Illness   Usama Harris is a 63 year old male who presents with lumbar radicular pain     Past Medical History   I have reviewed this patient's medical history and updated it with pertinent information if needed.   Past Medical History:   Diagnosis Date     Acute hepatitis C without mention of hepatic coma(070.51)     Hep C - Treated     Backache, unspecified      DDD (degenerative disc disease), lumbar     s/p back surgery     Hypertension        Past Surgical History   I have reviewed this patient's surgical history and updated it with pertinent information if needed.  Past Surgical History:   Procedure Laterality Date     DECOMPRESSION LUMBAR ONE LEVEL Right 5/18/2016    Procedure: DECOMPRESSION LUMBAR ONE LEVEL;  Surgeon: Prince Govea MD;  Location: [facility] OR     DISCECTOMY LUMBAR POSTERIOR MICROSCOPIC ONE LEVEL Right 7/22/2016    Procedure: DISCECTOMY LUMBAR POSTERIOR MICROSCOPIC ONE LEVEL;  Surgeon: Iggy Issa MD;  Location:  OR     LAMINECT/DISCECTOMY, LUMBAR      Laminectomy/Discectomy Cervical     LAMINECTOMY, FUSION LUMBAR TWO LEVELS, COMBINED Right 11/15/2016    Procedure: COMBINED LAMINECTOMY, FUSION LUMBAR TWO LEVELS;  Surgeon: Iggy Issa MD;  Location:  OR     SURGICAL HISTORY OF -        ligation of left spermatic vein       Prior to Admission Medications   Prior to Admission Medications   Prescriptions Last Dose Informant Patient Reported? Taking?   DULoxetine (CYMBALTA) 20 MG capsule 2/9/2019 at am  No Yes   Sig: Take 1 capsule (20 mg) by mouth daily   UNABLE TO FIND see comments  Yes Yes   Sig: MEDICATION NAME: Medicinal marijuana (vaping formulation) --patient was informed vaping formulation is not allowed in the hospital. He was also provided a sheet with formulation that is allowed for use inpatient  [facility]  2-9-19   acetaminophen (TYLENOL) 500 MG tablet   Yes Yes   Sig: Take 500-1,000 mg by mouth every 6 hours as needed for mild pain (every other day rotate days with ibuprofen)   carboxymethylcellulose (REFRESH PLUS) 0.5 % SOLN  Self Yes Yes   Sig: Place 1 drop into both eyes daily as needed for dry eyes   celecoxib (CELEBREX) 100 MG capsule 2/9/2019 at am  No Yes   Sig: Take 1 capsule (100 mg) by mouth 2 times daily as needed for moderate pain   clotrimazole 10 MG gabriel   No No   Sig: Take 1 Gabriel (10 mg) by mouth 5 times daily for 10 days   ibuprofen (ADVIL/MOTRIN) 200 MG capsule   Yes Yes   Sig: Take 200 mg by mouth every 4 hours as needed for fever (every other day)   irbesartan (AVAPRO) 300 MG tablet 2/9/2019 at am  No Yes   Sig: TAKE 1 TABLET BY MOUTH EVERY DAY   latanoprost (XALATAN) 0.005 % ophthalmic solution 2/8/2019 at Unknown time Self Yes Yes   Sig: Place 1 drop into both eyes At Bedtime    multivitamin, therapeutic with minerals (MULTI-VITAMIN) TABS tablet   Yes Yes   Sig: Take 1 tablet by mouth daily   nystatin (MYCOSTATIN) 020173 UNIT/ML suspension   No No   Sig: TAKE 5 MLS (500,000 UNITS) BY MOUTH 4 TIMES DAILY FOR 10 DAYS   sildenafil (REVATIO) 20 MG tablet   No Yes   Sig: Take 1-5 tablets ( mg) by mouth daily as needed (take about 30 min before needed for ED.) Never use with nitroglycerin.   tiZANidine (ZANAFLEX) 4 MG tablet   No Yes   Sig: Take 1-1.5 tablets (4-6  mg) by mouth 3 times daily      Facility-Administered Medications: None     Allergies   Allergies   Allergen Reactions     No Known Drug Allergies        Social History   I have reviewed this patient's social history and updated it with pertinent information if needed. Usama Harris  reports that he quit smoking about 19 years ago. His smoking use included cigarettes. He has a 29.00 pack-year smoking history. he has never used smokeless tobacco. He reports that he uses drugs. Drug: Marijuana. He reports that he does not drink alcohol.    Family History   I have reviewed this patient's family history and updated it with pertinent information if needed.   Family History   Problem Relation Age of Onset     C.A.D. Mother      Circulatory Mother         DVT     Cardiovascular Mother         DVT     Heart Disease Mother         a-fib     Hypertension Mother      Cerebrovascular Disease Mother         diseased       Review of Systems   CONSTITUTIONAL: NEGATIVE for fever, chills, change in weight  INTEGUMENTARY/SKIN: NEGATIVE for worrisome rashes, moles or lesions  EYES: NEGATIVE for vision changes or irritation  ENT/MOUTH: NEGATIVE for ear, mouth and throat problems  RESP: NEGATIVE for significant cough or SOB  BREAST: NEGATIVE for masses, tenderness or discharge  CV: NEGATIVE for chest pain, palpitations or peripheral edema  GI: NEGATIVE for nausea, abdominal pain, heartburn, or change in bowel habits  : NEGATIVE for frequency, dysuria, or hematuria  MUSCULOSKELETAL: NEGATIVE for significant arthralgias or myalgia  NEURO: NEGATIVE for weakness, dizziness or paresthesias  ENDOCRINE: NEGATIVE for temperature intolerance, skin/hair changes  HEME: NEGATIVE for bleeding problems  PSYCHIATRIC: NEGATIVE for changes in mood or affect    Physical Exam   Temp: 98.1  F (36.7  C) Temp src: Oral BP: 116/78   Heart Rate: 63 Resp: 18 SpO2: 93 % O2 Device: None (Room air)    Vital Signs with Ranges  Temp:  [98.1  F (36.7  C)-99.2  " F (37.3  C)] 98.1  F (36.7  C)  Heart Rate:  [60-83] 63  Resp:  [16-18] 18  BP: (114-161)/(72-95) 116/78  SpO2:  [93 %-97 %] 93 %  249 lbs 12.8 oz    Heart Rate: 63, Blood pressure 116/78, temperature 98.1  F (36.7  C), temperature source Oral, resp. rate 18, height 6' 2\" (1.88 m), weight 249 lb 12.8 oz (113.3 kg), SpO2 93 %.  249 lbs 12.8 oz  HEENT:  Normocephalic, atraumatic.  PERRLA.  EOM s intact.   Neck:  Supple, non-tender, without lymphadenopathy.  Heart:  No peripheral edema  Lungs:  No SOB  Abdomen:  Soft, non-tender, non-distended.  Normal bowel sounds.  Skin:  Warm and dry, good capillary refill.  Extremities:  Good radial and dorsalis pedis pulses bilaterally, no edema, cyanosis or clubbing.    NEUROLOGICAL EXAMINATION:     Mental status:  Alert and Oriented x 3, speech is fluent.  Cranial nerves:  II-XII intact.   Motor:  Strength is 5/5 throughout the upper and lower extremities  Shoulder Abduction:  Right:  5/5   Left:  5/5  Biceps:                      Right:  5/5   Left:  5/5  Triceps:                     Right:  5/5   Left:  5/5  Wrist Extensors:       Right:  5/5   Left:  5/5  Wrist Flexors:           Right:  5/5   Left:  5/5  interosseus :            Right:  5/5   Left:  5/5   Hip Flexor:                Right: 5/5  Left:  5/5  Hip Adductor:             Right:  5/5  Left:  5/5  Hip Abductor:             Right:  5/5  Left:  5/5  Gastroc Soleus:        Right:  5/5  Left:  5/5  Tib/Ant:                      Right:  5/5  Left:  5/5  EHL:                     Right:  4/5  Left:  5/5  Sensation:  intact  Reflexes:   Negative Babinski.  Negative Clonus.    Gait:  Up independently    Lumbar examination reveals no tenderness of the spine or paraspinous muscles.  H    Data   All new lab and imaging data was personally reviewed by me.  MRI:IMPRESSION:    1. L2-L3 large right posterior paramedian extruded disc herniation  extending inferior to the disc level down to mid L3. This causes  severe compression of " the thecal sac. This is new since 2016.  2. Anterior and posterior spinal fusion from L3 to L5 with components  in good position. Resection of previous L3-L4 disc herniation. No  definite effusion is seen across the disc spaces, however.  3. L1-L2 new mild degenerative disc disease.  4. Old compression fractures with Schmorl's nodes in the superior  endplates of L1 and L2, new since 2016 but unchanged compared with  radiographs 11/15/2017.  CBC RESULTS:   Recent Labs   Lab Test 02/09/19  1458   WBC 10.3   RBC 4.74   HGB 15.2   HCT 46.8   MCV 99   MCH 32.1   MCHC 32.5   RDW 13.0        Basic Metabolic Panel:  Lab Results   Component Value Date     02/09/2019      Lab Results   Component Value Date    POTASSIUM 4.1 02/09/2019     Lab Results   Component Value Date    CHLORIDE 108 02/09/2019     Lab Results   Component Value Date    NOY 8.9 02/09/2019     Lab Results   Component Value Date    CO2 26 02/09/2019     Lab Results   Component Value Date    BUN 20 02/09/2019     Lab Results   Component Value Date    CR 0.67 02/09/2019     Lab Results   Component Value Date     02/09/2019     INR:  Lab Results   Component Value Date    INR 0.98 11/10/2016    INR 0.94 12/20/2012    INR 0.99 06/22/2012    INR 1.03 09/20/2007

## 2019-02-10 NOTE — CONSULTS
Neurosurgery Brief Consult Note      Usama Harris is a 63 year old male that presents to [facility] ER following a acute onset of low back pain and lumbar radiculopathy. He had a lumbar fusion in 2016 by Dr. Issa. He was doing Michael Chi stretches and developed severe pain. He was admitted for pain control after MRI scan.     MRI lumbar - L3-5 posterior instrumented fusion with a large L2-3 herniated disk with severe stenosis    Recommendation:  1. Agree with admission by Hospitalist  2. Will obtain CT lumbar to evaluate fusion mass  3. Will discuss possible need for surgery

## 2019-02-10 NOTE — PROGRESS NOTES
ROOM # 228    Living Situation (if not independent, order SW consult): alone  Facility name: [facility]  : sister and ex wife    Activity level at baseline: indep  Activity level on admit: Ax2      Patient registered to observation; given Patient Bill of Rights; given the opportunity to ask questions about observation status and their plan of care.  Patient has been oriented to the observation room, bathroom and call light is in place.    Discussed discharge goals and expectations with patient/family.

## 2019-02-10 NOTE — H&P
Admitted:     02/09/2019      CHIEF COMPLAINT:  Severe back pain and weakness of the right lower extremity.      HISTORY OF PRESENT ILLNESS:  Usama Harris is a 63-year-old gentleman with history of chronic back pain, chronic right foot drop, history of L3- L4 decompression laminectomy in 05/2016, persistent right radiculopathy status post L4-L5 laminectomy in 2004, again in 07/2016 had a lumbar diskectomy for right L3-L4, who presented today with decreased leg function and pain.  The patient stated that he has had 3 diskectomies mentioned above and 1 spinal fusion with Dr. Issa at [facility].  To help his back he started rehab, which resulted in increased back pain.  Today the patient was doing stretching techniques and reaching.  He suddenly developed debilitating back pain and also pain radiating to his right leg.  He also had his right leg  weak, which is resolved at this point.  The patient is comfortable at this point at rest.  Denied any loss of control of his urine or bowel.  Denied any paraesthesia over the perineal area.  No weakness on the right leg.  In the emergency room he was evaluated.  MRI also showed a herniated disk.  He was given pain medications and being admitted.      PAST MEDICAL HISTORY:     1.  History of hepatitis C.   2.  History of degenerative disk disease with 3 surgeries and a spinal fusion.   3.  Hypertension.   4.  Chronic back pain.   5.  Osteoporosis.   6.  Anxiety.   7.  Herniated disk.   8.  Testosterone deficiency.      PAST SURGICAL HISTORY:  Decompression lumbar 1 level on the right, diskectomy; posterior laminectomy and fusion of lumbar 2 level, right side; ligation of left spermatic vein.      FAMILY HISTORY:  Reviewed and noncontributory.  Mother with coronary artery disease, DVT, atrial fibrillation, hypertension, cerebrovascular disease.      SOCIAL HISTORY:  He came to the emergency room with a friend.  When I saw him, patient was alone.  He is a former smoker, quit in  2000.  He does not drink alcohol.  He does not use illicit drugs.      REVIEW OF SYSTEMS:  Ten points reviewed, all are negative except those mentioned in history of present illness.      PHYSICAL EXAMINATION:   GENERAL:  The patient is awake, alert, oriented, comfortable, not in distress at rest.   VITAL SIGNS:  Blood pressure 150/90, pulse rate 60, temperature 98.6, oxygen saturation 96%.   HEENT:  Pink, nonicteric.  Extraocular muscle movement intact.   NECK:  Supple, no JVD, no thyromegaly.   CHEST:  Good air entry bilaterally.  No wheezing, crackles or rales.   CARDIOVASCULAR:  S1, S2 regular, no gallop or murmur.   ABDOMEN:  Obese, soft, nontender, nondistended.   EXTREMITIES:  No edema, cyanosis or clubbing.  Able to move his extremities.  The right leg is slightly weaker than the left.  Deep tendon reflexes +2 bilateral.      DIAGNOSTIC TESTS OF INTEREST:  Electrolytes all normal, creatinine 0.6, glucose 125.  CBC normal.        MRI of the lumbar spine showed L2-L3 large right posterior paramedian extruded disk herniation extending inferior to the disk level down to mid L3.  This causes severe compression of the thecal sac.  This is new since 2016.  Other chronic condition on postop reports.      ASSESSMENT:  Usama Harris is a 63-year-old gentleman with multiple surgical history related to his spine with diskectomies and lumbar decompression and also fusion, who came in this time with L2-L3 disk herniation and being admitted to the hospital under observation.  I discussed with Dr. Jeffrey Caputo regarding this patient.   1.  L2-L3 large right posterior paramedian disk herniation.   2.  Chronic back pain due to multiple degenerative disk disease.   3.  Hypertension.   4.  Testosterone deficiency in follow up with endocrinologist.      PLAN:  The patient is being admitted under observation for pain control.  The patient stated that morphine works for him better, Flexeril for muscle spasm and also Dilaudid  for breakthrough pain.  We will keep him n.p.o. after midnight.  Dr. Caputo already discussed with on-call neurosurgeon who recommended admission and will evaluate the patient in the morning.  Consult is placed.  In case surgical intervention is needed, the patient will be kept n.p.o.  I discussed with the patient at length.  All his questions and concerns addressed, showed understanding.  He is full code.            ARASH LEE MD             D: 2019   T: 2019   MT: GABBY      Name:     AL JEREZ   MRN:      [id number]        Account:      [id number]   :      1955        Admitted:     2019                   Document: [id number]       cc: Nikko Tang MD

## 2019-02-10 NOTE — PLAN OF CARE
"Acute Traumatic Pain/Lumbar Herniation    1.  Pain controlled with oral analgesia: With 15-30mg IR Morphine and IV Dilaudid 0.5mg-1mg. Has Flexeril if needed.       2.  Vital signs stable: Yes -BP slightly high possibly d/t pain. BP (!) 159/91 (BP Location: Right arm)   Temp 98.6  F (37  C) (Oral)   Resp 16   Ht 1.88 m (6' 2\")   Wt 113.3 kg (249 lb 12.8 oz)   SpO2 96%   BMI 32.07 kg/m        3.  Diagnotic testing complete: Yes- MRI shows herniated disc      4.  Cleared from consultants (if applicable): No      5. Return to near baseline physical activity: No  .  Pt NPO at midnight. Has Neurosurgery consult in am. Has hx of chronic back pain, 3 surgeries, and spinal fusion. Pt in 10/10 wants to stay on top of pian. Her to switch around med a bit to control pain. After the 1mg Dilaudid pt comfortable. Monitor pt per POC.    "

## 2019-02-11 ENCOUNTER — ANESTHESIA EVENT (OUTPATIENT)
Dept: SURGERY | Facility: CLINIC | Age: 64
DRG: 454 | End: 2019-02-11
Payer: COMMERCIAL

## 2019-02-11 ENCOUNTER — DOCUMENTATION ONLY (OUTPATIENT)
Dept: CARE COORDINATION | Facility: CLINIC | Age: 64
End: 2019-02-11

## 2019-02-11 ENCOUNTER — APPOINTMENT (OUTPATIENT)
Dept: CT IMAGING | Facility: CLINIC | Age: 64
DRG: 454 | End: 2019-02-11
Attending: NEUROLOGICAL SURGERY
Payer: COMMERCIAL

## 2019-02-11 ENCOUNTER — ANESTHESIA (OUTPATIENT)
Dept: SURGERY | Facility: CLINIC | Age: 64
DRG: 454 | End: 2019-02-11
Payer: COMMERCIAL

## 2019-02-11 LAB
ABO + RH BLD: NORMAL
ABO + RH BLD: NORMAL
ALBUMIN UR-MCNC: 100 MG/DL
APPEARANCE UR: CLEAR
BILIRUB UR QL STRIP: ABNORMAL
BLD GP AB SCN SERPL QL: NORMAL
BLOOD BANK CMNT PATIENT-IMP: NORMAL
COLOR UR AUTO: ABNORMAL
GLUCOSE UR STRIP-MCNC: NEGATIVE MG/DL
HGB UR QL STRIP: NEGATIVE
INR PPP: 1.22 (ref 0.86–1.14)
KETONES UR STRIP-MCNC: 40 MG/DL
LEUKOCYTE ESTERASE UR QL STRIP: ABNORMAL
MUCOUS THREADS #/AREA URNS LPF: PRESENT /LPF
NITRATE UR QL: NEGATIVE
PH UR STRIP: 6 PH (ref 5–7)
RBC #/AREA URNS AUTO: 2 /HPF (ref 0–2)
SOURCE: ABNORMAL
SP GR UR STRIP: 1.03 (ref 1–1.03)
SPECIMEN EXP DATE BLD: NORMAL
UROBILINOGEN UR STRIP-MCNC: 2 MG/DL (ref 0–2)
WBC #/AREA URNS AUTO: 44 /HPF (ref 0–5)

## 2019-02-11 PROCEDURE — 25000132 ZZH RX MED GY IP 250 OP 250 PS 637: Performed by: PHYSICIAN ASSISTANT

## 2019-02-11 PROCEDURE — 12000000 ZZH R&B MED SURG/OB

## 2019-02-11 PROCEDURE — 87086 URINE CULTURE/COLONY COUNT: CPT | Performed by: INTERNAL MEDICINE

## 2019-02-11 PROCEDURE — 72131 CT LUMBAR SPINE W/O DYE: CPT

## 2019-02-11 PROCEDURE — 25000132 ZZH RX MED GY IP 250 OP 250 PS 637: Performed by: ANESTHESIOLOGY

## 2019-02-11 PROCEDURE — 99222 1ST HOSP IP/OBS MODERATE 55: CPT | Mod: 57 | Performed by: NEUROLOGICAL SURGERY

## 2019-02-11 PROCEDURE — 87186 SC STD MICRODIL/AGAR DIL: CPT | Performed by: INTERNAL MEDICINE

## 2019-02-11 PROCEDURE — 36415 COLL VENOUS BLD VENIPUNCTURE: CPT | Performed by: NEUROLOGICAL SURGERY

## 2019-02-11 PROCEDURE — 81001 URINALYSIS AUTO W/SCOPE: CPT | Performed by: HOSPITALIST

## 2019-02-11 PROCEDURE — 25800030 ZZH RX IP 258 OP 636: Performed by: HOSPITALIST

## 2019-02-11 PROCEDURE — 25000131 ZZH RX MED GY IP 250 OP 636 PS 637: Performed by: INTERNAL MEDICINE

## 2019-02-11 PROCEDURE — 86850 RBC ANTIBODY SCREEN: CPT | Performed by: NEUROLOGICAL SURGERY

## 2019-02-11 PROCEDURE — 99232 SBSQ HOSP IP/OBS MODERATE 35: CPT | Performed by: HOSPITALIST

## 2019-02-11 PROCEDURE — 25000128 H RX IP 250 OP 636: Performed by: HOSPITALIST

## 2019-02-11 PROCEDURE — 85610 PROTHROMBIN TIME: CPT | Performed by: NEUROLOGICAL SURGERY

## 2019-02-11 PROCEDURE — 86900 BLOOD TYPING SEROLOGIC ABO: CPT | Performed by: NEUROLOGICAL SURGERY

## 2019-02-11 PROCEDURE — 87088 URINE BACTERIA CULTURE: CPT | Performed by: INTERNAL MEDICINE

## 2019-02-11 PROCEDURE — 86901 BLOOD TYPING SEROLOGIC RH(D): CPT | Performed by: NEUROLOGICAL SURGERY

## 2019-02-11 PROCEDURE — 25000132 ZZH RX MED GY IP 250 OP 250 PS 637: Performed by: INTERNAL MEDICINE

## 2019-02-11 RX ORDER — IRBESARTAN 150 MG/1
300 TABLET ORAL DAILY
Status: DISCONTINUED | OUTPATIENT
Start: 2019-02-11 | End: 2019-02-15 | Stop reason: HOSPADM

## 2019-02-11 RX ORDER — DULOXETIN HYDROCHLORIDE 20 MG/1
20 CAPSULE, DELAYED RELEASE ORAL DAILY
Status: DISCONTINUED | OUTPATIENT
Start: 2019-02-11 | End: 2019-02-15 | Stop reason: HOSPADM

## 2019-02-11 RX ORDER — LATANOPROST 50 UG/ML
1 SOLUTION/ DROPS OPHTHALMIC AT BEDTIME
Status: DISCONTINUED | OUTPATIENT
Start: 2019-02-11 | End: 2019-02-15 | Stop reason: HOSPADM

## 2019-02-11 RX ORDER — TIZANIDINE 2 MG/1
4 TABLET ORAL 3 TIMES DAILY PRN
Status: DISCONTINUED | OUTPATIENT
Start: 2019-02-11 | End: 2019-02-15 | Stop reason: HOSPADM

## 2019-02-11 RX ORDER — GABAPENTIN 300 MG/1
600 CAPSULE ORAL ONCE
Status: DISCONTINUED | OUTPATIENT
Start: 2019-02-11 | End: 2019-02-11 | Stop reason: HOSPADM

## 2019-02-11 RX ORDER — SODIUM CHLORIDE, SODIUM LACTATE, POTASSIUM CHLORIDE, CALCIUM CHLORIDE 600; 310; 30; 20 MG/100ML; MG/100ML; MG/100ML; MG/100ML
INJECTION, SOLUTION INTRAVENOUS CONTINUOUS
Status: DISCONTINUED | OUTPATIENT
Start: 2019-02-11 | End: 2019-02-11 | Stop reason: HOSPADM

## 2019-02-11 RX ORDER — CEFAZOLIN SODIUM 2 G/100ML
2 INJECTION, SOLUTION INTRAVENOUS
Status: DISCONTINUED | OUTPATIENT
Start: 2019-02-11 | End: 2019-02-11 | Stop reason: HOSPADM

## 2019-02-11 RX ORDER — SODIUM CHLORIDE 9 MG/ML
INJECTION, SOLUTION INTRAVENOUS CONTINUOUS
Status: DISCONTINUED | OUTPATIENT
Start: 2019-02-11 | End: 2019-02-15

## 2019-02-11 RX ORDER — OXYCODONE HYDROCHLORIDE 5 MG/1
5-10 TABLET ORAL EVERY 4 HOURS PRN
Status: DISCONTINUED | OUTPATIENT
Start: 2019-02-11 | End: 2019-02-14

## 2019-02-11 RX ORDER — SODIUM CHLORIDE 9 MG/ML
INJECTION, SOLUTION INTRAVENOUS CONTINUOUS
Status: DISCONTINUED | OUTPATIENT
Start: 2019-02-11 | End: 2019-02-11

## 2019-02-11 RX ORDER — FENTANYL CITRATE 50 UG/ML
25-100 INJECTION, SOLUTION INTRAMUSCULAR; INTRAVENOUS
Status: DISCONTINUED | OUTPATIENT
Start: 2019-02-11 | End: 2019-02-11 | Stop reason: HOSPADM

## 2019-02-11 RX ORDER — CEFTRIAXONE 1 G/1
1 INJECTION, POWDER, FOR SOLUTION INTRAMUSCULAR; INTRAVENOUS EVERY 24 HOURS
Status: DISCONTINUED | OUTPATIENT
Start: 2019-02-11 | End: 2019-02-13

## 2019-02-11 RX ORDER — ACETAMINOPHEN 325 MG/1
975 TABLET ORAL ONCE
Status: COMPLETED | OUTPATIENT
Start: 2019-02-11 | End: 2019-02-11

## 2019-02-11 RX ORDER — CEFAZOLIN SODIUM 1 G/3ML
1 INJECTION, POWDER, FOR SOLUTION INTRAMUSCULAR; INTRAVENOUS SEE ADMIN INSTRUCTIONS
Status: DISCONTINUED | OUTPATIENT
Start: 2019-02-11 | End: 2019-02-11 | Stop reason: HOSPADM

## 2019-02-11 RX ORDER — HYDROMORPHONE HYDROCHLORIDE 1 MG/ML
.3-.5 INJECTION, SOLUTION INTRAMUSCULAR; INTRAVENOUS; SUBCUTANEOUS
Status: DISCONTINUED | OUTPATIENT
Start: 2019-02-11 | End: 2019-02-12

## 2019-02-11 RX ADMIN — SODIUM CHLORIDE: 9 INJECTION, SOLUTION INTRAVENOUS at 10:38

## 2019-02-11 RX ADMIN — OXYCODONE HYDROCHLORIDE 10 MG: 5 TABLET ORAL at 16:54

## 2019-02-11 RX ADMIN — OXYCODONE HYDROCHLORIDE AND ACETAMINOPHEN 2 TABLET: 5; 325 TABLET ORAL at 06:02

## 2019-02-11 RX ADMIN — DEXAMETHASONE 4 MG: 4 TABLET ORAL at 08:16

## 2019-02-11 RX ADMIN — OXYCODONE HYDROCHLORIDE AND ACETAMINOPHEN 2 TABLET: 5; 325 TABLET ORAL at 01:03

## 2019-02-11 RX ADMIN — HYDROMORPHONE HYDROCHLORIDE 0.5 MG: 1 INJECTION, SOLUTION INTRAMUSCULAR; INTRAVENOUS; SUBCUTANEOUS at 13:07

## 2019-02-11 RX ADMIN — SENNOSIDES AND DOCUSATE SODIUM 2 TABLET: 8.6; 5 TABLET ORAL at 08:16

## 2019-02-11 RX ADMIN — SENNOSIDES AND DOCUSATE SODIUM 2 TABLET: 8.6; 5 TABLET ORAL at 20:12

## 2019-02-11 RX ADMIN — HYDROMORPHONE HYDROCHLORIDE 0.5 MG: 1 INJECTION, SOLUTION INTRAMUSCULAR; INTRAVENOUS; SUBCUTANEOUS at 09:39

## 2019-02-11 RX ADMIN — OXYCODONE HYDROCHLORIDE 5 MG: 5 TABLET ORAL at 21:21

## 2019-02-11 RX ADMIN — DULOXETINE HYDROCHLORIDE 20 MG: 20 CAPSULE, DELAYED RELEASE ORAL at 08:47

## 2019-02-11 RX ADMIN — HYDROMORPHONE HYDROCHLORIDE 0.5 MG: 1 INJECTION, SOLUTION INTRAMUSCULAR; INTRAVENOUS; SUBCUTANEOUS at 23:35

## 2019-02-11 RX ADMIN — CEFTRIAXONE SODIUM 1 G: 1 INJECTION, POWDER, FOR SOLUTION INTRAMUSCULAR; INTRAVENOUS at 16:29

## 2019-02-11 RX ADMIN — OXYCODONE HYDROCHLORIDE 5 MG: 5 TABLET ORAL at 21:18

## 2019-02-11 RX ADMIN — SODIUM CHLORIDE: 9 INJECTION, SOLUTION INTRAVENOUS at 23:44

## 2019-02-11 RX ADMIN — IRBESARTAN 300 MG: 150 TABLET ORAL at 08:47

## 2019-02-11 RX ADMIN — HYDROMORPHONE HYDROCHLORIDE 0.5 MG: 1 INJECTION, SOLUTION INTRAMUSCULAR; INTRAVENOUS; SUBCUTANEOUS at 21:14

## 2019-02-11 RX ADMIN — HYDROMORPHONE HYDROCHLORIDE 0.5 MG: 1 INJECTION, SOLUTION INTRAMUSCULAR; INTRAVENOUS; SUBCUTANEOUS at 18:51

## 2019-02-11 RX ADMIN — OXYCODONE HYDROCHLORIDE AND ACETAMINOPHEN 2 TABLET: 5; 325 TABLET ORAL at 10:01

## 2019-02-11 RX ADMIN — LATANOPROST 1 DROP: 50 SOLUTION OPHTHALMIC at 20:12

## 2019-02-11 RX ADMIN — HYDROMORPHONE HYDROCHLORIDE 0.5 MG: 1 INJECTION, SOLUTION INTRAMUSCULAR; INTRAVENOUS; SUBCUTANEOUS at 20:05

## 2019-02-11 RX ADMIN — ACETAMINOPHEN 975 MG: 325 TABLET, FILM COATED ORAL at 14:16

## 2019-02-11 ASSESSMENT — ACTIVITIES OF DAILY LIVING (ADL)
ADLS_ACUITY_SCORE: 14

## 2019-02-11 ASSESSMENT — COPD QUESTIONNAIRES
COPD: 1
CAT_SEVERITY: MILD

## 2019-02-11 ASSESSMENT — LIFESTYLE VARIABLES: TOBACCO_USE: 1

## 2019-02-11 NOTE — CONSULTS
[facility]    Neurosurgery Consultation     Date of Admission:  2/10/2019  Date of Consult (When I saw the patient): 02/11/19    Assessment & Plan   Usama Harris is a 63 year old male who was admitted on 2/10/2019. I was asked to see the patient for large L2-3 herniated disc with right leg pain and weakness.    Principal Problem:    Lumbar disc joe, Large Right L2-3 w Thecal Sac compression    Assessment: large right posterior extruded disc at L2-3 with severe compression of thecal sac. Right leg pain and weakness.    Plan:   -OR this afternoon with Dr. Iggy Issa    Active Problems:    Testosterone deficiency    Assessment: ongoing    Plan: per hospitalist    HTN (hypertension)    Assessment: controlled    Plan: per hospitalsit    Osteopenia    Assessment: ongoing    Plan: per hospitalist    Lumbar herniated disc    Assessment: as above    Plan: as above      I have discussed the following assessment and plan with Dr. Iggy Issa who is in agreement with initial plan and will follow up with further consultation recommendations.    Afsaneh Villareal PA-C  [facility] [place] [address]    Tel [phone]  Pager [phone]        Code Status    Full Code    Reason for Consult   Reason for consult: I was asked by Ghassan Lemon MD to evaluate this patient for large L2-3 herniated disc with right leg pain and weakness.    Primary Care Physician   Nikko Tang    Chief Complaint   Back and leg pain and weakness    History is obtained from the patient and electronic health record    History of Present Illness   Usama Harris is a 63 year old male who presents with right leg pain and weakness. He does have history of lumbar fusion with Dr. Issa in 2016. He had onset of right radicular pain and weakness on 2/8 after doing Michael Chi stretches. He does have right leg weakness.    Past Medical History   I have reviewed this  patient's medical history and updated it with pertinent information if needed.   Past Medical History:   Diagnosis Date     Acute hepatitis C without mention of hepatic coma(070.51)     Hep C - Treated     Backache, unspecified      DDD (degenerative disc disease), lumbar     s/p back surgery     Glaucoma      Hypertension        Past Surgical History   I have reviewed this patient's surgical history and updated it with pertinent information if needed.  Past Surgical History:   Procedure Laterality Date     BACK SURGERY  2007    Herniated disc     CATARACT IOL, RT/LT Left      DECOMPRESSION LUMBAR ONE LEVEL Right 5/18/2016    Procedure: DECOMPRESSION LUMBAR ONE LEVEL;  Surgeon: Prince Govea MD;  Location: [facility] OR     DISCECTOMY LUMBAR POSTERIOR MICROSCOPIC ONE LEVEL Right 7/22/2016    Procedure: DISCECTOMY LUMBAR POSTERIOR MICROSCOPIC ONE LEVEL;  Surgeon: Iggy Issa MD;  Location: [facility] OR     LAMINECT/DISCECTOMY, LUMBAR      Laminectomy/Discectomy Cervical     LAMINECTOMY, FUSION LUMBAR TWO LEVELS, COMBINED Right 11/15/2016    Procedure: COMBINED LAMINECTOMY, FUSION LUMBAR TWO LEVELS;  Surgeon: Iggy Issa MD;  Location:  OR     SURGICAL HISTORY OF -       ligation of left spermatic vein       Prior to Admission Medications   Prior to Admission Medications   Prescriptions Last Dose Informant Patient Reported? Taking?   DULoxetine (CYMBALTA) 20 MG capsule 2/10/2019 at AM  No Yes   Sig: Take 1 capsule (20 mg) by mouth daily   acetaminophen (TYLENOL) 500 MG tablet prn at prn  Yes Yes   Sig: Take 500-1,000 mg by mouth every 6 hours as needed for mild pain (every other day rotate days with ibuprofen)   carboxymethylcellulose (REFRESH PLUS) 0.5 % SOLN prn at prn Self Yes Yes   Sig: Place 1 drop into both eyes daily as needed for dry eyes   celecoxib (CELEBREX) 100 MG capsule 2/9/2019 at AM  No Yes   Sig: Take 1 capsule (100 mg) by mouth 2 times daily as needed for moderate pain   ibuprofen  (ADVIL/MOTRIN) 200 MG capsule prn at prn  Yes Yes   Sig: Take 200 mg by mouth every 4 hours as needed for fever (every other day)   irbesartan (AVAPRO) 300 MG tablet 2019 at AM  No Yes   Sig: TAKE 1 TABLET BY MOUTH EVERY DAY   latanoprost (XALATAN) 0.005 % ophthalmic solution 2019 at HS Self Yes Yes   Sig: Place 1 drop into both eyes At Bedtime    multivitamin, therapeutic with minerals (MULTI-VITAMIN) TABS tablet 2019 at Unknown time  Yes Yes   Sig: Take 1 tablet by mouth daily   sildenafil (REVATIO) 20 MG tablet prn at prn  No Yes   Sig: Take 1-5 tablets ( mg) by mouth daily as needed (take about 30 min before needed for ED.) Never use with nitroglycerin.   tiZANidine (ZANAFLEX) 4 MG tablet 2019 at prn  No Yes   Sig: Take 1-1.5 tablets (4-6 mg) by mouth 3 times daily      Facility-Administered Medications: None     Allergies   Allergies   Allergen Reactions     No Known Drug Allergies        Social History   I have reviewed this patient's social history and updated it with pertinent information if needed. Usama OMALLEY Steven  reports that he quit smoking about 19 years ago. His smoking use included cigarettes. He has a 29.00 pack-year smoking history. he has never used smokeless tobacco. He reports that he drinks alcohol. He reports that he uses drugs. Drug: Marijuana.    Family History   I have reviewed this patient's family history and updated it with pertinent information if needed.   Family History   Problem Relation Age of Onset     C.A.D. Mother          age 95     Circulatory Mother         DVT     Cardiovascular Mother         DVT     Heart Disease Mother         a-fib     Hypertension Mother      Cerebrovascular Disease Mother         diseased     Heart Failure Father          age 83       Review of Systems   CONSTITUTIONAL: NEGATIVE for fever, chills, change in weight  INTEGUMENTARY/SKIN: NEGATIVE for worrisome rashes, moles or lesions  EYES: NEGATIVE for vision changes or  irritation  ENT/MOUTH: NEGATIVE for ear, mouth and throat problems  RESP: NEGATIVE for significant cough or SOB  BREAST: NEGATIVE for masses, tenderness or discharge  CV: NEGATIVE for chest pain, palpitations or peripheral edema  GI: NEGATIVE for nausea, abdominal pain, heartburn, or change in bowel habits  : NEGATIVE for frequency, dysuria, or hematuria  MUSCULOSKELETAL: NEGATIVE for significant arthralgias or myalgia  NEURO: NEGATIVE for weakness, dizziness or paresthesias  ENDOCRINE: NEGATIVE for temperature intolerance, skin/hair changes  HEME: NEGATIVE for bleeding problems  PSYCHIATRIC: NEGATIVE for changes in mood or affect    Physical Exam   Temp: 97.7  F (36.5  C) Temp src: Oral BP: 133/68   Heart Rate: 58 Resp: 16 SpO2: 95 % O2 Device: None (Room air)    Vital Signs with Ranges  Temp:  [97.5  F (36.4  C)-98.9  F (37.2  C)] 97.7  F (36.5  C)  Heart Rate:  [52-64] 58  Resp:  [16-18] 16  BP: (102-137)/(66-91) 133/68  SpO2:  [93 %-98 %] 95 %  0 lbs 0 oz    Heart Rate: 58, Blood pressure 133/68, temperature 97.7  F (36.5  C), temperature source Oral, resp. rate 16, SpO2 95 %.  0 lbs 0 oz  HEENT:  Normocephalic, atraumatic.  PERRLA.  EOM s intact.   Neck:  Supple, non-tender, without lymphadenopathy.  Heart:  No peripheral edema  Lungs:  No SOB  Abdomen:  Soft, non-tender, non-distended.   Skin:  Warm and dry, good capillary refill.  Extremities:  Good radial and dorsalis pedis pulses bilaterally, no edema, cyanosis or clubbing.    NEUROLOGICAL EXAMINATION:   Mental status:  Alert and Oriented x 3, speech is fluent.  Cranial nerves:  II-XII intact.   Motor:     Hip Flexor:                Right: 5/5  Left:  4/5  Hip Adductor:             Right:  5/5  Left:  5/5  Hip Abductor:             Right:  5/5  Left:  5/5  Gastroc Soleus:        Right:  5/5  Left:  4/5  Tib/Ant:                      Right:  5/5  Left:  4/5  EHL:                     Right:  5/5  Left:  4/5  Sensation:  Decreased to distal aspect of  RLE  Gait:  Ambulates with cane.      Data   CBC RESULTS:   Recent Labs   Lab Test 02/09/19  1458   WBC 10.3   RBC 4.74   HGB 15.2   HCT 46.8   MCV 99   MCH 32.1   MCHC 32.5   RDW 13.0        Basic Metabolic Panel:  Lab Results   Component Value Date     02/09/2019      Lab Results   Component Value Date    POTASSIUM 4.1 02/09/2019     Lab Results   Component Value Date    CHLORIDE 108 02/09/2019     Lab Results   Component Value Date    NOY 8.9 02/09/2019     Lab Results   Component Value Date    CO2 26 02/09/2019     Lab Results   Component Value Date    BUN 20 02/09/2019     Lab Results   Component Value Date    CR 0.67 02/09/2019     Lab Results   Component Value Date     02/09/2019     INR:  Lab Results   Component Value Date    INR 1.22 02/11/2019    INR 0.98 11/10/2016    INR 0.94 12/20/2012    INR 0.99 06/22/2012    INR 1.03 09/20/2007

## 2019-02-11 NOTE — PROGRESS NOTES
Discussed with patient that U/A was consistent with UTI.  Discussed concern for elevated infection risk given untreated infection.  Will start IV antibiotics with 24 hours of treatment pre-operatively, and plan for surgery tomorrow evening to balance risks of surgical infection versus risks of prolonged neural compression and deficit.

## 2019-02-11 NOTE — ANESTHESIA PREPROCEDURE EVALUATION
Anesthesia Pre-Procedure Evaluation    Patient: Usama Harris   MRN: [id number] : 1955          Preoperative Diagnosis: LUMBAR DISC HERNIATION.    Procedure(s):  REVISION LUMBAR SPINAL FUSION  ON THE RIGHT.([employer], C-ARM.)    Past Medical History:   Diagnosis Date     Acute hepatitis C without mention of hepatic coma(070.51)     Hep C - Treated     Backache, unspecified      DDD (degenerative disc disease), lumbar     s/p back surgery     Glaucoma      Hypertension      Past Surgical History:   Procedure Laterality Date     BACK SURGERY      Herniated disc     CATARACT IOL, RT/LT Left      DECOMPRESSION LUMBAR ONE LEVEL Right 2016    Procedure: DECOMPRESSION LUMBAR ONE LEVEL;  Surgeon: Prince Govea MD;  Location: [facility] OR     DISCECTOMY LUMBAR POSTERIOR MICROSCOPIC ONE LEVEL Right 2016    Procedure: DISCECTOMY LUMBAR POSTERIOR MICROSCOPIC ONE LEVEL;  Surgeon: Iggy Issa MD;  Location:  OR     LAMINECT/DISCECTOMY, LUMBAR      Laminectomy/Discectomy Cervical     LAMINECTOMY, FUSION LUMBAR TWO LEVELS, COMBINED Right 11/15/2016    Procedure: COMBINED LAMINECTOMY, FUSION LUMBAR TWO LEVELS;  Surgeon: Iggy Issa MD;  Location:  OR     SURGICAL HISTORY OF -       ligation of left spermatic vein       Anesthesia Evaluation     . Pt has had prior anesthetic. Type: General    No history of anesthetic complications          ROS/MED HX    ENT/Pulmonary:     (+)CHUYITA risk factors snores loudly, hypertension, tobacco use, Past use mild COPD, , . .    Neurologic:       Cardiovascular:     (+) hypertension----. : . . . :. . Previous cardiac testing date:results:date: results:ECG reviewed date:2/10/19 results:Sinus kalyani date: results:          METS/Exercise Tolerance:  >4 METS   Hematologic:        (-) history of blood clots and anemia   Musculoskeletal:   (+) arthritis, , , other musculoskeletal-       GI/Hepatic:     (+) hepatitis (Cured) type C, liver disease,   "    (-) GERD   Renal/Genitourinary:         Endo:     (+) Obesity, .      Psychiatric:     (+) psychiatric history anxiety      Infectious Disease:         Malignancy:         Other:    (+) H/O Chronic Pain,                        Physical Exam  Normal systems: cardiovascular, pulmonary and dental    Airway   Mallampati: II  TM distance: >3 FB  Neck ROM: full    Dental   (+) caps    Cardiovascular       Pulmonary             Lab Results   Component Value Date    WBC 10.3 02/09/2019    HGB 15.2 02/09/2019    HCT 46.8 02/09/2019     02/09/2019     02/09/2019    POTASSIUM 4.1 02/09/2019    CHLORIDE 108 02/09/2019    CO2 26 02/09/2019    BUN 20 02/09/2019    CR 0.67 02/09/2019     (H) 02/09/2019    NOY 8.9 02/09/2019    PHOS 2.0 (L) 01/28/2019    ALBUMIN 3.8 01/28/2019    PROTTOTAL 7.6 11/13/2018    ALT 45 11/13/2018    AST 23 11/13/2018    ALKPHOS 80 11/13/2018    BILITOTAL 0.5 11/13/2018    SOFIA 18 10/10/2008    PTT 24 11/10/2016    INR 0.98 11/10/2016    TSH 0.72 11/13/2018    T4 0.86 12/15/2014       Preop Vitals  BP Readings from Last 3 Encounters:   02/11/19 134/83   02/10/19 116/78   01/24/19 (!) 149/91    Pulse Readings from Last 3 Encounters:   01/24/19 91   01/14/19 82   11/13/18 78      Resp Readings from Last 3 Encounters:   02/11/19 16   02/10/19 18   11/13/18 16    SpO2 Readings from Last 3 Encounters:   02/11/19 98%   02/10/19 93%   01/14/19 98%      Temp Readings from Last 1 Encounters:   02/11/19 37.1  C (98.8  F) (Oral)    Ht Readings from Last 1 Encounters:   02/09/19 1.88 m (6' 2\")      Wt Readings from Last 1 Encounters:   02/09/19 113.3 kg (249 lb 12.8 oz)    Estimated body mass index is 32.07 kg/m  as calculated from the following:    Height as of 2/9/19: 1.88 m (6' 2\").    Weight as of 2/9/19: 113.3 kg (249 lb 12.8 oz).       Anesthesia Plan      History & Physical Review  History and physical reviewed and following examination; no interval change.    ASA Status:  3 .    NPO " Status:  > 8 hours    Plan for General and ETT with Intravenous and Propofol induction. Maintenance will be Balanced.    PONV prophylaxis:  Ondansetron (or other 5HT-3) and Dexamethasone or Solumedrol  Additional equipment: Videolaryngoscope Pt c chronic pain, plan for Precedex gtt as well as Ketamine.      Postoperative Care  Postoperative pain management:  IV analgesics, Oral pain medications and Multi-modal analgesia.      Consents  Anesthetic plan, risks, benefits and alternatives discussed with:  Patient.  Use of blood products discussed: Yes.   Use of blood products discussed with Patient.  Consented to blood products.  .                 Rome Mcfadden MD

## 2019-02-11 NOTE — PROGRESS NOTES
"[facility]  Hospitalist Progress Note        Josse Mayra Diogenes, DO  02/11/2019        Interval History:      Patient reporting pain in back today.          Assessment and Plan:        63 year old male with HTN, glaucoma, and low testosterone level with associated osteopenia -- and 4 previous back surgeries, including an L3-5 fusion on 11/2016, and subsequent L1 compression fracture on 2/2017, who presented with back pain and progressive right leg pain with weakness to [facility] on 2/9 and observed overnight, and transferred to [facility].      Lumbar disc joe, Large Right L2-3 w Thecal Sac compression has L3-5 fusion, and now has herniated disc just above the area that was previously fused  - Neurosurgery following.   - Steroids per NS discretion.     Urinary symptoms: Patient reporting urinary symptoms.   - U/a with reflex.   - Rocephin.   - Await UC.     Testosterone deficiency follows with Endocrinology at [facility] and plans to restart Testosterone replacement  - has appointment scheduled for 2/24/19     HTN: Stable.   - Irbesartan      Osteopenia     Chronic pain -- in pain clinic ([place], Dr. Sifuentes) currently not on narcotics, but previously was so he is requesting \"high doses from the start\"  - had a vape for Marijuana which he was upset was taken away   - Cymbalta   - Celebrex bid prn.      Chronic right foot drop possibly related to prior herniated disc  - Monitor.      Personality issues wants his bed alarms turned off. Per report, explained it is a precautionary measure and asked him if he could cooperate with the policy.     DVT Prophylaxis: Defer to NS.     Code: Full Code     Disposition: Expected surgery today, possible discharge in 2-3 days.                    Physical Exam:      Heart Rate: 60    Blood pressure 134/83, temperature 98.8  F (37.1  C), temperature source Oral, resp. rate 16, SpO2 98 %.    There were no vitals filed for this visit.    Vital Sign " Ranges  Temperature Temp  Av.3  F (36.8  C)  Min: 97.5  F (36.4  C)  Max: 98.9  F (37.2  C)   Blood pressure Systolic (24hrs), Av , Min:102 , Max:137        Diastolic (24hrs), Av, Min:66, Max:91      Pulse No Data Recorded   Respirations Resp  Av.6  Min: 16  Max: 18   Pulse oximetry SpO2  Av %  Min: 93 %  Max: 98 %     Vital Signs with Ranges  Temp:  [97.5  F (36.4  C)-98.9  F (37.2  C)] 98.8  F (37.1  C)  Heart Rate:  [52-64] 60  Resp:  [16-18] 16  BP: (102-137)/(66-91) 134/83  SpO2:  [93 %-98 %] 98 %    I/O Last 3 Shifts:   I/O last 3 completed shifts:  In: 960 [P.O.:960]  Out: 150 [Urine:150]    I/O past 24 hours:     Intake/Output Summary (Last 24 hours) at 2019 0816  Last data filed at 2/10/2019 1956  Gross per 24 hour   Intake 960 ml   Output 150 ml   Net 810 ml     GENERAL: Alert and oriented. NAD. Conversational, appropriate.   HEENT: Normocephalic. EOMI. No icterus or injection. Nares normal.   LUNGS: Clear to auscultation. No dyspnea at rest.   HEART: Regular rate. Extremities perfused.   ABDOMEN: Soft, nontender, and nondistended. Positive bowel sounds.   EXTREMITIES: No LE edema noted.   NEUROLOGIC: Moves extremities x4. mild right foot drop, and 2+ reflex at left knee but absent on right knee, 2+ bilateral ankle reflexes.  Right posterior lower leg with numb sensation          Prior to Admission Medications:        Medications Prior to Admission   Medication Sig Dispense Refill Last Dose     acetaminophen (TYLENOL) 500 MG tablet Take 500-1,000 mg by mouth every 6 hours as needed for mild pain (every other day rotate days with ibuprofen)   prn at prn     carboxymethylcellulose (REFRESH PLUS) 0.5 % SOLN Place 1 drop into both eyes daily as needed for dry eyes   prn at prn     celecoxib (CELEBREX) 100 MG capsule Take 1 capsule (100 mg) by mouth 2 times daily as needed for moderate pain 60 capsule 1 2019 at AM     DULoxetine (CYMBALTA) 20 MG capsule Take 1 capsule (20 mg) by  mouth daily 30 capsule 3 2/10/2019 at AM     ibuprofen (ADVIL/MOTRIN) 200 MG capsule Take 200 mg by mouth every 4 hours as needed for fever (every other day)   prn at prn     irbesartan (AVAPRO) 300 MG tablet TAKE 1 TABLET BY MOUTH EVERY DAY 90 tablet 0 2/9/2019 at AM     latanoprost (XALATAN) 0.005 % ophthalmic solution Place 1 drop into both eyes At Bedtime   11 2/9/2019 at HS     multivitamin, therapeutic with minerals (MULTI-VITAMIN) TABS tablet Take 1 tablet by mouth daily   2/9/2019 at Unknown time     sildenafil (REVATIO) 20 MG tablet Take 1-5 tablets ( mg) by mouth daily as needed (take about 30 min before needed for ED.) Never use with nitroglycerin. 30 tablet 11 prn at prn     tiZANidine (ZANAFLEX) 4 MG tablet Take 1-1.5 tablets (4-6 mg) by mouth 3 times daily 135 tablet 1 2/9/2019 at prn            Medications:        Current Facility-Administered Medications   Medication Last Rate     Current Facility-Administered Medications   Medication Dose Route Frequency     dexamethasone  4 mg Oral Q12H JF     DULoxetine  20 mg Oral Daily     irbesartan  300 mg Oral Daily     latanoprost  1 drop Both Eyes At Bedtime     senna-docusate  2 tablet Oral BID    Or     senna-docusate  2 tablet Oral BID     Current Facility-Administered Medications   Medication Dose Route Frequency     acetaminophen  650 mg Oral Q4H PRN     calcium carbonate  1,000 mg Oral TID PRN     hypromellose-dextran  1 drop Both Eyes Daily PRN     melatonin  1 mg Oral At Bedtime PRN     naloxone  0.1-0.4 mg Intravenous Q2 Min PRN     ondansetron  4 mg Oral Q6H PRN    Or     ondansetron  4 mg Intravenous Q6H PRN     oxyCODONE-acetaminophen  1-2 tablet Oral Q4H PRN     polyethylene glycol  17 g Oral Daily PRN     tiZANidine  4 mg Oral TID PRN            Data:      Lab data reviewed.   Recent Labs   Lab 02/09/19  1458   HGB 15.2   MCV 99         POTASSIUM 4.1   CHLORIDE 108   CO2 26   BUN 20   CR 0.67   ANIONGAP 7   NOY 8.9   GLC  125*           Imaging:      Imaging data reviewed.     Dr. Josse Shetty D.O.  [facility]  Pager [phone]

## 2019-02-11 NOTE — PLAN OF CARE
Up A1 GB&cane. A/o4. VSS RA. Verbally aggressive and agitated early in shift, more appropriate as shift continued. RLE weakness and n/t from calf to ankle. Other CMS intact. Voiding in urinal, dark dakota in color. Pain 6/10 controlled w/prn oxycodone 3/10.  Plan pending consult w/Dr. Issa this morning.

## 2019-02-11 NOTE — OR NURSING
Dr. Issa notified on patients positive urinary tract infection.  In room to see patient at this time.

## 2019-02-11 NOTE — PROVIDER NOTIFICATION
Writer pageliborio Shetty, notified patient surgery postponed until tomorrow d/t UTI. Told IV meds were to be ordered.

## 2019-02-11 NOTE — PROGRESS NOTES
Pt. Refusing bed alarm. Fall risk medication of percocet being given q4h with RLE weakness, needs to use cane for ambulation. Pt. Educated and spoke with ANS. Bed alarm off.

## 2019-02-11 NOTE — PROGRESS NOTES
"Plans for surgery at 1500 today with Dr. Issa for revision lumbar spinal fusion, consent on front of chart, needs to be signed. Showered. Pt A&O x4, can be argumentative at times and refusing some cares, refused fall risk band, educated on fall risk concerns and policies. VSS, on RA. LS clear. RLE numbness/tinngling, mod plantar/dorsi flexion, 4/5 strength otherwise CMS intact. Pain managed with IV Dilaudid and Percocet. Pivot transfer A1 with GB and cane, patient stated RLE is \"non wt-bearing\" but puts wt on RLE with transfers. +BS, passing flatus, senna given, no BM. Voiding adequately, per urinal, urine is dark dakota/orange, MD notified, IVF started, UA collected, MD aware of results will wait for UC results. Report given to pre-op RN.                "

## 2019-02-11 NOTE — OR NURSING
Dr. Mcfadden notified patient has received over tylenol daily limit.  No new orders at this time.

## 2019-02-11 NOTE — TELEPHONE ENCOUNTER
Looks like pt is scheduled for a fusion today.  Called Tara and left message relaying the above and that this is void. Advised her to call back if she has any further questions.    Will keep encounter open for a couple of days in anticipation of patient call back.    Lorna Boone RN-BSN  [facility]

## 2019-02-11 NOTE — PLAN OF CARE
A&Ox4. Behavior inappropriate when telling pt. He couldn't have his marijuana vape pen. Became hostile and verbally aggressive, ANS spoke w/ pt. About behavior. Still anxious and restless, refused bed alarms. Diaphoretic. Responds to male figures more appropriately. RLE numbness and tingling thigh to ankle. RLE weakness. Bowel sounds active in all quadrants, c/o constipation scheduled bowel meds given. VSS. Urinary hesitancy, urine is dark dakota/orange/red, refused bladder scan. Up with a1/gb/cane, States he is non-weight bearing on the RLE. C/o moderate/severe pain, decreased with percocet q4h and warm packs. Plan pending consult w/ Dr. Issa tomorrow and pt. Would like work-up around the urine color.

## 2019-02-11 NOTE — PLAN OF CARE
PT/OT: Therapy evaluations cancelled today. Patient not appropriate to initiate therapies as he is having surgery today due to a large L2-3 disc herniation with thecal sac compression. Discussed this with RN who is in agreement.

## 2019-02-12 ENCOUNTER — ANESTHESIA EVENT (OUTPATIENT)
Dept: SURGERY | Facility: CLINIC | Age: 64
DRG: 454 | End: 2019-02-12
Payer: COMMERCIAL

## 2019-02-12 ENCOUNTER — ANESTHESIA (OUTPATIENT)
Dept: SURGERY | Facility: CLINIC | Age: 64
DRG: 454 | End: 2019-02-12
Payer: COMMERCIAL

## 2019-02-12 ENCOUNTER — APPOINTMENT (OUTPATIENT)
Dept: GENERAL RADIOLOGY | Facility: CLINIC | Age: 64
DRG: 454 | End: 2019-02-12
Attending: INTERNAL MEDICINE
Payer: COMMERCIAL

## 2019-02-12 PROBLEM — Z98.1 S/P LUMBAR FUSION: Status: ACTIVE | Noted: 2019-02-12

## 2019-02-12 LAB
ANION GAP SERPL CALCULATED.3IONS-SCNC: 8 MMOL/L (ref 3–14)
BUN SERPL-MCNC: 20 MG/DL (ref 7–30)
CALCIUM SERPL-MCNC: 8.5 MG/DL (ref 8.5–10.1)
CHLORIDE SERPL-SCNC: 104 MMOL/L (ref 94–109)
CO2 SERPL-SCNC: 27 MMOL/L (ref 20–32)
CREAT SERPL-MCNC: 0.78 MG/DL (ref 0.66–1.25)
ERYTHROCYTE [DISTWIDTH] IN BLOOD BY AUTOMATED COUNT: 13.7 % (ref 10–15)
GFR SERPL CREATININE-BSD FRML MDRD: >90 ML/MIN/{1.73_M2}
GLUCOSE SERPL-MCNC: 103 MG/DL (ref 70–99)
HCT VFR BLD AUTO: 45.6 % (ref 40–53)
HGB BLD-MCNC: 14.9 G/DL (ref 13.3–17.7)
MCH RBC QN AUTO: 31.8 PG (ref 26.5–33)
MCHC RBC AUTO-ENTMCNC: 32.7 G/DL (ref 31.5–36.5)
MCV RBC AUTO: 97 FL (ref 78–100)
PLATELET # BLD AUTO: 203 10E9/L (ref 150–450)
POTASSIUM SERPL-SCNC: 3.2 MMOL/L (ref 3.4–5.3)
RBC # BLD AUTO: 4.68 10E12/L (ref 4.4–5.9)
SODIUM SERPL-SCNC: 139 MMOL/L (ref 133–144)
WBC # BLD AUTO: 12.5 10E9/L (ref 4–11)

## 2019-02-12 PROCEDURE — 71000012 ZZH RECOVERY PHASE 1 LEVEL 1 FIRST HR: Performed by: NEUROLOGICAL SURGERY

## 2019-02-12 PROCEDURE — 25000128 H RX IP 250 OP 636: Performed by: HOSPITALIST

## 2019-02-12 PROCEDURE — 25000301 ZZH OR RX SURGIFLO W/THROMBIN KIT 2ML 1991 OPNP: Performed by: NEUROLOGICAL SURGERY

## 2019-02-12 PROCEDURE — 25000132 ZZH RX MED GY IP 250 OP 250 PS 637: Performed by: PHYSICIAN ASSISTANT

## 2019-02-12 PROCEDURE — 85027 COMPLETE CBC AUTOMATED: CPT | Performed by: HOSPITALIST

## 2019-02-12 PROCEDURE — 84295 ASSAY OF SERUM SODIUM: CPT

## 2019-02-12 PROCEDURE — 25000566 ZZH SEVOFLURANE, EA 15 MIN: Performed by: NEUROLOGICAL SURGERY

## 2019-02-12 PROCEDURE — 36415 COLL VENOUS BLD VENIPUNCTURE: CPT | Performed by: HOSPITALIST

## 2019-02-12 PROCEDURE — 22633 ARTHRD CMBN 1NTRSPC LUMBAR: CPT | Mod: AS | Performed by: PHYSICIAN ASSISTANT

## 2019-02-12 PROCEDURE — 63047 LAM FACETEC & FORAMOT LUMBAR: CPT | Mod: AS | Performed by: PHYSICIAN ASSISTANT

## 2019-02-12 PROCEDURE — 12000000 ZZH R&B MED SURG/OB

## 2019-02-12 PROCEDURE — 25000128 H RX IP 250 OP 636: Performed by: PHYSICIAN ASSISTANT

## 2019-02-12 PROCEDURE — 25000125 ZZHC RX 250: Performed by: NURSE ANESTHETIST, CERTIFIED REGISTERED

## 2019-02-12 PROCEDURE — C1713 ANCHOR/SCREW BN/BN,TIS/BN: HCPCS | Performed by: NEUROLOGICAL SURGERY

## 2019-02-12 PROCEDURE — 36000071 ZZH SURGERY LEVEL 5 W FLUORO 1ST 30 MIN: Performed by: NEUROLOGICAL SURGERY

## 2019-02-12 PROCEDURE — 40000277 XR SURGERY CARM FLUORO LESS THAN 5 MIN W STILLS

## 2019-02-12 PROCEDURE — 63047 LAM FACETEC & FORAMOT LUMBAR: CPT | Mod: 59 | Performed by: NEUROLOGICAL SURGERY

## 2019-02-12 PROCEDURE — 25000125 ZZHC RX 250: Performed by: NEUROLOGICAL SURGERY

## 2019-02-12 PROCEDURE — 25800030 ZZH RX IP 258 OP 636: Performed by: NURSE ANESTHETIST, CERTIFIED REGISTERED

## 2019-02-12 PROCEDURE — 80048 BASIC METABOLIC PNL TOTAL CA: CPT | Performed by: HOSPITALIST

## 2019-02-12 PROCEDURE — 37000008 ZZH ANESTHESIA TECHNICAL FEE, 1ST 30 MIN: Performed by: NEUROLOGICAL SURGERY

## 2019-02-12 PROCEDURE — 36000069 ZZH SURGERY LEVEL 5 EA 15 ADDTL MIN: Performed by: NEUROLOGICAL SURGERY

## 2019-02-12 PROCEDURE — 25000131 ZZH RX MED GY IP 250 OP 636 PS 637: Performed by: INTERNAL MEDICINE

## 2019-02-12 PROCEDURE — 25000132 ZZH RX MED GY IP 250 OP 250 PS 637: Performed by: HOSPITALIST

## 2019-02-12 PROCEDURE — 22842 INSERT SPINE FIXATION DEVICE: CPT | Mod: AS | Performed by: PHYSICIAN ASSISTANT

## 2019-02-12 PROCEDURE — 22633 ARTHRD CMBN 1NTRSPC LUMBAR: CPT | Performed by: NEUROLOGICAL SURGERY

## 2019-02-12 PROCEDURE — 84132 ASSAY OF SERUM POTASSIUM: CPT

## 2019-02-12 PROCEDURE — 22614 ARTHRD PST TQ 1NTRSPC EA ADD: CPT | Mod: AS | Performed by: PHYSICIAN ASSISTANT

## 2019-02-12 PROCEDURE — 85014 HEMATOCRIT: CPT

## 2019-02-12 PROCEDURE — 22614 ARTHRD PST TQ 1NTRSPC EA ADD: CPT | Performed by: NEUROLOGICAL SURGERY

## 2019-02-12 PROCEDURE — 37000009 ZZH ANESTHESIA TECHNICAL FEE, EACH ADDTL 15 MIN: Performed by: NEUROLOGICAL SURGERY

## 2019-02-12 PROCEDURE — 40000170 ZZH STATISTIC PRE-PROCEDURE ASSESSMENT II: Performed by: NEUROLOGICAL SURGERY

## 2019-02-12 PROCEDURE — 22853 INSJ BIOMECHANICAL DEVICE: CPT | Performed by: NEUROLOGICAL SURGERY

## 2019-02-12 PROCEDURE — 25000128 H RX IP 250 OP 636: Performed by: NURSE ANESTHETIST, CERTIFIED REGISTERED

## 2019-02-12 PROCEDURE — 25800030 ZZH RX IP 258 OP 636: Performed by: ANESTHESIOLOGY

## 2019-02-12 PROCEDURE — 99232 SBSQ HOSP IP/OBS MODERATE 35: CPT | Performed by: HOSPITALIST

## 2019-02-12 PROCEDURE — 25800030 ZZH RX IP 258 OP 636: Performed by: HOSPITALIST

## 2019-02-12 PROCEDURE — 22842 INSERT SPINE FIXATION DEVICE: CPT | Performed by: NEUROLOGICAL SURGERY

## 2019-02-12 PROCEDURE — 25000128 H RX IP 250 OP 636: Performed by: ANESTHESIOLOGY

## 2019-02-12 PROCEDURE — 22853 INSJ BIOMECHANICAL DEVICE: CPT | Mod: AS | Performed by: PHYSICIAN ASSISTANT

## 2019-02-12 PROCEDURE — 25000132 ZZH RX MED GY IP 250 OP 250 PS 637: Performed by: INTERNAL MEDICINE

## 2019-02-12 PROCEDURE — 27210794 ZZH OR GENERAL SUPPLY STERILE: Performed by: NEUROLOGICAL SURGERY

## 2019-02-12 PROCEDURE — 25000128 H RX IP 250 OP 636: Performed by: NEUROLOGICAL SURGERY

## 2019-02-12 PROCEDURE — C1763 CONN TISS, NON-HUMAN: HCPCS | Performed by: NEUROLOGICAL SURGERY

## 2019-02-12 DEVICE — IMPLANTABLE DEVICE: Type: IMPLANTABLE DEVICE | Site: SPINE LUMBAR | Status: FUNCTIONAL

## 2019-02-12 DEVICE — GRAFT BONE FOAM STRIP VITOSS BIO ACTIVE 25X100X8MM 2102-1520: Type: IMPLANTABLE DEVICE | Site: SPINE LUMBAR | Status: FUNCTIONAL

## 2019-02-12 RX ORDER — POTASSIUM CL/LIDO/0.9 % NACL 10MEQ/0.1L
10 INTRAVENOUS SOLUTION, PIGGYBACK (ML) INTRAVENOUS
Status: DISCONTINUED | OUTPATIENT
Start: 2019-02-12 | End: 2019-02-15 | Stop reason: HOSPADM

## 2019-02-12 RX ORDER — LIDOCAINE 40 MG/G
CREAM TOPICAL
Status: DISCONTINUED | OUTPATIENT
Start: 2019-02-12 | End: 2019-02-15 | Stop reason: HOSPADM

## 2019-02-12 RX ORDER — PROCHLORPERAZINE MALEATE 10 MG
10 TABLET ORAL EVERY 6 HOURS PRN
Status: DISCONTINUED | OUTPATIENT
Start: 2019-02-12 | End: 2019-02-15 | Stop reason: HOSPADM

## 2019-02-12 RX ORDER — AMOXICILLIN 250 MG
2 CAPSULE ORAL 2 TIMES DAILY
Status: DISCONTINUED | OUTPATIENT
Start: 2019-02-12 | End: 2019-02-15 | Stop reason: HOSPADM

## 2019-02-12 RX ORDER — ONDANSETRON 4 MG/1
4 TABLET, ORALLY DISINTEGRATING ORAL EVERY 30 MIN PRN
Status: DISCONTINUED | OUTPATIENT
Start: 2019-02-12 | End: 2019-02-12

## 2019-02-12 RX ORDER — BUPIVACAINE HYDROCHLORIDE AND EPINEPHRINE 5; 5 MG/ML; UG/ML
INJECTION, SOLUTION EPIDURAL; INTRACAUDAL; PERINEURAL PRN
Status: DISCONTINUED | OUTPATIENT
Start: 2019-02-12 | End: 2019-02-12 | Stop reason: HOSPADM

## 2019-02-12 RX ORDER — NALOXONE HYDROCHLORIDE 0.4 MG/ML
.1-.4 INJECTION, SOLUTION INTRAMUSCULAR; INTRAVENOUS; SUBCUTANEOUS
Status: DISCONTINUED | OUTPATIENT
Start: 2019-02-12 | End: 2019-02-15 | Stop reason: HOSPADM

## 2019-02-12 RX ORDER — ONDANSETRON 2 MG/ML
INJECTION INTRAMUSCULAR; INTRAVENOUS PRN
Status: DISCONTINUED | OUTPATIENT
Start: 2019-02-12 | End: 2019-02-12

## 2019-02-12 RX ORDER — MAGNESIUM SULFATE HEPTAHYDRATE 40 MG/ML
4 INJECTION, SOLUTION INTRAVENOUS EVERY 4 HOURS PRN
Status: DISCONTINUED | OUTPATIENT
Start: 2019-02-12 | End: 2019-02-15 | Stop reason: HOSPADM

## 2019-02-12 RX ORDER — POTASSIUM CHLORIDE 29.8 MG/ML
20 INJECTION INTRAVENOUS
Status: DISCONTINUED | OUTPATIENT
Start: 2019-02-12 | End: 2019-02-15 | Stop reason: HOSPADM

## 2019-02-12 RX ORDER — AMOXICILLIN 250 MG
1 CAPSULE ORAL 2 TIMES DAILY
Status: DISCONTINUED | OUTPATIENT
Start: 2019-02-12 | End: 2019-02-15 | Stop reason: HOSPADM

## 2019-02-12 RX ORDER — MEROPENEM 500 MG/1
500 INJECTION, POWDER, FOR SOLUTION INTRAVENOUS EVERY 24 HOURS
Status: DISCONTINUED | OUTPATIENT
Start: 2019-02-13 | End: 2019-02-13

## 2019-02-12 RX ORDER — POTASSIUM CHLORIDE 1.5 G/1.58G
20-40 POWDER, FOR SOLUTION ORAL
Status: DISCONTINUED | OUTPATIENT
Start: 2019-02-12 | End: 2019-02-15 | Stop reason: HOSPADM

## 2019-02-12 RX ORDER — FENTANYL CITRATE 50 UG/ML
25-50 INJECTION, SOLUTION INTRAMUSCULAR; INTRAVENOUS
Status: DISCONTINUED | OUTPATIENT
Start: 2019-02-12 | End: 2019-02-12

## 2019-02-12 RX ORDER — EPHEDRINE SULFATE 50 MG/ML
INJECTION, SOLUTION INTRAMUSCULAR; INTRAVENOUS; SUBCUTANEOUS PRN
Status: DISCONTINUED | OUTPATIENT
Start: 2019-02-12 | End: 2019-02-12

## 2019-02-12 RX ORDER — METOCLOPRAMIDE 10 MG/1
10 TABLET ORAL EVERY 6 HOURS PRN
Status: DISCONTINUED | OUTPATIENT
Start: 2019-02-12 | End: 2019-02-15 | Stop reason: HOSPADM

## 2019-02-12 RX ORDER — ONDANSETRON 2 MG/ML
4 INJECTION INTRAMUSCULAR; INTRAVENOUS EVERY 30 MIN PRN
Status: DISCONTINUED | OUTPATIENT
Start: 2019-02-12 | End: 2019-02-12

## 2019-02-12 RX ORDER — LORAZEPAM 0.5 MG/1
.5-1 TABLET ORAL EVERY 4 HOURS PRN
Status: DISCONTINUED | OUTPATIENT
Start: 2019-02-12 | End: 2019-02-15 | Stop reason: HOSPADM

## 2019-02-12 RX ORDER — NALOXONE HYDROCHLORIDE 0.4 MG/ML
.1-.4 INJECTION, SOLUTION INTRAMUSCULAR; INTRAVENOUS; SUBCUTANEOUS
Status: DISCONTINUED | OUTPATIENT
Start: 2019-02-12 | End: 2019-02-12

## 2019-02-12 RX ORDER — CALCIUM CARBONATE 500 MG/1
1000 TABLET, CHEWABLE ORAL 4 TIMES DAILY PRN
Status: DISCONTINUED | OUTPATIENT
Start: 2019-02-12 | End: 2019-02-15 | Stop reason: HOSPADM

## 2019-02-12 RX ORDER — POTASSIUM CHLORIDE 1500 MG/1
20-40 TABLET, EXTENDED RELEASE ORAL
Status: DISCONTINUED | OUTPATIENT
Start: 2019-02-12 | End: 2019-02-15 | Stop reason: HOSPADM

## 2019-02-12 RX ORDER — FENTANYL CITRATE 50 UG/ML
INJECTION, SOLUTION INTRAMUSCULAR; INTRAVENOUS PRN
Status: DISCONTINUED | OUTPATIENT
Start: 2019-02-12 | End: 2019-02-12

## 2019-02-12 RX ORDER — VECURONIUM BROMIDE 1 MG/ML
INJECTION, POWDER, LYOPHILIZED, FOR SOLUTION INTRAVENOUS PRN
Status: DISCONTINUED | OUTPATIENT
Start: 2019-02-12 | End: 2019-02-12

## 2019-02-12 RX ORDER — NEOSTIGMINE METHYLSULFATE 1 MG/ML
VIAL (ML) INJECTION PRN
Status: DISCONTINUED | OUTPATIENT
Start: 2019-02-12 | End: 2019-02-12

## 2019-02-12 RX ORDER — GLYCOPYRROLATE 0.2 MG/ML
INJECTION, SOLUTION INTRAMUSCULAR; INTRAVENOUS PRN
Status: DISCONTINUED | OUTPATIENT
Start: 2019-02-12 | End: 2019-02-12

## 2019-02-12 RX ORDER — METOCLOPRAMIDE HYDROCHLORIDE 5 MG/ML
10 INJECTION INTRAMUSCULAR; INTRAVENOUS EVERY 6 HOURS PRN
Status: DISCONTINUED | OUTPATIENT
Start: 2019-02-12 | End: 2019-02-15 | Stop reason: HOSPADM

## 2019-02-12 RX ORDER — MEROPENEM 1 G/1
1 INJECTION, POWDER, FOR SOLUTION INTRAVENOUS
Status: DISCONTINUED | OUTPATIENT
Start: 2019-02-12 | End: 2019-02-12

## 2019-02-12 RX ORDER — SODIUM CHLORIDE, SODIUM LACTATE, POTASSIUM CHLORIDE, CALCIUM CHLORIDE 600; 310; 30; 20 MG/100ML; MG/100ML; MG/100ML; MG/100ML
INJECTION, SOLUTION INTRAVENOUS CONTINUOUS PRN
Status: DISCONTINUED | OUTPATIENT
Start: 2019-02-12 | End: 2019-02-12

## 2019-02-12 RX ORDER — PROPOFOL 10 MG/ML
INJECTION, EMULSION INTRAVENOUS PRN
Status: DISCONTINUED | OUTPATIENT
Start: 2019-02-12 | End: 2019-02-12

## 2019-02-12 RX ORDER — ONDANSETRON 2 MG/ML
4 INJECTION INTRAMUSCULAR; INTRAVENOUS EVERY 6 HOURS PRN
Status: DISCONTINUED | OUTPATIENT
Start: 2019-02-12 | End: 2019-02-15 | Stop reason: HOSPADM

## 2019-02-12 RX ORDER — POTASSIUM CHLORIDE 7.45 MG/ML
10 INJECTION INTRAVENOUS
Status: DISCONTINUED | OUTPATIENT
Start: 2019-02-12 | End: 2019-02-15 | Stop reason: HOSPADM

## 2019-02-12 RX ORDER — ONDANSETRON 4 MG/1
4 TABLET, ORALLY DISINTEGRATING ORAL EVERY 6 HOURS PRN
Status: DISCONTINUED | OUTPATIENT
Start: 2019-02-12 | End: 2019-02-15 | Stop reason: HOSPADM

## 2019-02-12 RX ORDER — HYDROXYZINE HYDROCHLORIDE 25 MG/1
25 TABLET, FILM COATED ORAL EVERY 6 HOURS PRN
Status: DISCONTINUED | OUTPATIENT
Start: 2019-02-12 | End: 2019-02-15 | Stop reason: HOSPADM

## 2019-02-12 RX ORDER — HYDROMORPHONE HYDROCHLORIDE 1 MG/ML
.3-.5 INJECTION, SOLUTION INTRAMUSCULAR; INTRAVENOUS; SUBCUTANEOUS EVERY 5 MIN PRN
Status: DISCONTINUED | OUTPATIENT
Start: 2019-02-12 | End: 2019-02-12

## 2019-02-12 RX ORDER — SODIUM CHLORIDE, SODIUM LACTATE, POTASSIUM CHLORIDE, CALCIUM CHLORIDE 600; 310; 30; 20 MG/100ML; MG/100ML; MG/100ML; MG/100ML
INJECTION, SOLUTION INTRAVENOUS CONTINUOUS
Status: DISCONTINUED | OUTPATIENT
Start: 2019-02-12 | End: 2019-02-15

## 2019-02-12 RX ORDER — LIDOCAINE HYDROCHLORIDE 20 MG/ML
INJECTION, SOLUTION INFILTRATION; PERINEURAL PRN
Status: DISCONTINUED | OUTPATIENT
Start: 2019-02-12 | End: 2019-02-12

## 2019-02-12 RX ORDER — NALOXONE HYDROCHLORIDE 0.4 MG/ML
.1-.4 INJECTION, SOLUTION INTRAMUSCULAR; INTRAVENOUS; SUBCUTANEOUS
Status: ACTIVE | OUTPATIENT
Start: 2019-02-12 | End: 2019-02-13

## 2019-02-12 RX ORDER — SODIUM CHLORIDE, SODIUM LACTATE, POTASSIUM CHLORIDE, CALCIUM CHLORIDE 600; 310; 30; 20 MG/100ML; MG/100ML; MG/100ML; MG/100ML
INJECTION, SOLUTION INTRAVENOUS CONTINUOUS
Status: DISCONTINUED | OUTPATIENT
Start: 2019-02-12 | End: 2019-02-12

## 2019-02-12 RX ORDER — KETAMINE HYDROCHLORIDE 10 MG/ML
INJECTION, SOLUTION INTRAMUSCULAR; INTRAVENOUS PRN
Status: DISCONTINUED | OUTPATIENT
Start: 2019-02-12 | End: 2019-02-12

## 2019-02-12 RX ORDER — MEROPENEM 500 MG/1
500 INJECTION, POWDER, FOR SOLUTION INTRAVENOUS ONCE
Status: COMPLETED | OUTPATIENT
Start: 2019-02-12 | End: 2019-02-12

## 2019-02-12 RX ORDER — VANCOMYCIN HYDROCHLORIDE 1 G/200ML
1000 INJECTION, SOLUTION INTRAVENOUS
Status: DISCONTINUED | OUTPATIENT
Start: 2019-02-12 | End: 2019-02-12

## 2019-02-12 RX ADMIN — ONDANSETRON 4 MG: 2 INJECTION INTRAMUSCULAR; INTRAVENOUS at 22:15

## 2019-02-12 RX ADMIN — Medication 10 MEQ: at 18:37

## 2019-02-12 RX ADMIN — FENTANYL CITRATE 100 MCG: 50 INJECTION, SOLUTION INTRAMUSCULAR; INTRAVENOUS at 20:00

## 2019-02-12 RX ADMIN — HYDROMORPHONE HYDROCHLORIDE 0.5 MG: 1 INJECTION, SOLUTION INTRAMUSCULAR; INTRAVENOUS; SUBCUTANEOUS at 14:12

## 2019-02-12 RX ADMIN — VECURONIUM BROMIDE 3 MG: 1 INJECTION, POWDER, LYOPHILIZED, FOR SOLUTION INTRAVENOUS at 20:31

## 2019-02-12 RX ADMIN — FENTANYL CITRATE 100 MCG: 50 INJECTION, SOLUTION INTRAMUSCULAR; INTRAVENOUS at 17:48

## 2019-02-12 RX ADMIN — SENNOSIDES AND DOCUSATE SODIUM 2 TABLET: 8.6; 5 TABLET ORAL at 08:23

## 2019-02-12 RX ADMIN — OXYCODONE HYDROCHLORIDE 10 MG: 5 TABLET ORAL at 05:26

## 2019-02-12 RX ADMIN — ONDANSETRON 4 MG: 4 TABLET, ORALLY DISINTEGRATING ORAL at 16:17

## 2019-02-12 RX ADMIN — DEXMEDETOMIDINE HYDROCHLORIDE 0.5 MCG/KG/HR: 100 INJECTION, SOLUTION INTRAVENOUS at 17:57

## 2019-02-12 RX ADMIN — PHENYLEPHRINE HYDROCHLORIDE 0.25 MCG/KG/MIN: 10 INJECTION, SOLUTION INTRAMUSCULAR; INTRAVENOUS; SUBCUTANEOUS at 18:33

## 2019-02-12 RX ADMIN — HYDROMORPHONE HYDROCHLORIDE 0.5 MG: 1 INJECTION, SOLUTION INTRAMUSCULAR; INTRAVENOUS; SUBCUTANEOUS at 21:34

## 2019-02-12 RX ADMIN — VECURONIUM BROMIDE 3 MG: 1 INJECTION, POWDER, LYOPHILIZED, FOR SOLUTION INTRAVENOUS at 19:20

## 2019-02-12 RX ADMIN — PHENYLEPHRINE HYDROCHLORIDE 200 MCG: 10 INJECTION, SOLUTION INTRAMUSCULAR; INTRAVENOUS; SUBCUTANEOUS at 21:42

## 2019-02-12 RX ADMIN — VANCOMYCIN HYDROCHLORIDE 1500 MG: 5 INJECTION, POWDER, LYOPHILIZED, FOR SOLUTION INTRAVENOUS at 17:55

## 2019-02-12 RX ADMIN — SODIUM CHLORIDE, POTASSIUM CHLORIDE, SODIUM LACTATE AND CALCIUM CHLORIDE: 600; 310; 30; 20 INJECTION, SOLUTION INTRAVENOUS at 20:13

## 2019-02-12 RX ADMIN — PHENYLEPHRINE HYDROCHLORIDE 100 MCG: 10 INJECTION, SOLUTION INTRAMUSCULAR; INTRAVENOUS; SUBCUTANEOUS at 19:09

## 2019-02-12 RX ADMIN — PHENYLEPHRINE HYDROCHLORIDE 100 MCG: 10 INJECTION, SOLUTION INTRAMUSCULAR; INTRAVENOUS; SUBCUTANEOUS at 18:29

## 2019-02-12 RX ADMIN — HYDROMORPHONE HYDROCHLORIDE 0.5 MG: 1 INJECTION, SOLUTION INTRAMUSCULAR; INTRAVENOUS; SUBCUTANEOUS at 12:12

## 2019-02-12 RX ADMIN — Medication 25 MG: at 20:21

## 2019-02-12 RX ADMIN — CEFTRIAXONE SODIUM 1 G: 1 INJECTION, POWDER, FOR SOLUTION INTRAMUSCULAR; INTRAVENOUS at 14:17

## 2019-02-12 RX ADMIN — OXYCODONE HYDROCHLORIDE 10 MG: 5 TABLET ORAL at 01:13

## 2019-02-12 RX ADMIN — GLYCOPYRROLATE 0.2 MG: 0.2 INJECTION, SOLUTION INTRAMUSCULAR; INTRAVENOUS at 18:32

## 2019-02-12 RX ADMIN — DULOXETINE HYDROCHLORIDE 20 MG: 20 CAPSULE, DELAYED RELEASE ORAL at 08:23

## 2019-02-12 RX ADMIN — Medication 25 MG: at 19:16

## 2019-02-12 RX ADMIN — HYDROMORPHONE HYDROCHLORIDE 0.5 MG: 1 INJECTION, SOLUTION INTRAMUSCULAR; INTRAVENOUS; SUBCUTANEOUS at 13:20

## 2019-02-12 RX ADMIN — ONDANSETRON 4 MG: 2 INJECTION INTRAMUSCULAR; INTRAVENOUS at 21:20

## 2019-02-12 RX ADMIN — ROCURONIUM BROMIDE 50 MG: 10 INJECTION INTRAVENOUS at 17:48

## 2019-02-12 RX ADMIN — HYDROMORPHONE HYDROCHLORIDE 0.5 MG: 1 INJECTION, SOLUTION INTRAMUSCULAR; INTRAVENOUS; SUBCUTANEOUS at 15:33

## 2019-02-12 RX ADMIN — VECURONIUM BROMIDE 3 MG: 1 INJECTION, POWDER, LYOPHILIZED, FOR SOLUTION INTRAVENOUS at 18:25

## 2019-02-12 RX ADMIN — Medication 5 MG: at 18:25

## 2019-02-12 RX ADMIN — MIDAZOLAM 2 MG: 1 INJECTION INTRAMUSCULAR; INTRAVENOUS at 17:39

## 2019-02-12 RX ADMIN — NEOSTIGMINE METHYLSULFATE 5 MG: 1 INJECTION, SOLUTION INTRAVENOUS at 21:20

## 2019-02-12 RX ADMIN — GLYCOPYRROLATE 0.2 MG: 0.2 INJECTION, SOLUTION INTRAMUSCULAR; INTRAVENOUS at 19:59

## 2019-02-12 RX ADMIN — SODIUM CHLORIDE, POTASSIUM CHLORIDE, SODIUM LACTATE AND CALCIUM CHLORIDE: 600; 310; 30; 20 INJECTION, SOLUTION INTRAVENOUS at 16:40

## 2019-02-12 RX ADMIN — VECURONIUM BROMIDE 3 MG: 1 INJECTION, POWDER, LYOPHILIZED, FOR SOLUTION INTRAVENOUS at 20:01

## 2019-02-12 RX ADMIN — HYDROMORPHONE HYDROCHLORIDE 0.5 MG: 1 INJECTION, SOLUTION INTRAMUSCULAR; INTRAVENOUS; SUBCUTANEOUS at 21:32

## 2019-02-12 RX ADMIN — FENTANYL CITRATE 50 MCG: 50 INJECTION, SOLUTION INTRAMUSCULAR; INTRAVENOUS at 20:05

## 2019-02-12 RX ADMIN — Medication 10 MEQ: at 19:42

## 2019-02-12 RX ADMIN — VECURONIUM BROMIDE 2 MG: 1 INJECTION, POWDER, LYOPHILIZED, FOR SOLUTION INTRAVENOUS at 19:47

## 2019-02-12 RX ADMIN — PROPOFOL 200 MG: 10 INJECTION, EMULSION INTRAVENOUS at 17:48

## 2019-02-12 RX ADMIN — Medication 50 MG: at 18:17

## 2019-02-12 RX ADMIN — Medication: at 22:16

## 2019-02-12 RX ADMIN — VECURONIUM BROMIDE 2 MG: 1 INJECTION, POWDER, LYOPHILIZED, FOR SOLUTION INTRAVENOUS at 19:01

## 2019-02-12 RX ADMIN — OXYCODONE HYDROCHLORIDE 10 MG: 5 TABLET ORAL at 09:28

## 2019-02-12 RX ADMIN — HYDROMORPHONE HYDROCHLORIDE 0.5 MG: 1 INJECTION, SOLUTION INTRAMUSCULAR; INTRAVENOUS; SUBCUTANEOUS at 08:22

## 2019-02-12 RX ADMIN — Medication 10 MG: at 18:17

## 2019-02-12 RX ADMIN — GLYCOPYRROLATE 1 MG: 0.2 INJECTION, SOLUTION INTRAMUSCULAR; INTRAVENOUS at 21:20

## 2019-02-12 RX ADMIN — LIDOCAINE HYDROCHLORIDE 100 MG: 20 INJECTION, SOLUTION INFILTRATION; PERINEURAL at 17:48

## 2019-02-12 RX ADMIN — OXYCODONE HYDROCHLORIDE 10 MG: 5 TABLET ORAL at 13:28

## 2019-02-12 RX ADMIN — Medication 10 MEQ: at 16:02

## 2019-02-12 RX ADMIN — HYDROMORPHONE HYDROCHLORIDE 0.5 MG: 1 INJECTION, SOLUTION INTRAMUSCULAR; INTRAVENOUS; SUBCUTANEOUS at 00:40

## 2019-02-12 RX ADMIN — MEROPENEM 500 MG: 500 INJECTION, POWDER, FOR SOLUTION INTRAVENOUS at 17:55

## 2019-02-12 RX ADMIN — HYDROMORPHONE HYDROCHLORIDE 0.5 MG: 1 INJECTION, SOLUTION INTRAMUSCULAR; INTRAVENOUS; SUBCUTANEOUS at 03:08

## 2019-02-12 RX ADMIN — Medication 10 MEQ: at 14:13

## 2019-02-12 RX ADMIN — DIAZEPAM 2 MG: 2 TABLET ORAL at 09:30

## 2019-02-12 RX ADMIN — IRBESARTAN 300 MG: 150 TABLET ORAL at 08:22

## 2019-02-12 RX ADMIN — DEXMEDETOMIDINE HYDROCHLORIDE: 100 INJECTION, SOLUTION INTRAVENOUS at 19:45

## 2019-02-12 RX ADMIN — SODIUM CHLORIDE: 9 INJECTION, SOLUTION INTRAVENOUS at 12:03

## 2019-02-12 RX ADMIN — SODIUM CHLORIDE, POTASSIUM CHLORIDE, SODIUM LACTATE AND CALCIUM CHLORIDE: 600; 310; 30; 20 INJECTION, SOLUTION INTRAVENOUS at 17:52

## 2019-02-12 ASSESSMENT — LIFESTYLE VARIABLES: TOBACCO_USE: 1

## 2019-02-12 ASSESSMENT — ACTIVITIES OF DAILY LIVING (ADL)
ADLS_ACUITY_SCORE: 13
ADLS_ACUITY_SCORE: 14
ADLS_ACUITY_SCORE: 13
ADLS_ACUITY_SCORE: 13
ADLS_ACUITY_SCORE: 14

## 2019-02-12 ASSESSMENT — COPD QUESTIONNAIRES
CAT_SEVERITY: MILD
COPD: 1

## 2019-02-12 NOTE — PLAN OF CARE
Patient here for pain management prior to surgery this evening at 1730. A&O x4. Neuros include RLE weakness and numbness. VSS. Tolerating regular diet well for breakfast, then NPO at 0900. Up with cane. PO oxycodone and IV dilaudid for low back pain. Continue to monitor and follow POC.

## 2019-02-12 NOTE — PLAN OF CARE
Pt is alert/oriented with one period of agitation about surgery being postponed. Pt now calm/cooperative. Pt continually complaining of 6-8/10 back pain, tx with prn PO oxycodone and IV dilaudid, moderate relief noted after administration. IVF running at 75 ml/hr, tolerating PO diet. Plan for pt to eat breakfast then NPO for surgery in evening. Pt refusing PCDs, bed alarm, or assistance to bathroom. No change in neuro status, pt continues to have RLE numbness/tingling. CMS intact and VSS.

## 2019-02-12 NOTE — PROGRESS NOTES
1500 - 1900 no change in assessments, see writer's prior note. Surgery postponed d/t UTI. IV antibiotic ordered and given. Oxycodone and IV Dilaudid given for pain. Pt refusing fall risk band and bed alarm, fall risk education given. Nrsg will continue to monitor.

## 2019-02-12 NOTE — PLAN OF CARE
PT: Spine surgery delayed due to UTI and requiring 24 hours of antibiotics. Plan for surgery this evening. Pt has been up in the room with nursing staff. Will complete current PT orders. Pls re-order after surgery is completed.

## 2019-02-12 NOTE — PROGRESS NOTES
"[facility]  Hospitalist Progress Note        Josseetsefani Shetty, DO  02/12/2019        Interval History:      Patient reports he is anxious. Anticipating surgery today.          Assessment and Plan:        63 year old male with HTN, glaucoma, and low testosterone level with associated osteopenia -- and 4 previous back surgeries, including an L3-5 fusion on 11/2016, and subsequent L1 compression fracture on 2/2017, who presented with back pain and progressive right leg pain with weakness to [facility] on 2/9 and observed overnight, and transferred to [facility].      Lumbar disc joe, Large Right L2-3 w Thecal Sac compression has L3-5 fusion, and now has herniated disc just above the area that was previously fused  - Neurosurgery following.      Urinary symptoms: Patient reporting urinary symptoms.   - U/a with reflex.   - Rocephin.   - Await UC.      Testosterone deficiency follows with Endocrinology at [facility] and plans to restart Testosterone replacement  - has appointment scheduled for 2/24/19     HTN: Stable.   - Irbesartan      Osteopenia     Chronic pain -- in pain clinic ([place], Dr. Sifuentes) currently not on narcotics, but previously was so he is requesting \"high doses from the start\"  - had a vape for Marijuana which he was upset was taken away   - Cymbalta   - Celebrex bid prn.     Anxiety: Patient reporting anxiety.   - Ativan prn.      Chronic right foot drop possibly related to prior herniated disc  - Monitor.      Personality issues wants his bed alarms turned off. Per report, explained it is a precautionary measure and asked him if he could cooperate with the policy.     DVT Prophylaxis: Defer to NS.      Code: Full Code     Disposition: Expected surgery this evening, possible discharge in 2-3 days.                    Physical Exam:      Heart Rate: 53    Blood pressure (!) 140/97, temperature 98  F (36.7  C), temperature source Oral, resp. rate 16, SpO2 97 %.    There " were no vitals filed for this visit.    Vital Sign Ranges  Temperature Temp  Av.3  F (36.8  C)  Min: 97.6  F (36.4  C)  Max: 98.9  F (37.2  C)   Blood pressure Systolic (24hrs), Av , Min:133 , Max:140        Diastolic (24hrs), Av, Min:68, Max:97      Pulse No Data Recorded   Respirations Resp  Av.3  Min: 16  Max: 18   Pulse oximetry SpO2  Av.3 %  Min: 95 %  Max: 98 %     Vital Signs with Ranges  Temp:  [97.6  F (36.4  C)-98.9  F (37.2  C)] 98  F (36.7  C)  Heart Rate:  [] 53  Resp:  [16-18] 16  BP: (133-140)/(68-97) 140/97  SpO2:  [95 %-98 %] 97 %    I/O Last 3 Shifts:   I/O last 3 completed shifts:  In: 1616.25 [P.O.:430; I.V.:1186.25]  Out: 575 [Urine:575]    I/O past 24 hours:     Intake/Output Summary (Last 24 hours) at 2019 0710  Last data filed at 2019 0600  Gross per 24 hour   Intake 1616.25 ml   Output 575 ml   Net 1041.25 ml     GENERAL: Alert and oriented. Anxious. Conversational, appropriate.   HEENT: Normocephalic. EOMI. No icterus or injection. Nares normal.   LUNGS: Clear to auscultation. No dyspnea at rest.   HEART: Regular rate. Extremities perfused.   ABDOMEN: Soft, nontender, and nondistended. Positive bowel sounds.   EXTREMITIES: No LE edema noted.   NEUROLOGIC: Moves extremities x4. Follows commands.          Prior to Admission Medications:        Medications Prior to Admission   Medication Sig Dispense Refill Last Dose     acetaminophen (TYLENOL) 500 MG tablet Take 500-1,000 mg by mouth every 6 hours as needed for mild pain (every other day rotate days with ibuprofen)   prn at prn     carboxymethylcellulose (REFRESH PLUS) 0.5 % SOLN Place 1 drop into both eyes daily as needed for dry eyes   prn at prn     celecoxib (CELEBREX) 100 MG capsule Take 1 capsule (100 mg) by mouth 2 times daily as needed for moderate pain 60 capsule 1 2019 at AM     DULoxetine (CYMBALTA) 20 MG capsule Take 1 capsule (20 mg) by mouth daily 30 capsule 3 2/10/2019 at AM      ibuprofen (ADVIL/MOTRIN) 200 MG capsule Take 200 mg by mouth every 4 hours as needed for fever (every other day)   prn at prn     irbesartan (AVAPRO) 300 MG tablet TAKE 1 TABLET BY MOUTH EVERY DAY 90 tablet 0 2/9/2019 at AM     latanoprost (XALATAN) 0.005 % ophthalmic solution Place 1 drop into both eyes At Bedtime   11 2/9/2019 at HS     multivitamin, therapeutic with minerals (MULTI-VITAMIN) TABS tablet Take 1 tablet by mouth daily   2/9/2019 at Unknown time     sildenafil (REVATIO) 20 MG tablet Take 1-5 tablets ( mg) by mouth daily as needed (take about 30 min before needed for ED.) Never use with nitroglycerin. 30 tablet 11 prn at prn     tiZANidine (ZANAFLEX) 4 MG tablet Take 1-1.5 tablets (4-6 mg) by mouth 3 times daily 135 tablet 1 2/9/2019 at prn            Medications:        Current Facility-Administered Medications   Medication Last Rate     sodium chloride 75 mL/hr at 02/12/19 0322     Current Facility-Administered Medications   Medication Dose Route Frequency     cefTRIAXone  1 g Intravenous Q24H     DULoxetine  20 mg Oral Daily     irbesartan  300 mg Oral Daily     latanoprost  1 drop Both Eyes At Bedtime     senna-docusate  2 tablet Oral BID    Or     senna-docusate  2 tablet Oral BID     Current Facility-Administered Medications   Medication Dose Route Frequency     acetaminophen  650 mg Oral Q4H PRN     calcium carbonate  1,000 mg Oral TID PRN     HYDROmorphone  0.3-0.5 mg Intravenous Q1H PRN     hypromellose-dextran  1 drop Both Eyes Daily PRN     melatonin  1 mg Oral At Bedtime PRN     naloxone  0.1-0.4 mg Intravenous Q2 Min PRN     ondansetron  4 mg Oral Q6H PRN    Or     ondansetron  4 mg Intravenous Q6H PRN     oxyCODONE  5-10 mg Oral Q4H PRN     polyethylene glycol  17 g Oral Daily PRN     tiZANidine  4 mg Oral TID PRN            Data:      Lab data reviewed.   Recent Labs   Lab 02/11/19  1043 02/09/19  1458   HGB  --  15.2   MCV  --  99   PLT  --  224   INR 1.22*  --    NA  --  141    POTASSIUM  --  4.1   CHLORIDE  --  108   CO2  --  26   BUN  --  20   CR  --  0.67   ANIONGAP  --  7   NOY  --  8.9   GLC  --  125*           Imaging:      Imaging data reviewed.     Dr. Josse Shetty D.O.  [facility] Hospitalist  Pager [phone]

## 2019-02-12 NOTE — ANESTHESIA PREPROCEDURE EVALUATION
Anesthesia Pre-Procedure Evaluation    Patient: Usama Harris   MRN: [id number] : 1955          Preoperative Diagnosis: LUMBAR DISC HERNIATION.    Procedure(s):  REVISION LUMBAR SPINAL FUSION  ON THE RIGHT.([place] SYSTEM, C-ARM.)    Past Medical History:   Diagnosis Date     Acute hepatitis C without mention of hepatic coma(070.51)     Hep C - Treated     Backache, unspecified      DDD (degenerative disc disease), lumbar     s/p back surgery     Glaucoma      Hypertension      Past Surgical History:   Procedure Laterality Date     BACK SURGERY      Herniated disc     CATARACT IOL, RT/LT Left      DECOMPRESSION LUMBAR ONE LEVEL Right 2016    Procedure: DECOMPRESSION LUMBAR ONE LEVEL;  Surgeon: Prince Govea MD;  Location: [facility] OR     DISCECTOMY LUMBAR POSTERIOR MICROSCOPIC ONE LEVEL Right 2016    Procedure: DISCECTOMY LUMBAR POSTERIOR MICROSCOPIC ONE LEVEL;  Surgeon: Iggy Issa MD;  Location:  OR     LAMINECT/DISCECTOMY, LUMBAR      Laminectomy/Discectomy Cervical     LAMINECTOMY, FUSION LUMBAR TWO LEVELS, COMBINED Right 11/15/2016    Procedure: COMBINED LAMINECTOMY, FUSION LUMBAR TWO LEVELS;  Surgeon: Iggy Issa MD;  Location:  OR     SURGICAL HISTORY OF -       ligation of left spermatic vein       Anesthesia Evaluation     . Pt has had prior anesthetic. Type: General    No history of anesthetic complications          ROS/MED HX    ENT/Pulmonary:     (+)CHUYITA risk factors snores loudly, hypertension, tobacco use, Past use mild COPD, , . .    Neurologic:       Cardiovascular:     (+) hypertension----. : . . . :. . Previous cardiac testing date:results:date: results:ECG reviewed date:2/10/19 results:Sinus kalyani date: results:          METS/Exercise Tolerance:  >4 METS   Hematologic:        (-) history of blood clots and anemia   Musculoskeletal:   (+) arthritis, , , other musculoskeletal-       GI/Hepatic:     (+) hepatitis (Cured) type C, liver disease,   "    (-) GERD   Renal/Genitourinary:         Endo:     (+) Obesity, .      Psychiatric:     (+) psychiatric history anxiety      Infectious Disease:         Malignancy:         Other:    (+) H/O Chronic Pain,                        Physical Exam  Normal systems: cardiovascular, pulmonary and dental    Airway   Mallampati: II  TM distance: >3 FB  Neck ROM: full    Dental   (+) caps    Cardiovascular       Pulmonary             Lab Results   Component Value Date    WBC 12.5 (H) 02/12/2019    HGB 14.9 02/12/2019    HCT 45.6 02/12/2019     02/12/2019     02/12/2019    POTASSIUM 3.2 (L) 02/12/2019    CHLORIDE 104 02/12/2019    CO2 27 02/12/2019    BUN 20 02/12/2019    CR 0.78 02/12/2019     (H) 02/12/2019    NOY 8.5 02/12/2019    PHOS 2.0 (L) 01/28/2019    ALBUMIN 3.8 01/28/2019    PROTTOTAL 7.6 11/13/2018    ALT 45 11/13/2018    AST 23 11/13/2018    ALKPHOS 80 11/13/2018    BILITOTAL 0.5 11/13/2018    SOFIA 18 10/10/2008    PTT 24 11/10/2016    INR 1.22 (H) 02/11/2019    TSH 0.72 11/13/2018    T4 0.86 12/15/2014       Preop Vitals  BP Readings from Last 3 Encounters:   02/12/19 (!) 152/95   02/10/19 116/78   01/24/19 (!) 149/91    Pulse Readings from Last 3 Encounters:   01/24/19 91   01/14/19 82   11/13/18 78      Resp Readings from Last 3 Encounters:   02/12/19 16   02/10/19 18   11/13/18 16    SpO2 Readings from Last 3 Encounters:   02/12/19 95%   02/10/19 93%   01/14/19 98%      Temp Readings from Last 1 Encounters:   02/12/19 37.6  C (99.6  F) (Oral)    Ht Readings from Last 1 Encounters:   02/09/19 1.88 m (6' 2\")      Wt Readings from Last 1 Encounters:   02/09/19 113.3 kg (249 lb 12.8 oz)    Estimated body mass index is 32.07 kg/m  as calculated from the following:    Height as of 2/9/19: 1.88 m (6' 2\").    Weight as of 2/9/19: 113.3 kg (249 lb 12.8 oz).       Anesthesia Plan      History & Physical Review  History and physical reviewed and following examination; no interval change.    ASA " Status:  3 .    NPO Status:  > 8 hours    Plan for General and ETT with Intravenous and Propofol induction. Maintenance will be Balanced.      Pt c chronic pain, plan for Precedex gtt as well as Ketamine.      Postoperative Care  Postoperative pain management:  IV analgesics, Oral pain medications and Multi-modal analgesia.      Consents  Anesthetic plan, risks, benefits and alternatives discussed with:  Patient.  Use of blood products discussed: Yes.   Use of blood products discussed with Patient.  Consented to blood products.  .                 Rome Mcfadden MD

## 2019-02-12 NOTE — PLAN OF CARE
VSS, reports frequent pain to lower back. Numbness and tingling RLE at all times. Neuros intact except as noted. A+Ox4. Irritable and argumentative regarding med administration. Patient laughing on telephone, but states he has unbearable pain. Refuses bed alarm, ambulates to bathroom. Surgery late tomorrow afternoon.

## 2019-02-13 ENCOUNTER — APPOINTMENT (OUTPATIENT)
Dept: PHYSICAL THERAPY | Facility: CLINIC | Age: 64
DRG: 454 | End: 2019-02-13
Attending: PHYSICIAN ASSISTANT
Payer: COMMERCIAL

## 2019-02-13 LAB
ANION GAP SERPL CALCULATED.3IONS-SCNC: 8 MMOL/L (ref 3–14)
BACTERIA SPEC CULT: ABNORMAL
BUN SERPL-MCNC: 11 MG/DL (ref 7–30)
CALCIUM SERPL-MCNC: 7.7 MG/DL (ref 8.5–10.1)
CHLORIDE SERPL-SCNC: 104 MMOL/L (ref 94–109)
CO2 SERPL-SCNC: 27 MMOL/L (ref 20–32)
CREAT SERPL-MCNC: 0.73 MG/DL (ref 0.66–1.25)
ERYTHROCYTE [DISTWIDTH] IN BLOOD BY AUTOMATED COUNT: 13.5 % (ref 10–15)
GFR SERPL CREATININE-BSD FRML MDRD: >90 ML/MIN/{1.73_M2}
GLUCOSE SERPL-MCNC: 103 MG/DL (ref 70–99)
HCT VFR BLD AUTO: 36.4 % (ref 40–53)
HCT VFR BLD CALC: 38 %PCV (ref 40–53)
HCT VFR BLD CALC: 38 %PCV (ref 40–53)
HGB BLD CALC-MCNC: 12.9 G/DL (ref 13.3–17.7)
HGB BLD CALC-MCNC: 12.9 G/DL (ref 13.3–17.7)
HGB BLD-MCNC: 12.1 G/DL (ref 13.3–17.7)
INTERPRETATION ECG - MUSE: NORMAL
Lab: ABNORMAL
MCH RBC QN AUTO: 31.9 PG (ref 26.5–33)
MCHC RBC AUTO-ENTMCNC: 33.2 G/DL (ref 31.5–36.5)
MCV RBC AUTO: 96 FL (ref 78–100)
PLATELET # BLD AUTO: 161 10E9/L (ref 150–450)
POTASSIUM BLD-SCNC: 4.3 MMOL/L (ref 3.4–5.3)
POTASSIUM BLD-SCNC: 5.8 MMOL/L (ref 3.4–5.3)
POTASSIUM SERPL-SCNC: 4 MMOL/L (ref 3.4–5.3)
RBC # BLD AUTO: 3.79 10E12/L (ref 4.4–5.9)
SODIUM BLD-SCNC: 137 MMOL/L (ref 133–144)
SODIUM BLD-SCNC: 138 MMOL/L (ref 133–144)
SODIUM SERPL-SCNC: 139 MMOL/L (ref 133–144)
SPECIMEN SOURCE: ABNORMAL
WBC # BLD AUTO: 6.4 10E9/L (ref 4–11)

## 2019-02-13 PROCEDURE — 97530 THERAPEUTIC ACTIVITIES: CPT | Mod: GP | Performed by: PHYSICAL THERAPIST

## 2019-02-13 PROCEDURE — 97116 GAIT TRAINING THERAPY: CPT | Mod: GP | Performed by: PHYSICAL THERAPIST

## 2019-02-13 PROCEDURE — 25000132 ZZH RX MED GY IP 250 OP 250 PS 637: Performed by: INTERNAL MEDICINE

## 2019-02-13 PROCEDURE — 99232 SBSQ HOSP IP/OBS MODERATE 35: CPT | Performed by: HOSPITALIST

## 2019-02-13 PROCEDURE — 25000128 H RX IP 250 OP 636: Performed by: HOSPITALIST

## 2019-02-13 PROCEDURE — 25000132 ZZH RX MED GY IP 250 OP 250 PS 637: Performed by: PHYSICIAN ASSISTANT

## 2019-02-13 PROCEDURE — 80048 BASIC METABOLIC PNL TOTAL CA: CPT | Performed by: PHYSICIAN ASSISTANT

## 2019-02-13 PROCEDURE — 25000132 ZZH RX MED GY IP 250 OP 250 PS 637: Performed by: HOSPITALIST

## 2019-02-13 PROCEDURE — 25800030 ZZH RX IP 258 OP 636: Performed by: ANESTHESIOLOGY

## 2019-02-13 PROCEDURE — 12000000 ZZH R&B MED SURG/OB

## 2019-02-13 PROCEDURE — 97162 PT EVAL MOD COMPLEX 30 MIN: CPT | Mod: GP | Performed by: PHYSICAL THERAPIST

## 2019-02-13 PROCEDURE — 36415 COLL VENOUS BLD VENIPUNCTURE: CPT | Performed by: PHYSICIAN ASSISTANT

## 2019-02-13 PROCEDURE — 85027 COMPLETE CBC AUTOMATED: CPT | Performed by: PHYSICIAN ASSISTANT

## 2019-02-13 RX ORDER — AMPICILLIN AND SULBACTAM 2; 1 G/1; G/1
3 INJECTION, POWDER, FOR SOLUTION INTRAMUSCULAR; INTRAVENOUS EVERY 6 HOURS
Status: DISCONTINUED | OUTPATIENT
Start: 2019-02-13 | End: 2019-02-13

## 2019-02-13 RX ORDER — CEFTRIAXONE 1 G/1
1 INJECTION, POWDER, FOR SOLUTION INTRAMUSCULAR; INTRAVENOUS EVERY 24 HOURS
Status: DISCONTINUED | OUTPATIENT
Start: 2019-02-13 | End: 2019-02-13

## 2019-02-13 RX ORDER — AMPICILLIN AND SULBACTAM 2; 1 G/1; G/1
3 INJECTION, POWDER, FOR SOLUTION INTRAMUSCULAR; INTRAVENOUS EVERY 6 HOURS
Status: DISCONTINUED | OUTPATIENT
Start: 2019-02-13 | End: 2019-02-15

## 2019-02-13 RX ORDER — MEROPENEM 1 G/1
1 INJECTION, POWDER, FOR SOLUTION INTRAVENOUS EVERY 24 HOURS
Status: DISCONTINUED | OUTPATIENT
Start: 2019-02-13 | End: 2019-02-13

## 2019-02-13 RX ADMIN — AMPICILLIN SODIUM AND SULBACTAM SODIUM 3 G: 2; 1 INJECTION, POWDER, FOR SOLUTION INTRAMUSCULAR; INTRAVENOUS at 21:39

## 2019-02-13 RX ADMIN — LATANOPROST 1 DROP: 50 SOLUTION OPHTHALMIC at 21:42

## 2019-02-13 RX ADMIN — SENNOSIDES AND DOCUSATE SODIUM 2 TABLET: 8.6; 5 TABLET ORAL at 20:42

## 2019-02-13 RX ADMIN — SENNOSIDES AND DOCUSATE SODIUM 2 TABLET: 8.6; 5 TABLET ORAL at 08:02

## 2019-02-13 RX ADMIN — LORAZEPAM 0.5 MG: 0.5 TABLET ORAL at 04:03

## 2019-02-13 RX ADMIN — RANITIDINE 150 MG: 150 TABLET ORAL at 22:35

## 2019-02-13 RX ADMIN — CALCIUM CARBONATE (ANTACID) CHEW TAB 500 MG 1000 MG: 500 CHEW TAB at 08:40

## 2019-02-13 RX ADMIN — DULOXETINE HYDROCHLORIDE 20 MG: 20 CAPSULE, DELAYED RELEASE ORAL at 08:02

## 2019-02-13 RX ADMIN — SODIUM CHLORIDE, POTASSIUM CHLORIDE, SODIUM LACTATE AND CALCIUM CHLORIDE: 600; 310; 30; 20 INJECTION, SOLUTION INTRAVENOUS at 02:14

## 2019-02-13 RX ADMIN — CEFTRIAXONE SODIUM 1 G: 1 INJECTION, POWDER, FOR SOLUTION INTRAMUSCULAR; INTRAVENOUS at 15:35

## 2019-02-13 RX ADMIN — IRBESARTAN 300 MG: 150 TABLET ORAL at 08:02

## 2019-02-13 ASSESSMENT — ACTIVITIES OF DAILY LIVING (ADL)
ADLS_ACUITY_SCORE: 18
ADLS_ACUITY_SCORE: 19
ADLS_ACUITY_SCORE: 16
ADLS_ACUITY_SCORE: 19
ADLS_ACUITY_SCORE: 15
ADLS_ACUITY_SCORE: 16

## 2019-02-13 NOTE — ANESTHESIA CARE TRANSFER NOTE
Patient: Usama Harris    Procedure(s):  REVISION L2-L5 POSTERIOR SPINAL FUSION; RIGHT L2-L3 TRANSFORAMINAL INTERBODY FUSION    Diagnosis: LUMBAR DISC HERNIATION.  Diagnosis Additional Information: No value filed.    Anesthesia Type:   General, ETT     Note:  Airway :Face Mask  Patient transferred to:PACU  Comments: Level of Conscious:Asleep, responds to stimuli  Vital Signs   BP:116/62   HR:54   RR:20   O2 Saturation:97   Oxygen LPM:8   Temp:98.3  Dentition:Unchanged from preop  Patient Status:Stable  Report to PACU RN.Handoff Report: Identifed the Patient, Identified the Reponsible Provider, Reviewed the pertinent medical history, Discussed the surgical course, Reviewed Intra-OP anesthesia mangement and issues during anesthesia, Set expectations for post-procedure period and Allowed opportunity for questions and acknowledgement of understanding      Vitals: (Last set prior to Anesthesia Care Transfer)    CRNA VITALS  2/12/2019 2117 - 2/12/2019 2155      2/12/2019             NIBP:  94/62    Pulse:  73    NIBP Mean:  71    SpO2:  93 %    Resp Rate (observed):  4  (Abnormal)     Resp Rate (set):  10                Electronically Signed By: Christine Marie Volp Hodgkins, CRNA, APRN CRNA  February 12, 2019  9:55 PM

## 2019-02-13 NOTE — PROGRESS NOTES
02/13/19 1000   Quick Adds   Type of Visit Initial PT Evaluation   Living Environment   Lives With alone   Living Arrangements house   Home Accessibility stairs within home;stairs to enter home   Number of Stairs, Main Entrance three   Stair Railings, Main Entrance railings safe and in good condition   Number of Stairs, Within Home, Primary ten   Stair Railings, Within Home, Primary railings safe and in good condition   Transportation Anticipated car, drives self   Self-Care   Equipment Currently Used at Home cane, straight   Activity/Exercise/Self-Care Comment Pt I with all ADL's and mob until recent LE weakness with several fall, resulting in surgery   Functional Level Prior   Ambulation 0-->independent   Transferring 0-->independent   Toileting 0-->independent   Bathing 0-->independent   Communication 0-->understands/communicates without difficulty   Swallowing 0-->swallows foods/liquids without difficulty   Cognition 0 - no cognition issues reported   Fall history within last six months yes   Number of times patient has fallen within last six months 5   Which of the above functional risks had a recent onset or change? ambulation   General Information   Onset of Illness/Injury or Date of Surgery - Date 02/12/19   Referring Physician Iggy Issa   Patient/Family Goals Statement pt plans on dc tohome   Pertinent History of Current Problem (include personal factors and/or comorbidities that impact the POC) s/p spine surgery   Precautions/Limitations spinal precautions   Weight-Bearing Status - LUE full weight-bearing   Weight-Bearing Status - RUE full weight-bearing   Weight-Bearing Status - LLE full weight-bearing   Weight-Bearing Status - RLE full weight-bearing   General Observations Pt    Cognitive Status Examination   Orientation orientation to person, place and time   Level of Consciousness alert   Follows Commands and Answers Questions able to follow single-step instructions   Personal Safety and Judgment at  "risk behaviors demonstrated;impulsive;impaired  (pt moves quickly, not waiting on instructions or prep)   Pain Assessment   Patient Currently in Pain Yes, see Vital Sign flowsheet  (8/10)   Integumentary/Edema   Integumentary/Edema Comments per surgery   Range of Motion (ROM)   ROM Comment trunk limited ROM post op   Strength   Strength Comments GAY Bazzi decreased strength, does not lift anti gravity, reports previous buckling   Bed Mobility   Bed Mobility Comments bed mob with min A and cues for roll tech, use of rail   Transfer Skills   Transfer Comments sit to stand with CGA and FWW   Gait   Gait Comments amb with CGA and FWW 50'    Balance   Balance Comments impaired balance standing dynamic requires B UE support   General Therapy Interventions   Planned Therapy Interventions bed mobility training;gait training;transfer training;progressive activity/exercise;home program guidelines   Clinical Impression   Criteria for Skilled Therapeutic Intervention yes, treatment indicated   PT Diagnosis difficulty transferring and amb   Influenced by the following impairments post op pain, decreased ROM, strength, balance and act nitish, impaired safety with impulsive behavior, pain, spine restrictions   Functional limitations due to impairments impaired functional mob I and safety   Clinical Presentation Evolving/Changing   Clinical Presentation Rationale 3-5 deficits   Clinical Decision Making (Complexity) Moderate complexity   Therapy Frequency` 2 times/day   Predicted Duration of Therapy Intervention (days/wks) 2 days   Anticipated Discharge Disposition Home   Risk & Benefits of therapy have been explained Yes   Patient, Family & other staff in agreement with plan of care Yes   [facility] [employer] [facility] TM \"6 Clicks\"   2016, Trustees of [facility], under license to [employer].  All rights reserved.   6 Clicks Short Forms Basic Mobility Inpatient Short Form   [facility] AM-PAC  \"6 Clicks\" V.2 Basic Mobility " Inpatient Short Form   1. Turning from your back to your side while in a flat bed without using bedrails? 3 - A Little   2. Moving from lying on your back to sitting on the side of a flat bed without using bedrails? 3 - A Little   3. Moving to and from a bed to a chair (including a wheelchair)? 4 - None   4. Standing up from a chair using your arms (e.g., wheelchair, or bedside chair)? 4 - None   5. To walk in hospital room? 4 - None   6. Climbing 3-5 steps with a railing? 4 - None   Basic Mobility Raw Score (Score out of 24.Lower scores equate to lower levels of function) 22   Total Evaluation Time   Total Evaluation Time (Minutes) 13

## 2019-02-13 NOTE — PLAN OF CARE
POD 1 foe L2 -L5 Fusion.A&OX4.VSS except bradycardia at times.Pain managed with PCA dilaudid CMS intact except RLE numbness and weakness. Patient was very restless and anxious overnight 0.5 MG ativan given.Incision with moderate amount drainage marked no change.On bed rest for this shift.Turned and repositioned x2 hours.Patient refused capno monitor as it was beeping frequently with low HR while sleeping, changed to Masimo pulse oxy monitor, one time RN noticed patient shut down the monitor by himself ,RN given education on need of monitoring over night .BS hypo active passing gas.Cedeño intact  drain intact with 120 ml output this shift    0600; RN was doing PCA shift clearence then patient asking RN to increase the dose and he rated pain 10/10 PCA increased to [id number] per order.whole shift patient rated pain 5/10 and looks comfortable when RN asked about pain

## 2019-02-13 NOTE — PLAN OF CARE
Discharge Planner PT   Patient plan for discharge: home  Current status: PT eval completed, treatment initiated, pt admitted for spine surgery.  PT previously I at home lives in upper level of duplex, 10 steps to enter with 1 rail.  Pt recently using SEC due to R LE weakness and numbness with recent falls.  Pt currently able to complete bed mob with min A and cues for rolling, use of rail, SBA for sit to stand, impulsive and not waiting for prep or instructions, amb with FWW with '.  Able to complete steps with B rails and CGA, cues for sequencing and safety.  PT argumentive, accusatory and insulting to PT.  Multiple c/o of room and care, attitude improved some as treatment progressed.  Barriers to return to prior living situation: stairs to enter, A for mob  Recommendations for discharge: home  Rationale for recommendations: Anticipate with cont therapy pt will reach goals to safely discharge to home from a mob standpoint.       Entered by: ANIRUDH LANDRY 02/13/2019 12:35 PM

## 2019-02-13 NOTE — BRIEF OP NOTE
[facility] Brief Operative Note    Pre-operative diagnosis: LUMBAR DISC HERNIATION.   Post-operative diagnosis same   Procedure: Procedure(s):  REVISION L2-L5 POSTERIOR SPINAL FUSION; RIGHT L2-L3 TRANSFORAMINAL INTERBODY FUSION   Surgeon(s): Surgeon(s) and Role:     * Iggy Issa MD - Primary     * Afsaneh Villareal PA-C - Assisting   Estimated blood loss: 900 mL    Specimens: * No specimens in log *   Findings: See op note

## 2019-02-13 NOTE — PROGRESS NOTES
"[facility]    Neurosurgery Progress Note    Date of Service (when I saw the patient): 02/13/2019     Assessment & Plan   Usama Harris is a 63-year-old male with prior back surgeries, including L3-5 fusion He presented to [facility] with  severe back and right leg pain as well as right leg weakness and was transferred to [facility].  Imaging showed L2-3 degeneration and large right paracentral disc herniation with L2-3 severe central stenosis.  He underwent removal revision of L2-5 posterior spinal fusion and right L2-3 TLIF with Dr. Iggy Issa on 2/12/19.  This morning he is walking the hallways with therapy, with walker.  He states walking \"felt good\". He describes his main pain in the lower back.  He states the N/T in the right thigh is improved by \"50%\" with some ongoing N/T in the distal right leg, stopping at the ankle.  He denies nausea and is tolerating diet.  We discussed plan for today for pain control and mobilized.  We will transition off of PCA and he is in agreement.  He has had urinary symptoms for UTI, hospitalist has seen pt, U/A with reflex ordered.  Patient antibiotic therapies for UTI per hospitalist based on sensitivities.  Pt to remain on IV antibiotics during hospital admit with APARNA in place.      Principal Problem:    Lumbar disc joe, Large Right L2-3 w Thecal Sac compression    Assessment: Day 1 post lumbar fusion    Plan:   Antibiotic therapy for UTI per hospitalist, remain  PT/OT and ambulation  Wean off PCA and start oral analgesics   Encourage use of IS and deep breathing   Keep drain in until less than 30 cc's   Suture/Staple removal 10-14 days post surgery          I have discussed the following assessment and plan with Dr. Issa who is in agreement with initial plan and will follow up with further consultation recommendations.    [facility]  [facility]  [address]  [address] [place] [address]    Tel " [phone]  Pager [phone]    Interval History   Day 1 post op    Physical Exam   Temp: 97.5  F (36.4  C) Temp src: Oral BP: 138/85 Pulse: 54 Heart Rate: 61 Resp: 16 SpO2: 98 % O2 Device: Nasal cannula Oxygen Delivery: 2 LPM  There were no vitals filed for this visit.  Vital Signs with Ranges  Temp:  [97.1  F (36.2  C)-99.6  F (37.6  C)] 97.5  F (36.4  C)  Pulse:  [50-64] 54  Heart Rate:  [45-62] 61  Resp:  [10-21] 16  BP: (102-157)/(55-96) 138/85  SpO2:  [90 %-98 %] 98 %  I/O last 3 completed shifts:  In: 2540 [P.O.:240; I.V.:2300]  Out: 4310 [Urine:3200; Drains:210; Blood:900]    Heart Rate: 61, Blood pressure 138/85, pulse 54, temperature 97.5  F (36.4  C), temperature source Oral, resp. rate 16, SpO2 98 %.  0 lbs 0 oz  HEENT:  Normocephalic, atraumatic.   Heart:  No peripheral edema  Lungs:  No SOB  Skin:  Warm and dry, good capillary refill. Incision covered with dressing, moderate amount of drainage.  APARNA x1 intact.  Extremities:  Good radial and dorsalis pedis pulses bilaterally, no edema, cyanosis or clubbing.    NEUROLOGICAL EXAMINATION:     Mental status:  Alert and Oriented x 3, speech is fluent.  Cranial nerves:  II-XII intact.   Motor:  Strength is 5/5 throughout the upper  Extremities.    DF: Right 5/5, Left 4/5  PF:  Right 5/5, Left, 4/5    Sensation:  Intact to light touch  Gait: Stable with assist.     Medications     HYDROmorphone       lactated ringers 100 mL/hr at 02/13/19 0214     lactated ringers 25 mL/hr at 02/12/19 1640     sodium chloride Stopped (02/12/19 1630)       cefTRIAXone  1 g Intravenous Q24H     - MEDICATION INSTRUCTIONS -   Does not apply Once     DULoxetine  20 mg Oral Daily     ranitidine  150 mg Oral Q12H    Or     famotidine  20 mg Intravenous Q12H     irbesartan  300 mg Oral Daily     latanoprost  1 drop Both Eyes At Bedtime     meropenem  1 g Intravenous Q24H     senna-docusate  1 tablet Oral BID    Or     senna-docusate  2 tablet Oral BID     sodium chloride (PF)  3 mL  Intracatheter Q8H       Data     CBC RESULTS:   Recent Labs   Lab Test 02/13/19  0742   WBC 6.4   RBC 3.79*   HGB 12.1*   HCT 36.4*   MCV 96   MCH 31.9   MCHC 33.2   RDW 13.5        Basic Metabolic Panel:  Lab Results   Component Value Date     02/13/2019      Lab Results   Component Value Date    POTASSIUM 4.0 02/13/2019     Lab Results   Component Value Date    CHLORIDE 104 02/13/2019     Lab Results   Component Value Date    NOY 7.7 02/13/2019     Lab Results   Component Value Date    CO2 27 02/13/2019     Lab Results   Component Value Date    BUN 11 02/13/2019     Lab Results   Component Value Date    CR 0.73 02/13/2019     Lab Results   Component Value Date     02/13/2019     INR:  Lab Results   Component Value Date    INR 1.22 02/11/2019    INR 0.98 11/10/2016    INR 0.94 12/20/2012    INR 0.99 06/22/2012    INR 1.03 09/20/2007

## 2019-02-13 NOTE — ANESTHESIA POSTPROCEDURE EVALUATION
Patient: Usama Harris    Procedure(s):  REVISION L2-L5 POSTERIOR SPINAL FUSION; RIGHT L2-L3 TRANSFORAMINAL INTERBODY FUSION    Diagnosis:LUMBAR DISC HERNIATION.  Diagnosis Additional Information: No value filed.    Anesthesia Type:  General, ETT    Note:  Anesthesia Post Evaluation    Patient location during evaluation: PACU  Patient participation: Able to fully participate in evaluation  Level of consciousness: awake  Pain management: adequate  Airway patency: patent  Cardiovascular status: acceptable  Respiratory status: acceptable  Hydration status: acceptable  PONV: none             Last vitals:  Vitals:    02/12/19 2200 02/12/19 2210 02/12/19 2220   BP: 102/55 114/73 110/72   Pulse: 64 56 52   Resp: 10 16 21   Temp:      SpO2: 92% 90% 90%         Electronically Signed By: Rome Mcfadden MD  February 12, 2019  10:39 PM

## 2019-02-13 NOTE — PLAN OF CARE
Pt down to preop at 1625. A&O x4. Neuros intact, RLE weakness and numbness. AVSS. LS clear. BS active, passing flatus. NPO for surgery. Up with cane. Pain controlled with oxycodone and IV dilaudid. Replacing potassium.

## 2019-02-13 NOTE — PROGRESS NOTES
"[facility]  Hospitalist Progress Note        Josse Shetty, DO  02/13/2019        Interval History:      Patient states that he is feeling well. States pain is controlled.          Assessment and Plan:        63 year old male with HTN, glaucoma, and low testosterone level with associated osteopenia -- and 4 previous back surgeries, including an L3-5 fusion on 11/2016, and subsequent L1 compression fracture on 2/2017, who presented with back pain and progressive right leg pain with weakness to [facility] on 2/9 and observed overnight, and transferred to [facility].      Lumbar disc joe, Large Right L2-3 w Thecal Sac compression has L3-5 fusion, and now has herniated disc just above the area that was previously fused  - Neurosurgery following.   - Pain control and prophylaxis per Surgery.      Urinary symptoms: Patient reporting urinary symptoms.   - Unasyn.   - Urine culture with Enterococcus faecalis, sensitive to PCN and ampicillin.      Testosterone deficiency follows with Endocrinology at [facility] and plans to restart Testosterone replacement  - has appointment scheduled for 2/24/19     HTN: Stable.   - Irbesartan      Osteopenia     Chronic pain -- in pain clinic ([place], Dr. Sifuentes) currently not on narcotics, but previously was so he is requesting \"high doses from the start\"  - had a vape for Marijuana which he was upset was taken away   - Cymbalta   - Celebrex bid prn.      Anxiety: Patient reporting anxiety.   - Ativan prn.      Chronic right foot drop possibly related to prior herniated disc  - Monitor.      Personality issues wants his bed alarms turned off. Per report, explained it is a precautionary measure and asked him if he could cooperate with the policy.     DVT Prophylaxis: Defer to NS.      Code: Full Code     Disposition: Expected surgery this evening, possible discharge in 1-2 days.                    Physical Exam:      Heart Rate: 61    Blood pressure " 138/85, pulse 54, temperature 97.5  F (36.4  C), temperature source Oral, resp. rate 16, SpO2 98 %.    There were no vitals filed for this visit.    Vital Sign Ranges  Temperature Temp  Av.4  F (36.9  C)  Min: 97.1  F (36.2  C)  Max: 99.6  F (37.6  C)   Blood pressure Systolic (24hrs), Av , Min:102 , Max:157        Diastolic (24hrs), Av, Min:55, Max:96      Pulse Pulse  Av.3  Min: 50  Max: 64   Respirations Resp  Avg: 15.5  Min: 10  Max: 21   Pulse oximetry SpO2  Av.5 %  Min: 90 %  Max: 98 %     Vital Signs with Ranges  Temp:  [97.1  F (36.2  C)-99.6  F (37.6  C)] 97.5  F (36.4  C)  Pulse:  [50-64] 54  Heart Rate:  [45-62] 61  Resp:  [10-21] 16  BP: (102-157)/(55-96) 138/85  SpO2:  [90 %-98 %] 98 %    I/O Last 3 Shifts:   I/O last 3 completed shifts:  In: 2540 [P.O.:240; I.V.:2300]  Out: 4310 [Urine:3200; Drains:210; Blood:900]    I/O past 24 hours:     Intake/Output Summary (Last 24 hours) at 2019 0921  Last data filed at 2019 0904  Gross per 24 hour   Intake 2420 ml   Output 4110 ml   Net -1690 ml     GENERAL: Alert and oriented. Anxious. Conversational, appropriate.   HEENT: Normocephalic. EOMI. No icterus or injection. Nares normal.   LUNGS: Clear to auscultation. No dyspnea at rest.   HEART: Regular rate. Extremities perfused.   ABDOMEN: Soft, nontender, and nondistended. Positive bowel sounds.   EXTREMITIES: No LE edema noted.   NEUROLOGIC: Moves extremities x4. Follows commands.          Prior to Admission Medications:        Medications Prior to Admission   Medication Sig Dispense Refill Last Dose     acetaminophen (TYLENOL) 500 MG tablet Take 500-1,000 mg by mouth every 6 hours as needed for mild pain (every other day rotate days with ibuprofen)   prn at prn     carboxymethylcellulose (REFRESH PLUS) 0.5 % SOLN Place 1 drop into both eyes daily as needed for dry eyes   prn at prn     celecoxib (CELEBREX) 100 MG capsule Take 1 capsule (100 mg) by mouth 2 times daily as needed  for moderate pain 60 capsule 1 2/9/2019 at AM     DULoxetine (CYMBALTA) 20 MG capsule Take 1 capsule (20 mg) by mouth daily 30 capsule 3 2/10/2019 at AM     ibuprofen (ADVIL/MOTRIN) 200 MG capsule Take 200 mg by mouth every 4 hours as needed for fever (every other day)   prn at prn     irbesartan (AVAPRO) 300 MG tablet TAKE 1 TABLET BY MOUTH EVERY DAY 90 tablet 0 2/9/2019 at AM     latanoprost (XALATAN) 0.005 % ophthalmic solution Place 1 drop into both eyes At Bedtime   11 2/9/2019 at HS     multivitamin, therapeutic with minerals (MULTI-VITAMIN) TABS tablet Take 1 tablet by mouth daily   2/9/2019 at Unknown time     sildenafil (REVATIO) 20 MG tablet Take 1-5 tablets ( mg) by mouth daily as needed (take about 30 min before needed for ED.) Never use with nitroglycerin. 30 tablet 11 prn at prn     tiZANidine (ZANAFLEX) 4 MG tablet Take 1-1.5 tablets (4-6 mg) by mouth 3 times daily 135 tablet 1 2/9/2019 at prn            Medications:        Current Facility-Administered Medications   Medication Last Rate     HYDROmorphone       lactated ringers 100 mL/hr at 02/13/19 0214     lactated ringers 25 mL/hr at 02/12/19 1640     sodium chloride Stopped (02/12/19 1630)     Current Facility-Administered Medications   Medication Dose Route Frequency     cefTRIAXone  1 g Intravenous Q24H     - MEDICATION INSTRUCTIONS -   Does not apply Once     DULoxetine  20 mg Oral Daily     ranitidine  150 mg Oral Q12H    Or     famotidine  20 mg Intravenous Q12H     irbesartan  300 mg Oral Daily     latanoprost  1 drop Both Eyes At Bedtime     meropenem  500 mg Intravenous Q24H     senna-docusate  1 tablet Oral BID    Or     senna-docusate  2 tablet Oral BID     sodium chloride (PF)  3 mL Intracatheter Q8H     Current Facility-Administered Medications   Medication Dose Route Frequency     acetaminophen  650 mg Oral Q4H PRN     calcium carbonate  1,000 mg Oral 4x Daily PRN     hydrOXYzine  25 mg Oral Q6H PRN     hypromellose-dextran  1  drop Both Eyes Daily PRN     lidocaine 4%   Topical Q1H PRN     lidocaine (buffered or not buffered)  0.1-1 mL Other Q1H PRN     LORazepam  0.5-1 mg Oral Q4H PRN     magnesium sulfate  4 g Intravenous Q4H PRN     melatonin  1 mg Oral At Bedtime PRN     metoclopramide  10 mg Oral Q6H PRN    Or     metoclopramide  10 mg Intravenous Q6H PRN     naloxone  0.1-0.4 mg Intravenous Q2 Min PRN     naloxone  0.1-0.4 mg Intravenous Q2 Min PRN     ondansetron  4 mg Oral Q6H PRN    Or     ondansetron  4 mg Intravenous Q6H PRN     oxyCODONE  5-10 mg Oral Q4H PRN     polyethylene glycol  17 g Oral Daily PRN     potassium chloride  20-40 mEq Oral or Feeding Tube Q2H PRN     potassium chloride with lidocaine  10 mEq Intravenous Q1H PRN     potassium chloride  10 mEq Intravenous Q1H PRN     potassium chloride  20 mEq Intravenous Q1H PRN     potassium chloride  20-40 mEq Oral Q2H PRN     prochlorperazine  10 mg Intravenous Q6H PRN    Or     prochlorperazine  10 mg Oral Q6H PRN     sodium chloride (PF)  3 mL Intracatheter q1 min prn     tiZANidine  4 mg Oral TID PRN            Data:      Lab data reviewed.   Recent Labs   Lab 02/13/19  0742 02/12/19  2121 02/12/19  2101 02/12/19  0756 02/11/19  1043 02/09/19  1458   HGB 12.1* 12.9* 12.9* 14.9  --  15.2   MCV 96  --   --  97  --  99     --   --  203  --  224   INR  --   --   --   --  1.22*  --     138 137 139  --  141   POTASSIUM 4.0 4.3 5.8* 3.2*  --  4.1   CHLORIDE 104  --   --  104  --  108   CO2 27  --   --  27  --  26   BUN 11  --   --  20  --  20   CR 0.73  --   --  0.78  --  0.67   ANIONGAP 8  --   --  8  --  7   NOY 7.7*  --   --  8.5  --  8.9   *  --   --  103*  --  125*           Imaging:      Imaging data reviewed.     Dr. Josse Shetty D.O.  [facility]  Pager [phone]

## 2019-02-13 NOTE — OP NOTE
Date of surgery: 2/12/2019  Surgeon: Iggy Issa MD  Assistant: TOAN Hirsch  Note: Afsaneh Villareal was present for and assisted with the entire surgery, and his/her role as an assistant was crucial for aid in positioning, exposure, suctioning, retraction, and closure      Preoperative diagnosis: History of prior L3-5 fusion, Adjacent segment degeneration and large disc herniation  Postoperative diagnosis: History of prior L3-5 fusion, Adjacent segment degeneration and large disc herniation     Procedure:  1.  Removal of L3-5 bilateral set screws and rods  2.  Exploration of prior L3-5 fusion  3.  L2-L5 posterior segmental instrumentation with insertion of bilateral pedicle screws and rods  4.  L2-3 bilateral laminectomies for decompression of stenosis, and right complete facetectomy  5.  Right L2-3 complete facetectomy, transforaminal discectomy, and interbody arthrodesis  6.  L2-3 insertion of [place] Tritanium intervertebral graft with autograft and allograft  7.  L2-3, L3-4, and L4-5 posterolateral arthrodesis and fusion with autograft and allograft  8.  Use of intraoperative microscope and fluoroscopy  9.  Sabre O-arm intraoperative CT scan  10.  Use of [employer] Stealth neuro-navigation      EBL: 750 mL      Indications: 63-year-old male with prior back surgeries, L3-5 fusion, presented with severe back and right leg pain and right leg weakness.  Imaging showed L2-3 degeneration and large right paracentral disc herniation with L2-3 severe central stenosis.  Underwent extensive non-operative management with failure to improve.  Risks, benefits, indications, and alternatives were discussed with the patient and family in detail, and they wished to proceed.      Description of surgery: The patient was positioned prone.  Sterile prepping and draping procedures were performed.  Antibiotics were administered and timeout was performed.  A midline lumbar incision was performed.  The monopolar was used to expose the  spinous processes, lamina, facets, and transverse processes from L2-L5.  The previous hardware was visualized and using removal instrumentation, bilateral set screws and rods were removed at L3-5.  The previous fusion was explored and no overt instability was identified.  An O-arm intraoperative CT scan was performed, which identified the hardware to be in appropriate position.  Stealth navigation was registered.  Under navigation, bilateral pedicle screws were inserted at L2.  The [place] rongeurs were then used to remove the spinous processes at L2-3.  The high speed drill was then used to perform bilateral laminectomies at L2-3.  The [place] rongeurs were then used to remove the ligamentum flavum with decompression of the nerve roots.  The drill was used to perform a complete right facetectomy at L2-3.   The 15 blade was used to perform an annulotomy in the disk space at L2-3.  A complete discectomy was performed with antionette, the pituitary rongeurs, and curets.  Interbody arthrodesis was performed with antionette and curets.   A [place] Tritanium intervertebral graft was inserted into the disk space with autograft and allograft.  Rods and set screws were inserted.  Antibiotic irrigation was performed.  Hemostasis was achieved.  The bilateral transverse processes at L2-3, L3-4, and L4-5 were arthrodesed, and autograft and allograft were packed for posterolateral fusion.  Fluoroscopy demonstrated excellent positioning of the hardware.  The fascia was closed with 0-Vicryl sutures, and the dermal layer was closed with 2-0 vicryl sutures.  The skin was closed with staples.  There were no intraprocedural complications.

## 2019-02-14 ENCOUNTER — APPOINTMENT (OUTPATIENT)
Dept: PHYSICAL THERAPY | Facility: CLINIC | Age: 64
DRG: 454 | End: 2019-02-14
Attending: INTERNAL MEDICINE
Payer: COMMERCIAL

## 2019-02-14 ENCOUNTER — APPOINTMENT (OUTPATIENT)
Dept: OCCUPATIONAL THERAPY | Facility: CLINIC | Age: 64
DRG: 454 | End: 2019-02-14
Attending: PHYSICIAN ASSISTANT
Payer: COMMERCIAL

## 2019-02-14 LAB
ANION GAP SERPL CALCULATED.3IONS-SCNC: 7 MMOL/L (ref 3–14)
BUN SERPL-MCNC: 9 MG/DL (ref 7–30)
CALCIUM SERPL-MCNC: 8.7 MG/DL (ref 8.5–10.1)
CHLORIDE SERPL-SCNC: 100 MMOL/L (ref 94–109)
CO2 SERPL-SCNC: 27 MMOL/L (ref 20–32)
CREAT SERPL-MCNC: 0.61 MG/DL (ref 0.66–1.25)
ERYTHROCYTE [DISTWIDTH] IN BLOOD BY AUTOMATED COUNT: 13.2 % (ref 10–15)
GFR SERPL CREATININE-BSD FRML MDRD: >90 ML/MIN/{1.73_M2}
GLUCOSE SERPL-MCNC: 126 MG/DL (ref 70–99)
HCT VFR BLD AUTO: 41.2 % (ref 40–53)
HGB BLD-MCNC: 14 G/DL (ref 13.3–17.7)
MCH RBC QN AUTO: 32.2 PG (ref 26.5–33)
MCHC RBC AUTO-ENTMCNC: 34 G/DL (ref 31.5–36.5)
MCV RBC AUTO: 95 FL (ref 78–100)
PLATELET # BLD AUTO: 195 10E9/L (ref 150–450)
POTASSIUM SERPL-SCNC: 3.6 MMOL/L (ref 3.4–5.3)
RBC # BLD AUTO: 4.35 10E12/L (ref 4.4–5.9)
SODIUM SERPL-SCNC: 134 MMOL/L (ref 133–144)
WBC # BLD AUTO: 9.9 10E9/L (ref 4–11)

## 2019-02-14 PROCEDURE — 85027 COMPLETE CBC AUTOMATED: CPT | Performed by: HOSPITALIST

## 2019-02-14 PROCEDURE — 40000133 ZZH STATISTIC OT WARD VISIT: Performed by: OCCUPATIONAL THERAPIST

## 2019-02-14 PROCEDURE — 97535 SELF CARE MNGMENT TRAINING: CPT | Mod: GO | Performed by: OCCUPATIONAL THERAPIST

## 2019-02-14 PROCEDURE — 80048 BASIC METABOLIC PNL TOTAL CA: CPT | Performed by: HOSPITALIST

## 2019-02-14 PROCEDURE — 99232 SBSQ HOSP IP/OBS MODERATE 35: CPT | Performed by: HOSPITALIST

## 2019-02-14 PROCEDURE — 40000193 ZZH STATISTIC PT WARD VISIT

## 2019-02-14 PROCEDURE — 97166 OT EVAL MOD COMPLEX 45 MIN: CPT | Mod: GO | Performed by: OCCUPATIONAL THERAPIST

## 2019-02-14 PROCEDURE — 97116 GAIT TRAINING THERAPY: CPT | Mod: GP

## 2019-02-14 PROCEDURE — 36415 COLL VENOUS BLD VENIPUNCTURE: CPT | Performed by: HOSPITALIST

## 2019-02-14 PROCEDURE — 12000000 ZZH R&B MED SURG/OB

## 2019-02-14 PROCEDURE — 25000132 ZZH RX MED GY IP 250 OP 250 PS 637: Performed by: PHYSICIAN ASSISTANT

## 2019-02-14 PROCEDURE — 25000128 H RX IP 250 OP 636: Performed by: HOSPITALIST

## 2019-02-14 PROCEDURE — 25000132 ZZH RX MED GY IP 250 OP 250 PS 637: Performed by: INTERNAL MEDICINE

## 2019-02-14 RX ORDER — OXYCODONE HYDROCHLORIDE 5 MG/1
5-10 TABLET ORAL
Qty: 75 TABLET | Refills: 0 | Status: SHIPPED | OUTPATIENT
Start: 2019-02-14 | End: 2019-02-25

## 2019-02-14 RX ORDER — AMOXICILLIN 250 MG
2 CAPSULE ORAL DAILY PRN
Qty: 30 TABLET | Refills: 0 | Status: SHIPPED | OUTPATIENT
Start: 2019-02-14 | End: 2019-10-21

## 2019-02-14 RX ORDER — OXYCODONE HYDROCHLORIDE 5 MG/1
5-10 TABLET ORAL
Status: DISCONTINUED | OUTPATIENT
Start: 2019-02-14 | End: 2019-02-15 | Stop reason: HOSPADM

## 2019-02-14 RX ADMIN — LATANOPROST 1 DROP: 50 SOLUTION OPHTHALMIC at 21:02

## 2019-02-14 RX ADMIN — RANITIDINE 150 MG: 150 TABLET ORAL at 13:11

## 2019-02-14 RX ADMIN — OXYCODONE HYDROCHLORIDE 10 MG: 5 TABLET ORAL at 16:18

## 2019-02-14 RX ADMIN — OXYCODONE HYDROCHLORIDE 10 MG: 5 TABLET ORAL at 23:48

## 2019-02-14 RX ADMIN — AMPICILLIN SODIUM AND SULBACTAM SODIUM 3 G: 2; 1 INJECTION, POWDER, FOR SOLUTION INTRAMUSCULAR; INTRAVENOUS at 16:18

## 2019-02-14 RX ADMIN — AMPICILLIN SODIUM AND SULBACTAM SODIUM 3 G: 2; 1 INJECTION, POWDER, FOR SOLUTION INTRAMUSCULAR; INTRAVENOUS at 09:55

## 2019-02-14 RX ADMIN — AMPICILLIN SODIUM AND SULBACTAM SODIUM 3 G: 2; 1 INJECTION, POWDER, FOR SOLUTION INTRAMUSCULAR; INTRAVENOUS at 03:47

## 2019-02-14 RX ADMIN — DULOXETINE HYDROCHLORIDE 20 MG: 20 CAPSULE, DELAYED RELEASE ORAL at 09:55

## 2019-02-14 RX ADMIN — RANITIDINE 150 MG: 150 TABLET ORAL at 23:48

## 2019-02-14 RX ADMIN — ACETAMINOPHEN 650 MG: 325 TABLET, FILM COATED ORAL at 16:22

## 2019-02-14 RX ADMIN — AMPICILLIN SODIUM AND SULBACTAM SODIUM 3 G: 2; 1 INJECTION, POWDER, FOR SOLUTION INTRAMUSCULAR; INTRAVENOUS at 21:02

## 2019-02-14 RX ADMIN — SENNOSIDES AND DOCUSATE SODIUM 2 TABLET: 8.6; 5 TABLET ORAL at 09:54

## 2019-02-14 RX ADMIN — OXYCODONE HYDROCHLORIDE 10 MG: 5 TABLET ORAL at 20:47

## 2019-02-14 RX ADMIN — OXYCODONE HYDROCHLORIDE 10 MG: 5 TABLET ORAL at 13:11

## 2019-02-14 RX ADMIN — IRBESARTAN 300 MG: 150 TABLET ORAL at 09:54

## 2019-02-14 RX ADMIN — SENNOSIDES AND DOCUSATE SODIUM 2 TABLET: 8.6; 5 TABLET ORAL at 20:47

## 2019-02-14 ASSESSMENT — ACTIVITIES OF DAILY LIVING (ADL)
ADLS_ACUITY_SCORE: 17
ADLS_ACUITY_SCORE: 17
ADLS_ACUITY_SCORE: 16
ADLS_ACUITY_SCORE: 16
ADLS_ACUITY_SCORE: 17
ADLS_ACUITY_SCORE: 16

## 2019-02-14 NOTE — PLAN OF CARE
Pt A&O x4. VSS on RA. IVF running at 20 ml/hr with PCA pump, pt refusing oral pain meds. CMS intact,  Pt up SBA, but refusing assistance. Also refusing bed alarm and chair alarm. Tolerating regular diet. Cedeño removed, voiding in urinal, last PVR of 258. Dressing CDI. Discharge pending. Continue to monitor.

## 2019-02-14 NOTE — PROGRESS NOTES
"[facility]  Hospitalist Progress Note        Josse Shetty, DO  02/14/2019        Interval History:      Patient states that he is constipated, however, continues to pass flatus.          Assessment and Plan:        63 year old male with HTN, glaucoma, and low testosterone level with associated osteopenia -- and 4 previous back surgeries, including an L3-5 fusion on 11/2016, and subsequent L1 compression fracture on 2/2017, who presented with back pain and progressive right leg pain with weakness to [facility] on 2/9 and observed overnight, and transferred to [facility].      Lumbar disc joe, Large Right L2-3 w Thecal Sac compression has L3-5 fusion, and now has herniated disc just above the area that was previously fused  - Neurosurgery following.   - Pain control and prophylaxis per Surgery.      Urinary symptoms: Patient reporting urinary symptoms.   - Unasyn continued.   - Urine culture with Enterococcus faecalis, sensitive to PCN and ampicillin.      Testosterone deficiency follows with Endocrinology at [facility] and plans to restart Testosterone replacement  - has appointment scheduled for 2/24/19     HTN: Stable.   - Irbesartan      Osteopenia  - Monitor.      Chronic pain in pain clinic ([place], Dr. Sifuentes) currently not on narcotics, but previously was so he is requesting \"high doses from the start\"  - had a vape for Marijuana which he was upset was taken away   - Cymbalta   - Celebrex bid prn.      Anxiety: Patient reporting anxiety.   - Ativan prn.      Chronic right foot drop possibly related to prior herniated disc  - Monitor.      Personality issues wants his bed alarms turned off. Per report, explained it is a precautionary measure and asked him if he could cooperate with the policy.     DVT Prophylaxis: Defer to NS.      Code: Full Code     Disposition: Pending weaning from PCA and delineation of discharge location following therapy evaluations.                    " Physical Exam:      Heart Rate: 75    Blood pressure (!) 167/96, pulse 74, temperature 98.4  F (36.9  C), temperature source Oral, resp. rate 18, weight 113.1 kg (249 lb 4.8 oz), SpO2 96 %.    Vitals:    19 0500   Weight: 113.1 kg (249 lb 4.8 oz)       Vital Sign Ranges  Temperature Temp  Av.3  F (36.8  C)  Min: 97.6  F (36.4  C)  Max: 99.3  F (37.4  C)   Blood pressure Systolic (24hrs), Av , Min:141 , Max:167        Diastolic (24hrs), Av, Min:72, Max:97      Pulse Pulse  Av  Min: 74  Max: 74   Respirations Resp  Av.3  Min: 16  Max: 18   Pulse oximetry SpO2  Av.6 %  Min: 95 %  Max: 98 %     Vital Signs with Ranges  Temp:  [97.6  F (36.4  C)-99.3  F (37.4  C)] 98.4  F (36.9  C)  Pulse:  [74] 74  Heart Rate:  [63-75] 75  Resp:  [16-18] 18  BP: (141-167)/(72-97) 167/96  SpO2:  [95 %-98 %] 96 %    I/O Last 3 Shifts:   I/O last 3 completed shifts:  In: 120 [P.O.:120]  Out: 3275 [Urine:2700; Drains:575]    I/O past 24 hours:     Intake/Output Summary (Last 24 hours) at 2019 0820  Last data filed at 2019 0700  Gross per 24 hour   Intake 120 ml   Output 3440 ml   Net -3320 ml     GENERAL: Alert and oriented. Anxious. Conversational, appropriate. Pleasant.    HEENT: Normocephalic. EOMI. No icterus or injection. Nares normal.   LUNGS: Clear to auscultation. No dyspnea at rest.   HEART: Regular rate. Extremities perfused.   ABDOMEN: Soft, nontender, and nondistended. Positive bowel sounds.   EXTREMITIES: No LE edema noted.   NEUROLOGIC: Moves extremities x4. Follows commands.          Prior to Admission Medications:        Medications Prior to Admission   Medication Sig Dispense Refill Last Dose     acetaminophen (TYLENOL) 500 MG tablet Take 500-1,000 mg by mouth every 6 hours as needed for mild pain (every other day rotate days with ibuprofen)   prn at prn     carboxymethylcellulose (REFRESH PLUS) 0.5 % SOLN Place 1 drop into both eyes daily as needed for dry eyes   prn at prn      celecoxib (CELEBREX) 100 MG capsule Take 1 capsule (100 mg) by mouth 2 times daily as needed for moderate pain 60 capsule 1 2/9/2019 at AM     DULoxetine (CYMBALTA) 20 MG capsule Take 1 capsule (20 mg) by mouth daily 30 capsule 3 2/10/2019 at AM     ibuprofen (ADVIL/MOTRIN) 200 MG capsule Take 200 mg by mouth every 4 hours as needed for fever (every other day)   prn at prn     irbesartan (AVAPRO) 300 MG tablet TAKE 1 TABLET BY MOUTH EVERY DAY 90 tablet 0 2/9/2019 at AM     latanoprost (XALATAN) 0.005 % ophthalmic solution Place 1 drop into both eyes At Bedtime   11 2/9/2019 at HS     multivitamin, therapeutic with minerals (MULTI-VITAMIN) TABS tablet Take 1 tablet by mouth daily   2/9/2019 at Unknown time     sildenafil (REVATIO) 20 MG tablet Take 1-5 tablets ( mg) by mouth daily as needed (take about 30 min before needed for ED.) Never use with nitroglycerin. 30 tablet 11 prn at prn     tiZANidine (ZANAFLEX) 4 MG tablet Take 1-1.5 tablets (4-6 mg) by mouth 3 times daily 135 tablet 1 2/9/2019 at prn            Medications:        Current Facility-Administered Medications   Medication Last Rate     HYDROmorphone       lactated ringers 100 mL/hr at 02/13/19 0214     lactated ringers 25 mL/hr at 02/13/19 1157     sodium chloride Stopped (02/12/19 1630)     Current Facility-Administered Medications   Medication Dose Route Frequency     ampicillin-sulbactam (UNASYN) IV  3 g Intravenous Q6H     - MEDICATION INSTRUCTIONS -   Does not apply Once     DULoxetine  20 mg Oral Daily     ranitidine  150 mg Oral Q12H    Or     famotidine  20 mg Intravenous Q12H     irbesartan  300 mg Oral Daily     latanoprost  1 drop Both Eyes At Bedtime     senna-docusate  1 tablet Oral BID    Or     senna-docusate  2 tablet Oral BID     sodium chloride (PF)  3 mL Intracatheter Q8H     Current Facility-Administered Medications   Medication Dose Route Frequency     acetaminophen  650 mg Oral Q4H PRN     calcium carbonate  1,000 mg Oral 4x  Daily PRN     hydrOXYzine  25 mg Oral Q6H PRN     hypromellose-dextran  1 drop Both Eyes Daily PRN     lidocaine 4%   Topical Q1H PRN     lidocaine (buffered or not buffered)  0.1-1 mL Other Q1H PRN     LORazepam  0.5-1 mg Oral Q4H PRN     magnesium sulfate  4 g Intravenous Q4H PRN     melatonin  1 mg Oral At Bedtime PRN     metoclopramide  10 mg Oral Q6H PRN    Or     metoclopramide  10 mg Intravenous Q6H PRN     naloxone  0.1-0.4 mg Intravenous Q2 Min PRN     ondansetron  4 mg Oral Q6H PRN    Or     ondansetron  4 mg Intravenous Q6H PRN     oxyCODONE  5-10 mg Oral Q4H PRN     polyethylene glycol  17 g Oral Daily PRN     potassium chloride  20-40 mEq Oral or Feeding Tube Q2H PRN     potassium chloride with lidocaine  10 mEq Intravenous Q1H PRN     potassium chloride  10 mEq Intravenous Q1H PRN     potassium chloride  20 mEq Intravenous Q1H PRN     potassium chloride  20-40 mEq Oral Q2H PRN     prochlorperazine  10 mg Intravenous Q6H PRN    Or     prochlorperazine  10 mg Oral Q6H PRN     sodium chloride (PF)  3 mL Intracatheter q1 min prn     tiZANidine  4 mg Oral TID PRN            Data:      Lab data reviewed.   Recent Labs   Lab 02/14/19  0802 02/13/19  0742 02/12/19  2121 02/12/19  2101 02/12/19  0756 02/11/19  1043 02/09/19  1458   HGB 14.0 12.1* 12.9* 12.9* 14.9  --  15.2   MCV 95 96  --   --  97  --  99    161  --   --  203  --  224   INR  --   --   --   --   --  1.22*  --    NA  --  139 138 137 139  --  141   POTASSIUM  --  4.0 4.3 5.8* 3.2*  --  4.1   CHLORIDE  --  104  --   --  104  --  108   CO2  --  27  --   --  27  --  26   BUN  --  11  --   --  20  --  20   CR  --  0.73  --   --  0.78  --  0.67   ANIONGAP  --  8  --   --  8  --  7   NOY  --  7.7*  --   --  8.5  --  8.9   GLC  --  103*  --   --  103*  --  125*           Imaging:      Imaging data reviewed.     Dr. Josse Shetty D.O.  [facility]  Pager [phone]

## 2019-02-14 NOTE — PLAN OF CARE
Physical Therapy Discharge Summary    Reason for therapy discharge:    All goals and outcomes met, no further needs identified.    Progress towards therapy goal(s). See goals on Care Plan in [facility] electronic health record for goal details.  Goals met    Therapy recommendation(s):    No further therapy is recommended.

## 2019-02-14 NOTE — PROGRESS NOTES
[facility]    Neurosurgery  Daily Post-Op Note    Assessment & Plan   Procedure(s):  REVISION L2-L5 POSTERIOR SPINAL FUSION; RIGHT L2-L3 TRANSFORAMINAL INTERBODY FUSION   -2 Days Post-Op  Doing well.  Tolerating physical therapy and rehabilitation well.  Up walking independently in the room.  States he has not been able to have a BM.  Plan:  -Ambulate  -Advance activity as tolerated  -Pain control measures  -Advance diet as tolerated  -We will transition off of the PCA onto oral medications today.  We will keep drain in today.    Eyad Cm    Interval History   Stable.  Doing well.  Improving slowly.  Pain is reasonably controlled.  No fevers.     Physical Exam   Temp: 98.2  F (36.8  C) Temp src: Oral BP: 105/74 Pulse: 74 Heart Rate: 81 Resp: 18 SpO2: 93 % O2 Device: None (Room air)    Vitals:    02/14/19 0500   Weight: 249 lb 4.8 oz (113.1 kg)     Vital Signs with Ranges  Temp:  [97.6  F (36.4  C)-99.3  F (37.4  C)] 98.2  F (36.8  C)  Pulse:  [74] 74  Heart Rate:  [63-81] 81  Resp:  [16-18] 18  BP: (105-167)/(72-97) 105/74  SpO2:  [93 %-98 %] 93 %  I/O last 3 completed shifts:  In: 120 [P.O.:120]  Out: 3275 [Urine:2700; Drains:575]    Alert and oriented.  Moves all extremities equally.  Reflexes symmetrical.     Incision: CDI.  APARNA with 125 mL overnight.      Medications     HYDROmorphone       lactated ringers 100 mL/hr at 02/13/19 0214     lactated ringers 25 mL/hr at 02/13/19 1157     sodium chloride Stopped (02/12/19 1630)        ampicillin-sulbactam (UNASYN) IV  3 g Intravenous Q6H     - MEDICATION INSTRUCTIONS -   Does not apply Once     DULoxetine  20 mg Oral Daily     ranitidine  150 mg Oral Q12H    Or     famotidine  20 mg Intravenous Q12H     irbesartan  300 mg Oral Daily     latanoprost  1 drop Both Eyes At Bedtime     senna-docusate  1 tablet Oral BID    Or     senna-docusate  2 tablet Oral BID     sodium chloride (PF)  3 mL Intracatheter Q8H             Eyad Cm  TRACY  [facility]

## 2019-02-14 NOTE — PLAN OF CARE
Discharge Planner PT   Patient plan for discharge: home  Current status: Mod-I with bed mobility and transfers with log roll. Reviewed pt home cane use, recommended SEC in L UE to assist R LE. Ambulates 400' SBA with LBQC, 1 LOB due to someone walking close/fast in vera, recovers IND. SBA for gait without AD within room. Anticipate discharge from PT after one more tx given current mobility level. Refuses BG and alarms.  Barriers to return to prior living situation: stairs to enter, A for mob  Recommendations for discharge: home  Rationale for recommendations: Anticipate with cont therapy pt will reach goals to safely discharge to home from a mob standpoint.       Entered by: Levi Tapia 02/14/2019 12:22 PM

## 2019-02-14 NOTE — PROGRESS NOTES
02/14/19 1039   Quick Adds   Type of Visit Initial Occupational Therapy Evaluation   Living Environment   Lives With alone   Living Arrangements house  (lives in the upper part of a 2 unit duplex, owns duplex)   Home Accessibility stairs to enter home;stairs within home   Number of Stairs, Main Entrance three   Stair Railings, Main Entrance railings safe and in good condition   Number of Stairs, Within Home, Primary ten   Stair Railings, Within Home, Primary railings safe and in good condition   Transportation Anticipated car, drives self   Self-Care   Usual Activity Tolerance good   Current Activity Tolerance moderate   Regular Exercise Yes   Activity/Exercise Type other (see comments)  (pt stating that he works out daily, not specific about detai)   Equipment Currently Used at Home cane, straight;shower chair;grab bar, tub/shower;grab bar, toilet;dressing device   Functional Level   Ambulation 1-->assistive equipment   Transferring 0-->independent   Toileting 0-->independent   Bathing 0-->independent   Dressing 0-->independent   Eating 0-->independent   Communication 0-->understands/communicates without difficulty   Swallowing 0-->swallows foods/liquids without difficulty   Cognition 0 - no cognition issues reported   Fall history within last six months yes   Number of times patient has fallen within last six months 5   Which of the above functional risks had a recent onset or change? ambulation;transferring;bathing;dressing;fall history   General Information   Onset of Illness/Injury or Date of Surgery - Date 02/12/19   Referring Physician Afsaneh Villareal PA-C   Patient/Family Goals Statement return home   Additional Occupational Profile Info/Pertinent History of Current Problem Pt admitted for removal revison of L2-5 posterior spinal fusion and right L2-3 TLIF.  PMH includes other back surgeries including a L3-5 fusion, L1 compression fracture, HTN, glaucoma, low testosterone with associated ostepenia    Precautions/Limitations spinal precautions   General Observations Pt up in chair, willing to particiapte   Cognitive Status Examination   Orientation orientation to person, place and time   Level of Consciousness alert   Follows Commands (Cognition) WNL   Memory intact   Attention Distractible during evaluation   Cognitive Comment pt impulsive during session, needed reminders about precautions   Visual Perception   Visual Perception No deficits were identified   Pain Assessment   Patient Currently in Pain Yes, see Vital Sign flowsheet   Range of Motion (ROM)   ROM Quick Adds No deficits were identified   ROM Comment B UEs   Strength   Manual Muscle Testing Quick Adds No deficits were identified   Strength Comments B UEs   Coordination   Upper Extremity Coordination No deficits were identified   Transfer Skill: Sit to Stand   Level of [place]: Sit/Stand stand-by assist   Physical Assist/Nonphysical Assist: Sit/Stand supervision;verbal cues   Transfer Skill: Sit to Stand weight-bearing as tolerated   Assistive Device for Transfer: Sit/Stand quad cane   Activities of Daily Living Analysis   Impairments Contributing to Impaired Activities of Daily Living pain;post surgical precautions   ADL Comments pt also demonsrating impulsivity   General Therapy Interventions   Planned Therapy Interventions ADL retraining;home program guidelines   Clinical Impression   Criteria for Skilled Therapeutic Interventions Met yes, treatment indicated   OT Diagnosis decreased independence and safety for ADLS   Influenced by the following impairments post surgical precautions, impulsivity with following precautions   Assessment of Occupational Performance 1-3 Performance Deficits   Identified Performance Deficits decrased safety for functional mobility, dressing, bathing   Clinical Decision Making (Complexity) Moderate complexity   Therapy Frequency daily   Predicted Duration of Therapy Intervention (days/wks) 3 days   Anticipated  "Discharge Disposition Home;Home with Assist   Risks and Benefits of Treatment have been explained. Yes   Patient, Family & other staff in agreement with plan of care Yes   [facility] TM \"6 Clicks\"   2016, Trustees of [facility], under license to [employer].  All rights reserved.   6 Clicks Short Forms Daily Activity Inpatient Short Form   [facility]  \"6 Clicks\" Daily Activity Inpatient Short Form   1. Putting on and taking off regular lower body clothing? 3 - A Little   2. Bathing (including washing, rinsing, drying)? 3 - A Little   3. Toileting, which includes using toilet, bedpan or urinal? 4 - None   4. Putting on and taking off regular upper body clothing? 4 - None   5. Taking care of personal grooming such as brushing teeth? 4 - None   6. Eating meals? 4 - None   Daily Activity Raw Score (Score out of 24.Lower scores equate to lower levels of function) 22   Total Evaluation Time   Total Evaluation Time (Minutes) 15     "

## 2019-02-14 NOTE — PLAN OF CARE
Discharge Planner OT     Pt is a 63 year old male admitted for removal revison of L2-5 posterior spinal fusion and right L2-3 TLIF.  PMH includes other back surgeries including a L3-5 fusion, L1 compression fracture, HTN, glaucoma, low testosterone with associated ostepenia.  Lives alone in an upper level of a duplex he owns.  Has 3 steps to get in and 10 up to his unit.  States he has a girlfriend who has offered to help.  Reports being independent in ADLS at baseline.    Patient plan for discharge: Home  Current status: Initial evaluation complete, treatment started for ADLS.  Pt sitting up in chair, willing to participate.  Pt demonstrated impulsiveness throughout session.  He realized at times he was pushing his spinal precautions, needed reminders other times.  Able to bend legs comfortbly to cross legs for socks, we went over use of reacher for doing pants.  Has a reacher at home.  Pt states he has a shower chair and a tub, but tub sides are very low.  Walked to 7th floor satellite and was able to use the grab bar to step over our tub comforably holding on to bar.  Attemped to show pt technique for car transfer but pt declined to try it in tub.  Did a modified version on the plinth in the room instead.  Barriers to return to prior living situation: Pt was impulsive, may not follow spinal precautions consistently, lives alone  Recommendations for discharge: Home with assist of family and friends as able.   Rationale for recommendations: Pt has good overall physical movement.  Has some help from a girlfriend if he wants to use it.       Entered by: Jeffrey Chino 02/14/2019 11:02 AM

## 2019-02-14 NOTE — PROVIDER NOTIFICATION
Writer TOAN Scott, notified, Patient wants oxycodone to be Q3hrs instead of Q4hrs... please advise or change orders. Thx

## 2019-02-14 NOTE — PLAN OF CARE
A&O X4. CMS ex tingling/numbness to RLE-pt states this is improving, R leg weaker than L. Bowel sounds active, +flatus, -BM. VSS. Dressing marked w/ dried drainage- no change. APARNA drain patent-125cc out this shift. Up with Ax1, GB, W. Pt refuses bed alarm. C/o 7-10/10 lower back pain. PCA dilaudid in place. Discharge plan pending.

## 2019-02-15 ENCOUNTER — APPOINTMENT (OUTPATIENT)
Dept: OCCUPATIONAL THERAPY | Facility: CLINIC | Age: 64
DRG: 454 | End: 2019-02-15
Attending: INTERNAL MEDICINE
Payer: COMMERCIAL

## 2019-02-15 VITALS
OXYGEN SATURATION: 95 % | BODY MASS INDEX: 31.33 KG/M2 | HEART RATE: 74 BPM | RESPIRATION RATE: 16 BRPM | WEIGHT: 244 LBS | DIASTOLIC BLOOD PRESSURE: 97 MMHG | SYSTOLIC BLOOD PRESSURE: 152 MMHG | TEMPERATURE: 98.5 F

## 2019-02-15 LAB
ANION GAP SERPL CALCULATED.3IONS-SCNC: 5 MMOL/L (ref 3–14)
BUN SERPL-MCNC: 10 MG/DL (ref 7–30)
CALCIUM SERPL-MCNC: 8.6 MG/DL (ref 8.5–10.1)
CHLORIDE SERPL-SCNC: 102 MMOL/L (ref 94–109)
CO2 SERPL-SCNC: 31 MMOL/L (ref 20–32)
CREAT SERPL-MCNC: 0.65 MG/DL (ref 0.66–1.25)
GFR SERPL CREATININE-BSD FRML MDRD: >90 ML/MIN/{1.73_M2}
GLUCOSE BLDC GLUCOMTR-MCNC: 145 MG/DL (ref 70–99)
GLUCOSE SERPL-MCNC: 112 MG/DL (ref 70–99)
POTASSIUM SERPL-SCNC: 3.7 MMOL/L (ref 3.4–5.3)
SODIUM SERPL-SCNC: 138 MMOL/L (ref 133–144)

## 2019-02-15 PROCEDURE — 36415 COLL VENOUS BLD VENIPUNCTURE: CPT | Performed by: HOSPITALIST

## 2019-02-15 PROCEDURE — 25000132 ZZH RX MED GY IP 250 OP 250 PS 637: Performed by: HOSPITALIST

## 2019-02-15 PROCEDURE — 25000132 ZZH RX MED GY IP 250 OP 250 PS 637: Performed by: PHYSICIAN ASSISTANT

## 2019-02-15 PROCEDURE — 25000128 H RX IP 250 OP 636: Performed by: HOSPITALIST

## 2019-02-15 PROCEDURE — 99238 HOSP IP/OBS DSCHRG MGMT 30/<: CPT | Performed by: HOSPITALIST

## 2019-02-15 PROCEDURE — 25000132 ZZH RX MED GY IP 250 OP 250 PS 637: Performed by: INTERNAL MEDICINE

## 2019-02-15 PROCEDURE — 97535 SELF CARE MNGMENT TRAINING: CPT | Mod: GO

## 2019-02-15 PROCEDURE — 00000146 ZZHCL STATISTIC GLUCOSE BY METER IP

## 2019-02-15 PROCEDURE — 80048 BASIC METABOLIC PNL TOTAL CA: CPT | Performed by: HOSPITALIST

## 2019-02-15 PROCEDURE — 99231 SBSQ HOSP IP/OBS SF/LOW 25: CPT | Performed by: NURSE PRACTITIONER

## 2019-02-15 RX ORDER — NITROFURANTOIN 25; 75 MG/1; MG/1
100 CAPSULE ORAL EVERY 12 HOURS
Qty: 10 CAPSULE | Refills: 0 | Status: SHIPPED | OUTPATIENT
Start: 2019-02-15 | End: 2019-05-06

## 2019-02-15 RX ORDER — NITROFURANTOIN 25; 75 MG/1; MG/1
100 CAPSULE ORAL EVERY 12 HOURS SCHEDULED
Status: DISCONTINUED | OUTPATIENT
Start: 2019-02-15 | End: 2019-02-15 | Stop reason: HOSPADM

## 2019-02-15 RX ADMIN — OXYCODONE HYDROCHLORIDE 10 MG: 5 TABLET ORAL at 03:15

## 2019-02-15 RX ADMIN — OXYCODONE HYDROCHLORIDE 10 MG: 5 TABLET ORAL at 15:34

## 2019-02-15 RX ADMIN — OXYCODONE HYDROCHLORIDE 10 MG: 5 TABLET ORAL at 12:35

## 2019-02-15 RX ADMIN — OXYCODONE HYDROCHLORIDE 10 MG: 5 TABLET ORAL at 09:18

## 2019-02-15 RX ADMIN — AMPICILLIN SODIUM AND SULBACTAM SODIUM 3 G: 2; 1 INJECTION, POWDER, FOR SOLUTION INTRAMUSCULAR; INTRAVENOUS at 03:15

## 2019-02-15 RX ADMIN — NITROFURANTOIN MONOHYDRATE/MACROCRYSTALLINE 100 MG: 25; 75 CAPSULE ORAL at 11:44

## 2019-02-15 RX ADMIN — ACETAMINOPHEN 650 MG: 325 TABLET, FILM COATED ORAL at 06:23

## 2019-02-15 RX ADMIN — DULOXETINE HYDROCHLORIDE 20 MG: 20 CAPSULE, DELAYED RELEASE ORAL at 09:01

## 2019-02-15 RX ADMIN — SENNOSIDES AND DOCUSATE SODIUM 2 TABLET: 8.6; 5 TABLET ORAL at 09:01

## 2019-02-15 RX ADMIN — RANITIDINE 150 MG: 150 TABLET ORAL at 11:44

## 2019-02-15 RX ADMIN — MAGNESIUM HYDROXIDE 30 ML: 400 SUSPENSION ORAL at 09:18

## 2019-02-15 RX ADMIN — IRBESARTAN 300 MG: 150 TABLET ORAL at 09:01

## 2019-02-15 RX ADMIN — LORAZEPAM 0.5 MG: 0.5 TABLET ORAL at 06:23

## 2019-02-15 RX ADMIN — ACETAMINOPHEN 650 MG: 325 TABLET, FILM COATED ORAL at 10:09

## 2019-02-15 RX ADMIN — OXYCODONE HYDROCHLORIDE 10 MG: 5 TABLET ORAL at 06:23

## 2019-02-15 ASSESSMENT — ACTIVITIES OF DAILY LIVING (ADL)
ADLS_ACUITY_SCORE: 13
ADLS_ACUITY_SCORE: 17

## 2019-02-15 NOTE — DISCHARGE SUMMARY
[facility]    Discharge Summary  Neurosurgery    Date of Admission:  2/10/2019  Date of Discharge:  2/15/2019  Discharging Provider: Afsaneh Villareal  Date of Service (when I saw the patient): 02/15/19    Discharge Diagnoses   Principal Problem:    Lumbar disc joe, Large Right L2-3 w Thecal Sac compression  Active Problems:    Testosterone deficiency    HTN (hypertension)    Osteopenia    Lumbar herniated disc    S/P lumbar fusion      History of Present Illness   Usama Harris is an 63 year old male who presented with prior back surgeries, including L3-5 fusion He presented to [facility] with  severe back and right leg pain as well as right leg weakness and was transferred to [facility].  Imaging showed L2-3 degeneration and large right paracentral disc herniation with L2-3 severe central stenosis.  He underwent removal revision of L2-5 posterior spinal fusion and right L2-3 TLIF with Dr. Iggy Issa on 2/12/19.  Today, he is lying in bed after being up ambulating halls. He did fall onto knee while ambulating but notes no increase in back or knee pain. States his pain and N/T is improved in leg. He still does have right hip flexor weakness, but improving. He has been on antibiotics for UTI; appreciate hospitalist assistance. APARNA drain was removed. Drain site closed with 3-0 nylon and dressed with, dressing may be removed in 24 hours. Tip of drain intact upon removal.  Site appears clean without erythema, fluctuation, or induration.  Patient tolerated procedure without difficulty. Plan to discharge home this afternoon.     Hospital Course   Usama Harris was admitted on 2/10/2019.  The following problems were addressed during his hospitalization:    Principal Problem:    Lumbar disc joe, Large Right L2-3 w Thecal Sac compression    Assessment: s/p L2-5 revision PSF    Plan:   -Discharge to home today  -Follow up in 2 weeks for staple removal  -Continue bowel regimen    Appreciate hospitalist  recommendations as follows:      Lumbar disc hernia, Large Right L2-3 w Thecal Sac compression has L3-5 fusion, and now has herniated disc just above the area that was previously fused. Imaging showed L2-3 degeneration and large right paracentral disc herniation with L2-3 severe central stenosis.  He underwent removal revision of L2-5 posterior spinal fusion and right L2-3 TLIF with Dr. Iggy Issa on 2/12/19.  APARNA drain removed day of discharge  - Discharged to home 2/15  - Follow up in 2 weeks for staple removal  - Analgesia with oxycodone provided by NS, recommended to continue with bowel regimen     Urinary tract infection, dysuria   Patient reported difficulty with complete emptying and change in urine color. Started on unasyn. Urine culture + e faecalis sensitive to penicillins and macrobid . States he has multiple sexual partners and believes it was related to sexual activity  - Had long discussion with patient on discharge regarding safe sex practice going forward (including condoms) and recommended STD testing with his PCP.  - Will continue nitrofurantoin x5 more days to complete 7 day course.     Testosterone deficiency follows with Endocrinology at [facility] and plans to restart Testosterone replacement  - has appointment scheduled for 2/24/19, follow up as previously scheduled on discharge     HTN: Stable.   - Continue PTA Irbesartan      Osteopenia  - Monitor.      Chronic pain in pain clinic ([facility], Dr. Sifuentes) currently not on narcotics  - Continue PTA Cymbalta, Celebrex bid prn, tizanidine  - PRN oxycodone for acute surgical pain prescribed by NS     Anxiety: Patient reporting anxiety while hospitalized and was maintained on PRN ativan during hospitalization (received 2 doses in total)  - No ativan on discharge     Chronic right foot drop possibly related to prior herniated disc  - Follow up with NS          I have discussed the following assessment and plan with Dr. Iggy Issa who is in agreement with  initial plan and will follow up with further consultation recommendations.    Afsaneh Villareal PA-C  [facility]  [address]  [address]  [place] [address]    Tel [phone]  Pager [phone]      Pending Results   These results will be followed up by Dr. Issa  Unresulted Labs Ordered in the Past 30 Days of this Admission     No orders found from 12/12/2018 to 2/11/2019.          Code Status   Full Code    Primary Care Physician   Nikko Tang    Physical Exam   Temp: 98.5  F (36.9  C) Temp src: Oral BP: (!) 152/97   Heart Rate: 89 Resp: 18 SpO2: 95 % O2 Device: None (Room air)    Vitals:    02/14/19 0500 02/15/19 0500   Weight: 249 lb 4.8 oz (113.1 kg) 244 lb (110.7 kg)     Vital Signs with Ranges  Temp:  [97.5  F (36.4  C)-99.1  F (37.3  C)] 98.5  F (36.9  C)  Heart Rate:  [65-89] 89  Resp:  [18] 18  BP: (105-164)/(74-97) 152/97  SpO2:  [93 %-99 %] 95 %  I/O last 3 completed shifts:  In: 1100 [P.O.:900; I.V.:200]  Out: 1720 [Urine:1475; Drains:245]    Constitutional: Awake, alert, cooperative, no apparent distress, and appears stated age.  Eyes: Lids and lashes normal, pupils equal, round and reactive to light, extra ocular muscles intact  ENT: Normocephalic, without obvious abnormality, atraumatic  Respiratory: No increased work of breathing  Skin: No bruising or bleeding, normal skin color, texture, turgor, no redness, warmth, or swelling.  Incision with staples intact, minimal drainage covered with dressing. Drain site closed with suture.  Musculoskeletal: There is no redness, warmth, or swelling of the joints.  Full range of motion noted.  Motor strength is 5 out of 5 all extremities bilaterally except for RLE hip flexor 4/5.  Tone is normal.  Neurologic: Awake, alert, oriented to name, place and time.  Cranial nerves II-XII are grossly intact.  Motor is 5 out of 5 bilaterally except for RLE hip flexor weakness.  Neuropsychiatric: Calm, normal eye contact,  alert, normal affect, oriented to self, place, time and situation, memory for past and recent events intact and thought process normal.       Time Spent on this Encounter   I, Afsaneh Villareal, personally saw the patient today and spent greater than 30 minutes discharging this patient.    Discharge Disposition   Discharged to home  Condition at discharge: Stable    Consultations This Hospital Stay   PHYSICAL THERAPY ADULT IP CONSULT  OCCUPATIONAL THERAPY ADULT IP CONSULT  NEUROSURGERY IP CONSULT  OCCUPATIONAL THERAPY ADULT IP CONSULT  PHYSICAL THERAPY ADULT IP CONSULT  PHYSICAL THERAPY ADULT IP CONSULT    Discharge Orders      Reason for your hospital stay    Back surgery     Follow-up and recommended labs and tests     F/u 2 weeks for staple removal     Activity    Your activity upon discharge: activity as tolerated, no lifting more than 10 lbs.     Wound care and dressings    Instructions to care for your wound at home: keep wound clean and dry and may get incision wet in shower but do not soak or scrub.     Discharge Instructions    Discharge instructions:  No lifting of more than 10 pounds until follow up visit.  Ok to shampoo hair, shower or bathe, but, do not scrub or submerge incision under water until evaluated post operatively in clinic.    Ok to walk as tolerated, avoid bedrest.    No contact sports until after follow up visit  No high impact activities such as; running/jogging, snowmobile or 4 oliver riding or any other recreational vehicles    Call my office at [phone] for increasing redness, swelling or pus draining from the incision, increased pain or any other questions and concerns.    Go to ER with any seizure activity, mental status change (increasing confusion), difficulty with speech or increasing or acute weakness     Full Code     Diet    Follow this diet upon discharge: Regular     Discharge Medications   Current Discharge Medication List      START taking these medications    Details    oxyCODONE (ROXICODONE) 5 MG tablet Take 1-2 tablets (5-10 mg) by mouth every 3 hours as needed for moderate to severe pain  Qty: 75 tablet, Refills: 0    Associated Diagnoses: S/P lumbar fusion      senna-docusate (SENOKOT-S/PERICOLACE) 8.6-50 MG tablet Take 2 tablets by mouth daily as needed for constipation  Qty: 30 tablet, Refills: 0    Associated Diagnoses: S/P lumbar fusion         CONTINUE these medications which have CHANGED    Details   tiZANidine (ZANAFLEX) 4 MG tablet Take 1-1.5 tablets (4-6 mg) by mouth 3 times daily  Qty: 135 tablet, Refills: 1    Associated Diagnoses: Muscle spasm         CONTINUE these medications which have NOT CHANGED    Details   acetaminophen (TYLENOL) 500 MG tablet Take 500-1,000 mg by mouth every 6 hours as needed for mild pain (every other day rotate days with ibuprofen)      carboxymethylcellulose (REFRESH PLUS) 0.5 % SOLN Place 1 drop into both eyes daily as needed for dry eyes      celecoxib (CELEBREX) 100 MG capsule Take 1 capsule (100 mg) by mouth 2 times daily as needed for moderate pain  Qty: 60 capsule, Refills: 1    Associated Diagnoses: Failed back syndrome      DULoxetine (CYMBALTA) 20 MG capsule Take 1 capsule (20 mg) by mouth daily  Qty: 30 capsule, Refills: 3    Associated Diagnoses: Chronic pain syndrome; Failed back syndrome      irbesartan (AVAPRO) 300 MG tablet TAKE 1 TABLET BY MOUTH EVERY DAY  Qty: 90 tablet, Refills: 0    Associated Diagnoses: Essential hypertension      latanoprost (XALATAN) 0.005 % ophthalmic solution Place 1 drop into both eyes At Bedtime   Refills: 11      multivitamin, therapeutic with minerals (MULTI-VITAMIN) TABS tablet Take 1 tablet by mouth daily      sildenafil (REVATIO) 20 MG tablet Take 1-5 tablets ( mg) by mouth daily as needed (take about 30 min before needed for ED.) Never use with nitroglycerin.  Qty: 30 tablet, Refills: 11    Associated Diagnoses: Erectile dysfunction, unspecified erectile dysfunction type          STOP taking these medications       ibuprofen (ADVIL/MOTRIN) 200 MG capsule Comments:   Reason for Stopping:             Allergies   Allergies   Allergen Reactions     Gabapentin Other (See Comments)     Edema, elevated liver enzymes

## 2019-02-15 NOTE — PLAN OF CARE
Discharge Planner OT   Patient plan for discharge: Home   Current status: Completed the MOCA cognitive screen, Pt scored within normal limits on MOCA. While seated/standing pt completed lower body dressing with SBA.    Barriers to return to prior living situation: Current level of assist   Recommendations for discharge: Home with assist of family for I/ADLs as needed   Rationale for recommendations: Continued skilled OT while in IP to increase independence in I/ADLs.        Entered by: Gamal Conn 02/15/2019 2:05 PM

## 2019-02-15 NOTE — DISCHARGE INSTRUCTIONS
[facility] at [facility]  Dr. Issa Discharge Instructions Following Spine Surgery  [phone]  Monday - Friday; 8:00 AM - 4:00 PM    In General:   After you have had surgery on your spine, remember do not twist, or excessively flex or extend the area that you had surgery.  These activities can prevent healing.  Pain is normal and to be expected following surgery.  Please call our office to schedule your appointment follow up appointment.      Bowel Care:  Many people have constipation (hard stools) after surgery.  To help prevent constipation: Drink plenty of fluid (8-10 glasses/day); Eat more fiber, such as whole grain bread, bran cereal, and fruits and vegetables; Stay active by walking; Over the counter stool softener may also help.      Medications:  Spine surgery and pain management is unique to all patients.  You will generally be given medications for pain, muscle spasms or tightness, and for constipation during the immediate post op period.  It is important that you use these as prescribed.  Please remember to bring your pill bottles to all of your appointments. Avoid alcoholic beverages while taking narcotic pain medications. You can use ice to areas of pain as needed, 20 minutes at a time.  Changing positions and walking will help loosen your muscles as well.    Driving:  No driving while on narcotic pain medications.  It is state law not to drive while under the influence of a drug to a degree which renders you incapable of safely driving.  The narcotic medication you will be taking after surgery falls under this category.     Activity:   After surgery, most people feel less pain than they have had in a long time.  Walking and light activities will help you regain the use of your muscles.  You are encouraged to walk: start with short walks 5-10 minutes at a time for 4-5 times per day and increase as tolerated.  Stair climbing as tolerated, we recommend you use the railing.  No lifting greater than 10 pounds: approximately equal to one gallon of milk. No twisting, bending in the area you have had surgery. No housework, vacuuming, laundry, leaf raking, lawn mowing, or snow removal. Wear your brace (if ordered) as directed.    Showers:  If you have sutures or staples you may shower two days after surgery. It is ok to let water run over your incision but do not touch or scrub on the incision. Pat dry immediately after showering. If there is a dressing in place, you may remove it 2 days after surgery. If you were closed with Derma tena (glue), you may shower without covering the incision. No baths, hot tubs, or pool activity for at least 6 weeks.     Nutrition:  In general, your diet restrictions will not change with your surgery.  You may need to eat small frequent meals initially until your appetite returns.  Eat plenty of high fiber foods and drink plenty of fluids. If you do not have a fluid restriction from or prior to surgery, we recommend 6-8 (8oz) glasses of water per day. Other fluids are fine, but water is best. Nausea is not uncommon; it is a common side effect to many pain medications.  We recommend that you take the pain medications with food, if this does not improve your symptoms, please call us.     Smoking:  For proper healing it is required that you quit using all tobacco products.  This includes smoking, chewing, nicotine gums, and nicotine patches.  Call Dr. Issa if these occur: Drainage from your incision, increased pain/redness/swelling, temperatures greater than 101.5, increased leg pain or swelling or unrelieved headaches    Go to the nearest Emergency Room if you experience: chest pain, shortness of breath, neck swelling or swallowing problems

## 2019-02-15 NOTE — PROGRESS NOTES
"[facility]  House [facility]   Fall Note  2/15/2019   Time Called: 1000    called for: fall    Assessment & Plan   IMPRESSION & PLAN:  Unwitnessed fall in the lobby of seventh floor.  Patient reports he landed on his knee in a down on 1 knee stance.  He was using his cane and was able to lower himself down onto the one knee.  Patient reports that when he turned and he twisted his hip his leg (right) just gave out.  Has done this previously and it \"goes out like a ton of bricks\" he reports that his right knee and ankle had been weaker and more painful since this morning.  He did receive a dose of oxycodone at 920 this morning.  This was not his first dose.  He had his shoes on and was using a cane.  He denies any lightheadedness dizziness presyncope or syncope shortness of breath or chest pain.  There is no loss of consciousness no head strike.  He is unsure if his right knee hurts more so post fall limited pre-fall.  This was his second time up today for some of his room.  General he reports some degree of numbness in the proximal right lateral thigh and pain at the right knee and right ankle    Patient is postop day 3 status post L2-L5 fusion revision.  He is quite diaphoretic not known to be diabetic and he has been working with PT.  Hospital course includes treatment for cystitis.    Differential diagnosis: Likely mechanical fall in his presenting symptoms of right leg pain and weakness recent surgical intervention.    INTERVENTIONS:  -gluc check 145  -VS acceptable, if anything, HTN  -orthostatic BPs checked, negative  -examined, small abrasion to R medial knee, no need for imaging  -fall precautions offered by RN (and I agree), pt refused  -RN will update surgical team  -re-consult PT  -Patient's daughter updated on fall, counseled not to hold baby while he is standing but he may do so while he is sitting in bed or in chair    At the end of the evaluation glucose vital signs have been checked exam " completed no indication for radiographic imaging of the right knee    Discussed with and defer further cares to rounding hospitalist Dr. Yarbrough  Interval History     Usama Harris is a 63 year old male who was admitted on 2/10/2019 for back pain and progressive right leg pain with weakness to [place] on 2/9 status post revision L2-L5 posterior spinal fusion, right L2-L3 transforaminal interbody fusion    Medical history significant for:   Past Medical History:   Diagnosis Date     Acute hepatitis C without mention of hepatic coma(070.51)     Hep C - Treated     Backache, unspecified      DDD (degenerative disc disease), lumbar     s/p back surgery     Glaucoma      Hypertension      Code Status: Full Code    Allergies   Allergies   Allergen Reactions     Gabapentin Other (See Comments)     Edema, elevated liver enzymes         Physical Exam   Vital Signs with Ranges:  Temp:  [97.5  F (36.4  C)-99.1  F (37.3  C)] 98.5  F (36.9  C)  Heart Rate:  [65-89] 89  Resp:  [18] 18  BP: (105-164)/(74-97) 152/97  SpO2:  [93 %-99 %] 95 %  I/O last 3 completed shifts:  In: 1100 [P.O.:900; I.V.:200]  Out: 1720 [Urine:1475; Drains:245]      Constitutional:no acute distress, diaphoretic  ENT: defer  Neck: supple  Pulmonary: defer  Cardiovascular: BP as above  GI: defer  Skin/Integumen: Small abrasion at the distal medial knee  Neuro: Fluent speech follows commands to wiggle feet bilaterally.  He has full range of motion of the right knee.  Indicates he is unable to lift his leg straight up off the bed but dorsi flexion bilaterally is preserved  Psych: Talkative pleasantly refusing fall precautions  Extremities: No edema    Data   CBC with Diff:  Recent Labs   Lab Test 02/14/19  0802  02/11/19  1043   WBC 9.9   < >  --    HGB 14.0   < >  --    MCV 95   < >  --       < >  --    INR  --   --  1.22*    < > = values in this interval not displayed.      Comprehensive Metabolic Panel:  Recent Labs   Lab 02/15/19  0733   NA  138   POTASSIUM 3.7   CHLORIDE 102   CO2 31   ANIONGAP 5   *   BUN 10   CR 0.65*   GFRESTIMATED >90   GFRESTBLACK >90   NOY 8.6       INR:    Recent Labs   Lab Test 02/11/19  1043   INR 1.22*     UA:  Recent Labs   Lab 02/11/19  1230   COLOR Dark Yellow   APPEARANCE Clear   URINEGLC Negative   URINEBILI Small*   URINEKETONE 40*   SG 1.029   UBLD Negative   URINEPH 6.0   PROTEIN 100*   NITRITE Negative   LEUKEST Moderate*   RBCU 2   WBCU 44*       Time Spent on this Encounter   I spent 20 minutes from [id number] on the unit/floor managing the care of Usama Harris. Over 50% of my time was spent counseling the patient and/or coordinating care regarding services listed in this note.    Susan Buchanan [facility]  Hospitalist House [facility]  [phone]

## 2019-02-15 NOTE — PLAN OF CARE
A&O X4. CMS ex tingling/numbness to RLE-pt states this is improving, R leg slightly weaker than L. Bowel sounds active, +flatus, -BM-refused Miralax. VSS. Dressing marked w/ dried drainage- no change. APARNA drain patent-70mL out this shift. Up with Ax1, GB, cane. Pt refuses bed alarm. Regular diet. Taking oxycodone, tylenol for pain.

## 2019-02-15 NOTE — DISCHARGE SUMMARY
[facility]    Discharge Summary  Hospitalist    Date of Admission:  2/10/2019  Date of Discharge:  2/15/2019  Discharging Provider: Anna Yarbrough DO  Date of Service (when I saw the patient): 02/15/19    Discharge Diagnoses   Lumbar disc hernia, Large Right L2-3 w Thecal Sac compression s/p lumbar fusion  Urinary tract infection  Testosterone deficiency  Hypertension  Osteopenia  Chronic pain  Anxiety  Chronic right foot drop    History of Present Illness   Usama Harris is an 63 year old male who presented with back pain and progressive right leg pain/weakness to [facility] and was then transferred to [facility] for surgical intervention with Dr Issa.    Hospital Course   Usama Harris was admitted on 2/10/2019.  The following problems were addressed during his hospitalization:    Lumbar disc hernia, Large Right L2-3 w Thecal Sac compression has L3-5 fusion, and now has herniated disc just above the area that was previously fused. Imaging showed L2-3 degeneration and large right paracentral disc herniation with L2-3 severe central stenosis.  He underwent removal revision of L2-5 posterior spinal fusion and right L2-3 TLIF with Dr. Iggy Issa on 2/12/19.  APARNA drain removed day of discharge  - Discharged to home 2/15  - Follow up in 2 weeks for staple removal  - Analgesia with oxycodone provided by NS, recommended to continue with bowel regimen     Urinary tract infection, dysuria   Patient reported difficulty with complete emptying and change in urine color. Started on unasyn. Urine culture + e faecalis sensitive to penicillins and macrobid . States he has multiple sexual partners and believes it was related to sexual activity  - Had long discussion with patient on discharge regarding safe sex practice going forward (including condoms) and recommended STD testing with his PCP.  - Will continue nitrofurantoin x5 more days to complete 7 day course.     Testosterone  deficiency follows with Endocrinology at [facility] and plans to restart Testosterone replacement  - has appointment scheduled for 2/24/19, follow up as previously scheduled on discharge     HTN: Stable.   - Continue PTA Irbesartan      Osteopenia  - Monitor.      Chronic pain in pain clinic (Dr. Maria Pisano) currently not on narcotics  - Continue PTA Cymbalta, tizanidine  - Hold PTA celebrex on discharge  - PRN oxycodone for acute surgical pain prescribed by NS     Anxiety: Patient reporting anxiety while hospitalized and was maintained on PRN ativan during hospitalization (received 2 doses in total)  - No ativan on discharge     Chronic right foot drop possibly related to prior herniated disc  - Follow up with MIRELLA Yarbrough, DO    Significant Results and Procedures   2/12/19: removal revision of L2-5 posterior spinal fusion and right L2-3 TLIF with neurosurgery    Pending Results   NA    Code Status   Full Code       Primary Care Physician   Nikko Tang    Physical Exam   Temp: 98.5  F (36.9  C) Temp src: Oral BP: (!) 152/97   Heart Rate: 89 Resp: 16 SpO2: 95 % O2 Device: None (Room air)    Vitals:    02/14/19 0500 02/15/19 0500   Weight: 113.1 kg (249 lb 4.8 oz) 110.7 kg (244 lb)     Vital Signs with Ranges  Temp:  [97.5  F (36.4  C)-98.8  F (37.1  C)] 98.5  F (36.9  C)  Heart Rate:  [65-89] 89  Resp:  [16-18] 16  BP: (139-164)/(78-97) 152/97  SpO2:  [94 %-99 %] 95 %  I/O last 3 completed shifts:  In: 400 [P.O.:300; I.V.:100]  Out: 1125 [Urine:1000; Drains:125]    Constitutional: Awake, alert, cooperative, no apparent distress, mildly diaphoretic  Eyes: Conjunctiva and pupils examined and normal.  HEENT: Moist mucous membranes, normal dentition.  Respiratory: Clear to auscultation bilaterally, no crackles or wheezing.  Cardiovascular: Regular rate and rhythm, normal S1 and S2, and no murmur noted.  GI: Soft, non-distended, non-tender, normal bowel sounds.  Lymph/Hematologic: No anterior cervical or  supraclavicular adenopathy.  Skin: No rashes, no cyanosis, no edema. Back with dressing in place without strikethrough, APARNA drain in place with sanguinous fluid.  Musculoskeletal: No joint swelling, erythema. Mild abrasion right knee after fall this morning. Some pain in right knee and right ankle  Neurologic: Cranial nerves 2-12 intact, normal strength and sensation. Right lower extremity hip flexor weaker than left  Psychiatric: Alert, oriented to person, place and time, no obvious anxiety or depression.    Discharge Disposition   Discharged to home  Condition at discharge: Stable    Consultations This Hospital Stay   PHYSICAL THERAPY ADULT IP CONSULT  OCCUPATIONAL THERAPY ADULT IP CONSULT  NEUROSURGERY IP CONSULT  OCCUPATIONAL THERAPY ADULT IP CONSULT  PHYSICAL THERAPY ADULT IP CONSULT  PHYSICAL THERAPY ADULT IP CONSULT    Time Spent on this Encounter   I, Anna Yarbrough, personally saw the patient today and spent less than or equal to 30 minutes discharging this patient.    Discharge Orders      Reason for your hospital stay    Back surgery     Activity    Your activity upon discharge: activity as tolerated, no lifting more than 10 lbs.     Wound care and dressings    Instructions to care for your wound at home: keep wound clean and dry and may get incision wet in shower but do not soak or scrub.     Discharge Instructions    Discharge instructions:  No lifting of more than 10 pounds until follow up visit.  Ok to shampoo hair, shower or bathe, but, do not scrub or submerge incision under water until evaluated post operatively in clinic.    Ok to walk as tolerated, avoid bedrest.    No contact sports until after follow up visit  No high impact activities such as; running/jogging, snowmobile or 4 oliver riding or any other recreational vehicles    Call my office at [phone] for increasing redness, swelling or pus draining from the incision, increased pain or any other questions and concerns.    Go to ER with  any seizure activity, mental status change (increasing confusion), difficulty with speech or increasing or acute weakness     Follow-up and recommended labs and tests     F/u 2 weeks for staple removal    Follow up with your PCP within 7 days for hospital follow up.     Full Code     Diet    Follow this diet upon discharge: Regular     Discharge Medications   Current Discharge Medication List      START taking these medications    Details   nitroFURantoin macrocrystal-monohydrate (MACROBID) 100 MG capsule Take 1 capsule (100 mg) by mouth every 12 hours for 10 doses  Qty: 10 capsule, Refills: 0    Associated Diagnoses: Dysuria; Acute cystitis without hematuria      oxyCODONE (ROXICODONE) 5 MG tablet Take 1-2 tablets (5-10 mg) by mouth every 3 hours as needed for moderate to severe pain  Qty: 75 tablet, Refills: 0    Associated Diagnoses: S/P lumbar fusion      senna-docusate (SENOKOT-S/PERICOLACE) 8.6-50 MG tablet Take 2 tablets by mouth daily as needed for constipation  Qty: 30 tablet, Refills: 0    Associated Diagnoses: S/P lumbar fusion         CONTINUE these medications which have CHANGED    Details   tiZANidine (ZANAFLEX) 4 MG tablet Take 1-1.5 tablets (4-6 mg) by mouth 3 times daily  Qty: 135 tablet, Refills: 1    Associated Diagnoses: Muscle spasm         CONTINUE these medications which have NOT CHANGED    Details   acetaminophen (TYLENOL) 500 MG tablet Take 500-1,000 mg by mouth every 6 hours as needed for mild pain (every other day rotate days with ibuprofen)      carboxymethylcellulose (REFRESH PLUS) 0.5 % SOLN Place 1 drop into both eyes daily as needed for dry eyes      DULoxetine (CYMBALTA) 20 MG capsule Take 1 capsule (20 mg) by mouth daily  Qty: 30 capsule, Refills: 3    Associated Diagnoses: Chronic pain syndrome; Failed back syndrome      irbesartan (AVAPRO) 300 MG tablet TAKE 1 TABLET BY MOUTH EVERY DAY  Qty: 90 tablet, Refills: 0    Associated Diagnoses: Essential hypertension      latanoprost  (XALATAN) 0.005 % ophthalmic solution Place 1 drop into both eyes At Bedtime   Refills: 11      multivitamin, therapeutic with minerals (MULTI-VITAMIN) TABS tablet Take 1 tablet by mouth daily      sildenafil (REVATIO) 20 MG tablet Take 1-5 tablets ( mg) by mouth daily as needed (take about 30 min before needed for ED.) Never use with nitroglycerin.  Qty: 30 tablet, Refills: 11    Associated Diagnoses: Erectile dysfunction, unspecified erectile dysfunction type         STOP taking these medications       celecoxib (CELEBREX) 100 MG capsule Comments:   Reason for Stopping:         ibuprofen (ADVIL/MOTRIN) 200 MG capsule Comments:   Reason for Stopping:             Allergies   Allergies   Allergen Reactions     Gabapentin Other (See Comments)     Edema, elevated liver enzymes       Data   Most Recent 3 CBC's:  Recent Labs   Lab Test 02/14/19  0802 02/13/19  0742 02/12/19  2121  02/12/19  0756   WBC 9.9 6.4  --   --  12.5*   HGB 14.0 12.1* 12.9*   < > 14.9   MCV 95 96  --   --  97    161  --   --  203    < > = values in this interval not displayed.      Most Recent 3 BMP's:  Recent Labs   Lab Test 02/15/19  0733 02/14/19  0802 02/13/19  0742    134 139   POTASSIUM 3.7 3.6 4.0   CHLORIDE 102 100 104   CO2 31 27 27   BUN 10 9 11   CR 0.65* 0.61* 0.73   ANIONGAP 5 7 8   NOY 8.6 8.7 7.7*   * 126* 103*     Most Recent 2 LFT's:  Recent Labs   Lab Test 11/13/18  1345 12/19/16  1120   AST 23 33   ALT 45 63   ALKPHOS 80 139   BILITOTAL 0.5 0.4     Most Recent INR's and Anticoagulation Dosing History:  Anticoagulation Dose History     Recent Dosing and Labs Latest Ref Rng & Units 9/20/2007 6/22/2012 12/20/2012 11/10/2016 2/11/2019    INR 0.86 - 1.14 1.03 0.99 0.94 0.98 1.22(H)        Most Recent 3 Troponin's:No lab results found.  Most Recent Cholesterol Panel:  Recent Labs   Lab Test 04/21/17  1515   CHOL 212*   *   HDL 53   TRIG 96     Most Recent 6 Bacteria Isolates From Any Culture (See EPIC  Reports for Culture Details):  Recent Labs   Lab Test 02/11/19  1230   CULT >100,000 colonies/mL  Enterococcus faecalis  *     Most Recent TSH, T4 and A1c Labs:  Recent Labs   Lab Test 11/13/18  1345  12/15/14  1049   TSH 0.72   < > 2.39   T4  --   --  0.86   A1C 5.7*  --  5.7    < > = values in this interval not displayed.     Results for orders placed or performed during the hospital encounter of 02/10/19   CT Lumbar Spine w/o Contrast    Narrative    CT LUMBAR SPINE WITHOUT CONTRAST  2/11/2019 10:33 AM     HISTORY: Lumbosacral spine fusion, follow-up.      TECHNIQUE: Axial images of the lumbar spine were obtained without  intravenous contrast. Multiplanar reformations were performed.   Radiation dose for this scan was reduced using automated exposure  control, adjustment of the mA and/or kV according to patient size, or  iterative reconstruction technique.     COMPARISON: Lumbar spine MR 2/9/2019. Lumbar spine MR 10/14/2016.     FINDINGS:  There are five lumbar-type vertebral bodies assumed for the  purposes of this dictation.     There are postoperative changes of posterior wei and screw fixation  from L3 to L5. Intervertebral disc spacers are seen at L3-L4 and  L4-L5. Surgical hardware appears intact. No lucency around the screws  to suggest loosening or infection. Previous laminectomy changes are  seen from L3 to L5.    No new loss of vertebral body height. Schmorl's node type deformity  seen at the superior L1 and L2 endplates that are unchanged. No acute  lucent fracture lines are visualized. Mild degenerative changes and  loss of disc height at the nonfused levels.    Level by level as follows:     T12-L1: No significant spinal canal or neural foraminal narrowing.     L1-L2: No significant spinal canal or neural foraminal narrowing.     L2-L3: Large right paracentral disc extrusion extending below the disc  level is likely still present, although this was much better  appreciated by MRI. Evaluation is  difficult given the streak artifact  from surgical hardware. There is likely severe spinal canal narrowing  that is similar to prior. No significant neural foraminal narrowing.     L3-L4: Postoperative changes of posterior decompression and fusion. No  significant spinal canal or neural foraminal narrowing.    L4-L5: Postoperative changes of posterior decompression and fusion.  There may be mild to moderate bilateral neural foraminal narrowings  due to facet hypertrophy and uncinate spurring. No significant spinal  canal narrowing.     L5-S1: Posterior disc bulge and mild facet hypertrophy, right greater  than left. Findings result in moderate bilateral neural foraminal  narrowing, right greater than left. No significant spinal canal  narrowing.       Impression    IMPRESSION:    1. Previously seen large disc extrusion at the L2-L3 level is likely  still present but was much better appreciated on previous lumbar spine  MR 2/9/2019.  2. Postoperative changes of fusion from L3 to L5. Hardware appears  intact without evidence of loosening.  3. Additional degenerative changes as above.    MG DENNEY MD   XR Surgery KATHY L/T 5 Min Fluoro w Stills    Narrative    XR SURGERY KATHY FLUORO LESS THAN 5 MIN W STILLS 2/12/2019 9:52 PM     COMPARISON: CT dated 2/11/2019    HISTORY: L2-5 Revision    NUMBER OF IMAGES ACQUIRED: 4 2-D images and 1 CT acquisition with  axial reconstruction.    VIEWS: 4 2-D projections and 1 CT acquisition with axial  reconstruction.    FLUOROSCOPY TIME: .5      Impression    IMPRESSION: Intraoperative imaging during L2-L5 instrumented surgical  fusion revision.    CIERRA CHAVEZ MD

## 2019-02-15 NOTE — PLAN OF CARE
POD 2 L2-5 fusion. Pt A&O x4, VSS, on RA. LS clear. RLE numbness/tingling, 4/5 strength, mod plantar/dorsi flexion otherwise CMS intact. PCA d/bernardo, PRN Oxycodone and Tylenol given for pain. Dressing dry drainage, unchanged. APARNA patent, 120cc output for day shift. Up A1 w/ GB and cane, refuse bed alarm. Voiding adequately, urinal. +BS, passing flatus, senna given, refused Miralax, no BM today. Reg diet. IV antibiotic, Unasyn given for UTI. Discharge to home pending. Nrsg will continue to monitor.

## 2019-02-15 NOTE — PROGRESS NOTES
"Informed by charge RN that pt had fall while ambulating in the lobby. Pt reported that his \"right leg went out on me\" while pt was walking with a cane. House RAJESH assessed pt at the bedside. Prior to fall pt had refused bed alarm and fall precautions (gait belt, walking with staff, fall wrist band and hospital socks). This RN reviewed reason for fall precautions and encouraged pt to minimally call for assistance when ambulating. 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  After the fall this RN again reviewed fall precautions, pt continued to refuse bed alarm or staff assistance while ambulating. Pt did agree to use a walker (supplied by PT and at pt's bedside) when ambulating to reduce the risk of future falls.  "

## 2019-02-15 NOTE — PROGRESS NOTES
SPIRITUAL HEALTH SERVICES Progress Note  [facility] 74    Visit per LOS.  Pt said he is waiting for his ride, ready to go home today.  Pt named challenges that he's has during this hospital stay.  He said he is hoping that he feels like he will be able to do most things, but sometimes doesn't realize that his activity is hurting his back.  Pt talked about his daughter and his grandson, who delights him.  He said he was raised Hinduism, but isn't involved with the Worship any more.      provided listening and support.      Pt said he lives alone and has 16 stairs.  He said he feels confident about going home.  He said will have support from friends and family.      SH available as needs arise.  No plan to follow.      Hannah Collier  Chaplain Resident

## 2019-02-16 NOTE — PLAN OF CARE
Occupational Therapy Discharge Summary    Reason for therapy discharge:    Discharged to home.    Progress towards therapy goal(s). See goals on Care Plan in [facility] electronic health record for goal details.  Goals not met.  Barriers to achieving goals:   discharge from facility.    Therapy recommendation(s):    No further therapy is recommended.

## 2019-02-16 NOTE — PLAN OF CARE
POD3 revision of L2-L5 fusion. Alert and oriented x4. VSS. CMS with 4/5 strength in RLE (moderate strength in hip flexion), and numbness reported to R lateral thigh, knee and posterior calf. Lung sounds clear. Had BM, voiding without difficulty. Incision on back WDL with intact staples. Sterile dressing change completed. Incision pain managed with prn tylenol and oxycodone. Reviewed written discharge instructions including medications, follow-up apts, spinal precautions and incision care. All questions answered. Pt d/bernardo home with SO providing transportation.

## 2019-02-18 ENCOUNTER — TELEPHONE (OUTPATIENT)
Dept: FAMILY MEDICINE | Facility: CLINIC | Age: 64
End: 2019-02-18

## 2019-02-18 NOTE — TELEPHONE ENCOUNTER
ED / Discharge Outreach Protocol    Admitted to [facility] from 2/10 - 2/15 lumbar fusion     Patient Contact    Attempt # 1    Was call answered?  NO   Message left to return call to triage nurse     Marah Rahman, Registered Nurse   [facility]

## 2019-02-18 NOTE — TELEPHONE ENCOUNTER
Pt called to schedule appointment for hospital follow up but would like to discuss options with care team as no available appointment with provider.Thanks

## 2019-02-19 DIAGNOSIS — Z98.1 S/P LUMBAR FUSION: Primary | ICD-10-CM

## 2019-02-19 NOTE — TELEPHONE ENCOUNTER
ED / Discharge Outreach Protocol    Patient Contact    Attempt # 2    Was call answered?  No.  Left message on voicemail with information to call me back.    Yenifer Grey RN

## 2019-02-20 ENCOUNTER — PATIENT OUTREACH (OUTPATIENT)
Dept: CARE COORDINATION | Facility: CLINIC | Age: 64
End: 2019-02-20

## 2019-02-20 DIAGNOSIS — Z98.1 S/P LUMBAR FUSION: Primary | ICD-10-CM

## 2019-02-20 NOTE — LETTER
[facility] Home  Complex Care Plan  About Me  Patient Name:  Usama Harris    YOB: 1955  Age:     63 year old   Aliya MRN:   [id number] Telephone Information:  Home Phone [phone]   Mobile [phone]       Address:    [address]  [place] [address] Email address:  [email or web address]      Emergency Contact(s)  Name Relationship Lgl Grd Work Phone Home Phone Mobile Phone   MKIAEL JONES Spouse No  [phone]           Primary language:  English     needed? No   [employer]:  [phone] op. 1  Other communication barriers: None  Preferred Method of Communication:  Mail  Current living arrangement:    Mobility Status/ Medical Equipment: Independent w/Device    Health Maintenance  Health Maintenance Reviewed: Due/Overdue   Health Maintenance Due   Topic Date Due     PREVENTIVE CARE VISIT  1955     ZOSTER IMMUNIZATION (2 of 3) 06/16/2017     DUC QUESTIONNAIRE 1 YEAR  04/21/2018     DTAP/TDAP/TD IMMUNIZATION (2 - Td) 10/10/2018     COLON CANCER SCREEN (SYSTEM ASSIGNED)  10/29/2018     URINE DRUG SCREEN Q1 YR  01/08/2019     PHQ-9 Q1YR  01/08/2019       My Access Plan  Medical Emergency 911   Primary Clinic Line [facility] - [phone]   24 Hour Appointment Line [phone] or  [phone]) (toll-free)   24 Hour Nurse Line [phone] (toll-free)   Preferred Urgent Care [facility], [phone]   Preferred Hospital     Preferred Pharmacy [facility] - [place], [place] - [address]     Behavioral Health Crisis Line The National Suicide Prevention Lifeline at 1-988.357.8127 or 911     My Care Team Members    Patient Care Team       Relationship Specialty Notifications Start End    Nikko Tang MD PCP - General   3/23/11     Phone: [phone] Fax: [phone]         [address] [facility] [address]    Nikko Tang  MD Jeffrey PCP - Assigned PCP   11/6/08     Phone: [phone] Fax: [phone]         [address] [facility] [address]    Zhao Mora, RN Nurse Coordinator Neurology Admissions 3/6/15     stroke/neuro-endovascular [phone] pager [phone]    Phone: [phone] Pager: [phone]        Jeffrey Friedman MD Resident Student in organized health care education/training program  11/28/18     Phone: [phone] Fax: [phone]         [address] [facility] [address]    Sayra Snowden, RN Lead Care Coordinator Primary Care - [facility] Admissions 2/20/19     Phone: [phone] Fax: [phone]                My Care Plans  Self Management and Treatment Plan  Goals and (Comments)  Goals        General    # 1 Pain Management (pt-stated)     Notes - Note created  2/20/2019 10:41 AM by Sayra Snowden, RN    Goal Statement: I will reduce surgical back pain   Measure of Success: More mobility for daily activities   Supportive Steps to Achieve:   I will take Oxycodone and Zanaflex as directed   I will keep by surgeons appointment 2/25/2019  I will seek medical help if any signs of infection  Increased redness ,pain, swelling drainage or fever   Barriers: Deconditioned   Strengths: agrees with plan   Date to Achieve By: 3/20/2019  Patient expressed understanding of goal: yes               Action Plans on File: None                      Advance Care Plans/Directives Type: None       My Medical and Care Information  Problem List   Patient Active Problem List   Diagnosis     Chronic low back pain     Anxiety     Testosterone deficiency     HTN (hypertension)     Leukoariosis     Chronic pain syndrome     S/P lumbar discectomy     Surgery, elective     Health Care Home     Osteoporosis     Lumbar disc joe, Large Right L2-3 w Thecal Sac compression     Osteopenia     Lumbar herniated disc     S/P lumbar fusion      Current Medications and Allergies:  See printed Medication Report.    Care  Coordination Start Date: 2/20/2019   Frequency of Care Coordination: weekly   Form Last Updated: 02/20/2019

## 2019-02-20 NOTE — PROGRESS NOTES
Clinic Care Coordination Contact    Clinic Care Coordination Contact  OUTREACH    Referral Information:  Referral Source: IP Report    Primary Diagnosis: Orthopedic    Chief Complaint   Patient presents with     Clinic Care Coordination - Post Hospital     Clinic Care Coordination RN         [place] Utilization: [facility] Hospitalization 2/10-2/15/2019-  Lumbar disc hernia, Large Right L2-3 w Thecal Sac compression s/p lumbar fusion  Urinary tract infection  Testosterone deficiency  Hypertension  Osteopenia  Chronic pain  Anxiety  Chronic right foot drop        Clinic Utilization  Difficulty keeping appointments:: No  Compliance Concerns: No  No-Show Concerns: No  No PCP office visit in Past Year: No  Utilization    Last refreshed: 2/19/2019  2:40 PM:  Hospital Admissions 2           Last refreshed: 2/19/2019  2:40 PM:  ED Visits 0           Last refreshed: 2/19/2019  2:40 PM:  No Show Count (past year) 0              Current as of: 2/19/2019  2:40 PM              Clinical Concerns:  Current Medical Concerns:  Patient described the back pain as a # 8 before he takes his pain medication Taking Oxycodone as directed and is going to start taking the Zanaflex .  Patient has a dressing over the incision and does have a little redness around the incision he will monitor for signs of infection and call surgeon with concerns .  Patient is using a walker and cane as needed   Patient needs a follow up on possible UTI/STD Patient reports his urine was gui and now it is [place]ak[place].  Burning frequency no blood in urine  Patient will continue Macrobid and keep follow up appointment 2/22  Patient will have his staples removed 2/25/2019  Pain  Pain (GOAL):: Yes  Type: Chronic (>3mo)  Location of chronic pain:: BACK  Progression: Intermittent  Description of pain: Aching, Numb  Chronic pain severity:: 8  Limitation of routine activities due to chronic pain:: Yes  Alleviating Factors: Rest, Pain Medication  Aggravating  Factors: Activity  Health Maintenance Reviewed: Due/Overdue   Health Maintenance Due   Topic Date Due     PREVENTIVE CARE VISIT  1955     ZOSTER IMMUNIZATION (2 of 3) 06/16/2017     DUC QUESTIONNAIRE 1 YEAR  04/21/2018     DTAP/TDAP/TD IMMUNIZATION (2 - Td) 10/10/2018     COLON CANCER SCREEN (SYSTEM ASSIGNED)  10/29/2018     URINE DRUG SCREEN Q1 YR  01/08/2019     PHQ-9 Q1YR  01/08/2019     Clinical Pathway: None    Medication Management:  Reviewed      Functional Status:  Dependent ADLs:: Ambulation-cane, Ambulation-walker  Bed or wheelchair confined:: No  Mobility Status: Independent w/Device  Any fall with injury in the past year?: Yes      Diet/Exercise/Sleep:  Diet:: Diabetic diet  Inadequate nutrition (GOAL):: No  Food Insecurity: No  Tube Feeding: No  Exercise:: Unable to exercise  Inadequate activity/exercise (GOAL):: No  Significant changes in sleep pattern (GOAL): No      Psychosocial:  Informal Support system:: Spouse     Financial/Insurance:   Financial/Insurance concerns (GOAL):: No    Community Resources: None  Supplies used at home:: None  Equipment Currently Used at Home: cane, straight, shower chair, grab bar, tub/shower, grab bar, toilet, dressing device    Advance Care Plan/Directive  Advanced Care Plans/Directives on file:: No    Referrals Placed: None     Goals:   Goals        General    # 1 Pain Management (pt-stated)     Notes - Note created  2/20/2019 10:41 AM by Sayra Snowden RN    Goal Statement: I will reduce surgical back pain   Measure of Success: More mobility for daily activities   Supportive Steps to Achieve:   I will take Oxycodone and Zanaflex as directed   I will keep by surgeons appointment 2/25/2019  I will seek medical help if any signs of infection  Increased redness ,pain, swelling drainage or fever   Barriers: Deconditioned   Strengths: agrees with plan   Date to Achieve By: 3/20/2019  Patient expressed understanding of goal: yes                Patient/Caregiver  understanding: Expresses good understanding of discharge instructions    Outreach Frequency: weekly  Future Appointments              Tomorrow Jeffrey Friedman MD [facility] Endocrinology, [facility]    In 2 days Valery Pereira MD [facility],     In 5 days Nurse, Sh Neuro [facility] Neurosurgery [facility], [facility]    In 1 month CS[facility] GABBY Seo    In 1 month Afsaneh Villareal PA-C [facility] Neurosurgery [facility], [facility]          Plan:   Patient will monitor any signs of a surgical site infection and call surgeon with concerns  Patient will continue to take pain medications as directed   Stapled will be removed by surgeons office 2/25  Patient will keep PCP visit 2/22 for follow up on possible UTI/STD  CC will follow up in 3-5 business days     Sayra Snowden RN / Clinical Care Coordinator     [facility]   [email or web address]  Office :  [phone] / Fax :  [phone]

## 2019-02-20 NOTE — LETTER
[facility] CARE COORDINATION  [address]  [facility] [place] [address]  February 20, 2019    Usama Harris  [address] [place] [address]      Dear Usama,    I am a clinic care coordinator who works with Nikko Tang MD at [facility] . I wanted to thank you for spending the time to talk with me.  I wanted to introduce myself and provide you with my contact information so that you can call me with questions or concerns about your health care. Below is a description of clinic care coordination and how I can further assist you.     The clinic care coordinator is a registered nurse who understand the health care system. The goal of clinic care coordination is to help you manage your health and improve access to the [facility] system in the most efficient manner. The registered nurse can assist you in meeting your health care goals by providing education, coordinating services, and strengthening the communication among your providers.   Please feel free to contact me at [phone], with any questions or concerns. We at [facility] are focused on providing you with the highest-quality healthcare experience possible and that all starts with you.     Sincerely,     Sayra Snowden RN / Clinical Care Coordinator     [facility]   [email or web address]  Office :  [phone] / Fax :  [phone]      Enclosed: I have enclosed a copy of the Complex Care Plan. This has helpful information and goals that we have talked about. Please keep this in an easy to access place to use as needed.

## 2019-02-21 ENCOUNTER — OFFICE VISIT (OUTPATIENT)
Dept: ENDOCRINOLOGY | Facility: CLINIC | Age: 64
End: 2019-02-21
Payer: COMMERCIAL

## 2019-02-21 ENCOUNTER — TELEPHONE (OUTPATIENT)
Dept: ENDOCRINOLOGY | Facility: CLINIC | Age: 64
End: 2019-02-21

## 2019-02-21 VITALS
WEIGHT: 251.2 LBS | DIASTOLIC BLOOD PRESSURE: 76 MMHG | BODY MASS INDEX: 32.24 KG/M2 | SYSTOLIC BLOOD PRESSURE: 120 MMHG | HEART RATE: 91 BPM | HEIGHT: 74 IN

## 2019-02-21 DIAGNOSIS — E29.1 HYPOGONADISM MALE: ICD-10-CM

## 2019-02-21 DIAGNOSIS — M81.0 OSTEOPOROSIS, UNSPECIFIED OSTEOPOROSIS TYPE, UNSPECIFIED PATHOLOGICAL FRACTURE PRESENCE: Primary | ICD-10-CM

## 2019-02-21 DIAGNOSIS — M81.0 OSTEOPOROSIS, UNSPECIFIED OSTEOPOROSIS TYPE, UNSPECIFIED PATHOLOGICAL FRACTURE PRESENCE: ICD-10-CM

## 2019-02-21 LAB
PHOSPHATE SERPL-MCNC: 2.7 MG/DL (ref 2.5–4.5)
PSA SERPL-MCNC: 0.75 UG/L (ref 0–4)

## 2019-02-21 RX ORDER — TESTOSTERONE CYPIONATE 200 MG/ML
50 INJECTION, SOLUTION INTRAMUSCULAR
Status: DISCONTINUED | OUTPATIENT
Start: 2019-02-21 | End: 2020-03-10

## 2019-02-21 RX ORDER — ZOLEDRONIC ACID 5 MG/100ML
5 INJECTION, SOLUTION INTRAVENOUS ONCE
Status: CANCELLED
Start: 2019-02-21 | End: 2019-02-21

## 2019-02-21 ASSESSMENT — PAIN SCALES - GENERAL: PAINLEVEL: NO PAIN (0)

## 2019-02-21 ASSESSMENT — MIFFLIN-ST. JEOR: SCORE: 2010.06

## 2019-02-21 NOTE — TELEPHONE ENCOUNTER
Prior Authorization Approval    Authorization Effective Date:    Authorization Expiration Date:    Medication: Testosterone 200mg/ml-PA approved  Approved Dose/Quantity:   Reference #: [id number]   Insurance Company: [employer] - Phone [phone] Fax [phone]  Expected CoPay:       CoPay Card Available:      Foundation Assistance Needed:    Which Pharmacy is filling the prescription (Not needed for infusion/clinic administered): [facility], [place] - [address]  Pharmacy Notified: Yes  Patient Notified: No

## 2019-02-21 NOTE — PATIENT INSTRUCTIONS
- Labs today  - We will order reclast infusion and someone will call you to set this up  - In 1 month get your blood drawn at any [place] clinic  - Recommend you start taking 800 - 1000 units of vitamin D3 (cholecalciferol)  - Recommend 1200mg elemental calcium daily, either through diet and/or supplements

## 2019-02-21 NOTE — TELEPHONE ENCOUNTER
----- Message from Jeffrey Friedman MD sent at 2/21/2019  9:42 AM CST -----  Regarding: Scheduling reclast  Hello,    I put in a reclast therapy plan for this patient. Please assist with PA and scheduling.    Thank you,  Jeffrey

## 2019-02-21 NOTE — TELEPHONE ENCOUNTER
Central Prior Authorization Team   Phone: [phone]      PA Initiation    Medication: Testosterone 200mg/ml-PA initiated  Insurance Company: [employer] - Phone [phone] Fax [phone]  Pharmacy Filling the Rx: [facility] - [address]  Filling Pharmacy Phone: [phone]  Filling Pharmacy Fax:    Start Date: 2/21/2019

## 2019-02-21 NOTE — PROGRESS NOTES
[facility]  Date: 02/21/2019  Fellow: Jeffrey Friedman MD  Attending: Dr. Langley    SUBJECTIVE:  Usama Harris is a 63 year old male who is seen in clinic for management of osteoporosis and hypogonadotropic hypogonadism    He is here today to discuss testosterone therapy and treatment of osteoporosis.    History:  I saw patient on 1/24/19 for initial consultation for both osteoporosis and hypogonadism. In the past, testosterone and gonadotropins have been low and he's had a normal brain MRI. He was on significant amount of opioids around this time. Was on testosterone from 1050-2960 and noted improvement in libido and strength, but replacement was stopped because of mildly elevated transaminases, which eventually normalized.     In 03/2017 lumbar x-rays showed an incidental L1 compression fracture.  He had no trauma that he can recall. He has a history of L3-L5 fusion surgery in 2016. In 04/2017, his PCP started him on fosamax and calcitonin. Calcitonin stopped in 10/2017. Fosamax use was continued, but a pharmacist told him to stop it because of some GI symptoms (reflux, dyspepsia), so he stopped it in fall 2018.    Interval history:  Patient admitted from 2/10/19 through 2/15/19 for a lumbar disc hernia and right L2-3 with thecal sac compression. He underwent removal revision of L2-5 posterior spinal fusion and right L2-3 TLIF with Dr. Iggy Issa on 2/12/19.    Labs from last visit: Vitamin D3 27. PTH 78, calcium 8.5, 24-hour urine calcium within normal limits, phosphorus low at 2. Testosterone, free and total, both low.    Review Of Systems  12 point ROS negative unless noted above    Past Medical History  Past Medical History:   Diagnosis Date     Acute hepatitis C without mention of hepatic coma(070.51)     Hep C - Treated     Backache, unspecified      DDD (degenerative disc disease), lumbar     s/p back surgery     Glaucoma      Hypertension        Past Surgical  History  Past Surgical History:   Procedure Laterality Date     BACK SURGERY  2007    Herniated disc     CATARACT IOL, RT/LT Left      DECOMPRESSION LUMBAR ONE LEVEL Right 5/18/2016    Procedure: DECOMPRESSION LUMBAR ONE LEVEL;  Surgeon: Prince Govea MD;  Location: [facility] OR     DISCECTOMY LUMBAR POSTERIOR MICROSCOPIC ONE LEVEL Right 7/22/2016    Procedure: DISCECTOMY LUMBAR POSTERIOR MICROSCOPIC ONE LEVEL;  Surgeon: Iggy Issa MD;  Location: [facility] OR     LAMINECT/DISCECTOMY, LUMBAR      Laminectomy/Discectomy Cervical     LAMINECTOMY, FUSION LUMBAR THREE+ LEVEL, COMBINED Right 2/12/2019    Procedure: REVISION L2-L5 POSTERIOR SPINAL FUSION; RIGHT L2-L3 TRANSFORAMINAL INTERBODY FUSION;  Surgeon: Iggy Issa MD;  Location: [facility] OR     LAMINECTOMY, FUSION LUMBAR TWO LEVELS, COMBINED Right 11/15/2016    Procedure: COMBINED LAMINECTOMY, FUSION LUMBAR TWO LEVELS;  Surgeon: Iggy Issa MD;  Location: [facility] OR     SURGICAL HISTORY OF -       ligation of left spermatic vein        Medications  Current Outpatient Medications   Medication Sig Dispense Refill     acetaminophen (TYLENOL) 500 MG tablet Take 500-1,000 mg by mouth every 6 hours as needed for mild pain (every other day rotate days with ibuprofen)       carboxymethylcellulose (REFRESH PLUS) 0.5 % SOLN Place 1 drop into both eyes daily as needed for dry eyes       DULoxetine (CYMBALTA) 20 MG capsule Take 1 capsule (20 mg) by mouth daily 30 capsule 3     irbesartan (AVAPRO) 300 MG tablet TAKE 1 TABLET BY MOUTH EVERY DAY 90 tablet 0     latanoprost (XALATAN) 0.005 % ophthalmic solution Place 1 drop into both eyes At Bedtime   11     multivitamin, therapeutic with minerals (MULTI-VITAMIN) TABS tablet Take 1 tablet by mouth daily       senna-docusate (SENOKOT-S/PERICOLACE) 8.6-50 MG tablet Take 2 tablets by mouth daily as needed for constipation 30 tablet 0     sildenafil (REVATIO) 20 MG tablet Take 1-5 tablets ( mg) by mouth daily as needed (take  "about 30 min before needed for ED.) Never use with nitroglycerin. 30 tablet 11     tiZANidine (ZANAFLEX) 4 MG tablet Take 1-1.5 tablets (4-6 mg) by mouth 3 times daily 135 tablet 1       OBJECTIVE:  /76   Pulse 91   Ht 1.881 m (6' 2.06\")   Wt 113.9 kg (251 lb 3.2 oz)   BMI 32.20 kg/m    Body mass index is 32.2 kg/m .   Gen: NAD, well-appearing  HEENT:  Normocephalic, atraumatic, EOMI, no scleral icterus   Rectal: prostate feels smooth without nodularity  Neuro: alert and oriented    ASSESSMENT/PLAN:    # Osteoporosis  Patient with non-traumatic L1 compression fracture in 2017. Previously on calcitonin and then fosamax, but couldn't tolerate the latter due to GI side effects. Risk factors include mild hypogonadism, family history, and prior tobacco use. 24-hour urine calcium and other secondary osteoporosis w/u was negative apart from mildly low phosphorus, which we will repeat today. DEXA from 01/2019 with lowest T-score -1.2 at left neck, however since it was a different scanner, can't really compare to 2015 scan. We discussed risks/benefits of reclast therapy, and patient is willing to proceed with treatment  - Reclast infusion ordered  - Testosterone therapy as noted below  - Advised 800-1000 units of vitamin D3 daily, and 1200mg elemental calcium daily through diet/supplements     # Hypogonadotropic hypogonadism  Diagnosed in 2014. Pituitary MRI normal. Etiology could have been from chronic opioid use. Transferrin saturation was elevated in 2011, but he also had active HCV infection during that time and outpatient GI notes don't really mention the transferrin. Ferritin was in normal range in 2014. He is now off opioids, but is now a daily marijuana user to help with his chronic pain. He is not interested in quitting marijuana. Repeat testosterone (total and free) confirmed low. He has low libido and osteoporosis and it would be reasonable to treat him with testosterone therapy. We discussed " risks/benefits, including increased cardiovascular risk and progression of an undiagnosed prostate cancer. He has a family history of prostate cancer in brother and father - his prostate exam today was remarkable. We will get a baseline PSA. We will monitor liver enzymes closely given previous rise in transaminases while on testosterone in the past.   - Per patient preference, will start testosterone cypionate 50mcg weekly   - Check liver enzymes in 1 month  - Check PSA, testosterone, CBC, hepatic panel in 3-4 months at his follow-up visit    Return to clinic in 4 months    This patient was seen and staffed with Dr. Korin Friedman MD  PGY4, Endocrinology Fellow    Attestation    I have seen and discussed this patient with the Endocrinology Fellow on 2/21/2019.  I agree with the assessment and plan of care as documented above.        Ludy Langley MD  Staff Physician   Endocrinology, [facility]   Pager [phone]

## 2019-02-21 NOTE — TELEPHONE ENCOUNTER
[facility] Prior Authorization Team Request    Medication: Testosterone Cyp 200mg/ml  Dosin.25mls every 7 days  Qty: 2mls  Day Supply: 28  NDC (required for Medicaid members): [address]-71     Insurance   BIN: [id number]  PCN: [id number]  Grp: [id number]  ID: [id number]    CoverMyMeds Key (if applicable):     Additional documentation:       Filling Pharmacy: [facility]  Phone Number: [phone]  Contact:    Pharmacy NPI (required for Medicaid members): [id number]

## 2019-02-21 NOTE — LETTER
2/21/2019     RE: Usama Harris  [address] [place] [address]     Dear Colleague,    Thank you for referring your patient, Usama Harris, to the [facility] ENDOCRINOLOGY at [facility]. Please see a copy of my visit note below.    [facility]  Date: 02/21/2019  Fellow: Jeffrey Friedman MD  Attending: Dr. Langley    SUBJECTIVE:  Usama Harris is a 63 year old male who is seen in clinic for management of osteoporosis and  hypogonadotropic hypogonadism    He is here today to discuss testosterone therapy and treatment of osteoporosis.    History:  I saw patient on 1/24/19 for initial consultation for both osteoporosis and hypogonadism. In the past, testosterone and gonadotropins have been low and he's had a normal brain MRI. He was on significant amount of opioids around this time. Was on testosterone from 5375-1545 and noted improvement in libido and strength, but replacement was stopped because of mildly elevated transaminases, which eventually normalized.     In 03/2017 lumbar x-rays showed an incidental L1 compression fracture.  He had no trauma that he can recall. He has a history of L3-L5 fusion surgery in 2016. In 04/2017, his PCP started him on fosamax and calcitonin. Calcitonin stopped in 10/2017. Fosamax use was continued, but a pharmacist told him to stop it because of some GI symptoms (reflux, dyspepsia), so he stopped it  in fall 2018.    Interval history:  Patient admitted from 2/10/19 through 2/15/19 for a lumbar disc hernia and right L2-3 with thecal sac compression. He underwent removal revision of L2-5 posterior spinal fusion and right L2-3 TLIF with Dr. Iggy Issa on 2/12/19.    Labs from last visit: Vitamin D3 27. PTH 78, calcium 8.5, 24-hour urine calcium within normal limits, phosphorus low at 2. Testosterone, free and total, both low.    Review Of Systems  12 point ROS negative unless noted above    Past  Medical History  Past Medical History:   Diagnosis Date     Acute hepatitis C without mention of hepatic coma(070.51)     Hep C - Treated     Backache, unspecified      DDD (degenerative disc disease), lumbar     s/p back surgery     Glaucoma      Hypertension        Past Surgical History  Past Surgical History:   Procedure Laterality Date     BACK SURGERY  2007    Herniated disc     CATARACT IOL, RT/LT Left      DECOMPRESSION LUMBAR ONE LEVEL Right 5/18/2016    Procedure: DECOMPRESSION LUMBAR ONE LEVEL;  Surgeon: Prince Govea MD;  Location: [facility] OR     DISCECTOMY LUMBAR POSTERIOR MICROSCOPIC ONE LEVEL Right 7/22/2016    Procedure: DISCECTOMY LUMBAR POSTERIOR MICROSCOPIC ONE LEVEL;  Surgeon: Iggy Issa MD;  Location: [facility] OR     LAMINECT/DISCECTOMY, LUMBAR      Laminectomy/Discectomy Cervical     LAMINECTOMY, FUSION LUMBAR THREE+ LEVEL, COMBINED Right 2/12/2019    Procedure: REVISION L2-L5 POSTERIOR SPINAL FUSION; RIGHT L2-L3 TRANSFORAMINAL INTERBODY FUSION;  Surgeon: Iggy Issa MD;  Location: [facility] OR     LAMINECTOMY, FUSION LUMBAR TWO LEVELS, COMBINED Right 11/15/2016    Procedure: COMBINED LAMINECTOMY, FUSION LUMBAR TWO LEVELS;  Surgeon: Iggy Issa MD;  Location: [facility] OR     SURGICAL HISTORY OF -       ligation of left spermatic vein        Medications  Current Outpatient Medications   Medication Sig Dispense Refill     acetaminophen (TYLENOL) 500 MG tablet Take 500-1,000 mg by mouth every 6 hours as needed for mild pain (every other day rotate days with ibuprofen)       carboxymethylcellulose (REFRESH PLUS) 0.5 % SOLN Place 1 drop into both eyes daily as needed for dry eyes       DULoxetine (CYMBALTA) 20 MG capsule Take 1 capsule (20 mg) by mouth daily 30 capsule 3     irbesartan (AVAPRO) 300 MG tablet TAKE 1 TABLET BY MOUTH EVERY DAY 90 tablet 0     latanoprost (XALATAN) 0.005 % ophthalmic solution Place 1 drop into both eyes At Bedtime   11     multivitamin, therapeutic with minerals  "(MULTI-VITAMIN) TABS tablet Take 1 tablet by mouth daily       senna-docusate (SENOKOT-S/PERICOLACE) 8.6-50 MG tablet Take 2 tablets by mouth daily as needed for constipation 30 tablet 0     sildenafil (REVATIO) 20 MG tablet Take 1-5 tablets ( mg) by mouth daily as needed (take about 30 min before needed for ED.) Never use with nitroglycerin. 30 tablet 11     tiZANidine (ZANAFLEX) 4 MG tablet Take 1-1.5 tablets (4-6 mg) by mouth 3 times daily 135 tablet 1       OBJECTIVE:  /76   Pulse 91   Ht 1.881 m (6' 2.06\")   Wt 113.9 kg (251 lb 3.2 oz)   BMI 32.20 kg/m     Body mass index is 32.2 kg/m .   Gen: NAD, well-appearing  HEENT:  Normocephalic, atraumatic, EOMI, no scleral icterus   Rectal: prostate feels smooth without nodularity  Neuro: alert and oriented    ASSESSMENT/PLAN:    # Osteoporosis  Patient with non-traumatic L1 compression fracture in 2017. Previously on calcitonin and then fosamax, but couldn't tolerate the latter due to GI side effects. Risk factors include mild hypogonadism, family history, and prior tobacco use. 24-hour urine calcium and other secondary osteoporosis w/u was negative apart from mildly low phosphorus, which we will repeat today. DEXA from 01/2019 with lowest T-score -1.2 at left neck, however since it was a different scanner, can't really compare to 2015 scan. We discussed risks/benefits of reclast therapy, and patient is willing to proceed with treatment  - Reclast infusion ordered  - Testosterone therapy as noted below  - Advised 800-1000 units of vitamin D3 daily, and 1200mg elemental calcium daily through diet/supplements     # Hypogonadotropic hypogonadism  Diagnosed in 2014. Pituitary MRI normal. Etiology could have been from chronic opioid use. Transferrin saturation was elevated in 2011, but he also had active HCV infection during that time and outpatient GI notes don't really mention the transferrin. Ferritin was in normal range in 2014. He is now off opioids, " but is now a daily marijuana user to help with his chronic pain. He is not interested in quitting marijuana. Repeat testosterone (total and free) confirmed low. He has low libido and osteoporosis and it would be reasonable to treat him with testosterone therapy. We discussed risks/benefits, including increased cardiovascular risk and progression of an undiagnosed prostate cancer. He has a family history of prostate cancer in brother and father - his prostate exam today was remarkable. We will get a baseline PSA. We will monitor liver enzymes closely given previous rise in transaminases while on testosterone in the past.   - Per patient preference, will start testosterone cypionate 50mcg weekly   - Check liver enzymes in 1 month  - Check PSA, testosterone, CBC, hepatic panel in 3-4 months at his follow-up visit    Return to clinic in 4 months    This patient was seen and staffed with Dr. Korin Friedman MD  PGY4, Endocrinology Fellow    Attestation    I have seen and discussed this patient with the Endocrinology Fellow on 2/21/2019.  I agree with the assessment and plan of care as documented above.        Ludy Langley MD  Staff Physician   Endocrinology, [facility]   Pager [phone]

## 2019-02-22 ENCOUNTER — OFFICE VISIT (OUTPATIENT)
Dept: FAMILY MEDICINE | Facility: CLINIC | Age: 64
End: 2019-02-22
Payer: COMMERCIAL

## 2019-02-22 VITALS
RESPIRATION RATE: 16 BRPM | SYSTOLIC BLOOD PRESSURE: 133 MMHG | HEART RATE: 88 BPM | WEIGHT: 245.13 LBS | TEMPERATURE: 98.3 F | OXYGEN SATURATION: 100 % | BODY MASS INDEX: 31.43 KG/M2 | DIASTOLIC BLOOD PRESSURE: 89 MMHG

## 2019-02-22 DIAGNOSIS — N39.0 URINARY TRACT INFECTION WITHOUT HEMATURIA, SITE UNSPECIFIED: Primary | ICD-10-CM

## 2019-02-22 DIAGNOSIS — Z98.1 S/P LUMBAR FUSION: ICD-10-CM

## 2019-02-22 DIAGNOSIS — Q38.3 TONGUE ABNORMALITY: ICD-10-CM

## 2019-02-22 DIAGNOSIS — B37.0 THRUSH: ICD-10-CM

## 2019-02-22 LAB
ALBUMIN UR-MCNC: ABNORMAL MG/DL
APPEARANCE UR: CLEAR
BACTERIA #/AREA URNS HPF: ABNORMAL /HPF
BILIRUB UR QL STRIP: NEGATIVE
COLOR UR AUTO: YELLOW
ERYTHROCYTE [DISTWIDTH] IN BLOOD BY AUTOMATED COUNT: 12.6 % (ref 10–15)
GLUCOSE UR STRIP-MCNC: NEGATIVE MG/DL
HCT VFR BLD AUTO: 41.6 % (ref 40–53)
HGB BLD-MCNC: 13.3 G/DL (ref 13.3–17.7)
HGB UR QL STRIP: NEGATIVE
KETONES UR STRIP-MCNC: NEGATIVE MG/DL
KOH PREP SPEC: ABNORMAL
LEUKOCYTE ESTERASE UR QL STRIP: NEGATIVE
MCH RBC QN AUTO: 31.4 PG (ref 26.5–33)
MCHC RBC AUTO-ENTMCNC: 32 G/DL (ref 31.5–36.5)
MCV RBC AUTO: 98 FL (ref 78–100)
NITRATE UR QL: NEGATIVE
NON-SQ EPI CELLS #/AREA URNS LPF: ABNORMAL /LPF
PH UR STRIP: 7 PH (ref 5–7)
PLATELET # BLD AUTO: 471 10E9/L (ref 150–450)
RBC # BLD AUTO: 4.23 10E12/L (ref 4.4–5.9)
RBC #/AREA URNS AUTO: ABNORMAL /HPF
SOURCE: ABNORMAL
SP GR UR STRIP: 1.02 (ref 1–1.03)
SPECIMEN SOURCE: ABNORMAL
UROBILINOGEN UR STRIP-ACNC: 0.2 EU/DL (ref 0.2–1)
WBC # BLD AUTO: 14.2 10E9/L (ref 4–11)
WBC #/AREA URNS AUTO: ABNORMAL /HPF

## 2019-02-22 PROCEDURE — 85027 COMPLETE CBC AUTOMATED: CPT | Performed by: FAMILY MEDICINE

## 2019-02-22 PROCEDURE — 36415 COLL VENOUS BLD VENIPUNCTURE: CPT | Performed by: FAMILY MEDICINE

## 2019-02-22 PROCEDURE — 87220 TISSUE EXAM FOR FUNGI: CPT | Performed by: FAMILY MEDICINE

## 2019-02-22 PROCEDURE — 99214 OFFICE O/P EST MOD 30 MIN: CPT | Performed by: FAMILY MEDICINE

## 2019-02-22 PROCEDURE — 81001 URINALYSIS AUTO W/SCOPE: CPT | Performed by: FAMILY MEDICINE

## 2019-02-22 RX ORDER — CEPHALEXIN 500 MG/1
500 CAPSULE ORAL 3 TIMES DAILY
Qty: 15 CAPSULE | Refills: 0 | Status: SHIPPED | OUTPATIENT
Start: 2019-02-22 | End: 2019-05-06

## 2019-02-22 RX ORDER — NYSTATIN 100000/ML
500000 SUSPENSION, ORAL (FINAL DOSE FORM) ORAL 4 TIMES DAILY
Qty: 200 ML | Refills: 0 | Status: SHIPPED | OUTPATIENT
Start: 2019-02-22 | End: 2019-05-06

## 2019-02-22 ASSESSMENT — PATIENT HEALTH QUESTIONNAIRE - PHQ9
5. POOR APPETITE OR OVEREATING: NOT AT ALL
SUM OF ALL RESPONSES TO PHQ QUESTIONS 1-9: 6

## 2019-02-22 ASSESSMENT — ANXIETY QUESTIONNAIRES
5. BEING SO RESTLESS THAT IT IS HARD TO SIT STILL: NOT AT ALL
6. BECOMING EASILY ANNOYED OR IRRITABLE: SEVERAL DAYS
2. NOT BEING ABLE TO STOP OR CONTROL WORRYING: NOT AT ALL
1. FEELING NERVOUS, ANXIOUS, OR ON EDGE: MORE THAN HALF THE DAYS
7. FEELING AFRAID AS IF SOMETHING AWFUL MIGHT HAPPEN: SEVERAL DAYS
GAD7 TOTAL SCORE: 4
3. WORRYING TOO MUCH ABOUT DIFFERENT THINGS: NOT AT ALL

## 2019-02-22 NOTE — PROGRESS NOTES
"  SUBJECTIVE:   Usama Harris is a 63 year old male who presents to clinic today for the following health issues:    Hospital Follow-up Visit:    Hospital/Nursing Home/IP Rehab Facility: [facility]   Date of Admission: 02/09/19  Date of Discharge: 02/10/19  Reason(s) for Admission: lumbar disc hernia             Problems taking medications regularly:  None       Medication changes since discharge: None       Problems adhering to non-medication therapy:  None    Summary of hospitalization:  [facility] discharge summary reviewed   Discharge Summary:   \"Usama Harris is an 63 year old male who presented with prior back surgeries, including L3-5 fusion He presented to [facility] with  severe back and right leg pain as well as right leg weakness and was transferred to [facility].  Imaging showed L2-3 degeneration and large right paracentral disc herniation with L2-3 severe central stenosis.  He underwent removal revision of L2-5 posterior spinal fusion and right L2-3 TLIF with Dr. Iggy Issa on 2/12/19.\"    Patient reports feeling well today. Continues to have pain near surgical site and right leg, but improving. The numbness of his right leg is improving, but still weak. Has been treating pain with marijuana and zanaflex and stopped using oxycodone. Additionally reports having hot and cold flashes with a faster pulse, but no change in blood pressure or syncope. Had cystitis in the hospital and thinks his symptoms might be due to another infection, though denies any infectious symptoms, including change in urinary color, frequency, or urgency, dysuria, change in bowel habits, cough, sob, or acute change in pain at surgical site. Has had thrush in the past and thinks it has returned. No current plans for physical therapy.    Diagnostic Tests/Treatments reviewed.  Follow up needed: none  Other Healthcare Providers Involved in Patient s Care:         Surgical follow-up appointment - neurosurgery  Update " since discharge: improved.     Post Discharge Medication Reconciliation: discharge medications reconciled and changed, per note/orders (see AVS).  Plan of care communicated with patient     Coding guidelines for this visit:  Type of Medical   Decision Making Face-to-Face Visit       within 7 Days of discharge Face-to-Face Visit        within 14 days of discharge   Moderate Complexity [id number]   High Complexity [id number]              -------------------------------------    Problem list and histories reviewed & adjusted, as indicated.    ROS:  Constitutional, HEENT, cardiovascular, pulmonary, GI, , musculoskeletal, neuro, skin, endocrine and psych systems are negative, except as otherwise noted.    OBJECTIVE:     /89   Pulse 88   Temp 98.3  F (36.8  C) (Oral)   Resp 16   Wt 111.2 kg (245 lb 2 oz)   SpO2 100%   BMI 31.43 kg/m    Body mass index is 31.43 kg/m .  GENERAL: healthy, alert and no distress  EYES: Eyes grossly normal to inspection, conjunctivae and sclerae normal  HENT: Posterior oropharynx clear, mildly thickened posterior tongue.  RESP: lungs clear to auscultation - no rales, rhonchi or wheezes  CV: regular rate and rhythm, normal S1 S2, no S3 or S4, no murmur, click or rub, no peripheral edema and peripheral pulses strong  SKIN: no suspicious lesions or rashes. Incision site midline in lumber back appears to be healing well without sign of infection and only mild inflammation and redness more so at the uppermost aspect with about 1cm of erythema   NEURO: Strength 3/5 on right hip flexion and leg extension. 5/5 on right leg flexion and plantar extension and flexion. Sensation grossly intact bilaterally.  PSYCH:affect normal/bright. Mentation normal. Speech mildly pressured. Interrupts. Grandiose at times.     Results for orders placed or performed in visit on 02/22/19   CBC with platelets   Result Value Ref Range    WBC 14.2 (H) 4.0 - 11.0 10e9/L    RBC Count 4.23 (L) 4.4 - 5.9 10e12/L     Hemoglobin 13.3 13.3 - 17.7 g/dL    Hematocrit 41.6 40.0 - 53.0 %    MCV 98 78 - 100 fl    MCH 31.4 26.5 - 33.0 pg    MCHC 32.0 31.5 - 36.5 g/dL    RDW 12.6 10.0 - 15.0 %    Platelet Count 471 (H) 150 - 450 10e9/L   *UA reflex to Microscopic and Culture ([facility] (except [place] and [place])   Result Value Ref Range    Color Urine Yellow     Appearance Urine Clear     Glucose Urine Negative NEG^Negative mg/dL    Bilirubin Urine Negative NEG^Negative    Ketones Urine Negative NEG^Negative mg/dL    Specific Gravity Urine 1.020 1.003 - 1.035    Blood Urine Negative NEG^Negative    pH Urine 7.0 5.0 - 7.0 pH    Protein Albumin Urine Trace (A) NEG^Negative mg/dL    Urobilinogen Urine 0.2 0.2 - 1.0 EU/dL    Nitrite Urine Negative NEG^Negative    Leukocyte Esterase Urine Negative NEG^Negative    Source Midstream Urine    Urine Microscopic   Result Value Ref Range    WBC Urine 0 - 5 OTO5^0 - 5 /HPF    RBC Urine O - 2 OTO2^O - 2 /HPF    Squamous Epithelial /LPF Urine Few FEW^Few /LPF    Bacteria Urine Few (A) NEG^Negative /HPF   KOH prep (skin, hair or nails only)   Result Value Ref Range    Specimen Description Mouth     KOH Skin Hair Nails Test Fungal elements seen (A)       ASSESSMENT/PLAN:   S/P lumbar fusion-He subjectively has some sense of fever and infection. No localizing signs. He is doing well pain-wise using medical marijuana rather than narcotics. He has appropriate follow up with neurosurgery.     His mood has seemed somewhat hypomanic for some months. We discussed this. Kevyn has noted this some with improvement with a decrease in cymbalta. No prior diagnosis of Bipolar illness. Some of this mood change may be related to the marijuana usage.     1. Urinary tract infection without hematuria, site unspecified  UTI s/p course of Macrobid. Seems to be resolved.  - CBC with platelets  - *UA reflex to Microscopic and Culture ([facility] (except [facility])    2.  Tongue abnormality  Mild thickening of posterior tongue. Patient has history of thrush and concerned a similar process may be occurring now  - suspected thrush  - treat with nystatin.    Added in keflex for he wound erythema and swelling given the higher white count and subjective fever. Although visually  it does not appear to be likely to be infected.       Nikko Tang MD  [facility]

## 2019-02-22 NOTE — RESULT ENCOUNTER NOTE
Dear Kevyn,   Here are your recent results. PSA looks good and phosphorus is normal. Make sure to get labs drawn in 1 month - we will look at your liver tests. Also, we will repeat the PSA and some other labs in 4 months, which are needed for monitoring while on testosterone    Jeffrey Friedman MD

## 2019-02-23 ASSESSMENT — ANXIETY QUESTIONNAIRES: GAD7 TOTAL SCORE: 4

## 2019-02-25 ENCOUNTER — OFFICE VISIT (OUTPATIENT)
Dept: NEUROSURGERY | Facility: CLINIC | Age: 64
End: 2019-02-25
Attending: NEUROLOGICAL SURGERY
Payer: COMMERCIAL

## 2019-02-25 VITALS
TEMPERATURE: 97.2 F | OXYGEN SATURATION: 99 % | WEIGHT: 244 LBS | HEIGHT: 74 IN | DIASTOLIC BLOOD PRESSURE: 80 MMHG | HEART RATE: 70 BPM | SYSTOLIC BLOOD PRESSURE: 139 MMHG | BODY MASS INDEX: 31.32 KG/M2

## 2019-02-25 DIAGNOSIS — Z98.1 S/P LUMBAR FUSION: ICD-10-CM

## 2019-02-25 DIAGNOSIS — Z98.1 S/P LUMBAR FUSION: Primary | ICD-10-CM

## 2019-02-25 PROCEDURE — G0463 HOSPITAL OUTPT CLINIC VISIT: HCPCS

## 2019-02-25 PROCEDURE — 99207 ZZC NO CHARGE NURSE ONLY: CPT

## 2019-02-25 RX ORDER — OXYCODONE HYDROCHLORIDE 5 MG/1
5-10 TABLET ORAL
Qty: 60 TABLET | Refills: 0 | Status: SHIPPED | OUTPATIENT
Start: 2019-02-25 | End: 2019-03-07

## 2019-02-25 ASSESSMENT — PAIN SCALES - GENERAL: PAINLEVEL: MODERATE PAIN (4)

## 2019-02-25 ASSESSMENT — MIFFLIN-ST. JEOR: SCORE: 1976.53

## 2019-02-25 NOTE — PROGRESS NOTES
Suture/Staple removal visit note:    S:  Patient is s/p lumbar fusion 2/10. Patient reports minimal pain today,  and has no current concerns or questions regarding post-surgical plan of care.  O:  Staples removed by Saida NEWMAN, wound is healing well without erythema, fluctuation, induration, drainage, or fever.  A: Patient returned to clinic post-op for staple removal.  Patient tolerated procedure without incident.  P:  Return to clinic for routine 6 week post op visit.  - Keep your incision clean and dry at all times. Steri strips applied and should remain in place for 7-10 days  No bathing, swimming, or submerging in water until incision is well healed.  Call clinic or go to Emergency Room if you develop any new pain, drainage, swelling, or fever.  - No lifting greater than 10-15 pounds. No bending, twisting, or overhead reaching.  -Continue to wear brace as directed    - Return in 4 weeks for follow up appointment and xray.    -Please call clinic with any further questions or concerns: [phone]    Saida Deshpande RN  [facility]  [address] [address]    Tel [phone]

## 2019-02-25 NOTE — NURSING NOTE
"Usama Harris is a 63 year old male who presents for:  Chief Complaint   Patient presents with     Follow Up     Staple removal from surgery on 2/12/19        Initial Vitals:  /80 (BP Location: Left arm, Patient Position: Sitting, Cuff Size: Adult Large)   Pulse 70   Temp 97.2  F (36.2  C) (Oral)   Ht 6' 2\" (1.88 m)   Wt 244 lb (110.7 kg)   SpO2 99%   BMI 31.33 kg/m   Estimated body mass index is 31.33 kg/m  as calculated from the following:    Height as of this encounter: 6' 2\" (1.88 m).    Weight as of this encounter: 244 lb (110.7 kg).. Body surface area is 2.4 meters squared. BP completed using cuff size: large  Moderate Pain (4)        Nursing Comments: Follow up for staple removal from surgery on 2/12/19.         Lorenza Burks    "

## 2019-02-25 NOTE — LETTER
2/25/2019         RE: Usama Harris  [address]  [place] [address]        Dear Colleague,    Thank you for referring your patient, Usama Harris, to the [facility]. Please see a copy of my visit note below.    Suture/Staple removal visit note:    S:  Patient is s/p lumbar fusion 2/10. Patient reports minimal pain today,  and has no current concerns or questions regarding post-surgical plan of care.  O:  Staples removed by Saida NEWMAN, wound is healing well without erythema, fluctuation, induration, drainage, or fever.  A: Patient returned to clinic post-op for staple removal.  Patient tolerated procedure without incident.  P:  Return to clinic for routine 6 week post op visit.  - Keep your incision clean and dry at all times. Steri strips applied and should remain in place for 7-10 days  No bathing, swimming, or submerging in water until incision is well healed.  Call clinic or go to Emergency Room if you develop any new pain, drainage, swelling, or fever.  - No lifting greater than 10-15 pounds. No bending, twisting, or overhead reaching.  -Continue to wear brace as directed    - Return in 4 weeks for follow up appointment and xray.    -Please call clinic with any further questions or concerns: [phone]    Saida Deshpande RN  [facility]  [address] [address]    Tel [phone]      Again, thank you for allowing me to participate in the care of your patient.        Sincerely,         Neurosurgery Nurse

## 2019-02-25 NOTE — PATIENT INSTRUCTIONS
- Keep your incision clean and dry at all times. Steri strips applied and should remain in place for 7-10 days  No bathing, swimming, or submerging in water until incision is well healed.  Call clinic or go to Emergency Room if you develop any new pain, drainage, swelling, or fever.  - No lifting greater than 10-15 pounds. No bending, twisting, or overhead reaching.  -Continue to wear brace as directed  - Refill Oxycodone.  - Return in 4 weeks for follow up appointment and xray.    -Please call clinic with any further questions or concerns: [phone]

## 2019-02-27 ENCOUNTER — PATIENT OUTREACH (OUTPATIENT)
Dept: CARE COORDINATION | Facility: CLINIC | Age: 64
End: 2019-02-27

## 2019-02-27 DIAGNOSIS — Z98.1 S/P LUMBAR FUSION: Primary | ICD-10-CM

## 2019-02-27 NOTE — PROGRESS NOTES
Clinic Care Coordination Contact    Clinic Care Coordination Contact  OUTREACH    Referral Information:  Referral Source: IP Report    Primary Diagnosis: Orthopedic    Chief Complaint   Patient presents with     Clinic Care Coordination - Follow-up     Clinic Care Coordination RN         [place] Utilization: [facility] Hospitalization 2/10-2/15/2019-  Lumbar disc hernia, Large Right L2-3 w Thecal Sac compression s/p lumbar fusion  Urinary tract infection  Testosterone deficiency  Hypertension  Osteopenia  Chronic pain  Anxiety  Chronic right foot drop     Clinic Utilization  Difficulty keeping appointments:: No  Compliance Concerns: No  No-Show Concerns: No  No PCP office visit in Past Year: No  Utilization    Last refreshed: 2/25/2019  8:56 PM:  Hospital Admissions 2           Last refreshed: 2/25/2019  8:56 PM:  ED Visits 0           Last refreshed: 2/25/2019  8:56 PM:  No Show Count (past year) 0              Current as of: 2/25/2019  8:56 PM              Clinical Concerns:  Current Medical Concerns:  Patient states his Neurosurgeons office is aware his right leg has some numbness and they are not concerned     Patient continues 6 Oxycodone a day and  Zanaflex  Three times a day .  Patient states he has been to the Pain Clinic in the past.  CC encouraged patient to clarify with the Neurosurgeon how many he should be taking a day and it would be important to wean off.  Patient agrees with the plan   Pain  Pain (GOAL):: Yes  Type: Chronic (>3mo)  Location of chronic pain:: BACK  Description of pain: Aching  Chronic pain severity:: 8  Alleviating Factors: Rest, Pain Medication  Aggravating Factors: Activity  Health Maintenance Reviewed:    Clinical Pathway: None    Medication Management:  Discussed pain medication      Functional Status:  Dependent ADLs:: Ambulation-cane, Ambulation-walker  Bed or wheelchair confined:: No  Mobility Status: Independent w/Device    Living Situation:        Diet/Exercise/Sleep:  Inadequate nutrition (GOAL):: No  Food Insecurity: No  Tube Feeding: No  Exercise:: Unable to exercise  Inadequate activity/exercise (GOAL):: No  Significant changes in sleep pattern (GOAL): No    Transportation:           Psychosocial:  Mental health DX:: Yes  Mental health DX how managed:: Medication  Mental health management concern (GOAL):: No  Informal Support system:: Spouse     Financial/Insurance:   Financial/Insurance concerns (GOAL):: No  Community Resources: None  Supplies used at home:: None  Equipment Currently Used at Home: cane, straight, shower chair, grab bar, tub/shower, grab bar, toilet, dressing device    Advance Care Plan/Directive  Advanced Care Plans/Directives on file:: No    Referrals Placed: None     Goals:   Goals        General    # 1 Pain Management (pt-stated)     Notes - Note edited  2/27/2019  1:29 PM by Sayra Snowden RN    Goal Statement: I will reduce surgical back pain   Measure of Success: More mobility for daily activities   Supportive Steps to Achieve:   I will take Oxycodone and Zanaflex as directed   I will keep by surgeons appointment 2/25/2019  I will seek medical help if any signs of infection  Increased redness ,pain, swelling drainage or fever   Barriers: Deconditioned   Strengths: agrees with plan   Date to Achieve By:3/27/2019  Patient expressed understanding of goal: yes            Outreach Frequency: weekly  Future Appointments              In 4 weeks [facility] GABBY Seo    In 4 weeks Afsaneh Villareal PA-C [facility], [facility]    In 3 months Jeffrey Friedman MD [facility] Endocrinology, [facility]          Plan:   Patient agrees to call Neurosurgeons office with any questions or concerns   Patient will pace activity,rest and take pain medication as needed   Patient has a Neurosurgeon follow up appointment 3/27/2019  CC will follow up in 1-2 weeks   Sayra Snowden RN / Clinical Care Coordinator      [facility]   [email or web address]  Office :  [phone] / Fax :  [phone]

## 2019-03-07 DIAGNOSIS — Z98.1 S/P LUMBAR FUSION: ICD-10-CM

## 2019-03-07 RX ORDER — OXYCODONE HYDROCHLORIDE 5 MG/1
5-10 TABLET ORAL EVERY 4 HOURS PRN
Qty: 60 TABLET | Refills: 0 | Status: SHIPPED | OUTPATIENT
Start: 2019-03-07 | End: 2019-03-22

## 2019-03-07 NOTE — TELEPHONE ENCOUNTER
Patient is s/p lumbar fusion with Dr Issa on 2/12/19. Patient is requesting a refill on oxycodone 5 mg. He has 10 pills left and will run out over the weekend. If approved patient would like to  at [facility] pharmacy on the 1st floor. He said he will be able to get it tomorrow.

## 2019-03-13 DIAGNOSIS — I10 ESSENTIAL HYPERTENSION: ICD-10-CM

## 2019-03-13 RX ORDER — IRBESARTAN 300 MG/1
TABLET ORAL
Qty: 90 TABLET | Refills: 0 | Status: SHIPPED | OUTPATIENT
Start: 2019-03-13 | End: 2019-06-19

## 2019-03-14 NOTE — TELEPHONE ENCOUNTER
"Requested Prescriptions   Pending Prescriptions Disp Refills     irbesartan (AVAPRO) 300 MG tablet [Pharmacy Med Name: IRBESARTAN 300 MG TABLET] 90 tablet 0          Last Written Prescription Date:  12/21/18  Last Fill Quantity: 90,   # refills: 0  Last Office Visit: 2/22/19  Future Office visit:    Next 5 appointments (look out 90 days)    Mar 27, 2019 10:00 AM CDT  Return Visit with Afsaneh Villareal PA-C  [facility] ([facility]) [address] [place] [id number] [phone]           Routing refill request to provider for review/approval because:  Creatine out of range   Sig: TAKE 1 TABLET BY MOUTH EVERY DAY    Angiotensin-II Receptors Failed - 3/13/2019  4:25 PM       Failed - Normal serum creatinine on file in past 12 months    Recent Labs   Lab Test 02/15/19  0733   CR 0.65*            Passed - Blood pressure under 140/90 in past 12 months    BP Readings from Last 3 Encounters:   02/25/19 139/80   02/22/19 133/89   02/21/19 120/76                Passed - Recent (12 mo) or future (30 days) visit within the authorizing provider's specialty    Patient had office visit in the last 12 months or has a visit in the next 30 days with authorizing provider or within the authorizing provider's specialty.  See \"Patient Info\" tab in inbasket, or \"Choose Columns\" in Meds & Orders section of the refill encounter.             Passed - Medication is active on med list       Passed - Patient is age 18 or older       Passed - Normal serum potassium on file in past 12 months    Recent Labs   Lab Test 02/15/19  0733   POTASSIUM 3.7                      "

## 2019-03-18 ENCOUNTER — TELEPHONE (OUTPATIENT)
Dept: PALLIATIVE MEDICINE | Facility: CLINIC | Age: 64
End: 2019-03-18

## 2019-03-20 DIAGNOSIS — G89.4 CHRONIC PAIN SYNDROME: ICD-10-CM

## 2019-03-20 RX ORDER — DULOXETIN HYDROCHLORIDE 30 MG/1
CAPSULE, DELAYED RELEASE ORAL
Qty: 0.01 CAPSULE | Refills: 0 | OUTPATIENT
Start: 2019-03-20

## 2019-03-20 NOTE — TELEPHONE ENCOUNTER
"Requested Prescriptions   Pending Prescriptions Disp Refills     DULoxetine (CYMBALTA) 30 MG capsule [Pharmacy Med Name: DULOXETINE HCL DR 30 MG CAP]      Last Written Prescription Date:  1/14/2019     PLEASE CHECK DOSE  20MG VS. 30 MG???????  Last Fill Quantity: 30 capsule   ,  # refills: 3   Last Office Visit: 2/22/2019   Future Office Visit:    Next 5 appointments (look out 90 days)    Mar 27, 2019 10:10 AM CDT  Return Visit with Afsaneh Villareal PA-C  [facility] ([facility]) [address] [place] [phone]  [phone]        90 capsule 0     Sig: TAKE 1 CAP BY MOUTH DAILY    Serotonin-Norepinephrine Reuptake Inhibitors  Passed - 3/20/2019  1:08 AM       Passed - Blood pressure under 140/90 in past 12 months           Passed - Recent (12 mo) or future (30 days) visit within the authorizing provider's specialty    Patient had office visit in the last 12 months or has a visit in the next 30 days with authorizing provider or within the authorizing provider's specialty.  See \"Patient Info\" tab in inbasket, or \"Choose Columns\" in Meds & Orders section of the refill encounter.           Passed - Medication is active on med list       Passed - Patient is age 18 or older          "

## 2019-03-21 ENCOUNTER — TELEPHONE (OUTPATIENT)
Dept: ENDOCRINOLOGY | Facility: CLINIC | Age: 64
End: 2019-03-21

## 2019-03-21 DIAGNOSIS — Z98.1 S/P LUMBAR FUSION: Primary | ICD-10-CM

## 2019-03-21 NOTE — TELEPHONE ENCOUNTER
----- Message from Jeffrey Friedman MD sent at 3/20/2019  9:42 PM CDT -----  Regarding: RE: MICKY JOYA denied  Hello,    He has tried alendronate and failed the medication due to gastrointestinal side effects. Other oral options would likely have the same side effects, so reclast infusion would be preferred.    Do you need anything else from me to get reclast approved?    Thanks,  Jeffrey    ----- Message -----  From: So Escamilla RN  Sent: 3/11/2019   6:02 PM  To: Jeffrey Friedman MD  Subject: FW: MICKY JOYA denied                                ----- Message -----  From: Ghassan Yun  Sent: 3/8/2019   2:32 PM  To: Ashlie Andrade RN, Jeffrey Friedman MD, #  Subject: MICKY JOYA denied                                Hi Dr Friedman & Ashlie,    The prior auth for Bill's Reclast was denied from insurance. Their reasoning is patient hasn't tried and failed one self administered medication as evidence by one of the following. Decline in bone mineral density of equal to or greater than 5%: or experienced a fragility fracture: or has severe renal impairment with a creatine clearance less than 35ml/min.     We can file an appeal with a LMN and any literature you would like to provide. Please advise.    Thanks,  Vitaly Yun - [employer]    - Infusion Therapy   [facility]   [email or web address]  [email or web address]    Office: [phone]  Fax: [phone]

## 2019-03-21 NOTE — TELEPHONE ENCOUNTER
Request sent to [facility]  With new information for Reclast PA

## 2019-03-21 NOTE — TELEPHONE ENCOUNTER
Refused Prescriptions:                       Disp   Refills    DULoxetine (CYMBALTA) 30 MG capsule [Pharm*0.01 c*0        Sig: TAKE 1 CAP BY MOUTH DAILY  Refused By: SOULEYMANE JOY  Reason for Refusal: Duplicate      Closing encounter - no further actions needed at this time    Souleymane Joy RN

## 2019-03-21 NOTE — TELEPHONE ENCOUNTER
Patient left a voice mail on the nurse line requesting a refill on his oxycodone.    Attempted to return call, no answer left a message requesting a return call to discuss the refill.

## 2019-03-22 RX ORDER — OXYCODONE HYDROCHLORIDE 5 MG/1
5-10 TABLET ORAL EVERY 6 HOURS PRN
Qty: 60 TABLET | Refills: 0 | Status: SHIPPED | OUTPATIENT
Start: 2019-03-22 | End: 2019-04-01

## 2019-03-22 NOTE — TELEPHONE ENCOUNTER
.Prescription Refill Request    Medication: Oxycodone 5 mg    Surgical procedure/date: 2/12/19 s/p lumbar fusion with Dr Issa    Current regimen/number of tabs per day: 6-8 per day but recently 4-6 per day. Patient is completely out. last pill was this morning.     Last refill:  3/7/19    Pain scale today (0-10):  6/10. Reports pain left sided low back and right leg numbness and muscle weakness. Denies any falls, no bowel or bladder incontinence, patient reports permament foot drop on the right.      location: [place]     Any patient questions or concerns: see above.     Will forward request to care team for approval.     Discussed foot drop with Afsaneh Villareal PA-C. She is aware she is seeing him 3/27 for follow-up but said to go ahead and order PT to work on strengthening exercises.

## 2019-03-27 ENCOUNTER — ANCILLARY PROCEDURE (OUTPATIENT)
Dept: GENERAL RADIOLOGY | Facility: CLINIC | Age: 64
End: 2019-03-27
Attending: NEUROLOGICAL SURGERY
Payer: COMMERCIAL

## 2019-03-27 ENCOUNTER — OFFICE VISIT (OUTPATIENT)
Dept: NEUROSURGERY | Facility: CLINIC | Age: 64
End: 2019-03-27
Attending: PHYSICIAN ASSISTANT
Payer: COMMERCIAL

## 2019-03-27 ENCOUNTER — PATIENT OUTREACH (OUTPATIENT)
Dept: CARE COORDINATION | Facility: CLINIC | Age: 64
End: 2019-03-27

## 2019-03-27 VITALS
OXYGEN SATURATION: 95 % | HEART RATE: 62 BPM | TEMPERATURE: 98.1 F | HEIGHT: 75 IN | BODY MASS INDEX: 30.84 KG/M2 | SYSTOLIC BLOOD PRESSURE: 155 MMHG | WEIGHT: 248 LBS | DIASTOLIC BLOOD PRESSURE: 90 MMHG

## 2019-03-27 DIAGNOSIS — Z98.1 S/P LUMBAR FUSION: Primary | ICD-10-CM

## 2019-03-27 DIAGNOSIS — Z98.1 S/P LUMBAR FUSION: ICD-10-CM

## 2019-03-27 PROCEDURE — 99024 POSTOP FOLLOW-UP VISIT: CPT | Performed by: PHYSICIAN ASSISTANT

## 2019-03-27 PROCEDURE — G0463 HOSPITAL OUTPT CLINIC VISIT: HCPCS

## 2019-03-27 PROCEDURE — 72100 X-RAY EXAM L-S SPINE 2/3 VWS: CPT

## 2019-03-27 ASSESSMENT — PAIN SCALES - GENERAL: PAINLEVEL: EXTREME PAIN (8)

## 2019-03-27 ASSESSMENT — MIFFLIN-ST. JEOR: SCORE: 2002.61

## 2019-03-27 NOTE — PROGRESS NOTES
Clinic Care Coordination Contact    Clinic Care Coordination Contact  OUTREACH    Referral Information:  Referral Source: IP Report    Primary Diagnosis: Orthopedic    Chief Complaint   Patient presents with     Clinic Care Coordination - Follow-up     Clinic Care Coordination RN         Virgie Utilization: [facility] Hospitalization 2/10-2/15/2019-  Lumbar disc hernia, Large Right L2-3 w Thecal Sac compression s/p lumbar fusion  Urinary tract infection  Testosterone deficiency  Hypertension  Osteopenia  Chronic pain  Anxiety  Chronic right foot drop   Clinic Utilization  Difficulty keeping appointments:: No  Compliance Concerns: No  No-Show Concerns: No  No PCP office visit in Past Year: No  Utilization    Last refreshed: 3/27/2019 11:30 AM:  Hospital Admissions 2           Last refreshed: 3/27/2019 11:30 AM:  ED Visits 0           Last refreshed: 3/27/2019 11:30 AM:  No Show Count (past year) 0              Current as of: 3/27/2019 11:30 AM              Clinical Concerns:  Current Medical Concerns:  Patient Describes  the back pain as a #8 and then goes down depending on his movement   Patient saw the Neurosurgeon today and the X-rays are looking good and his is healing nicely  They informed patient will need to continue to take pain medications as needed for awhile  Patient takes 6 Oxycodone and 3 Zanaflex a day  Patient states he will not wean off and will just stop pain medication when no needed anymore   Patient has stopped pain medications several times in the past    Pain  Pain (GOAL):: Yes  Type: Chronic (>3mo)  Location of chronic pain:: BACK  Description of pain: Aching  Chronic pain severity:: 8  Alleviating Factors: Rest, Pain Medication  Aggravating Factors: Activity  Health Maintenance Reviewed: Due/Overdue   Health Maintenance Due   Topic Date Due     ZOSTER IMMUNIZATION (2 of 3) 06/16/2017     PREVENTIVE CARE VISIT  04/21/2018     DTAP/TDAP/TD IMMUNIZATION (2 - Td) 10/10/2018     COLON  CANCER SCREEN (SYSTEM ASSIGNED)  10/29/2018     URINE DRUG SCREEN Q1 YR  01/08/2019     Clinical Pathway: None    Medication Management:  Patient medication discussed      Functional Status:  Dependent ADLs:: Ambulation-cane, Ambulation-walker  Bed or wheelchair confined:: No  Mobility Status: Independent w/Device    Living Situation:       Diet/Exercise/Sleep:  Inadequate nutrition (GOAL):: No  Food Insecurity: No  Tube Feeding: No  Exercise:: Unable to exercise  Inadequate activity/exercise (GOAL):: No  Significant changes in sleep pattern (GOAL): No    Transportation:           Psychosocial:  Mental health DX:: Yes  Mental health DX how managed:: Medication  Mental health management concern (GOAL):: No  Informal Support system:: Spouse     Financial/Insurance:   Financial/Insurance concerns (GOAL):: No    Community Resources: None  Supplies used at home:: None  Equipment Currently Used at Home: cane, straight, shower chair, grab bar, tub/shower, grab bar, toilet, dressing device    Advance Care Plan/Directive  Advanced Care Plans/Directives on file:: No    Referrals Placed: None       Future Appointments              In 5 days  [facility] LAB DRAW [facility] Draw, [facility]    In 5 days  49 ATC;  SPEC INFUSION [facility], [facility]    In 1 month CSXR1 [facility] GABBY Seo    In 1 month Afsaneh Villareal PA-C [facility], [facility]    In 2 months Jeffrey Friedman MD [facility]          Plan: No unmet needs, no further care coordination needed at this time  Patient will call CC in the future with any questions    Sayra Snowden RN / Clinical Care Coordinator     [facility]   [email or web address]  Office :  [phone] / Fax :  [phone]

## 2019-03-27 NOTE — PROGRESS NOTES
[facility]  Neurosurgery followup:    HPI: 6 weeks s/p revision L2-5 PSF. Recovering well but does have some continued low back and right leg pain. States he feels as though his ambulating has been stalled and he would like to start some therapy to work on this. No weakness.  Exam:  Constitutional:  Alert, well nourished, NAD.  HEENT: Normocephalic, atraumatic.   Pulm:  Without shortness of breath   CV:  No pitting edema of BLE.      Neurological:  Awake  Alert  Oriented x 3  Motor exam:        IP Q DF PF EHL  R   5  5   5   5    5  L   5  5   5   5    5     Reflexes are 2+ in the patellar and Achilles. There is no clonus. Downgoing Babinski.    Able to spontaneously move L/E bilaterally  Sensation intact throughout all L/E dermatomes     Incision: Healing nicely  Imaging: AP and lateral lumbar films reveal stable hardware  A/P: 6 weeks s/p revision L2-5 PSF. Recovering well. XRs with stable hardware. Incision nicely healed. Full strength on exam. Will have him start a course of PT at [facility] per his request. I did also place orthotics referral for shoe inserts as he states this has been very helpful in the past. Will have him follow up in 6 weeks with xray prior. Patient voiced understanding and agreement.  - May increase lifting restriction to 20 pounds   - followup in 6 weeks with xray prior   - Call the clinic at [phone] for increased pain or any other questions and concerns.      Afsaneh Villareal PA-C  [facility]  [address]  [address] [place] [id number]    Tel [phone]  Pager [phone]

## 2019-03-27 NOTE — LETTER
3/27/2019         RE: Usama Harris  [address] [place] [address]        Dear Colleague,    Thank you for referring your patient, Usama Harris, to the [facility]. Please see a copy of my visit note below.    [facility]  Neurosurgery followup:    HPI: 6 weeks s/p revision L2-5 PSF. Recovering well but does have some continued low back and right leg pain. States he feels as though his ambulating has been stalled and he would like to start some therapy to work on this. No weakness.  Exam:  Constitutional:  Alert, well nourished, NAD.  HEENT: Normocephalic, atraumatic.   Pulm:  Without shortness of breath   CV:  No pitting edema of BLE.      Neurological:  Awake  Alert  Oriented x 3  Motor exam:        IP Q DF PF EHL  R   5  5   5   5    5  L   5  5   5   5    5     Reflexes are 2+ in the patellar and Achilles. There is no clonus. Downgoing Babinski.    Able to spontaneously move L/E bilaterally  Sensation intact throughout all L/E dermatomes     Incision: Healing nicely  Imaging: AP and lateral lumbar films reveal stable hardware  A/P: 6 weeks s/p revision L2-5 PSF. Recovering well. XRs with stable hardware. Incision nicely healed. Full strength on exam. Will have him start a course of PT at [facility] per his request. I did also place orthotics referral for shoe inserts as he states this has been very helpful in the past. Will have him follow up in 6 weeks with xray prior. Patient voiced understanding and agreement.  - May increase lifting restriction to 20 pounds   - followup in 6 weeks with xray prior   - Call the clinic at [phone] for increased pain or any other questions and concerns.      Afsaneh Villareal PA-C  [facility]  [address]  [address] [place] [address], [place] [address]    Tel [phone]  Pager [phone]      Again, thank you for allowing me to participate in the care of your patient.         Sincerely,        Afsaneh Villareal PA-C

## 2019-04-01 ENCOUNTER — INFUSION THERAPY VISIT (OUTPATIENT)
Dept: INFUSION THERAPY | Facility: CLINIC | Age: 64
End: 2019-04-01
Attending: INTERNAL MEDICINE
Payer: COMMERCIAL

## 2019-04-01 VITALS
OXYGEN SATURATION: 96 % | SYSTOLIC BLOOD PRESSURE: 138 MMHG | DIASTOLIC BLOOD PRESSURE: 82 MMHG | HEART RATE: 61 BPM | TEMPERATURE: 98.5 F

## 2019-04-01 DIAGNOSIS — Z98.1 S/P LUMBAR FUSION: Primary | ICD-10-CM

## 2019-04-01 DIAGNOSIS — M81.0 OSTEOPOROSIS, UNSPECIFIED OSTEOPOROSIS TYPE, UNSPECIFIED PATHOLOGICAL FRACTURE PRESENCE: Primary | ICD-10-CM

## 2019-04-01 PROCEDURE — 96365 THER/PROPH/DIAG IV INF INIT: CPT

## 2019-04-01 PROCEDURE — 25000128 H RX IP 250 OP 636: Mod: ZF | Performed by: INTERNAL MEDICINE

## 2019-04-01 RX ORDER — ZOLEDRONIC ACID 5 MG/100ML
5 INJECTION, SOLUTION INTRAVENOUS ONCE
Status: COMPLETED | OUTPATIENT
Start: 2019-04-01 | End: 2019-04-01

## 2019-04-01 RX ORDER — ZOLEDRONIC ACID 5 MG/100ML
5 INJECTION, SOLUTION INTRAVENOUS ONCE
Status: CANCELLED
Start: 2019-04-01 | End: 2019-04-01

## 2019-04-01 RX ORDER — OXYCODONE HYDROCHLORIDE 5 MG/1
5-10 TABLET ORAL EVERY 6 HOURS PRN
Qty: 60 TABLET | Refills: 0 | Status: SHIPPED | OUTPATIENT
Start: 2019-04-01 | End: 2019-10-21 | Stop reason: ALTCHOICE

## 2019-04-01 RX ADMIN — ZOLEDRONIC ACID 5 MG: 0.05 INJECTION, SOLUTION INTRAVENOUS at 14:09

## 2019-04-01 NOTE — PROGRESS NOTES
Nursing Note  Us[facility] Harris [facility]:   Chief Complaint   Patient presents with     Infusion     reclast     During today's [facility] appointment, orders from Dr. Dooley were completed.  Frequency: once    Progress note:  Patient identification verified by name and date of birth.  Assessment completed.  Vitals recorded in Doc Flowsheets.  Patient was provided with education regarding infusion and possible side effects.  Patient verbalized understanding.      needed: No  Premedications: were not ordered.  Infusion Rates: 400 ml/hr.  Approximate Infusion length:15 minutes.   Labs: were not ordered for this appointment.  Vascular access: peripheral IV placed today.  Treatment Conditions: patient's Creatinine Clearance is within paramaters to administer medication per orders. Labs drawn at previous appt, within 2 months  Patient tolerated infusion: well.    Drug Waste Record? No     Discharge Plan:   Follow up plan of care with: ongoing infusions at [facility]. and primary medical doctor.  Discharge instructions were reviewed with patient.  Patient/representative verbalized understanding of discharge instructions and all questions answered.  Patient discharged from [facility] in stable condition.    Adriana Borges RN    Administrations This Visit     zoledronic Acid (RECLAST) infusion 5 mg     Admin Date  04/01/2019 Action  New Bag Dose  5 mg Rate  400 mL/hr Route  Intravenous Administered By  Adriana Borges RN                /82   Pulse 61   Temp 98.5  F (36.9  C) (Oral)   SpO2 96%

## 2019-04-01 NOTE — PATIENT INSTRUCTIONS
Patient Education     Patient Education    Zoledronic Acid Solution for injection    Zoledronic Acid Solution for injection [Hypercalcemia of Malignancy]    Zoledronic Acid Solution for injection [Pagets Disease]  Zoledronic Acid Solution for injection  What is this medicine?  ZOLEDRONIC ACID (LAKISHA le dron ik AS id) lowers the amount of calcium loss from bone. It is used to treat Paget's disease and osteoporosis in women.  This medicine may be used for other purposes; ask your health care provider or pharmacist if you have questions.  What should I tell my health care provider before I take this medicine?  They need to know if you have any of these conditions:    aspirin-sensitive asthma    cancer, especially if you are receiving medicines used to treat cancer    dental disease or wear dentures    infection    kidney disease    low levels of calcium in the blood    past surgery on the parathyroid gland or intestines    receiving corticosteroids like dexamethasone or prednisone    an unusual or allergic reaction to zoledronic acid, other medicines, foods, dyes, or preservatives    pregnant or trying to get pregnant    breast-feeding  How should I use this medicine?  This medicine is for infusion into a vein. It is given by a health care professional in a hospital or clinic setting.  Talk to your pediatrician regarding the use of this medicine in children. This medicine is not approved for use in children.  Overdosage: If you think you have taken too much of this medicine contact a poison control center or emergency room at once.  NOTE: This medicine is only for you. Do not share this medicine with others.  What if I miss a dose?  It is important not to miss your dose. Call your doctor or health care professional if you are unable to keep an appointment.  What may interact with this medicine?    certain antibiotics given by injection    NSAIDs, medicines for pain and inflammation, like ibuprofen or naproxen    some  diuretics like bumetanide, furosemide    teriparatide  This list may not describe all possible interactions. Give your health care provider a list of all the medicines, herbs, non-prescription drugs, or dietary supplements you use. Also tell them if you smoke, drink alcohol, or use illegal drugs. Some items may interact with your medicine.  What should I watch for while using this medicine?  Visit your doctor or health care professional for regular checkups. It may be some time before you see the benefit from this medicine. Do not stop taking your medicine unless your doctor tells you to. Your doctor may order blood tests or other tests to see how you are doing.  Women should inform their doctor if they wish to become pregnant or think they might be pregnant. There is a potential for serious side effects to an unborn child. Talk to your health care professional or pharmacist for more information.  You should make sure that you get enough calcium and vitamin D while you are taking this medicine. Discuss the foods you eat and the vitamins you take with your health care professional.  Some people who take this medicine have severe bone, joint, and/or muscle pain. This medicine may also increase your risk for jaw problems or a broken thigh bone. Tell your doctor right away if you have severe pain in your jaw, bones, joints, or muscles. Tell your doctor if you have any pain that does not go away or that gets worse.  Tell your dentist and dental surgeon that you are taking this medicine. You should not have major dental surgery while on this medicine. See your dentist to have a dental exam and fix any dental problems before starting this medicine. Take good care of your teeth while on this medicine. Make sure you see your dentist for regular follow-up appointments.  What side effects may I notice from receiving this medicine?  Side effects that you should report to your doctor or health care professional as soon as  possible:    allergic reactions like skin rash, itching or hives, swelling of the face, lips, or tongue    anxiety, confusion, or depression    breathing problems    changes in vision    eye pain    feeling faint or lightheaded, falls    jaw pain, especially after dental work    mouth sores    muscle cramps, stiffness, or weakness    redness, blistering, peeling or loosening of the skin, including inside the mouth    trouble passing urine or change in the amount of urine  Side effects that usually do not require medical attention (report to your doctor or health care professional if they continue or are bothersome):    bone, joint, or muscle pain    constipation    diarrhea    fever    hair loss    irritation at site where injected    loss of appetite    nausea, vomiting    stomach upset    trouble sleeping    trouble swallowing    weak or tired  This list may not describe all possible side effects. Call your doctor for medical advice about side effects. You may report side effects to FDA at 1-035-FDA-8496.  Where should I keep my medicine?  This drug is given in a hospital or clinic and will not be stored at home.  NOTE:This sheet is a summary. It may not cover all possible information. If you have questions about this medicine, talk to your doctor, pharmacist, or health care provider. Copyright  2016 Gold Standard

## 2019-04-01 NOTE — TELEPHONE ENCOUNTER
Prescription Refill Request    Medication: oxycodone 5 mg     Surgical procedure/date: s/p lumbar fusion with Dr bose 2/12/19     Current regimen/number of tabs per day: 8 per day and reports increased pain from ambulation. Takes 2 tabs q 6 hours and tylenol with it.     Last refill:  3/22/19 #60    Pain scale today (0-10):  8/10 , low back pain and right leg numbness and weakness continues but is slowly getting better. Patient feels pain is where the hardware is located in his back.      location: [place]     Any patient questions or concerns: patient is leaving for vacation to [place] on 4/10/19-4/28/19. He was inquiring about a larger Rx.      pulled. Last refill oxycodone by Afsaneh Villareal PA-C#60 3/22/19. No red flags.     Will forward request to care team for approval.

## 2019-04-02 ENCOUNTER — TELEPHONE (OUTPATIENT)
Dept: FAMILY MEDICINE | Facility: CLINIC | Age: 64
End: 2019-04-02

## 2019-04-02 DIAGNOSIS — N52.9 ERECTILE DYSFUNCTION, UNSPECIFIED ERECTILE DYSFUNCTION TYPE: ICD-10-CM

## 2019-04-02 RX ORDER — SILDENAFIL CITRATE 20 MG/1
20-100 TABLET ORAL DAILY PRN
Qty: 30 TABLET | Refills: 11 | Status: CANCELLED | OUTPATIENT
Start: 2019-04-02

## 2019-04-03 NOTE — TELEPHONE ENCOUNTER
sildenafil (REVATIO) 100 MG tablet      Last Written Prescription Date:  NOT ON CURRENT MED LIST.   Last Fill Quantity:  0,   # refills: 0  Last Office Visit: 2-22-19  Future Office visit:    Next 5 appointments (look out 90 days)    Apr 09, 2019  1:20 PM CDT  PHYSICAL with Nikko Tang MD  [facility] ([address] [address]  [phone]   May 08, 2019 10:30 AM CDT  Return Visit with Afsaneh Villareal PA-C  [facility] ([facility]) [address] [place] [id number] [phone]           Routing refill request to provider for review/approval because:  Drug not active on patient's medication list

## 2019-04-04 DIAGNOSIS — M96.1 FAILED BACK SYNDROME: ICD-10-CM

## 2019-04-04 DIAGNOSIS — G89.4 CHRONIC PAIN SYNDROME: ICD-10-CM

## 2019-04-04 RX ORDER — DULOXETIN HYDROCHLORIDE 20 MG/1
20 CAPSULE, DELAYED RELEASE ORAL DAILY
Qty: 30 CAPSULE | Refills: 3 | Status: SHIPPED | OUTPATIENT
Start: 2019-04-04 | End: 2019-07-02

## 2019-04-09 ENCOUNTER — OFFICE VISIT (OUTPATIENT)
Dept: FAMILY MEDICINE | Facility: CLINIC | Age: 64
End: 2019-04-09
Payer: COMMERCIAL

## 2019-04-09 VITALS
OXYGEN SATURATION: 97 % | TEMPERATURE: 97.3 F | BODY MASS INDEX: 32.3 KG/M2 | HEART RATE: 65 BPM | RESPIRATION RATE: 16 BRPM | SYSTOLIC BLOOD PRESSURE: 164 MMHG | DIASTOLIC BLOOD PRESSURE: 86 MMHG | WEIGHT: 255 LBS

## 2019-04-09 DIAGNOSIS — M54.9 CHRONIC BACK PAIN, UNSPECIFIED BACK LOCATION, UNSPECIFIED BACK PAIN LATERALITY: ICD-10-CM

## 2019-04-09 DIAGNOSIS — N52.9 ERECTILE DYSFUNCTION, UNSPECIFIED ERECTILE DYSFUNCTION TYPE: ICD-10-CM

## 2019-04-09 DIAGNOSIS — G89.29 CHRONIC BACK PAIN, UNSPECIFIED BACK LOCATION, UNSPECIFIED BACK PAIN LATERALITY: ICD-10-CM

## 2019-04-09 DIAGNOSIS — Z23 ENCOUNTER FOR IMMUNIZATION: ICD-10-CM

## 2019-04-09 DIAGNOSIS — Z00.00 ROUTINE GENERAL MEDICAL EXAMINATION AT A HEALTH CARE FACILITY: Primary | ICD-10-CM

## 2019-04-09 DIAGNOSIS — E29.1 ANDROGEN DEFICIENCY: ICD-10-CM

## 2019-04-09 PROCEDURE — 99396 PREV VISIT EST AGE 40-64: CPT | Performed by: FAMILY MEDICINE

## 2019-04-09 PROCEDURE — 99214 OFFICE O/P EST MOD 30 MIN: CPT | Mod: 25 | Performed by: FAMILY MEDICINE

## 2019-04-09 RX ORDER — SILDENAFIL 50 MG/1
50 TABLET, FILM COATED ORAL DAILY PRN
Qty: 40 TABLET | Refills: 1 | Status: SHIPPED | OUTPATIENT
Start: 2019-04-09 | End: 2019-07-23

## 2019-04-09 RX ORDER — OXYCODONE HYDROCHLORIDE 5 MG/1
5-10 TABLET ORAL EVERY 6 HOURS PRN
Qty: 120 TABLET | Refills: 0 | Status: SHIPPED | OUTPATIENT
Start: 2019-04-09 | End: 2019-05-06

## 2019-04-09 NOTE — PROGRESS NOTES
"SUBJECTIVE:   CC: Usama Harris is an 63 year old male who presents for preventative health visit.     1. S/p lumbar fusion revision   Pleased with recovery thus far, about 6 weeks out from procedure. Reports that the recovery and post-op period has been much better than his first surgery. Reports that with walking \"everything hurts,\" although pleased that he is able to walk. Weakness with lifting right leg to go up stairs. Using a cane to ambulate. Reports that his right leg feels \"weird\" \"numb\" \"different\". Wonders if he has \"dysreflexia\" due to symptoms including increased swelling in that leg, temperature dysregulation, and muscle spasms. Has been taking x6 oxycodone 5 mg per day. Has not been able to taper down since surgery, but feels he needs this many oxycodone per day to be able to ambulate. He is going to [place] tomorrow for 3 weeks and wants 1 month of prescription for oxycodone so he is able to enjoy himself there. He was seen by Rigo Villareal PA-C in Neurosurgery Clinic on 3/27/19 with plan for 6 week follow up.     2. Erectile dysfunction   Has been needing x3-4 viagra 20 mg to be able to obtain an erection. Wondering if he can get a Rx with a larger dose or more pills per month.     3. Hypertension  BP elevated today. Pt reports he forgot to take his bp meds last night.     4. Preventative Health   - due for colonoscopy - will schedule for this summer   - due for Tdap and zoster vaccine - next visit     - Reports mood has been good. Does not feel that he has ashlee symptoms. Reports being evaluated by psychiatry in the past many years ago.     Healthy Habits:    Getting at least 3 servings of Calcium per day:  Yes    Bi-annual eye exam:  Yes    Dental care twice a year:  NO    Sleep apnea or symptoms of sleep apnea:  None    Diet:  Regular (no restrictions)    Frequency of exercise:  6-7 days/week    Duration of exercise:  Greater than 60 minutes    Taking medications regularly:  No    Barriers to " taking medications:  None    Medication side effects:  None    PHQ-2 Total Score:    Additional concerns today:  Yes    Today's PHQ-2 Score:   PHQ-2 (  [employer]) 2019   Q1: Little interest or pleasure in doing things 0   Q2: Feeling down, depressed or hopeless 1   PHQ-2 Score 1   Q1: Little interest or pleasure in doing things -   Q2: Feeling down, depressed or hopeless -   PHQ-2 Score -     Abuse: Current or Past(Physical, Sexual or Emotional)- No  Do you feel safe in your environment? Yes    Social History     Tobacco Use     Smoking status: Former Smoker     Packs/day: 1.00     Years: 29.00     Pack years: 29.00     Types: Cigarettes     Last attempt to quit: 2000     Years since quittin.2     Smokeless tobacco: Never Used   Substance Use Topics     Alcohol use: Yes     Alcohol/week: 0.0 oz     Comment: average 2 beers a day     Alcohol Use 2017   Prescreen: >3 drinks/day or >7 drinks/week? The patient does not drink >3 drinks per day nor >7 drinks per week.     Last PSA:   PSA   Date Value Ref Range Status   2019 0.75 0 - 4 ug/L Final     Comment:     Assay Method:  Chemiluminescence using Siemens Vista analyzer     Reviewed orders with patient. Reviewed health maintenance and updated orders accordingly - Mat   Reviewed and updated as needed this visit by clinical staff  Reviewed and updated as needed this visit by Provider    Review of Systems:   CONSTITUTIONAL: NEGATIVE for fever, chills.   INTEGUMENTARY/SKIN: Concern for petechia and increase in varicose veins over R leg. NEGATIVE for worrisome rashes.   EYES: NEGATIVE for vision changes.   RESP: History of tobacco use. NEGATIVE for significant cough or SOB.   CV: Concern for edema of right leg. Hypertension - blood pressures at home have bene 150/90s. NEGATIVE for chest pain.   GI: NEGATIVE for nausea, abdominal pain.    male: Increased amount of Viagra needed for erection.   MUSCULOSKELETAL: Back pain. Right leg numbness.    NEURO: R leg weakness and numbness.   PSYCHIATRIC: Reports mood is good. NEGATIVE for changes in mood or affect    OBJECTIVE:   /86   Pulse 65   Temp 97.3  F (36.3  C) (Oral)   Resp 16   Wt 115.7 kg (255 lb)   SpO2 97%   BMI 32.30 kg/m      Physical Exam  GENERAL: healthy, alert and no distress. Very talkative, swearing frequently throughout the examination, has several complaints about care he has received.   NECK: no adenopathy, no asymmetry, masses, or scars and thyroid normal to palpation  RESP: lungs clear to auscultation - no rales, rhonchi or wheezes  CV: regular rate and rhythm, normal S1 S2  ABDOMEN: soft, nontender, no hepatosplenomegaly, no masses and bowel sounds normal  EXTREMITIES: Right leg with 1+ pitting edema, there are several varicose veins over the leg with more superficial bruising/phlebitis posterior to medial malleolus on the right.   BACK: midline scar over lumbar area is healing well, no tenderness to palpation over spine  PSYCH: Pressured speech, rapid speech, mood is elevated, good eye contact  NEURO: Able to ambulate. Decreased strength of right quadriceps, able to lift against gravity. Otherwise normal strength.     Diagnostic Test Results:  none     ASSESSMENT/PLAN:   1. Routine general medical examination at a health care facility  - due for colonoscopy - schedule at [facility]    2. Chronic back pain, unspecified back location, unspecified back pain laterality  - Follow up per neurosurgery clinic  - oxyCODONE (ROXICODONE) 5 MG tablet; Take 1-2 tablets (5-10 mg) by mouth every 6 hours as needed for pain  Dispense: 120 tablet; Refill: 0  - PAIN MANAGEMENT REFERRAL    3. Encounter for immunization  - TD PRESERV FREE, IM (7+ YRS)  - Due for zoster vaccine, will give at next visit given upcoming travel to [place]     4.  Erectile dysfunction, unspecified erectile dysfunction type  - sildenafil (VIAGRA) 50 MG tablet; Take 1 tablet (50 mg) by mouth daily as needed (ED)   "Dispense: 40 tablet; Refill: 1  5. Androgen deficiency  Return in 1 month for lab draw.   - **Hemoglobin FUTURE 14d; Future    6. Hypertension  - continue to monitor blood pressures at home which have been at goal- missed his pills today  - Recheck blood pressure when you return for blood draw in 1 month.     Mood disorder-he reports feeling fine on Cymbalta. His pain specialist decreased the Cymbalta due to concern about hypomania. We discussed this today and recommended a psychiatric consultation. He has not interest in this at present.     He reports his affect is more the result of his being retired and  and just doesn't care to hold back on his opinions. We discussed in the past I have heard he is berating RN at the clinic here yelling and using profane language. He denies this. I clearly warned him that I would not continue seeing him if I hear this continues to occur.     COUNSELING:   Reviewed preventive health counseling, as reflected in patient instructions  Special attention given to:        Immunizations       Prostate cancer screening    BP Readings from Last 1 Encounters:   04/09/19 164/86     Estimated body mass index is 32.3 kg/m  as calculated from the following:    Height as of 3/27/19: 1.892 m (6' 2.5\").    Weight as of this encounter: 115.7 kg (255 lb).    BP Screening:   Last 3 BP Readings:    BP Readings from Last 3 Encounters:   04/09/19 164/86   04/01/19 138/82   03/27/19 155/90     The following was recommended to the patient:  Recommend lifestyle modifications   Consider adding metroprolol for anti-hypertensive if BP continue to be elevated     reports that he quit smoking about 19 years ago. His smoking use included cigarettes. He has a 29.00 pack-year smoking history. He has never used smokeless tobacco.    Counseling Resources:  ATP IV Guidelines  Pooled Cohorts Equation Calculator  FRAX Risk Assessment  ICSI Preventive Guidelines  Dietary Guidelines for Americans, 2010  USDA's " MyPlate  ASA Prophylaxis  Lung CA Screening    Kylie Burnham, MS3  [facility]     Nikko Tang MD  [facility]

## 2019-04-09 NOTE — Clinical Note
I saw BIll the other day. I think he appears mildly manic and told him so and recommended psychiatry evaluation. I did not start a mood stabilizer as he declined but will readdress. I did refill his pain meds as he will be gone for the next few weeks in [place] to see family. And also recommended he reconnect with the pain clinic.I hope that is ok with you all. He does report he is improving and is happy with the surgery. Thanks. Nikko Tang

## 2019-05-06 ENCOUNTER — OFFICE VISIT (OUTPATIENT)
Dept: PALLIATIVE MEDICINE | Facility: CLINIC | Age: 64
End: 2019-05-06
Payer: COMMERCIAL

## 2019-05-06 VITALS
DIASTOLIC BLOOD PRESSURE: 96 MMHG | WEIGHT: 262 LBS | HEART RATE: 67 BPM | SYSTOLIC BLOOD PRESSURE: 158 MMHG | BODY MASS INDEX: 33.19 KG/M2 | OXYGEN SATURATION: 96 %

## 2019-05-06 DIAGNOSIS — M54.16 LUMBAR RADICULOPATHY: Primary | ICD-10-CM

## 2019-05-06 DIAGNOSIS — Z79.891 ENCOUNTER FOR LONG-TERM OPIATE ANALGESIC USE: ICD-10-CM

## 2019-05-06 DIAGNOSIS — M79.18 MYOFASCIAL MUSCLE PAIN: ICD-10-CM

## 2019-05-06 PROCEDURE — 99215 OFFICE O/P EST HI 40 MIN: CPT | Performed by: PAIN MEDICINE

## 2019-05-06 RX ORDER — METHOCARBAMOL 500 MG/1
500 TABLET, FILM COATED ORAL 2 TIMES DAILY PRN
Qty: 60 TABLET | Refills: 1 | Status: SHIPPED | OUTPATIENT
Start: 2019-05-06 | End: 2019-07-09

## 2019-05-06 RX ORDER — BACLOFEN 10 MG/1
10 TABLET ORAL 2 TIMES DAILY PRN
Qty: 60 TABLET | Refills: 1 | Status: SHIPPED | OUTPATIENT
Start: 2019-05-06 | End: 2019-05-06 | Stop reason: ALTCHOICE

## 2019-05-06 ASSESSMENT — PAIN SCALES - GENERAL: PAINLEVEL: SEVERE PAIN (6)

## 2019-05-06 NOTE — PROGRESS NOTES
[facility] Follow Up    Date of visit: 1/8/2018    Chief complaint:   Chief Complaint   Patient presents with     Pain     Hx: S/p L3-4 decompression laminectomy 5/18/16 persistent right radiculopathy. S/p L4-5 laminectomy 2004, 7/22/16 lumbar discectomy RIGHT L3-L4 REVISION    MICRODISCECTOMY. L3 to 5 posterior spinal fusion in November 2016 with Dr. Iggy Issa. Subsequent L1 compression fracture  Interval history:    - Further procedures recommended:    - consider Lumbar MEDIAL BRANCH BLOCK  Above and below fusion   - Medication Management:     - medical marijuana- [email or web address]   - consider adding robaxin    - consider adding a different NSAID  -Consider pain PT  - Clinical Health Psychologist to address issues of relaxation, behavioral change, coping style, and other factors important to improvement: consider in the future  - Diagnostic Studies: no  - Urine toxicology screen today: yes  - Follow up: 3 months or for procedure      Since his last visit, Usama Harris reports:   Patient has had multiple changes since last visit.  Patient was extremely anxious about his current medical condition    Of note patient has a follow-up with neurosurgery 5/8/2019     the patient reports in early February he suddenly developed severe pain.  He denied any specific inciting event.  The patient's pain was so severe that he fell to the ground.  After the fall his pain was even worse.. The pt reports he immediately felt weak afterwards. The pt was subsequently brought to the ED. He was ultimately trans to [facility] for [facility]. Patient is status post revision of L2-5 PSF2/12/19. The pt reported initial sig benefit. The pt reports was doing far better than before. The pt cont to have numbness in his R LE. The pt has noticed the symptoms have been gradually improving. The pt went to [place] 4/ 8/-28 the pt was doing great. The pt was able to be active. The pt reports intermittently having symptoms,  but was able to resolve wi self manipulation. The pt reports he had approximately 48 hours of travel time. The pt pain subsequently got worse on the plane. The pt cont to have R side LBP and radiates to her leg. The pt cont to have residual numbness. Of note the pt does feel he can manipulate his legs to improve the symptoms.  Additionally, the patient feels he is still having significant weakness.  Specifically, he is having issues going up and down steps.  He feels both his hip and his leg are weak.  He denies any incontinence.    The pt was not taking any oxy or cannabis while in [place].       The patient has subsequently been requiring oxycodone.  He was not on any opioids prior to surgery.  Of note he had a recent visit with PCP.  There were some concerns about his behavior at that time.    Of note patient is interested in another prescription of oxycodone.  Discussed with the patient that he would have to do a urine toxicology and then we would have to wait for any results to make decision.  Additionally discussed that the patient was not on chronic opioids prior to the surgery.  The goal would be to gradually and safely wean him off.  Again, I stressed that his long-term plan should not include chronic opioids.  Of note pt has not started PHYSICAL THERAPY      Pt has restarted Medical cannabis     Pt cymbalta on 20mg     Pain scores:  Pain intensity on average is 5 on a scale of 0-10.     Current pain treatments:   zanaflex - not taking   cymbalta 20mg  Fosamax  Medical marijuana  Oxycodone-but out of his prescription  Past pain treatments:    Dilaudid, morphine, oxycodone, Opana, Celebrex, Motrin, naproxen, prednisone, , MS Contin knee  PAST MEDICATION TRIALS:                                                   OPIOIDS: Tylenol #3, hydrocodone( Vicodin), Morphine ER, percocet, OxyContin, tramadol   NSAID'S/ANALEGESICS: Celbrex, Ibuprofen, naprosyn,      ANTIMIGRAINE: none   STEROIDS: yes dose pack     MUSCLE   RELAXERS:  Flexeril, tizanidine, Skelaxin, Soma, Flexeril, Robaxin, Zanaflex, gabapentin, Xanax   ANTIDEPRESSANTS: Bupropion   ANTIANXIETY: Xanax   HYPNOTICS: Hydroxyzine   ANTICONVULSANTS: none , not wanting Gabapentin, Lyrica     TOPICALS:  None     Side Effects: no side effect    Medications:  Current Outpatient Medications   Medication Sig Dispense Refill     acetaminophen (TYLENOL) 500 MG tablet Take 500-1,000 mg by mouth every 6 hours as needed for mild pain (every other day rotate days with ibuprofen)       DULoxetine (CYMBALTA) 20 MG capsule Take 1 capsule (20 mg) by mouth daily 30 capsule 3     irbesartan (AVAPRO) 300 MG tablet TAKE 1 TABLET BY MOUTH EVERY DAY 90 tablet 0     multivitamin, therapeutic with minerals (MULTI-VITAMIN) TABS tablet Take 1 tablet by mouth daily       senna-docusate (SENOKOT-S/PERICOLACE) 8.6-50 MG tablet Take 2 tablets by mouth daily as needed for constipation 30 tablet 0     sildenafil (REVATIO) 20 MG tablet Take 1-5 tablets ( mg) by mouth daily as needed (take about 30 min before needed for ED.) Never use with nitroglycerin. 30 tablet 11     sildenafil (VIAGRA) 50 MG tablet Take 1 tablet (50 mg) by mouth daily as needed (ED) 40 tablet 1     tiZANidine (ZANAFLEX) 4 MG tablet Take 1-1.5 tablets (4-6 mg) by mouth 3 times daily 135 tablet 1     carboxymethylcellulose (REFRESH PLUS) 0.5 % SOLN Place 1 drop into both eyes daily as needed for dry eyes       latanoprost (XALATAN) 0.005 % ophthalmic solution Place 1 drop into both eyes At Bedtime   11     oxyCODONE (ROXICODONE) 5 MG tablet Take 1-2 tablets (5-10 mg) by mouth every 6 hours as needed for moderate to severe pain 60 tablet 0     UNABLE TO FIND MEDICATION NAME: testosterone Decon Injection - Use it weekly         Medical History: any changes in medical history since they were last seen? No    Review of Systems:  The 14 system ROS was reviewed from the intake questionnaire*  Any bowel or bladder problems: no  Mood:  good    Physical Exam:  BP (!) 158/96   Pulse 67   Wt 118.8 kg (262 lb)   SpO2 96%   BMI 33.19 kg/m    Constitutional: alert and no distress.  Pt not obese  Head: Normocephalic  ENT: EOMI, mucosal surfaces moist.  Neck with full ROM   Cardiovascular: No edema or JVD appreciated.  Respiratory: Speaking in complete sentences without shortness of breath.    Cervical Spine:  wnl    Lumbar spine:     ROM: Decreased extension   Myofascial tenderness: Positive left lower back TTP minimal   Kemps:positive on the right    Neurologic exam:  CN:  Cranial nerves 2-12 are normal  Motor: On the right 4/5 hip flexion and knee ext.   Reflexes:    (hard to illicit on the Right    Patella:  On the left+2   Achilles:   on the left+2    Sensory:  (upper and lower extremities):   Light touch: decreased of ant thigh and leg   Allodynia: absent        Skin: no suspicious lesions or rashes appreciated on exposed areas  Psychiatric: mentation appears normal, affect full and good eye contact.        Assessment/Plan:   Usama Harris is a 62 year old male who is seen at the pain clinic for Bilat LBP w/ occasional radiation down his legs  Usama was seen today for pain.    Diagnoses and all orders for this visit:    Lumbar radiculopathy    Myofascial muscle pain      - Further procedures recommended:    -none  - Medication Management:     - startrobaxin 500mg twice a day as needed    - re-certify  medical cannabis   - would discuss with surgeon on when to restart NSAIDS    - consider celebrex 100mg twice a day as needed-we will need to discuss with surgeon - continue Cymbalta to 20 mg daily  -NEED TO START PHYSICAL THERAPY    - Clinical Health Psychologist to address issues of relaxation, behavioral change, coping style, and other factors important to improvement: consider in the future  - Follow up: 1-2 months    - need to see neurosurgery     Total time spent was 50 minutes, and more than 50% of face to face time was spent in  counseling and/or coordination of care regarding  Bilat LBP worse on the right compared to the left.    Taran Sifuentes MD  [facility]

## 2019-05-06 NOTE — PATIENT INSTRUCTIONS
- Further procedures recommended:    -none  - Medication Management:     - start robaxin 500mg twice a day as needed    - re-certify  medical cannabis   - would discuss with surgeon on when to restart NSAIDS    - consider celebrex 100mg twice a day as needed   - continue Cymbalta to 20 mg daily  -NEED TO START PHYSICAL THERAPY    - Clinical Health Psychologist to address issues of relaxation, behavioral change, coping style, and other factors important to improvement: consider in the future  - Follow up: 1-2 months    - need to see neurosurgery     ----------------------------------------------------------------  Clinic Number:  [phone]   Call this number with any questions about your care and for scheduling assistance. Calls are returned Monday through Friday between 8 AM and 4:30 PM. We usually get back to you within 2 business days depending on the issue/request.       Medication refills:    For non-opioid medications, call your pharmacy directly to request a refill. The pharmacy will contact the Pain Management Center for authorization. Please allow 3-4 days for these refills to be processed.     We believe regular attendance is key to your success in our program.    Any time you are unable to keep your appointment we ask that you call us at least 24 hours in advance to let us know. This will allow us to offer the appointment time to another patient.

## 2019-05-08 ENCOUNTER — ANCILLARY PROCEDURE (OUTPATIENT)
Dept: GENERAL RADIOLOGY | Facility: CLINIC | Age: 64
End: 2019-05-08
Attending: PHYSICIAN ASSISTANT
Payer: COMMERCIAL

## 2019-05-08 ENCOUNTER — TELEPHONE (OUTPATIENT)
Dept: PALLIATIVE MEDICINE | Facility: CLINIC | Age: 64
End: 2019-05-08

## 2019-05-08 ENCOUNTER — OFFICE VISIT (OUTPATIENT)
Dept: NEUROSURGERY | Facility: CLINIC | Age: 64
End: 2019-05-08
Attending: PHYSICIAN ASSISTANT
Payer: COMMERCIAL

## 2019-05-08 VITALS
SYSTOLIC BLOOD PRESSURE: 142 MMHG | WEIGHT: 260 LBS | BODY MASS INDEX: 32.33 KG/M2 | HEART RATE: 97 BPM | TEMPERATURE: 98 F | OXYGEN SATURATION: 96 % | DIASTOLIC BLOOD PRESSURE: 99 MMHG | HEIGHT: 75 IN

## 2019-05-08 DIAGNOSIS — Z98.1 S/P LUMBAR FUSION: Primary | ICD-10-CM

## 2019-05-08 DIAGNOSIS — Z98.1 S/P LUMBAR FUSION: ICD-10-CM

## 2019-05-08 PROCEDURE — G0463 HOSPITAL OUTPT CLINIC VISIT: HCPCS

## 2019-05-08 PROCEDURE — 99024 POSTOP FOLLOW-UP VISIT: CPT | Performed by: PHYSICIAN ASSISTANT

## 2019-05-08 PROCEDURE — 72100 X-RAY EXAM L-S SPINE 2/3 VWS: CPT

## 2019-05-08 ASSESSMENT — PAIN SCALES - GENERAL: PAINLEVEL: SEVERE PAIN (6)

## 2019-05-08 ASSESSMENT — MIFFLIN-ST. JEOR: SCORE: 2064.98

## 2019-05-08 NOTE — TELEPHONE ENCOUNTER
Patient seen in clinic by Dr. Sifuentes on 5/6 and brought in a [place] [employer] Public [employer] Application for Disability Parking Certificate.     Certificate completed by Dr. Sifuentes.     Called pt and LM asking patient how he would like to receive the parking certificate form I.e. Should we mail it to him or would he like to pick it up at the [facility]. LM that he should call the main clinic number to let us know.     TOMMY LeeN, RN-BC  Patient Care Supervisor/Care Coordinator  [facility]

## 2019-05-08 NOTE — PROGRESS NOTES
[facility]  Neurosurgery followup:    HPI: 3 months s/p L2-5 revision PSF. States he has been doing well and slowly continues to improve. He recently traveled to [place], which went well. He does have intermittent low back pain L>R. Has been following with Dr. Sifuentes as well. He has not yet started PT, but is scheduled to begin this month. He is also being fit for right foot orthotic.   Exam:  Constitutional:  Alert, well nourished, NAD.  HEENT: Normocephalic, atraumatic.   Pulm:  Without shortness of breath   CV:  No pitting edema of BLE.      Neurological:  Awake  Alert  Oriented x 3  Motor exam:        IP Q DF PF EHL  R   5  5   5   5    5  L   5  5   5   5    5     Reflexes are 2+ in the patellar and Achilles. There is no clonus. Downgoing Babinski.    Able to spontaneously move L/E bilaterally  Sensation intact throughout all L/E dermatomes     Incision: Nicely healed  Imaging: AP and lateral lumbar films reveal stable hardware  A/P: 3 months s/p L2-5 revision PSF. States he has been doing well and slowly continues to improve. He recently traveled to [place], which went well. He does have intermittent low back pain L>R. XRs with stable hardware. OK to start NSAIDs in place of narcotics at this time. Continue with muscle relaxant. Continue follow up with Dr. Sifuentes. Begin PT. Follow up in 3 months with xray prior. Patient voiced understanding and agreement.    - Continue to follow up with pain clinic  - followup in 3 months with xray prior   - Call the clinic at [phone] for increased pain or any other questions and concerns.      Afsaneh Villareal PA-C  [facility]  [address]  [address] [place] [id number]    Tel [phone]  Pager [phone]

## 2019-05-08 NOTE — TELEPHONE ENCOUNTER
Patient stopped by [facility] and picked up disability parking certificate.     Copy put in scanning bin.     TOMMY LeeN, RN-BC  Patient Care Supervisor/Care Coordinator  [facility]

## 2019-05-08 NOTE — NURSING NOTE
"Usama Harris is a 63 year old male who presents for:  Chief Complaint   Patient presents with     Neurologic Problem        Initial Vitals:  BP (!) 142/99   Pulse 97   Temp 98  F (36.7  C)   Ht 6' 3\" (1.905 m)   Wt 260 lb (117.9 kg)   SpO2 96%   BMI 32.50 kg/m   Estimated body mass index is 32.5 kg/m  as calculated from the following:    Height as of this encounter: 6' 3\" (1.905 m).    Weight as of this encounter: 260 lb (117.9 kg).. Body surface area is 2.5 meters squared. BP completed   Severe Pain (6)        Nursing Comments:         Beatrice Garber  [facility]    "

## 2019-05-08 NOTE — PATIENT INSTRUCTIONS
-Begin physical therapy  - Continue to follow up with pain clinic  - followup in 3 months with xray prior   - Call the clinic at [phone] for increased pain or any other questions and concerns.

## 2019-05-08 NOTE — LETTER
5/8/2019         RE: Usama Harris  [address]  [place] [address]        Dear Colleague,    Thank you for referring your patient, Usama Harris, to the [facility]. Please see a copy of my visit note below.    [facility]  Neurosurgery followup:    HPI: 3 months s/p L2-5 revision PSF. States he has been doing well and slowly continues to improve. He recently traveled to [place], which went well. He does have intermittent low back pain L>R. Has been following with Dr. Sifuentes as well. He has not yet started PT, but is scheduled to begin this month. He is also being fit for right foot orthotic.   Exam:  Constitutional:  Alert, well nourished, NAD.  HEENT: Normocephalic, atraumatic.   Pulm:  Without shortness of breath   CV:  No pitting edema of BLE.      Neurological:  Awake  Alert  Oriented x 3  Motor exam:        IP Q DF PF EHL  R   5  5   5   5    5  L   5  5   5   5    5     Reflexes are 2+ in the patellar and Achilles. There is no clonus. Downgoing Babinski.    Able to spontaneously move L/E bilaterally  Sensation intact throughout all L/E dermatomes     Incision: Nicely healed  Imaging: AP and lateral lumbar films reveal stable hardware  A/P: 3 months s/p L2-5 revision PSF. States he has been doing well and slowly continues to improve. He recently traveled to [place], which went well. He does have intermittent low back pain L>R. XRs with stable hardware. OK to start NSAIDs in place of narcotics at this time. Continue with muscle relaxant. Continue follow up with Dr. Sifuentes. Begin PT. Follow up in 3 months with xray prior. Patient voiced understanding and agreement.    - Continue to follow up with pain clinic  - followup in 3 months with xray prior   - Call the clinic at [phone] for increased pain or any other questions and concerns.      Afsaneh Villareal PA-C  [facility]  [address]  [place] [address]    Tel [phone]  Pager [phone]      Again, thank you for allowing me to participate in the care of your patient.        Sincerely,        Afsaneh Villareal PA-C

## 2019-05-28 DIAGNOSIS — E29.1 HYPOGONADISM MALE: ICD-10-CM

## 2019-05-28 LAB
ALBUMIN SERPL-MCNC: 4.2 G/DL (ref 3.4–5)
ALP SERPL-CCNC: 80 U/L (ref 40–150)
ALT SERPL W P-5'-P-CCNC: 40 U/L (ref 0–70)
AST SERPL W P-5'-P-CCNC: 19 U/L (ref 0–45)
BILIRUB DIRECT SERPL-MCNC: 0.1 MG/DL (ref 0–0.2)
BILIRUB SERPL-MCNC: 0.5 MG/DL (ref 0.2–1.3)
PROT SERPL-MCNC: 7.8 G/DL (ref 6.8–8.8)

## 2019-06-06 ENCOUNTER — OFFICE VISIT (OUTPATIENT)
Dept: ENDOCRINOLOGY | Facility: CLINIC | Age: 64
End: 2019-06-06
Payer: COMMERCIAL

## 2019-06-06 VITALS
BODY MASS INDEX: 32.83 KG/M2 | HEART RATE: 66 BPM | SYSTOLIC BLOOD PRESSURE: 146 MMHG | HEIGHT: 75 IN | DIASTOLIC BLOOD PRESSURE: 91 MMHG | WEIGHT: 264 LBS

## 2019-06-06 DIAGNOSIS — E34.9 TESTOSTERONE DEFICIENCY: ICD-10-CM

## 2019-06-06 DIAGNOSIS — E34.9 TESTOSTERONE DEFICIENCY: Primary | ICD-10-CM

## 2019-06-06 DIAGNOSIS — Z79.891 ENCOUNTER FOR LONG-TERM OPIATE ANALGESIC USE: ICD-10-CM

## 2019-06-06 LAB
ERYTHROCYTE [DISTWIDTH] IN BLOOD BY AUTOMATED COUNT: 14.6 % (ref 10–15)
HCT VFR BLD AUTO: 49.5 % (ref 40–53)
HGB BLD-MCNC: 16 G/DL (ref 13.3–17.7)
MCH RBC QN AUTO: 30.2 PG (ref 26.5–33)
MCHC RBC AUTO-ENTMCNC: 32.3 G/DL (ref 31.5–36.5)
MCV RBC AUTO: 93 FL (ref 78–100)
PLATELET # BLD AUTO: 197 10E9/L (ref 150–450)
PSA SERPL-MCNC: 0.78 UG/L (ref 0–4)
RBC # BLD AUTO: 5.3 10E12/L (ref 4.4–5.9)
WBC # BLD AUTO: 8.1 10E9/L (ref 4–11)

## 2019-06-06 RX ORDER — TESTOSTERONE CYPIONATE 200 MG/ML
50 INJECTION, SOLUTION INTRAMUSCULAR WEEKLY
Qty: 2 ML | Refills: 5 | Status: SHIPPED | OUTPATIENT
Start: 2019-06-06 | End: 2019-12-16

## 2019-06-06 ASSESSMENT — ENCOUNTER SYMPTOMS
SINUS CONGESTION: 1
EYE REDNESS: 0
CONSTIPATION: 0
JOINT SWELLING: 0
LOSS OF CONSCIOUSNESS: 0
SORE THROAT: 1
SKIN CHANGES: 0
NAUSEA: 1
TROUBLE SWALLOWING: 0
BLOATING: 1
SNORES LOUDLY: 1
NECK PAIN: 1
POSTURAL DYSPNEA: 0
BOWEL INCONTINENCE: 0
SLEEP DISTURBANCES DUE TO BREATHING: 0
COUGH DISTURBING SLEEP: 1
TREMORS: 1
SINUS PAIN: 0
RECTAL PAIN: 0
ALTERED TEMPERATURE REGULATION: 0
SPUTUM PRODUCTION: 1
DIZZINESS: 0
MUSCLE CRAMPS: 1
JAUNDICE: 0
SPEECH CHANGE: 0
HYPERTENSION: 1
NIGHT SWEATS: 1
PARALYSIS: 1
DECREASED APPETITE: 0
HEMOPTYSIS: 0
SEIZURES: 0
INSOMNIA: 1
FEVER: 1
MEMORY LOSS: 0
BACK PAIN: 1
POOR WOUND HEALING: 0
SMELL DISTURBANCE: 0
PANIC: 0
EXERCISE INTOLERANCE: 0
WEAKNESS: 1
DIARRHEA: 1
PALPITATIONS: 1
NERVOUS/ANXIOUS: 0
MUSCLE WEAKNESS: 1
POLYDIPSIA: 0
ORTHOPNEA: 0
HALLUCINATIONS: 0
FATIGUE: 1
DEPRESSION: 0
CHILLS: 0
NECK MASS: 0
STIFFNESS: 1
DYSPNEA ON EXERTION: 1
BLOOD IN STOOL: 0
EYE IRRITATION: 1
NUMBNESS: 1
ABDOMINAL PAIN: 1
LIGHT-HEADEDNESS: 0
TASTE DISTURBANCE: 0
MYALGIAS: 1
DISTURBANCES IN COORDINATION: 0
VOMITING: 1
POLYPHAGIA: 0
TINGLING: 1
LEG PAIN: 0
EYE WATERING: 0
NAIL CHANGES: 0
EYE PAIN: 1
INCREASED ENERGY: 0
DECREASED CONCENTRATION: 1
COUGH: 1
HOARSE VOICE: 0
WEIGHT LOSS: 0
SYNCOPE: 0
DOUBLE VISION: 0
HEARTBURN: 1
HEADACHES: 1
WEIGHT GAIN: 1
ARTHRALGIAS: 0
SHORTNESS OF BREATH: 1
WHEEZING: 1
HYPOTENSION: 0

## 2019-06-06 ASSESSMENT — PAIN SCALES - GENERAL: PAINLEVEL: NO PAIN (0)

## 2019-06-06 ASSESSMENT — MIFFLIN-ST. JEOR: SCORE: 2083.13

## 2019-06-06 NOTE — PROGRESS NOTES
[facility]  Date: 06/06/2019  Fellow: Jeffrey Friedman MD  Attending: Dr. RAMIN Up    SUBJECTIVE:  Usama Harris is a 63 year old male who is seen in clinic for management of osteoporosis and hypogonadotropic hypogonadism    History:  Past last seen in 02/2019    1. Hypogonadism  In the past, testosterone and gonadotropins have been low and he's had a normal brain MRI. He was on significant amount of opioids around this time. Was on testosterone from 2390-9597 and noted improvement in libido and strength, but replacement was stopped because of mildly elevated transaminases, which eventually normalized.     In 01/2019, AM testosterone was 223 (240-950). He had low libido, ED, and low energy. In 02/2019, he was started on 50 mg testosterone cypionate weekly. He feels much better with testosterone and all of the aforementioned symptoms have improved. His liver enzymes in 05/2019 were normal.       2. Osteoporosis  In 03/2017 lumbar x-rays showed an incidental L1 compression fracture.  He had no trauma that he can recall. He has a history of L3-L5 fusion surgery in 2016. In 04/2017, his PCP started him on fosamax and calcitonin. Calcitonin stopped in 10/2017. Fosamax use was continued, but a pharmacist told him to stop it because of some GI symptoms (reflux, dyspepsia), so he stopped it in fall 2018.  First zoledronic acid infusion 04/2019, which he tolerated well.    He takes a vitamin D supplement, unclear dose. He eats 2-3 servings of dairy daily (milk, yogurt, cheese)    Review Of Systems  12 point ROS negative unless noted above    Past Medical History  Past Medical History:   Diagnosis Date     Acute hepatitis C without mention of hepatic coma(070.51)     Hep C - Treated     Backache, unspecified      DDD (degenerative disc disease), lumbar     s/p back surgery     Glaucoma      Hypertension        Past Surgical History  Past Surgical History:   Procedure Laterality Date     BACK  "SURGERY  2007    Herniated disc     CATARACT IOL, RT/LT Left      DECOMPRESSION LUMBAR ONE LEVEL Right 5/18/2016    Procedure: DECOMPRESSION LUMBAR ONE LEVEL;  Surgeon: Prince Govea MD;  Location: [facility] OR     DISCECTOMY LUMBAR POSTERIOR MICROSCOPIC ONE LEVEL Right 7/22/2016    Procedure: DISCECTOMY LUMBAR POSTERIOR MICROSCOPIC ONE LEVEL;  Surgeon: Iggy Issa MD;  Location: [facility] OR     LAMINECT/DISCECTOMY, LUMBAR      Laminectomy/Discectomy Cervical     LAMINECTOMY, FUSION LUMBAR THREE+ LEVEL, COMBINED Right 2/12/2019    Procedure: REVISION L2-L5 POSTERIOR SPINAL FUSION; RIGHT L2-L3 TRANSFORAMINAL INTERBODY FUSION;  Surgeon: Iggy Issa MD;  Location: [facility] OR     LAMINECTOMY, FUSION LUMBAR TWO LEVELS, COMBINED Right 11/15/2016    Procedure: COMBINED LAMINECTOMY, FUSION LUMBAR TWO LEVELS;  Surgeon: Iggy Issa MD;  Location: [facility] OR     SURGICAL HISTORY OF -       ligation of left spermatic vein        Medications  Current Outpatient Medications   Medication Sig Dispense Refill     syringe/needle, disp, 23G X 1\" 3 ML MISC Use 1 with each testosterone injection 50 each 5     testosterone cypionate (DEPOTESTOSTERONE) 200 MG/ML injection Inject 0.25 mLs (50 mg) into the muscle once a week 2 mL 5     acetaminophen (TYLENOL) 500 MG tablet Take 500-1,000 mg by mouth every 6 hours as needed for mild pain (every other day rotate days with ibuprofen)       carboxymethylcellulose (REFRESH PLUS) 0.5 % SOLN Place 1 drop into both eyes daily as needed for dry eyes       DULoxetine (CYMBALTA) 20 MG capsule Take 1 capsule (20 mg) by mouth daily 30 capsule 3     irbesartan (AVAPRO) 300 MG tablet TAKE 1 TABLET BY MOUTH EVERY DAY 90 tablet 0     latanoprost (XALATAN) 0.005 % ophthalmic solution Place 1 drop into both eyes At Bedtime   11     methocarbamol (ROBAXIN) 500 MG tablet Take 1 tablet (500 mg) by mouth 2 times daily as needed for muscle spasms 60 tablet 1     multivitamin, therapeutic with minerals " "(MULTI-VITAMIN) TABS tablet Take 1 tablet by mouth daily       oxyCODONE (ROXICODONE) 5 MG tablet Take 1-2 tablets (5-10 mg) by mouth every 6 hours as needed for moderate to severe pain 60 tablet 0     sildenafil (REVATIO) 20 MG tablet Take 1-5 tablets ( mg) by mouth daily as needed (take about 30 min before needed for ED.) Never use with nitroglycerin. 30 tablet 11     sildenafil (VIAGRA) 50 MG tablet Take 1 tablet (50 mg) by mouth daily as needed (ED) 40 tablet 1     UNABLE TO FIND MEDICATION NAME: testosterone Decon Injection - Use it weekly         OBJECTIVE:  BP (!) 146/91   Pulse 66   Ht 1.905 m (6' 3\")   Wt 119.7 kg (264 lb)   BMI 33.00 kg/m    Body mass index is 33 kg/m .   Gen: NAD, well-appearing  Head: Normocephalic, atraumatic  ENT: EOMI, no scleral icterus   Neuro: alert and oriented    ASSESSMENT/PLAN:    # Osteoporosis  Patient with non-traumatic L1 compression fracture in 2017. Previously on calcitonin and then Fosamax, but couldn't tolerate the latter due to GI side effects. Risk factors include mild hypogonadism, family history, and prior tobacco use. Last DXA 01/2019. He got 1st dose of reclast 04/2019  - Recommended addition of 500 mg elemental calcium daily, which should get him to 1000-1200mg daily  - Continue vitamin D  - Follow-up in 04/2020 to discuss additional Reclast      # Hypogonadotropic hypogonadism  Restarted testosterone in 02/2019 and is symptomatically improved. Liver labs have remained normal on therapy. Will remain on weekly injections per patient preference  - Check testosterone, PSA and CBC  - Continue testosterone cypionate 50mcg weekly     Return to clinic in 10 months    This patient was seen and staffed with Dr. Annel Friedman MD  PGY4, Endocrinology Fellow    Usama seen and examined with Dr. Friedman. His note reflects our joint assessment and plan.    Jeffrey Up MD  Endocrinology Staff  "

## 2019-06-06 NOTE — LETTER
6/6/2019       RE: Usama Harris  [address] [place] [address]     Dear Colleague,    Thank you for referring your patient, Usama Harris, to the [facility] ENDOCR[facility]OLOGY at [facility]. Please see a copy of my visit note below.    [facility]  Date: 06/06/2019  Fellow: Jeffrey Friedman MD  Attending: Dr. RAMIN Up    SUBJECTIVE:  Usama Harris is a 63 year old male who is seen in clinic for management of osteoporosis and hypogonadotropic hypogonadism    History:  Past last seen in 02/2019    1. Hypogonadism  In the past, testosterone and gonadotropins have been low and he's had a normal brain MRI. He was on significant amount of opioids around this time. Was on testosterone from 8581-4285 and noted improvement in libido and strength, but replacement was stopped because of mildly elevated transaminases, which eventually normalized.     In 01/2019, AM testosterone was 223 (240-950). He had low libido, ED, and low energy. In 02/2019, he was started on 50 mg testosterone cypionate weekly. He feels much better with testosterone and all of the aforementioned symptoms have improved. His liver enzymes in 05/2019 were normal.       2. Osteoporosis  In 03/2017 lumbar x-rays showed an incidental L1 compression fracture.  He had no trauma that he can recall. He has a history of L3-L5 fusion surgery in 2016. In 04/2017, his PCP started him on fosamax and calcitonin. Calcitonin stopped in 10/2017. Fosamax use was continued, but a pharmacist told him to stop it because of some GI symptoms (reflux, dyspepsia), so he stopped it in fall 2018.  First zoledronic acid infusion 04/2019, which he tolerated well.    He takes a vitamin D supplement, unclear dose. He eats 2-3 servings of dairy daily (milk, yogurt, cheese)    Review Of Systems  12 point ROS negative unless noted above    Past Medical History  Past Medical History:   Diagnosis Date      "Acute hepatitis C without mention of hepatic coma(070.51)     Hep C - Treated     Backache, unspecified      DDD (degenerative disc disease), lumbar     s/p back surgery     Glaucoma      Hypertension        Past Surgical History  Past Surgical History:   Procedure Laterality Date     BACK SURGERY  2007    Herniated disc     CATARACT IOL, RT/LT Left      DECOMPRESSION LUMBAR ONE LEVEL Right 5/18/2016    Procedure: DECOMPRESSION LUMBAR ONE LEVEL;  Surgeon: Prince Govea MD;  Location: [facility] OR     DISCECTOMY LUMBAR POSTERIOR MICROSCOPIC ONE LEVEL Right 7/22/2016    Procedure: DISCECTOMY LUMBAR POSTERIOR MICROSCOPIC ONE LEVEL;  Surgeon: Iggy Issa MD;  Location: [facility]     LAMINECT/DISCECTOMY, LUMBAR      Laminectomy/Discectomy Cervical     LAMINECTOMY, FUSION LUMBAR THREE+ LEVEL, COMBINED Right 2/12/2019    Procedure: REVISION L2-L5 POSTERIOR SPINAL FUSION; RIGHT L2-L3 TRANSFORAMINAL INTERBODY FUSION;  Surgeon: Iggy Issa MD;  Location: [facility]     LAMINECTOMY, FUSION LUMBAR TWO LEVELS, COMBINED Right 11/15/2016    Procedure: COMBINED LAMINECTOMY, FUSION LUMBAR TWO LEVELS;  Surgeon: Iggy Issa MD;  Location: [facility]     SURGICAL HISTORY OF -       ligation of left spermatic vein        Medications  Current Outpatient Medications   Medication Sig Dispense Refill     syringe/needle, disp, 23G X 1\" 3 ML MISC Use 1 with each testosterone injection 50 each 5     testosterone cypionate (DEPOTESTOSTERONE) 200 MG/ML injection Inject 0.25 mLs (50 mg) into the muscle once a week 2 mL 5     acetaminophen (TYLENOL) 500 MG tablet Take 500-1,000 mg by mouth every 6 hours as needed for mild pain (every other day rotate days with ibuprofen)       carboxymethylcellulose (REFRESH PLUS) 0.5 % SOLN Place 1 drop into both eyes daily as needed for dry eyes       DULoxetine (CYMBALTA) 20 MG capsule Take 1 capsule (20 mg) by mouth daily 30 capsule 3     irbesartan (AVAPRO) 300 MG tablet TAKE 1 TABLET BY MOUTH EVERY " "DAY 90 tablet 0     latanoprost (XALATAN) 0.005 % ophthalmic solution Place 1 drop into both eyes At Bedtime   11     methocarbamol (ROBAXIN) 500 MG tablet Take 1 tablet (500 mg) by mouth 2 times daily as needed for muscle spasms 60 tablet 1     multivitamin, therapeutic with minerals (MULTI-VITAMIN) TABS tablet Take 1 tablet by mouth daily       oxyCODONE (ROXICODONE) 5 MG tablet Take 1-2 tablets (5-10 mg) by mouth every 6 hours as needed for moderate to severe pain 60 tablet 0     sildenafil (REVATIO) 20 MG tablet Take 1-5 tablets ( mg) by mouth daily as needed (take about 30 min before needed for ED.) Never use with nitroglycerin. 30 tablet 11     sildenafil (VIAGRA) 50 MG tablet Take 1 tablet (50 mg) by mouth daily as needed (ED) 40 tablet 1     UNABLE TO FIND MEDICATION NAME: testosterone Decon Injection - Use it weekly         OBJECTIVE:  BP (!) 146/91   Pulse 66   Ht 1.905 m (6' 3\")   Wt 119.7 kg (264 lb)   BMI 33.00 kg/m     Body mass index is 33 kg/m .   Gen: NAD, well-appearing  Head: Normocephalic, atraumatic  ENT: EOMI, no scleral icterus   Neuro: alert and oriented    ASSESSMENT/PLAN:    # Osteoporosis  Patient with non-traumatic L1 compression fracture in 2017. Previously on calcitonin and then Fosamax, but couldn't tolerate the latter due to GI side effects. Risk factors include mild hypogonadism, family history, and prior tobacco use. Last DXA 01/2019. He got 1st dose of reclast 04/2019  - Recommended addition of 500 mg elemental calcium daily, which should get him to 1000-1200mg daily  - Continue vitamin D  - Follow-up in 04/2020 to discuss additional Reclast      # Hypogonadotropic hypogonadism  Restarted testosterone in 02/2019 and is symptomatically improved. Liver labs have remained normal on therapy. Will remain on weekly injections per patient preference  - Check testosterone, PSA and CBC  - Continue testosterone cypionate 50mcg weekly     Return to clinic in 10 months    This " patient was seen and staffed with Dr. Annel Friedman MD  PGY4, Endocrinology Fellow    Usama seen and examined with Dr. Friedman. His note reflects our joint assessment and plan.    Jeffrey Up MD  Endocrinology Staff

## 2019-06-07 LAB — TESTOST SERPL-MCNC: 394 NG/DL (ref 240–950)

## 2019-06-10 ENCOUNTER — MYC REFILL (OUTPATIENT)
Dept: PALLIATIVE MEDICINE | Facility: CLINIC | Age: 64
End: 2019-06-10

## 2019-06-10 DIAGNOSIS — M96.1 FAILED BACK SYNDROME: ICD-10-CM

## 2019-06-10 DIAGNOSIS — G89.4 CHRONIC PAIN SYNDROME: ICD-10-CM

## 2019-06-10 RX ORDER — DULOXETIN HYDROCHLORIDE 20 MG/1
20 CAPSULE, DELAYED RELEASE ORAL DAILY
Qty: 30 CAPSULE | Refills: 3 | Status: CANCELLED | OUTPATIENT
Start: 2019-06-10

## 2019-06-11 ENCOUNTER — TELEPHONE (OUTPATIENT)
Dept: NEUROSURGERY | Facility: CLINIC | Age: 64
End: 2019-06-11

## 2019-06-11 LAB — PAIN DRUG SCR UR W RPTD MEDS: NORMAL

## 2019-06-11 NOTE — TELEPHONE ENCOUNTER
Received message on RN line from Feliciano White, PT at [facility] needing order faxed.    Faxed PT order to [facility]  June 11, 2019 to fax number [phone]    Right Fax confirmed at 10:43  AM    Jenny Esquivel RN

## 2019-06-14 DIAGNOSIS — I10 ESSENTIAL HYPERTENSION: ICD-10-CM

## 2019-06-14 RX ORDER — CELECOXIB 200 MG/1
CAPSULE ORAL
Qty: 180 CAPSULE | Refills: 6 | Status: SHIPPED | OUTPATIENT
Start: 2019-06-14 | End: 2019-10-21 | Stop reason: SINTOL

## 2019-06-14 NOTE — TELEPHONE ENCOUNTER
Dr. Sifuentes, Please reorder pt's Celebrex and re-certify for medical marijuana.      Pt stated that his Celebrex was discontinued after surgery, but you stated that you would be willing to restart.      Thanks,    Mo Zapien, RN  Care Coordinator   [facility] [place]

## 2019-06-15 NOTE — TELEPHONE ENCOUNTER
"Requested Prescriptions   Pending Prescriptions Disp Refills     irbesartan (AVAPRO) 300 MG tablet [Pharmacy Med Name: IRBESARTAN 300 MG  Last Written Prescription Date:  3/13/19  Last Fill Quantity: 90,  # refills: 0   Last office visit: 4/9/2019 with prescribing provider:     Future Office Visit:   Next 5 appointments (look out 90 days)    Aug 07, 2019 11:30 AM CDT  Return Visit with Ava Brown NP  [facility] ([facility]) [address] [place] [id number]  [phone]          TABLET] 90 tablet 0     Sig: TAKE 1 TABLET BY MOUTH EVERY DAY       Angiotensin-II Receptors Failed - 6/14/2019 12:35 PM        Failed - Blood pressure under 140/90 in past 12 months     BP Readings from Last 3 Encounters:   06/06/19 (!) 146/91   05/08/19 (!) 142/99   05/06/19 (!) 158/96           Failed - Normal serum creatinine on file in past 12 months     Recent Labs   Lab Test 02/15/19  0733   CR 0.65*           Passed - Recent (12 mo) or future (30 days) visit within the authorizing provider's specialty     Patient had office visit in the last 12 months or has a visit in the next 30 days with authorizing provider or within the authorizing provider's specialty.  See \"Patient Info\" tab in inbasket, or \"Choose Columns\" in Meds & Orders section of the refill encounter.            Passed - Medication is active on med list        Passed - Patient is age 18 or older        Passed - Normal serum potassium on file in past 12 months     Recent Labs   Lab Test 02/15/19  0733   POTASSIUM 3.7              "

## 2019-06-16 NOTE — TELEPHONE ENCOUNTER
Routing refill request to provider for review/approval because:  BP Readings from Last 3 Encounters:   06/06/19 (!) 146/91   05/08/19 (!) 142/99   05/06/19 (!) 158/96

## 2019-06-19 RX ORDER — IRBESARTAN 300 MG/1
TABLET ORAL
Qty: 90 TABLET | Refills: 0 | OUTPATIENT
Start: 2019-06-19

## 2019-06-19 RX ORDER — IRBESARTAN 300 MG/1
300 TABLET ORAL DAILY
Qty: 30 TABLET | Refills: 0 | Status: SHIPPED | OUTPATIENT
Start: 2019-06-19 | End: 2019-06-19

## 2019-06-19 RX ORDER — IRBESARTAN 300 MG/1
300 TABLET ORAL DAILY
Qty: 60 TABLET | Refills: 0 | Status: SHIPPED | OUTPATIENT
Start: 2019-06-19 | End: 2019-07-23

## 2019-06-19 NOTE — TELEPHONE ENCOUNTER
Pt is scheduled for 7/23 with PCP.   He is wondering if he can still get medication refilled.       Adriana MONREAL     [facility]

## 2019-06-19 NOTE — TELEPHONE ENCOUNTER
Yes . Let pt know that enough has been refilled to get him to the appt on July 23 (60 days)    Alyx Low, RN, BSN

## 2019-06-19 NOTE — TELEPHONE ENCOUNTER
Spoke with pt and informed him directly of refill.       Adriana MONREAL     [facility]

## 2019-07-01 ENCOUNTER — TELEPHONE (OUTPATIENT)
Dept: PALLIATIVE MEDICINE | Facility: CLINIC | Age: 64
End: 2019-07-01

## 2019-07-01 NOTE — TELEPHONE ENCOUNTER
Dr. Sifuentes have your re-certified pt?    Lorna Boone, RN-BSN  [facility]

## 2019-07-02 DIAGNOSIS — G89.4 CHRONIC PAIN SYNDROME: ICD-10-CM

## 2019-07-02 DIAGNOSIS — M96.1 FAILED BACK SYNDROME: ICD-10-CM

## 2019-07-02 RX ORDER — DULOXETIN HYDROCHLORIDE 20 MG/1
20 CAPSULE, DELAYED RELEASE ORAL DAILY
Qty: 30 CAPSULE | Refills: 3 | Status: SHIPPED | OUTPATIENT
Start: 2019-07-02 | End: 2019-09-26

## 2019-07-02 NOTE — TELEPHONE ENCOUNTER
Fax received from: [facility]  Refill authorization requested    Drug: DULoxetine (CYMBALTA) 20 MG capsule  Qty: 30.0  Last filled 04/09/19  Last seen:  5/6/19  Next appointment: no future appointment.     Anita Us MA

## 2019-07-09 DIAGNOSIS — M79.18 MYOFASCIAL MUSCLE PAIN: ICD-10-CM

## 2019-07-09 RX ORDER — METHOCARBAMOL 500 MG/1
500 TABLET, FILM COATED ORAL 2 TIMES DAILY PRN
Qty: 60 TABLET | Refills: 1 | Status: SHIPPED | OUTPATIENT
Start: 2019-07-09 | End: 2019-12-31

## 2019-07-09 NOTE — TELEPHONE ENCOUNTER
Received fax request from [facility] pharmacy requesting refill(s) for methocarbamol (ROBAXIN) 500 MG tablet    Last refilled on 05/29/19    Pt last seen on 05/06/19  Next appt scheduled for : none    Will facilitate refill.

## 2019-07-15 DIAGNOSIS — I10 ESSENTIAL HYPERTENSION: ICD-10-CM

## 2019-07-16 DIAGNOSIS — E34.9 TESTOSTERONE DEFICIENCY: Primary | ICD-10-CM

## 2019-07-16 RX ORDER — CALCIUM CARB/VITAMIN D3/VIT K1 500-100-40
TABLET,CHEWABLE ORAL
Qty: 12 EACH | Refills: 3 | Status: SHIPPED | OUTPATIENT
Start: 2019-07-16 | End: 2019-10-21

## 2019-07-17 NOTE — TELEPHONE ENCOUNTER
"Requested Prescriptions   Pending Prescriptions Disp Refills     irbesartan (AVAPRO) 300 MG tablet [Pharmacy Med Name: IRBESARTAN 300 MG TABLET]  Last Written Prescription Date:  6-19-19  Last Fill Quantity: 60 tab,  # refills: 0   Last office visit: 4/9/2019 with prescribing provider:  Nikko Tang    Future Office Visit:   Next 5 appointments (look out 90 days)    Jul 23, 2019  1:40 PM CDT  Office Visit with Nikko Tang MD  [facility] ([facility]) [address] [place] [id number]  [phone]   Aug 07, 2019 11:30 AM CDT  Return Visit with Ava Brown NP  [facility] ([facility]) [address] [place] [phone]  [phone]       30 tablet 0     Sig: TAKE 1 TABLET BY MOUTH EVERY DAY       Angiotensin-II Receptors Failed - 7/15/2019  1:31 PM        Failed - Blood pressure under 140/90 in past 12 months     BP Readings from Last 3 Encounters:   06/06/19 (!) 146/91   05/08/19 (!) 142/99   05/06/19 (!) 158/96           Failed - Normal serum creatinine on file in past 12 months     Recent Labs   Lab Test 02/15/19  0733   CR 0.65*           Passed - Recent (12 mo) or future (30 days) visit within the authorizing provider's specialty     Patient had office visit in the last 12 months or has a visit in the next 30 days with authorizing provider or within the authorizing provider's specialty.  See \"Patient Info\" tab in inbasket, or \"Choose Columns\" in Meds & Orders section of the refill encounter.          Passed - Medication is active on med list        Passed - Patient is age 18 or older        Passed - Normal serum potassium on file in past 12 months     Recent Labs   Lab Test 02/15/19  0733   POTASSIUM 3.7            "

## 2019-07-18 DIAGNOSIS — E34.9 TESTOSTERONE DEFICIENCY: ICD-10-CM

## 2019-07-18 RX ORDER — IRBESARTAN 300 MG/1
TABLET ORAL
Qty: 0.1 TABLET | Refills: 0 | OUTPATIENT
Start: 2019-07-18

## 2019-07-18 NOTE — TELEPHONE ENCOUNTER
Refused Prescriptions:                       Disp   Refills    irbesartan (AVAPRO) 300 MG tablet [Pharmac*0.1 ta*0        Sig: TAKE 1 TABLET BY MOUTH EVERY DAY  Refused By: SOULEYMANE JOY  Reason for Refusal: Inappropriate, get more info

## 2019-07-23 ENCOUNTER — OFFICE VISIT (OUTPATIENT)
Dept: FAMILY MEDICINE | Facility: CLINIC | Age: 64
End: 2019-07-23
Payer: COMMERCIAL

## 2019-07-23 VITALS
SYSTOLIC BLOOD PRESSURE: 172 MMHG | HEART RATE: 64 BPM | WEIGHT: 254 LBS | DIASTOLIC BLOOD PRESSURE: 89 MMHG | OXYGEN SATURATION: 97 % | BODY MASS INDEX: 31.75 KG/M2 | TEMPERATURE: 98.7 F | RESPIRATION RATE: 18 BRPM

## 2019-07-23 DIAGNOSIS — N52.9 ERECTILE DYSFUNCTION, UNSPECIFIED ERECTILE DYSFUNCTION TYPE: ICD-10-CM

## 2019-07-23 DIAGNOSIS — G89.4 CHRONIC PAIN SYNDROME: ICD-10-CM

## 2019-07-23 DIAGNOSIS — I10 ESSENTIAL HYPERTENSION: Primary | ICD-10-CM

## 2019-07-23 DIAGNOSIS — E29.1 ANDROGEN DEFICIENCY: ICD-10-CM

## 2019-07-23 DIAGNOSIS — Z98.1 S/P LUMBAR FUSION: ICD-10-CM

## 2019-07-23 PROCEDURE — 99214 OFFICE O/P EST MOD 30 MIN: CPT | Performed by: FAMILY MEDICINE

## 2019-07-23 RX ORDER — SILDENAFIL 50 MG/1
50 TABLET, FILM COATED ORAL DAILY PRN
Qty: 40 TABLET | Refills: 11 | Status: SHIPPED | OUTPATIENT
Start: 2019-07-23 | End: 2020-08-18

## 2019-07-23 RX ORDER — OXYCODONE HYDROCHLORIDE 5 MG/1
5-10 TABLET ORAL EVERY 6 HOURS PRN
Qty: 60 TABLET | Refills: 0 | Status: CANCELLED | OUTPATIENT
Start: 2019-07-23

## 2019-07-23 RX ORDER — METOPROLOL SUCCINATE 25 MG/1
25 TABLET, EXTENDED RELEASE ORAL DAILY
Qty: 90 TABLET | Refills: 1 | Status: SHIPPED | OUTPATIENT
Start: 2019-07-23 | End: 2019-10-29

## 2019-07-23 ASSESSMENT — ANXIETY QUESTIONNAIRES
1. FEELING NERVOUS, ANXIOUS, OR ON EDGE: SEVERAL DAYS
IF YOU CHECKED OFF ANY PROBLEMS ON THIS QUESTIONNAIRE, HOW DIFFICULT HAVE THESE PROBLEMS MADE IT FOR YOU TO DO YOUR WORK, TAKE CARE OF THINGS AT HOME, OR GET ALONG WITH OTHER PEOPLE: SOMEWHAT DIFFICULT
3. WORRYING TOO MUCH ABOUT DIFFERENT THINGS: NOT AT ALL
GAD7 TOTAL SCORE: 3
2. NOT BEING ABLE TO STOP OR CONTROL WORRYING: NOT AT ALL
6. BECOMING EASILY ANNOYED OR IRRITABLE: MORE THAN HALF THE DAYS
5. BEING SO RESTLESS THAT IT IS HARD TO SIT STILL: NOT AT ALL
7. FEELING AFRAID AS IF SOMETHING AWFUL MIGHT HAPPEN: NOT AT ALL

## 2019-07-23 ASSESSMENT — PATIENT HEALTH QUESTIONNAIRE - PHQ9: 5. POOR APPETITE OR OVEREATING: NOT AT ALL

## 2019-07-23 NOTE — PROGRESS NOTES
Subjective     Usama Harris is a 63 year old male who presents to clinic today for the following health issues:    HPI   Hypertension Follow-up      Do you check your blood pressure regularly outside of the clinic? Yes  135/80 this morning     Are you following a low salt diet? No    Are your blood pressures ever more than 140 on the top number (systolic) OR more   than 90 on the bottom number (diastolic), for example 140/90? No    Amount of exercise or physical activity: 6-7 days/week for an average of greater than 60 minutes    Problems taking medications regularly: Yes,  problems remembering to take    Medication side effects: none    Diet: regular (no restrictions)    Bill- has noted bp to be in the goal range when he is checking at home. He is not sure why elevated today. He wonders if it is because he ran out of marijuana or related to stress in coming to the clinic.     2: chronic pain-overall doing worse than previous. Is a bit frustrated that he has so much residual weakness and atrophy in his leg. He had not been on narcotics for months but reports daily pain. He is managing this with medical marijuana in part. He has not followed with the pain clinic recently. Wonders if I would refill oxycodone.     3: androgen deficiency-he is connected with endo for this. Needs a different sized needle.     med hx, family hx, and soc hx reviewed and updated     Ros he reports his mood to be doing well despite this. No depression symptoms.     OBJECTIVE: /89 (BP Location: Right arm, Patient Position: Sitting, Cuff Size: Adult Large)   Pulse 64   Temp 98.7  F (37.1  C) (Oral)   Resp 18   Wt 115.2 kg (254 lb)   SpO2 97%   BMI 31.75 kg/m   no apparent distress   L clear  Cv: rrr no murmur  Mental status. Today he is much less pressured with his speech, less grandiose.        ICD-10-CM    1. Essential hypertension I10 metoprolol succinate ER (TOPROL-XL) 25 MG 24 hr tablet   2. S/P lumbar fusion Z98.1    3.  "Erectile dysfunction, unspecified erectile dysfunction type N52.9 sildenafil (VIAGRA) 50 MG tablet   4. Androgen deficiency E29.1 syringe/needle, disp, 23G X 1\" 3 ML MISC   5. Chronic pain syndrome G89.4     not at goal for htn but at home is at goal. recommend he bring in his home meter for confirmation of accuracy. I am uncomfortable refilling oxycodone when he is not connected to the pain clinic given past history of excalating dosing. Will run by dr holden. Recommend that he reconnect with pain mgnt.   "

## 2019-07-23 NOTE — Clinical Note
Dr holden, I saw bill who reports continued pain. Not using oxycodone but wanting me to represcribe. I have been reluctant due to past excalating doses among other issues. I wanted him to reconnect with you all. Do I need or rerefer? Any other guidance would be appreciated. Thanks. Nikko Tang

## 2019-07-24 ASSESSMENT — ANXIETY QUESTIONNAIRES: GAD7 TOTAL SCORE: 3

## 2019-07-24 NOTE — TELEPHONE ENCOUNTER
irbesartan (AVAPRO) 300 MG tablet (Discontinued)      Last Written Prescription Date:  6-19-19  Last Fill Quantity: 30 tab,   # refills: 0  Last Office Visit: 7-23-19  Future Office visit:    Next 5 appointments (look out 90 days)    Aug 07, 2019 11:30 AM CDT  Return Visit with Ava Brown NP  [facility] ([facility]) [address] [place] [id number] [phone]           Routing refill request to provider for review/approval because:  Drug not active on patient's medication list

## 2019-08-07 ENCOUNTER — OFFICE VISIT (OUTPATIENT)
Dept: NEUROSURGERY | Facility: CLINIC | Age: 64
End: 2019-08-07
Attending: NURSE PRACTITIONER
Payer: COMMERCIAL

## 2019-08-07 ENCOUNTER — ANCILLARY PROCEDURE (OUTPATIENT)
Dept: GENERAL RADIOLOGY | Facility: CLINIC | Age: 64
End: 2019-08-07
Attending: PHYSICIAN ASSISTANT
Payer: COMMERCIAL

## 2019-08-07 VITALS
HEART RATE: 66 BPM | OXYGEN SATURATION: 92 % | SYSTOLIC BLOOD PRESSURE: 140 MMHG | DIASTOLIC BLOOD PRESSURE: 82 MMHG | BODY MASS INDEX: 33.5 KG/M2 | TEMPERATURE: 98.4 F | HEIGHT: 74 IN | WEIGHT: 261 LBS

## 2019-08-07 DIAGNOSIS — Z98.1 S/P LUMBAR FUSION: ICD-10-CM

## 2019-08-07 DIAGNOSIS — Z98.1 S/P LUMBAR FUSION: Primary | ICD-10-CM

## 2019-08-07 PROCEDURE — 72100 X-RAY EXAM L-S SPINE 2/3 VWS: CPT

## 2019-08-07 PROCEDURE — 99213 OFFICE O/P EST LOW 20 MIN: CPT | Performed by: NURSE PRACTITIONER

## 2019-08-07 RX ORDER — METHYLPREDNISOLONE 4 MG
TABLET, DOSE PACK ORAL
Qty: 21 TABLET | Refills: 0 | Status: SHIPPED | OUTPATIENT
Start: 2019-08-07 | End: 2019-10-21

## 2019-08-07 ASSESSMENT — PAIN SCALES - GENERAL: PAINLEVEL: SEVERE PAIN (6)

## 2019-08-07 ASSESSMENT — MIFFLIN-ST. JEOR: SCORE: 2053.64

## 2019-08-07 NOTE — PATIENT INSTRUCTIONS
- Follow up in 6 months with xray prior   - Call the clinic at [phone] for increased pain or any other questions and concerns.  - Bill to follow up with Primary Care provider regarding elevated blood pressure.

## 2019-08-07 NOTE — PROGRESS NOTES
[facility]  Neurosurgery followup:    HPI: 6 months s/p L2-5 revision PSF. Doing well and continues to improve slowly. He continues to have low back pain and states he can feel the hardware at times. He continues to have RLE numbness that has not changed.  He is currently followed by Dr. Sifuentes. He was previously recommended to initiate PT and underwent 3 sessions. Denies new weakness.   Exam:  Constitutional:  Alert, well nourished, NAD.  HEENT: Normocephalic, atraumatic.   Pulm:  Without shortness of breath   CV:  No pitting edema of BLE.      Neurological:  Awake  Alert  Oriented x 3  Motor exam:        IP Q DF PF EHL  R   5  5   5   5    5  L   5  5   5   5    5     Able to spontaneously move L/E bilaterally  Sensation intact throughout all L/E dermatomes     Incisions:  Healed nicely.  Imaging: AP and lateral films reveal stable hardware.     A/P: 6 months s/p L2-5 revision PSF. Doing well and continues to improve slowly. He continues to have low back pain and states he can fee the hardware at times. He is currently followed by Dr. Sifuentes. He was previously recommended to initiate PT and underwent 3 sessions. Denies new weakness. He states he needs disability paperwork filled out. Instructed him that we do not do long term disability. Recommended FCE to which he declined at this time. He insists that I discuss disability paperwork with Dr. Issa. Will contact Dr. Issa to discuss long term disability options. We will contact him regarding this. Discussed options including physical therapy, oral steroids, and injections. He states he is not able to do the exercises in PT due to pain and is not interested in any more injections at this time. He is agreeable to a medrol dose pack. Recommended him to follow up in 6 months with XR prior. He verbalized understanding and agreement.    Patient Instructions   - Follow up in 6 months with xray prior   - Call the clinic at [phone] for increased pain or  any other questions and concerns.  - Bill to follow up with Primary Care provider regarding elevated blood pressure.      Ava Brown CNP  [facility]  [address] [address]    Tel [phone]

## 2019-08-07 NOTE — LETTER
8/7/2019         RE: Usama Harris  [address]  [place] [address]        Dear Colleague,    Thank you for referring your patient, Usama Harris, to the [facility]UR[facility]ERY [facility]. Please see a copy of my visit note below.    [facility]  Neurosurgery followup:    HPI: 6 months s/p L2-5 revision PSF. Doing well and continues to improve slowly. He continues to have low back pain and states he can feel the hardware at times. He continues to have RLE numbness that has not changed.  He is currently followed by Dr. Sifuentes. He was previously recommended to initiate PT and underwent 3 sessions. Denies new weakness.   Exam:  Constitutional:  Alert, well nourished, NAD.  HEENT: Normocephalic, atraumatic.   Pulm:  Without shortness of breath   CV:  No pitting edema of BLE.      Neurological:  Awake  Alert  Oriented x 3  Motor exam:        IP Q DF PF EHL  R   5  5   5   5    5  L   5  5   5   5    5     Able to spontaneously move L/E bilaterally  Sensation intact throughout all L/E dermatomes     Incisions:  Healed nicely.  Imaging: AP and lateral films reveal stable hardware.     A/P: 6 months s/p L2-5 revision PSF. Doing well and continues to improve slowly. He continues to have low back pain and states he can fee the hardware at times. He is currently followed by Dr. Sifuentes. He was previously recommended to initiate PT and underwent 3 sessions. Denies new weakness. He states he needs disability paperwork filled out. Instructed him that we do not do long term disability. Recommended FCE to which he declined at this time. He insists that I discuss disability paperwork with Dr. Issa. Will contact Dr. Issa to discuss long term disability options. We will contact him regarding this. Discussed options including physical therapy, oral steroids, and injections. He states he is not able to do the exercises in PT due to pain and is not interested in any more injections at this time.  He is agreeable to a medrol dose pack. Recommended him to follow up in 6 months with XR prior. He verbalized understanding and agreement.    Patient Instructions   - Follow up in 6 months with xray prior   - Call the clinic at [phone] for increased pain or any other questions and concerns.  - Bill to follow up with Primary Care provider regarding elevated blood pressure.      Ava Brown CNP  [facility]  [address] [address]    Tel [phone]      Again, thank you for allowing me to participate in the care of your patient.        Sincerely,        Ava Brown, KEHINDE

## 2019-08-07 NOTE — NURSING NOTE
"Usama Harris is a 63 year old male who presents for:  Chief Complaint   Patient presents with     Surgical Followup     6 month follow up s/p 2/12/19 revision L2-5 PSF; right L2-3 TLIF         Initial Vitals:  BP (!) 140/82 (BP Location: Left arm, Patient Position: Sitting, Cuff Size: Adult Large)   Pulse 66   Temp 98.4  F (36.9  C) (Oral)   Ht 6' 2\" (1.88 m)   Wt 261 lb (118.4 kg)   SpO2 92%   BMI 33.51 kg/m   Estimated body mass index is 33.51 kg/m  as calculated from the following:    Height as of this encounter: 6' 2\" (1.88 m).    Weight as of this encounter: 261 lb (118.4 kg).. Body surface area is 2.49 meters squared. BP completed using cuff size: large  Severe Pain (6)        Nursing Comments: Patient states his pain level today is at a 6 out of 10.         Lorenza Burks    "

## 2019-08-08 ENCOUNTER — TELEPHONE (OUTPATIENT)
Dept: NEUROSURGERY | Facility: CLINIC | Age: 64
End: 2019-08-08

## 2019-08-08 NOTE — TELEPHONE ENCOUNTER
----- Message from Ava Brown NP sent at 8/7/2019  2:58 PM CDT -----  Please contact the patient regarding request for long term disability paperwork to be filed. Thanks.    ----- Message -----  From: Iggy Issa MD  Sent: 8/7/2019   2:25 PM  To: Ava Brown NP    Xray looks fine to me.  I am OK generally approving long term disability.  And you can write him a basic letter along those lines.  But if he wants something particularly complicated, order an FCE.  Thanks    ----- Message -----  From: Ava Brown NP  Sent: 8/7/2019  12:47 PM  To: Iggy Issa MD    6 months s/p revision L2-5 PSF and right L2-3 TLIF. Radiology reads today's XR as subtle lucency around the anterior interbody fusion device at the L2-3 level.     Also, he insists that I ask you if you will approve long term/permanent disability paperwork for him given the number of back surgeries he has had.

## 2019-08-27 ENCOUNTER — TRANSFERRED RECORDS (OUTPATIENT)
Dept: HEALTH INFORMATION MANAGEMENT | Facility: CLINIC | Age: 64
End: 2019-08-27

## 2019-09-04 ENCOUNTER — TRANSFERRED RECORDS (OUTPATIENT)
Dept: HEALTH INFORMATION MANAGEMENT | Facility: CLINIC | Age: 64
End: 2019-09-04

## 2019-09-10 ENCOUNTER — TELEPHONE (OUTPATIENT)
Dept: PALLIATIVE MEDICINE | Facility: CLINIC | Age: 64
End: 2019-09-10

## 2019-09-10 NOTE — TELEPHONE ENCOUNTER
Writer attempted to call pt, No answer.  LVM for Pt to call writer back at [phone].    Mo Zapien, RN  Care Coordinator   [facility]

## 2019-09-10 NOTE — TELEPHONE ENCOUNTER
Reason for Call:  Same Day Appointment, Requested Provider:  Maria    PCP: Nikko Tang    Reason for visit: Pt stopped in clinic and thought he had an appointment scheduled for today 09/10/2019 at 9:30. Advised pt Dr Sifuentes is not in office. Pt is requesting to be seen by Dr Sifuentes asap pt states he is collapsing. Please call pt to let him know if he can be worked in.     Duration of symptoms: Follow Up pain appointment     Have you been treated for this in the past? Yes    Additional comments: NA     Can we leave a detailed message on this number? YES    Phone number patient can be reached at: Home number on file [phone] (home)    Best Time: Anytime     Call taken on 9/10/2019 at 9:26 AM by Derrick Matos

## 2019-09-11 NOTE — TELEPHONE ENCOUNTER
Called pt and left message on personal voice mail that he should f/u with primary care provider or his neurosurgeon if he has been collapsing.  Advised him to make the next available appointment with Dr. Sifuentes so we can see him consistently as his last appointment was in May.  He is to call back/mychart if he has further questions/concerns.    Will keep encounter open for a couple of days in anticipation of patient call back.    Lorna Boone RN-BSN  [facility]

## 2019-09-18 ENCOUNTER — TRANSFERRED RECORDS (OUTPATIENT)
Dept: HEALTH INFORMATION MANAGEMENT | Facility: CLINIC | Age: 64
End: 2019-09-18

## 2019-09-26 DIAGNOSIS — G89.4 CHRONIC PAIN SYNDROME: ICD-10-CM

## 2019-09-26 DIAGNOSIS — M96.1 FAILED BACK SYNDROME: ICD-10-CM

## 2019-09-26 RX ORDER — DULOXETIN HYDROCHLORIDE 20 MG/1
20 CAPSULE, DELAYED RELEASE ORAL DAILY
Qty: 30 CAPSULE | Refills: 3 | Status: SHIPPED | OUTPATIENT
Start: 2019-09-26 | End: 2019-10-21

## 2019-09-26 NOTE — TELEPHONE ENCOUNTER
Received fax request from [facility] pharmacy requesting refill(s) for DULoxetine (CYMBALTA) 20 MG capsule    Last refilled on 07/02/19    Pt last seen on 05/06/19  Next appt scheduled for : none    Will facilitate refill.

## 2019-09-26 NOTE — TELEPHONE ENCOUNTER
VM left for pt to pickup Rx        Mily Gutierrez, SARAI   [facility]  September 26, 2019

## 2019-10-03 ENCOUNTER — HEALTH MAINTENANCE LETTER (OUTPATIENT)
Age: 64
End: 2019-10-03

## 2019-10-04 ENCOUNTER — OFFICE VISIT (OUTPATIENT)
Dept: FAMILY MEDICINE | Facility: CLINIC | Age: 64
End: 2019-10-04
Payer: COMMERCIAL

## 2019-10-04 VITALS
HEART RATE: 94 BPM | SYSTOLIC BLOOD PRESSURE: 166 MMHG | DIASTOLIC BLOOD PRESSURE: 92 MMHG | OXYGEN SATURATION: 94 % | BODY MASS INDEX: 32.46 KG/M2 | WEIGHT: 252.8 LBS

## 2019-10-04 DIAGNOSIS — I10 BENIGN ESSENTIAL HYPERTENSION: ICD-10-CM

## 2019-10-04 DIAGNOSIS — Z23 NEED FOR PROPHYLACTIC VACCINATION AND INOCULATION AGAINST INFLUENZA: ICD-10-CM

## 2019-10-04 DIAGNOSIS — G89.4 CHRONIC PAIN SYNDROME: Primary | ICD-10-CM

## 2019-10-04 PROCEDURE — 99213 OFFICE O/P EST LOW 20 MIN: CPT | Performed by: FAMILY MEDICINE

## 2019-10-04 NOTE — Clinical Note
Minh, did we give Bill a flu shot. I can't remember. If so can you document it please. Thanks. Nikko Tang MD

## 2019-10-04 NOTE — PROGRESS NOTES
Subjective     Usama Harris is a 63 year old male who presents to clinic today for the following health issues:  1: htn- taking meds. Home measuring is lower than it is here usually. Lately has been in the 130s most of the time.     2: chronic back pain with neuropathy of right leg- he has decreased strength which is likely to be permanent. He had surgery about 7 months ago. He feels he can feel the area of surgery and the hardware moving around. He has signficant pain treated currently with medical cannibis and cymbalta at a low dose. He had been on narcotics but was weaned off months ago. He had jelena connected to the pain clinic and has some interest in reconnecting but is very wary of physical therapy at present. It looks like per the surgery notes that they would write a letter regarding disabiilty but feel unable to write specifics without a FCE.     med hx, family hx, and soc hx reviewed and updated     Review of Systems   He is feeling anxious about the prospect of disability. No other cv symptoms.       Objective    BP (!) 166/92 (BP Location: Left arm, Patient Position: Sitting, Cuff Size: Adult Regular)   Pulse 94   Wt 114.7 kg (252 lb 12.8 oz)   SpO2 94%   BMI 32.46 kg/m    Body mass index is 32.46 kg/m .  Physical Exam   GENERAL: healthy, alert and no distress  PSYCH: mentation appears normal, affect normal. Less grandiose than on previous appt. He does seem a bit trangential and pressured with speech but not as prominent as in the past.         Assessment & Plan     1. Chronic pain syndrome  Discussed options. He has some benefit from a tens unit in the past. We discussed lidocaine patches. He may retrial these. He has a supply at home. He may increase the cymbalta to 30mg daily. He seemed to be more manic at higher doses. Would be wary of higher doses. We discussed meds that may help with pain such as neurontin or lyrica. He may also choose to consult with PM and R. He has an upcoming neurosurg  "appt  - order for DME; Equipment being ordered: tens unit patches 12 patches/ month  Dispense: 1 Box; Refill: 11    2. Benign essential hypertension  Not at goal but at home at goal. He has appt upcoming where it will be rechecked.     3. Need for prophylactic vaccination and inoculation against influenza     - INFLUENZA QUAD, RECOMBINANT, P-FREE (RIV4) (FLUBLOCK) [91202]  - Vaccine Administration, Initial [44678]     Time of visit > 25 minutes greater than half in counseling and coordination of care.     BMI:   Estimated body mass index is 32.46 kg/m  as calculated from the following:    Height as of 8/7/19: 1.88 m (6' 2\").    Weight as of this encounter: 114.7 kg (252 lb 12.8 oz).   Weight management plan: not discussed today      Return in about 3 months (around 1/4/2020) for follow up appointment.    Nikko Tang MD  [facility]    "

## 2019-10-04 NOTE — Clinical Note
Was this the one? He also needs a flu shot documented. I think claude was rooming for me this time.

## 2019-10-14 ENCOUNTER — TELEPHONE (OUTPATIENT)
Dept: NEUROSURGERY | Facility: CLINIC | Age: 64
End: 2019-10-14

## 2019-10-14 NOTE — TELEPHONE ENCOUNTER
Patient scheduled an appt to see Afsaneh Villareal PA-C via [employer]. Called patient to discuss his reason for visit. He was last seen and evaluated by Ava Brown CNP on 8/719. Imaging reviewed with him at that time and revealed hardware is stable. Patient was to follow-up in 6 months with XR prior.LM.  Awaiting call back to discuss reason for coming in sooner.

## 2019-10-16 ENCOUNTER — TELEPHONE (OUTPATIENT)
Dept: NEUROSURGERY | Facility: CLINIC | Age: 64
End: 2019-10-16

## 2019-10-16 DIAGNOSIS — Z98.1 S/P LUMBAR FUSION: Primary | ICD-10-CM

## 2019-10-16 PROCEDURE — 90471 IMMUNIZATION ADMIN: CPT | Performed by: FAMILY MEDICINE

## 2019-10-16 PROCEDURE — 90682 RIV4 VACC RECOMBINANT DNA IM: CPT | Performed by: FAMILY MEDICINE

## 2019-10-16 NOTE — TELEPHONE ENCOUNTER
Called and informed patient that Dr. Issa would like patient to get MRI and CT prior to appointment. Orders placed patient will call to schedule.

## 2019-10-16 NOTE — TELEPHONE ENCOUNTER
"Patient called to request an follow up appointment with Dr. Issa, S/p L2-4 revision PSF on 2/12/19. Patient reports that \"something is wrong\" he is having increased pain, continued right leg weakness, and right leg numbness. Patient states he has fallen due to his right leg weakness. Patient feels as if he has \"no core strength\" and is worried about his hardware, because last 2 XR's showed loosening of his hardware. Informed patient that I would forward a message to Dr. Issa to see if he would recommend any imaging prior.  "

## 2019-10-18 ENCOUNTER — HOSPITAL ENCOUNTER (OUTPATIENT)
Dept: MRI IMAGING | Facility: CLINIC | Age: 64
Discharge: HOME | End: 2019-10-18
Attending: NEUROLOGICAL SURGERY | Admitting: NEUROLOGICAL SURGERY
Payer: COMMERCIAL

## 2019-10-18 ENCOUNTER — HOSPITAL ENCOUNTER (OUTPATIENT)
Dept: CT IMAGING | Facility: CLINIC | Age: 64
End: 2019-10-18
Attending: NEUROLOGICAL SURGERY
Payer: COMMERCIAL

## 2019-10-18 DIAGNOSIS — Z98.1 S/P LUMBAR FUSION: ICD-10-CM

## 2019-10-18 PROCEDURE — 72148 MRI LUMBAR SPINE W/O DYE: CPT

## 2019-10-18 PROCEDURE — 72131 CT LUMBAR SPINE W/O DYE: CPT

## 2019-10-21 ENCOUNTER — OFFICE VISIT (OUTPATIENT)
Dept: NEUROSURGERY | Facility: CLINIC | Age: 64
End: 2019-10-21
Attending: NEUROLOGICAL SURGERY
Payer: COMMERCIAL

## 2019-10-21 ENCOUNTER — OFFICE VISIT (OUTPATIENT)
Dept: PALLIATIVE MEDICINE | Facility: CLINIC | Age: 64
End: 2019-10-21
Payer: COMMERCIAL

## 2019-10-21 VITALS
BODY MASS INDEX: 32.47 KG/M2 | DIASTOLIC BLOOD PRESSURE: 93 MMHG | TEMPERATURE: 97.6 F | HEIGHT: 74 IN | OXYGEN SATURATION: 93 % | RESPIRATION RATE: 16 BRPM | HEART RATE: 111 BPM | WEIGHT: 253 LBS | SYSTOLIC BLOOD PRESSURE: 150 MMHG

## 2019-10-21 VITALS
HEART RATE: 113 BPM | DIASTOLIC BLOOD PRESSURE: 86 MMHG | SYSTOLIC BLOOD PRESSURE: 147 MMHG | OXYGEN SATURATION: 95 % | BODY MASS INDEX: 31.97 KG/M2 | WEIGHT: 249 LBS

## 2019-10-21 DIAGNOSIS — G89.4 CHRONIC PAIN SYNDROME: ICD-10-CM

## 2019-10-21 DIAGNOSIS — Z98.1 S/P LUMBAR FUSION: Primary | ICD-10-CM

## 2019-10-21 DIAGNOSIS — M79.18 MYOFASCIAL MUSCLE PAIN: ICD-10-CM

## 2019-10-21 DIAGNOSIS — M54.16 LUMBAR RADICULOPATHY: ICD-10-CM

## 2019-10-21 DIAGNOSIS — M96.1 FAILED BACK SYNDROME: Primary | ICD-10-CM

## 2019-10-21 PROCEDURE — 99214 OFFICE O/P EST MOD 30 MIN: CPT | Performed by: PAIN MEDICINE

## 2019-10-21 PROCEDURE — 99213 OFFICE O/P EST LOW 20 MIN: CPT | Performed by: NEUROLOGICAL SURGERY

## 2019-10-21 PROCEDURE — G0463 HOSPITAL OUTPT CLINIC VISIT: HCPCS

## 2019-10-21 RX ORDER — ACETAMINOPHEN 500 MG
500-1000 TABLET ORAL EVERY 6 HOURS PRN
COMMUNITY

## 2019-10-21 RX ORDER — DULOXETIN HYDROCHLORIDE 30 MG/1
30 CAPSULE, DELAYED RELEASE ORAL DAILY
Qty: 30 CAPSULE | Refills: 1 | Status: SHIPPED | OUTPATIENT
Start: 2019-10-21 | End: 2020-01-24

## 2019-10-21 ASSESSMENT — PAIN SCALES - GENERAL
PAINLEVEL: SEVERE PAIN (6)
PAINLEVEL: SEVERE PAIN (6)

## 2019-10-21 ASSESSMENT — MIFFLIN-ST. JEOR: SCORE: 2017.35

## 2019-10-21 NOTE — PROGRESS NOTES
[facility] Follow Up    Date of visit: 1/8/2018    Chief complaint:   Chief Complaint   Patient presents with     Pain     Hx: S/p L3-4 decompression laminectomy 5/18/16 persistent right radiculopathy. S/p L4-5 laminectomy 2004, 7/22/16 lumbar discectomy RIGHT L3-L4 REVISION    MICRODISCECTOMY. L3 to 5 posterior spinal fusion in November 2016 with Dr. Iggy Issa. Subsequent L1 compression fracture  Interval history:  - Further procedures recommended:    -none  - Medication Management:     - startrobaxin 500mg twice a day as needed    - re-certify  medical cannabis   - would discuss with surgeon on when to restart NSAIDS    - consider celebrex 100mg twice a day as needed-we will need to discuss with surgeon - continue Cymbalta to 20 mg daily  -NEED TO START PHYSICAL THERAPY    - Clinical Health Psychologist to address issues of relaxation, behavioral change, coping style, and other factors important to improvement: consider in the future  - Follow up: 1-2 months    - need to see neurosurgery       Since his last visit, Usama Harris reports:   Patient has had multiple changes since last visit.    The pt has cont LBP. The pt saw neuro this morning, patient reports that he may have to have a revision.  Patient is unsure on how to best proceed.  The patient continues to have significant weakness, although slightly improved.  Patient reports the radiating symptoms have been severe..  The pain mostly radiates to his right lower extremity.  The pain is worse with bending.  The pain is worse with lifting.  The pain is slightly better with rest.  The pain is slightly better with medical cannabis.  Of note patient interested in addition of possible opioids.  Most sig finding on CT is loosening pedicle screws at L2. Of note the pt was is also having cont HTN, so he stopped his celebrex. The pt also stopped metoprolol.  The patient reports he was told this by the pharmacist.  The pt cont to have  htn,which is making the pt very concerned. The pt reports he tolerating the cymbalta. The patient notes some benefit with medical cannabis.  Overall, the patient's current regimen seems in flux.  Additionally, there seems like the plan for the patient is unclear.  Of note patient is having a lot of difficulty sleeping.          Previous:     the patient reports in early February he suddenly developed severe pain.  He denied any specific inciting event.  The patient's pain was so severe that he fell to the ground.  After the fall his pain was even worse.. The pt reports he immediately felt weak afterwards. The pt was subsequently brought to the ED. He was ultimately trans to [facility] for surgery. Patient is status post revision of L2-5 PSF2/12/19. The pt reported initial sig benefit. The pt reports was doing far better than before. The pt cont to have numbness in his R LE. The pt has noticed the symptoms have been gradually improving. The pt went to [place] 4/ 8/-28 the pt was doing great. The pt was able to be active. The pt reports intermittently having symptoms, but was able to resolve wi self manipulation. The pt reports he had approximately 48 hours of travel time. The pt pain subsequently got worse on the plane. The pt cont to have R side LBP and radiates to her leg. The pt cont to have residual numbness. Of note the pt does feel he can manipulate his legs to improve the symptoms.  Additionally, the patient feels he is still having significant weakness.  Specifically, he is having issues going up and down steps.  He feels both his hip and his leg are weak.  He denies any incontinence.    The pt was not taking any oxy or cannabis while in [place].       The patient has subsequently been requiring oxycodone.  He was not on any opioids prior to surgery.  Of note he had a recent visit with PCP.  There were some concerns about his behavior at that time.    Of note patient is interested in another prescription of  "oxycodone.  Discussed with the patient that he would have to do a urine toxicology and then we would have to wait for any results to make decision.  Additionally discussed that the patient was not on chronic opioids prior to the surgery.  The goal would be to gradually and safely wean him off.  Again, I stressed that his long-term plan should not include chronic opioids.  Of note pt has not started PHYSICAL THERAPY      Pt has restarted Medical cannabis     Pt cymbalta on 20mg     Pain scores:  Pain intensity on average is 5 on a scale of 0-10.     Current pain treatments:   zanaflex - not taking   cymbalta 20mg  Fosamax  Medical marijuana  Oxycodone-but out of his prescription  Past pain treatments:    Dilaudid, morphine, oxycodone, Opana, Celebrex, Motrin, naproxen, prednisone, , MS Contin knee  PAST MEDICATION TRIALS:                                                   OPIOIDS: Tylenol #3, hydrocodone( Vicodin), Morphine ER, percocet, OxyContin, tramadol   NSAID'S/ANALEGESICS: Celbrex, Ibuprofen, naprosyn,      ANTIMIGRAINE: none   STEROIDS: yes dose pack     MUSCLE  RELAXERS:  Flexeril, tizanidine, Skelaxin, Soma, Flexeril, Robaxin, Zanaflex, gabapentin, Xanax   ANTIDEPRESSANTS: Bupropion   ANTIANXIETY: Xanax   HYPNOTICS: Hydroxyzine   ANTICONVULSANTS: none , not wanting Gabapentin, Lyrica     TOPICALS:  None     Side Effects: no side effect    Medications:  Current Outpatient Medications   Medication Sig Dispense Refill     acetaminophen (TYLENOL) 500 MG tablet Take 500-1,000 mg by mouth every 6 hours as needed for mild pain (every other day rotate days with ibuprofen)       carboxymethylcellulose (REFRESH PLUS) 0.5 % SOLN Place 1 drop into both eyes daily as needed for dry eyes       DULoxetine (CYMBALTA) 20 MG capsule Take 1 capsule (20 mg) by mouth daily 30 capsule 3     insulin syringe 31G X 5/16\" 1 ML MISC Use to draw testosterone weekly. 12 each 3     latanoprost (XALATAN) 0.005 % ophthalmic solution Place " "1 drop into both eyes At Bedtime   11     methocarbamol (ROBAXIN) 500 MG tablet Take 1 tablet (500 mg) by mouth 2 times daily as needed for muscle spasms 60 tablet 1     methylPREDNISolone (MEDROL DOSEPAK) 4 MG tablet therapy pack Follow Package Directions (Patient not taking: Reported on 10/4/2019) 21 tablet 0     metoprolol succinate ER (TOPROL-XL) 25 MG 24 hr tablet Take 1 tablet (25 mg) by mouth daily 90 tablet 1     multivitamin, therapeutic with minerals (MULTI-VITAMIN) TABS tablet Take 1 tablet by mouth daily       order for DME Equipment being ordered: tens unit patches 12 patches/ month 1 Box 11     oxyCODONE (ROXICODONE) 5 MG tablet Take 1-2 tablets (5-10 mg) by mouth every 6 hours as needed for moderate to severe pain (Patient not taking: Reported on 10/21/2019) 60 tablet 0     sildenafil (VIAGRA) 50 MG tablet Take 1 tablet (50 mg) by mouth daily as needed (ED) 40 tablet 11     syringe/needle, disp, 23G X 1\" 3 ML MISC Use 1 with each testosterone injection 50 each 11     testosterone cypionate (DEPOTESTOSTERONE) 200 MG/ML injection Inject 0.25 mLs (50 mg) into the muscle once a week 2 mL 5     UNABLE TO FIND MEDICATION NAME: testosterone Decon Injection - Use it weekly         Medical History: any changes in medical history since they were last seen? No    Review of Systems:  The 14 system ROS was reviewed from the intake questionnaire*  Any bowel or bladder problems: no  Mood: good    Physical Exam:  BP (!) 147/86   Pulse 113   Wt 112.9 kg (249 lb)   SpO2 95%   BMI 31.97 kg/m    Constitutional: alert and no distress.  Pt not obese  Head: Normocephalic  ENT: EOMI, mucosal surfaces moist.  Neck with full ROM   Cardiovascular: No edema or JVD appreciated.  Respiratory: Speaking in complete sentences without shortness of breath.    Cervical Spine:  wnl    Lumbar spine:     ROM: Decreased extension   Myofascial tenderness: Positive left lower back TTP minimal   Kemps:positive on the right  Positive " straight leg on the right  Neurologic exam:  CN:  Cranial nerves 2-12 are normal  Motor: On the right 4/5 hip flexion and knee ext.   Reflexes:    (hard to illicit on the Right    Patella:  On the left+2   Achilles:   on the left+2    Sensory:  (upper and lower extremities):   Light touch: decreased of ant thigh and leg   Allodynia: absent        Skin: no suspicious lesions or rashes appreciated on exposed areas  Psychiatric: mentation appears normal, affect full and good eye contact.         T12-L1: No significant disc herniation. No spinal canal or neural  foraminal narrowing.      L1-L2: No significant disc herniation. No spinal canal or neural  foraminal narrowing.      L2-L3: New postoperative changes of discectomy and posterior fusion.  Previous disc extrusion has been completely resected. No residual disc  herniation is appreciated. No spinal canal narrowing. No significant  right neural foraminal narrowing. There appears to be mild left neural  foraminal narrowing.      L3-L4: Postoperative changes at this level. There does not appear to  be significant spinal canal or neural foraminal narrowing noting  limitations secondary to the hardware artifact.      L4-L5: Postoperative changes at this level. No spinal canal narrowing.  There appears to be moderate bilateral neural foraminal narrowing  secondary to uncinate spurring and facet hypertrophy.     L5-S1: Small central disc protrusion with annular fissuring of the  disc material in the right paracentral region. This is unchanged. Mild  right greater than left facet hypertrophy. No significant spinal canal  narrowing. Mild to moderate right neural foraminal narrowing. Mild  left neural foraminal narrowing.                                                                       IMPRESSION:    1. New postoperative changes with extension of posterior fusion  hardware now from L2 to L5.  2. Previous disc extrusion at L2-L3 has been completely resected. No  residual  disc herniation is appreciated at that level and there is no  spinal canal narrowing.  3. Previous surgical changes are again seen from L3 to L5. No residual  spinal canal narrowing. There appears to be moderate bilateral neural  foraminal narrowing at L4-L5 secondary to uncinate spurring and facet  hypertrophy. This is not significantly changed.  4. Degenerative disc changes at the nonfused levels as described above  including moderate right and mild left neural foraminal narrowing at  L5-S1. No significant change since prior.      Assessment/Plan:   Usama Harris is a 62 year old male who is seen at the pain clinic for Bilat LBP w/ occasional radiation down his legs  Usama was seen today for pain.    Diagnoses and all orders for this visit:    Failed back syndrome    Lumbar radiculopathy    Myofascial muscle pain      - Further procedures recommended:    -none  - Medication Management:     - needs to see PCP to discuss blood pressure.    - Robaxin 1-2 tablets at night for pain and sleep    - continue medical cannabis    - Would hold the celebrex for now    - increase Cymbalta to 30 mg daily  - Clinical Health Psychologist to address issues of relaxation, behavioral change, coping style, and other factors important to improvement: ordered please schedule   - Follow up:  2-3 months    Total time spent was 50 minutes, and more than 50% of face to face time was spent in counseling and/or coordination of care regarding  Bilat LBP worse on the right compared to the left.    Taran Sifuentes MD  [facility]

## 2019-10-21 NOTE — Clinical Note
Patient stopped his beta blocker. He was concerned about se. He reports he has been taking a ca channel blocker. Pt was tachy and htn today. Discussed that he needs to discuss his BP regimen.

## 2019-10-21 NOTE — LETTER
10/21/2019         RE: Usama Harris  [address]  [place] [address]        Dear Colleague,    Thank you for referring your patient, Usama Harris, to the [facility]IC. Please see a copy of my visit note below.    [facility]  Neurosurgery followup:    HPI: 9 months s/p L2-5 revision PSF. Having increasing back pain in the last 2 months, worse with activity.  No new neurologic symptoms.    Exam:  Constitutional:  Alert, well nourished, NAD.  HEENT: Normocephalic, atraumatic.   Pulm:  Without shortness of breath   CV:  No pitting edema of BLE.      Neurological:  Awake  Alert  Oriented x 3  Motor exam:        IP Q DF PF EHL  R   5  5   5   5    5  L   5  5   5   5    5     Able to spontaneously move L/E bilaterally  Sensation intact throughout all L/E dermatomes     Incisions:  Healed nicely.  Imaging: AP and lateral films reveal stable hardware.     A/P: 9 months s/p L2-5 revision PSF    Increasing back pain, and patient with CT demonstrating early halo-ing of L2 screws  MR without significant stenosis  Will have patient brace, use a bone stimulator, and pursue therapy  If symptoms persist, may require revision surgery  Plan for repeat CT in 3 months      Again, thank you for allowing me to participate in the care of your patient.        Sincerely,        Iggy Issa MD

## 2019-10-21 NOTE — PATIENT INSTRUCTIONS
Order placed for lumbar corset with [facility]    Follow up in 3 months with new CT lumbar spine. You can make this appt at our  or call to schedule.    TRENT Joya    [facility] Neurosurgery  Phone: [phone]

## 2019-10-21 NOTE — PATIENT INSTRUCTIONS
- Further procedures recommended:    -none  - Medication Management:     - needs to see PCP to discuss blood pressure.    - Robaxin 1-2 tablets at night for pain and sleep    - continue medical cannabis    - Would hold the celebrex for now    - increase Cymbalta to 30 mg daily  - Clinical Health Psychologist to address issues of relaxation, behavioral change, coping style, and other factors important to improvement: ordered please schedule   - Follow up:  2-3 months      ----------------------------------------------------------------  Clinic Number:  [phone]     Call with any questions about your care and for scheduling assistance.     Calls are returned Monday through Friday between 8 AM and 4:30 PM. We usually get back to you within 2 business days depending on the issue/request.    If we are prescribing your medications:    For opioid medication refills, call the clinic or send a Mobile Shareholder message 7 days in advance.  Please include:    Name of requested medication    Name of the pharmacy.    For non-opioid medications, call your pharmacy directly to request a refill. Please allow 3-4 days to be processed.     Per [place] State Law:    All controlled substance prescriptions must be filled within 30 days of being written.      For those controlled substances allowing refills, pickup must occur within 30 days of last fill.      We believe regular attendance is key to your success in our program!      Any time you are unable to keep your appointment we ask that you call us at least 24 hours in advance to cancel.This will allow us to offer the appointment time to another patient.     Multiple missed appointments may lead to dismissal from the clinic.

## 2019-10-21 NOTE — NURSING NOTE
"Usama Harris is a 63 year old male who presents for:  Chief Complaint   Patient presents with     Follow Up        Initial Vitals:  BP (!) 150/93   Pulse 111   Temp 97.6  F (36.4  C) (Oral)   Resp 16   Ht 6' 2\" (1.88 m)   Wt 253 lb (114.8 kg)   SpO2 93%   BMI 32.48 kg/m   Estimated body mass index is 32.48 kg/m  as calculated from the following:    Height as of this encounter: 6' 2\" (1.88 m).    Weight as of this encounter: 253 lb (114.8 kg).. Body surface area is 2.45 meters squared. BP completed using cuff size: regular  Severe Pain (6)        Nursing Comments: Pt present today for follow up.        Jayden Salinas CMA    "

## 2019-10-21 NOTE — PROGRESS NOTES
[facility]  Neurosurgery followup:    HPI: 9 months s/p L2-5 revision PSF. Having increasing back pain in the last 2 months, worse with activity.  No new neurologic symptoms.    Exam:  Constitutional:  Alert, well nourished, NAD.  HEENT: Normocephalic, atraumatic.   Pulm:  Without shortness of breath   CV:  No pitting edema of BLE.      Neurological:  Awake  Alert  Oriented x 3  Motor exam:        IP Q DF PF EHL  R   5  5   5   5    5  L   5  5   5   5    5     Able to spontaneously move L/E bilaterally  Sensation intact throughout all L/E dermatomes     Incisions:  Healed nicely.  Imaging: AP and lateral films reveal stable hardware.     A/P: 9 months s/p L2-5 revision PSF    Increasing back pain, and patient with CT demonstrating early halo-ing of L2 screws  MR without significant stenosis  Will have patient brace, use a bone stimulator, and pursue therapy  If symptoms persist, may require revision surgery  Plan for repeat CT in 3 months

## 2019-10-29 ENCOUNTER — ANCILLARY PROCEDURE (OUTPATIENT)
Dept: GENERAL RADIOLOGY | Facility: CLINIC | Age: 64
End: 2019-10-29
Attending: FAMILY MEDICINE
Payer: COMMERCIAL

## 2019-10-29 ENCOUNTER — OFFICE VISIT (OUTPATIENT)
Dept: FAMILY MEDICINE | Facility: CLINIC | Age: 64
End: 2019-10-29
Payer: COMMERCIAL

## 2019-10-29 VITALS
WEIGHT: 251 LBS | RESPIRATION RATE: 16 BRPM | DIASTOLIC BLOOD PRESSURE: 96 MMHG | HEART RATE: 74 BPM | BODY MASS INDEX: 32.23 KG/M2 | SYSTOLIC BLOOD PRESSURE: 148 MMHG

## 2019-10-29 DIAGNOSIS — G89.4 CHRONIC PAIN SYNDROME: ICD-10-CM

## 2019-10-29 DIAGNOSIS — R05.9 COUGH: Primary | ICD-10-CM

## 2019-10-29 DIAGNOSIS — I10 BENIGN ESSENTIAL HYPERTENSION: ICD-10-CM

## 2019-10-29 DIAGNOSIS — R05.9 COUGH: ICD-10-CM

## 2019-10-29 DIAGNOSIS — Z23 ENCOUNTER FOR IMMUNIZATION: ICD-10-CM

## 2019-10-29 PROCEDURE — 90471 IMMUNIZATION ADMIN: CPT | Performed by: FAMILY MEDICINE

## 2019-10-29 PROCEDURE — 99214 OFFICE O/P EST MOD 30 MIN: CPT | Mod: 25 | Performed by: FAMILY MEDICINE

## 2019-10-29 PROCEDURE — 90715 TDAP VACCINE 7 YRS/> IM: CPT | Performed by: FAMILY MEDICINE

## 2019-10-29 PROCEDURE — 71046 X-RAY EXAM CHEST 2 VIEWS: CPT

## 2019-10-29 RX ORDER — TRAMADOL HYDROCHLORIDE 50 MG/1
50 TABLET ORAL EVERY 6 HOURS PRN
Qty: 30 TABLET | Refills: 0 | Status: SHIPPED | OUTPATIENT
Start: 2019-10-29 | End: 2019-11-08

## 2019-10-29 RX ORDER — AMLODIPINE BESYLATE 5 MG/1
5 TABLET ORAL DAILY
Qty: 90 TABLET | Refills: 0 | Status: SHIPPED | OUTPATIENT
Start: 2019-10-29 | End: 2020-01-22

## 2019-10-29 RX ORDER — ALBUTEROL SULFATE 90 UG/1
2 AEROSOL, METERED RESPIRATORY (INHALATION) EVERY 6 HOURS
Qty: 18 G | Refills: 0 | Status: SHIPPED | OUTPATIENT
Start: 2019-10-29 | End: 2020-03-19

## 2019-10-29 NOTE — PROGRESS NOTES
SUBJECTIVE:     1: htn- elevated at the pain clinic and seen in the ER as well. He thinks the htn may be related to his vaping of medical marijuana. He gets chest congestion when he does this and at home before he vapes he noted normal bp. Currently he is taking procardia at 60mg daily but reports it to be expensive. No side effects.     2: chest congestion for months. Thinks this is related to vaping. Notes better since started using a product without coconut oil. occ feels some tightness but no really wheezy. Told he had emphysema years ago but unclear on what basis. Not a big hx of tobacco. None recent.     3: chronic pain-this has persisted but is better since using the back brace. He may be recommended to have another surgery. He might want another pain clinic appt. He is taking medical marijuaan but no opioids.     med hx, family hx, and soc hx reviewed and updated     occ tachycardia when he vapes. No other cv nor const symptoms. He reports his mood to be pretty anxious and stressed partly due to the potential need for more surgery but other things going on as well.     OBJECTIVE: BP (!) 148/96   Pulse 74   Resp 16   Wt 113.9 kg (251 lb)   BMI 32.23 kg/m     Exam:  GENERAL APPEARANCE: healthy, alert and no distress  NECK: no adenopathy, no asymmetry, masses, or scars and thyroid normal to palpation  RESP: lungs clear to auscultation - no rales, rhonchi or wheezes  CV: regular rates and rhythm, normal S1 S2, no S3 or S4 and no murmur, click or rub -  PSYCH: anxious with pressured speech some tangential speech. He expresses such frustration with multiple medical providers/and the system as a whole.       cxr pending.      ASSESSMENT:/PLAN:   1. Cough  Possibly irritatnt reaction to the vaping. Discussed options this seems farzad improved on current formuation. He has tried the oral formulation but these were less effective. Will have him try albuterol for the symptoms.   - XR Chest 2 Views; Future  -  albuterol (PROAIR HFA/PROVENTIL HFA/VENTOLIN HFA) 108 (90 Base) MCG/ACT inhaler; Inhale 2 puffs into the lungs every 6 hours  Dispense: 18 g; Refill: 0    2. Encounter for immunization     - TDAP VACCINE    3. Benign essential hypertension  At goal on home measuring now. Will try amlodipine instead mainly due to cost. Starting at 5mg titrate up if needed.   - amLODIPine (NORVASC) 5 MG tablet; Take 1 tablet (5 mg) by mouth daily  Dispense: 90 tablet; Refill: 0    4. Chronic pain syndrome  Discussed options. [facility] pain clinic did not recommend long term opioids. Kevyn has interest in seeing Dr Webber who he used to see some years ago. He may pursue this. In the interim will use ultram sparingly and continue with the cymbalta and medical cannibis. He also has follow up with Dr. Issa in a couple months.   - traMADol (ULTRAM) 50 MG tablet; Take 1 tablet (50 mg) by mouth every 6 hours as needed for severe pain  Dispense: 30 tablet; Refill: 0

## 2019-10-29 NOTE — NURSING NOTE
Prior to immunization administration, verified patients identity using patient s name and date of birth. Please see Immunization Activity for additional information.     Screening Questionnaire for Adult Immunization    Are you sick today?   No   Do you have allergies to medications, food, a vaccine component or latex?   No   Have you ever had a serious reaction after receiving a vaccination?   No   Do you have a long-term health problem with heart disease, lung disease, asthma, kidney disease, metabolic disease (e.g. diabetes), anemia, or other blood disorder?   No   Do you have cancer, leukemia, HIV/AIDS, or any other immune system problem?   No   In the past 3 months, have you taken medications that affect  your immune system, such as prednisone, other steroids, or anticancer drugs; drugs for the treatment of rheumatoid arthritis, Crohn s disease, or psoriasis; or have you had radiation treatments?   No   Have you had a seizure, or a brain or other nervous system problem?   No   During the past year, have you received a transfusion of blood or blood     products, or been given immune (gamma) globulin or antiviral drug?   No   For women: Are you pregnant or is there a chance you could become        pregnant during the next month?   No   Have you received any vaccinations in the past 4 weeks?   No     Immunization questionnaire answers were all negative.        Per orders of Dr. Tang, injection of tdap given by Marlen Holbrook MA. Patient instructed to remain in clinic for 15 minutes afterwards, and to report any adverse reaction to me immediately.       Screening performed by Marlen Holbrook MA on 10/29/2019 at 9:36 AM.

## 2019-10-31 ENCOUNTER — MYC REFILL (OUTPATIENT)
Dept: FAMILY MEDICINE | Facility: CLINIC | Age: 64
End: 2019-10-31

## 2019-10-31 DIAGNOSIS — G89.4 CHRONIC PAIN SYNDROME: ICD-10-CM

## 2019-10-31 RX ORDER — TRAMADOL HYDROCHLORIDE 50 MG/1
50 TABLET ORAL EVERY 6 HOURS PRN
Qty: 30 TABLET | Refills: 0 | Status: CANCELLED | OUTPATIENT
Start: 2019-10-31

## 2019-10-31 NOTE — TELEPHONE ENCOUNTER
"Requested Prescriptions   Pending Prescriptions Disp Refills     DULoxetine (CYMBALTA) 30 MG capsule [Pharmacy Med Name: DULOXETINE HCL DR 30 MG CAP] 90 capsule 0     Sig: TAKE 1 CAP BY MOUTH DAILY      Last Written Prescription Date:  10/21/2019  Last Fill Quantity: 30 capsule    ,  # refills: 1   Last Office Visit: 10/29/2019   Future Office Visit:    Next 5 appointments (look out 90 days)    Jan 20, 2020 11:00 AM CST  Return Visit with Ava Brown NP  [facility] ([facility]) [address] [place] [id number] [phone]             Serotonin-Norepinephrine Reuptake Inhibitors  Failed - 10/31/2019  2:36 PM        Failed - Blood pressure under 140/90 in past 12 months     BP Readings from Last 3 Encounters:   10/29/19 (!) 148/96   10/21/19 (!) 147/86   10/21/19 (!) 150/93           Passed - Recent (12 mo) or future (30 days) visit within the authorizing provider's specialty     Patient has had an office visit with the authorizing provider or a provider within the authorizing providers department within the previous 12 mos or has a future within next 30 days. See \"Patient Info\" tab in inbasket, or \"Choose Columns\" in Meds & Orders section of the refill encounter.          Passed - Medication is active on med list        Passed - Patient is age 18 or older        PHQ-9 SCORE 10/3/2017 1/15/2018 2/22/2019   PHQ-9 Total Score MyChart - - Incomplete   PHQ-9 Total Score 12 10 6     DUC-7 SCORE 4/21/2017 2/22/2019 7/23/2019   Total Score 9 4 3           "

## 2019-11-01 NOTE — TELEPHONE ENCOUNTER
Requested Prescriptions   Pending Prescriptions Disp Refills     traMADol (ULTRAM) 50 MG tablet 30 tablet 0     Sig: Take 1 tablet (50 mg) by mouth every 6 hours as needed for severe pain       There is no refill protocol information for this order              Last Written Prescription Date:  10/29/19  Last Fill Quantity: 30,   # refills: 0  Last Office Visit: 10/29/19  Future Office visit:    Next 5 appointments (look out 90 days)    Jan 20, 2020 11:00 AM CST  Return Visit with Ava Brown NP  [facility] ([facility]) [address] [place] [id number]  [phone]           Routing refill request to provider for review/approval because:  Drug not on the [facility], [facility] or [facility] refill protocol or controlled substance

## 2019-11-04 RX ORDER — DULOXETIN HYDROCHLORIDE 30 MG/1
CAPSULE, DELAYED RELEASE ORAL
Qty: 90 CAPSULE | Refills: 0 | Status: SHIPPED | OUTPATIENT
Start: 2019-11-04 | End: 2019-12-26

## 2019-11-08 RX ORDER — TRAMADOL HYDROCHLORIDE 50 MG/1
50 TABLET ORAL EVERY 6 HOURS PRN
Qty: 60 TABLET | Refills: 0 | Status: SHIPPED | OUTPATIENT
Start: 2019-11-08 | End: 2019-11-29

## 2019-11-29 DIAGNOSIS — G89.4 CHRONIC PAIN SYNDROME: ICD-10-CM

## 2019-11-30 NOTE — TELEPHONE ENCOUNTER
Requested Prescriptions   Pending Prescriptions Disp Refills     traMADol (ULTRAM) 50 MG tablet [Pharmacy Med Name: TRAMADOL HCL 50 MG TABLET] 60 tablet 0     Sig: TAKE 1 TABLET (50 MG) BY MOUTH EVERY 6 HOURS AS NEEDED FOR SEVERE PAIN       There is no refill protocol information for this order              Last Written Prescription Date:  11/8/19  Last Fill Quantity: 60,   # refills: 0  Last Office Visit: 10/29/19  Future Office visit:    Next 5 appointments (look out 90 days)    Jan 20, 2020 11:00 AM CST  Return Visit with Ava Brown NP  [facility] ([facility]) [address] [place] [id number] [phone]           Routing refill request to provider for review/approval because:  Drug not on the [facility], [facility] or [facility] refill protocol or controlled substance

## 2019-12-01 RX ORDER — TRAMADOL HYDROCHLORIDE 50 MG/1
50 TABLET ORAL EVERY 6 HOURS PRN
Qty: 60 TABLET | Refills: 0 | Status: SHIPPED | OUTPATIENT
Start: 2019-12-01 | End: 2020-01-13

## 2019-12-04 ENCOUNTER — TRANSFERRED RECORDS (OUTPATIENT)
Dept: HEALTH INFORMATION MANAGEMENT | Facility: CLINIC | Age: 64
End: 2019-12-04

## 2019-12-12 ENCOUNTER — TELEPHONE (OUTPATIENT)
Dept: PALLIATIVE MEDICINE | Facility: CLINIC | Age: 64
End: 2019-12-12

## 2019-12-16 ENCOUNTER — TELEPHONE (OUTPATIENT)
Dept: ENDOCRINOLOGY | Facility: CLINIC | Age: 64
End: 2019-12-16

## 2019-12-16 DIAGNOSIS — E34.9 TESTOSTERONE DEFICIENCY: ICD-10-CM

## 2019-12-16 NOTE — TELEPHONE ENCOUNTER
testosterone cypionate (DEPOTESTOSTERONE) 200 MG/ML injection         Last Written Prescription Date:  6/6/19  Last Fill Quantity: 2 ml,   # refills: 5  Last Office Visit : 6/6/19 with recommended 10 month follow up  Future Office visit:  None    Routing refill request to provider for review/approval because:  Drug not on the [facility], P or [facility] refill protocol or controlled substance    Last clinic note: Return to clinic in 10 months  Continue testosterone cypionate 50mcg weekly

## 2019-12-17 RX ORDER — TESTOSTERONE CYPIONATE 200 MG/ML
50 INJECTION, SOLUTION INTRAMUSCULAR WEEKLY
Qty: 3 ML | Refills: 1 | Status: SHIPPED | OUTPATIENT
Start: 2019-12-17 | End: 2020-03-11

## 2019-12-19 ENCOUNTER — TELEPHONE (OUTPATIENT)
Dept: ENDOCRINOLOGY | Facility: CLINIC | Age: 64
End: 2019-12-19

## 2019-12-19 NOTE — TELEPHONE ENCOUNTER
Lunaa Up MD  [facility]   Caller: Unspecified (3 days ago,  1:08 PM)             My controlled substance e-prescibe is not currently working. Please call in.   Thank you! Luana

## 2019-12-19 NOTE — TELEPHONE ENCOUNTER
testosterone cypionate (DEPOTESTOSTERONE) 200 MG/ML injection 3 mL 1 12/17/2019  No   Sig - Route: Inject 0.25 mLs (50 mg) into the muscle once a week For additional refills, please schedule a follow-up appointment at [phone] - Intramuscular     Pharmacy Contact     Telephone    [phone], 2      Verbal called as written.  Pharm D Jak leonard.   Ashlie Andrade RN on 12/19/2019 at 12:31 PM

## 2019-12-19 NOTE — TELEPHONE ENCOUNTER
[facility]    Phone Message    May a detailed message be left on voicemail: yes    Reason for Call: Other: Pt said the year on the prescription is wrong. It was dated to   but it needs to be .Please call pt if there's any questions.     Action Taken: Message routed to:  [facility] ([facility] Endo

## 2019-12-26 DIAGNOSIS — G89.4 CHRONIC PAIN SYNDROME: ICD-10-CM

## 2019-12-26 RX ORDER — DULOXETIN HYDROCHLORIDE 30 MG/1
CAPSULE, DELAYED RELEASE ORAL
Qty: 90 CAPSULE | Refills: 0 | Status: SHIPPED | OUTPATIENT
Start: 2019-12-26 | End: 2020-04-22 | Stop reason: DRUGHIGH

## 2019-12-26 NOTE — TELEPHONE ENCOUNTER
Received fax request from [facility] pharmacy requesting refill(s) for DULoxetine (CYMBALTA) 30 MG capsule    Last refilled on 11/04/19    Pt last seen on 10/21/19  Next appt scheduled for : none    Will facilitate refill.

## 2019-12-31 DIAGNOSIS — M79.18 MYOFASCIAL MUSCLE PAIN: ICD-10-CM

## 2019-12-31 RX ORDER — METHOCARBAMOL 500 MG/1
500 TABLET, FILM COATED ORAL 2 TIMES DAILY PRN
Qty: 60 TABLET | Refills: 1 | Status: SHIPPED | OUTPATIENT
Start: 2019-12-31 | End: 2020-03-20

## 2019-12-31 NOTE — TELEPHONE ENCOUNTER
Received fax request from [facility] pharmacy requesting refill(s) for methocarbamol (ROBAXIN) 500 MG tablet    Last refilled on 11/29/19    Pt last seen on 10/21/19  Next appt scheduled for : none    Will facilitate refill.

## 2020-01-12 DIAGNOSIS — G89.4 CHRONIC PAIN SYNDROME: ICD-10-CM

## 2020-01-12 NOTE — TELEPHONE ENCOUNTER
Requested Prescriptions   Pending Prescriptions Disp Refills     traMADol (ULTRAM) 50 MG tablet [Pharmacy Med Name: TRAMADOL HCL 50 MG TABLET] 60 tablet 0     Sig: TAKE 1 TABLET (50 MG) BY MOUTH EVERY 6 HOURS AS NEEDED FOR SEVERE PAIN       There is no refill protocol information for this order              Last Written Prescription Date:  12/1/19  Last Fill Quantity: 60,   # refills: 0  Last Office Visit: 10/29/19  Future Office visit:    Next 5 appointments (look out 90 days)    Jan 20, 2020 11:00 AM CST  Return Visit with Ava Brown NP  [facility] ([facility]) [address] [place] [id number] [phone]           Routing refill request to provider for review/approval because:  Drug not on the [facility], [facility] or [facility] refill protocol or controlled substance

## 2020-01-13 RX ORDER — TRAMADOL HYDROCHLORIDE 50 MG/1
50 TABLET ORAL EVERY 6 HOURS PRN
Qty: 60 TABLET | Refills: 0 | Status: SHIPPED | OUTPATIENT
Start: 2020-01-13 | End: 2020-02-19

## 2020-01-19 DIAGNOSIS — I10 BENIGN ESSENTIAL HYPERTENSION: ICD-10-CM

## 2020-01-20 ENCOUNTER — HOSPITAL ENCOUNTER (OUTPATIENT)
Dept: CT IMAGING | Facility: CLINIC | Age: 65
Discharge: HOME | End: 2020-01-20
Attending: NEUROLOGICAL SURGERY | Admitting: NEUROLOGICAL SURGERY
Payer: COMMERCIAL

## 2020-01-20 ENCOUNTER — OFFICE VISIT (OUTPATIENT)
Dept: NEUROSURGERY | Facility: CLINIC | Age: 65
End: 2020-01-20
Attending: NURSE PRACTITIONER
Payer: COMMERCIAL

## 2020-01-20 VITALS
SYSTOLIC BLOOD PRESSURE: 134 MMHG | WEIGHT: 251 LBS | RESPIRATION RATE: 16 BRPM | DIASTOLIC BLOOD PRESSURE: 77 MMHG | OXYGEN SATURATION: 96 % | BODY MASS INDEX: 32.21 KG/M2 | HEART RATE: 77 BPM | HEIGHT: 74 IN

## 2020-01-20 DIAGNOSIS — Z98.1 S/P LUMBAR FUSION: ICD-10-CM

## 2020-01-20 DIAGNOSIS — Z98.1 S/P LUMBAR FUSION: Primary | ICD-10-CM

## 2020-01-20 PROCEDURE — 99213 OFFICE O/P EST LOW 20 MIN: CPT | Performed by: NURSE PRACTITIONER

## 2020-01-20 PROCEDURE — G0463 HOSPITAL OUTPT CLINIC VISIT: HCPCS

## 2020-01-20 PROCEDURE — 72131 CT LUMBAR SPINE W/O DYE: CPT

## 2020-01-20 ASSESSMENT — PAIN SCALES - GENERAL: PAINLEVEL: SEVERE PAIN (6)

## 2020-01-20 ASSESSMENT — MIFFLIN-ST. JEOR: SCORE: 2003.28

## 2020-01-20 NOTE — PATIENT INSTRUCTIONS
-Follow up in clinic in 3 months with lumbar XR prior.   -Please contact the clinic if pain persists at [phone].

## 2020-01-20 NOTE — TELEPHONE ENCOUNTER
"Requested Prescriptions   Pending Prescriptions Disp Refills     amLODIPine (NORVASC) 5 MG tablet [Pharmacy Med Name: AMLODIPINE BESYLATE 5 MG TAB] 90 tablet 0     Sig: TAKE 1 TABLET BY MOUTH EVERY DAY  Last Written Prescription Date:  10/29/19  Last Fill Quantity: 90 tab,  # refills: 0   Last office visit: 10/29/2019 with prescribing provider:  Clyde   Future Office Visit:   Next 5 appointments (look out 90 days)    Jan 20, 2020 11:00 AM CST  Return Visit with Ava Brown NP  [facility] ([facility]) [address] [place] [id number]  [phone]             Calcium Channel Blockers Protocol  Failed - 1/19/2020 11:56 AM        Failed - Blood pressure under 140/90 in past 12 months     BP Readings from Last 3 Encounters:   10/29/19 (!) 148/96   10/21/19 (!) 147/86   10/21/19 (!) 150/93                 Failed - Normal serum creatinine on file in past 12 months     Recent Labs   Lab Test 02/15/19  0733   CR 0.65*             Passed - Recent (12 mo) or future (30 days) visit within the authorizing provider's specialty     Patient has had an office visit with the authorizing provider or a provider within the authorizing providers department within the previous 12 mos or has a future within next 30 days. See \"Patient Info\" tab in inbasket, or \"Choose Columns\" in Meds & Orders section of the refill encounter.              Passed - Medication is active on med list        Passed - Patient is age 18 or older           "

## 2020-01-20 NOTE — NURSING NOTE
"Usama Harris is a 64 year old male who presents for:  Chief Complaint   Patient presents with     Follow Up        Initial Vitals:  /77   Pulse 77   Resp 16   Ht 6' 2\" (1.88 m)   Wt 251 lb (113.9 kg)   SpO2 96%   BMI 32.23 kg/m   Estimated body mass index is 32.23 kg/m  as calculated from the following:    Height as of this encounter: 6' 2\" (1.88 m).    Weight as of this encounter: 251 lb (113.9 kg).. Body surface area is 2.44 meters squared. BP completed using cuff size: regular  Severe Pain (6)    Nursing Comments: Pt present today for follow up.    Jayden Salinas CMA    "

## 2020-01-20 NOTE — PROGRESS NOTES
"[facility]  Neurosurgery Follow Up:    HPI: 12 months s/p L2-5 revision PSF. Continues to have back pain that is worse with activity, he states this is unchanged. No new neurologic symptoms. He continues to wear brace and use bone stimulator.      Medical, surgical, family, and social history unchanged since prior exam.  Exam:  Constitutional:  Alert, well nourished, NAD.  HEENT: Normocephalic, atraumatic.   Pulm:  Without shortness of breath   CV:  No pitting edema of BLE.      Vital Signs:  /77   Pulse 77   Resp 16   Ht 6' 2\" (1.88 m)   Wt 251 lb (113.9 kg)   SpO2 96%   BMI 32.23 kg/m      Neurological:  Awake  Alert  Oriented x 3  Motor exam:        IP Q DF PF EHL  R   5  5   5   5    5  L   5  5   5   5    5     Able to spontaneously move L/E bilaterally  Sensation intact throughout all L/E dermatomes     Incisions:  Healed  Imaging:     Lumbar CT 1/20/2020  IMPRESSION:    1. 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 This is unchanged.  2. Solid fusion L3-L5, unchanged.  3. Superior central deformities of the L1 and L2 vertebral bodies either from Schmorl's nodes or fractures, unchanged.     A/P: 12 months s/p L2-5 revision PSF. Continues to have back pain that is worse with activity, he states this is unchanged. No new neurologic symptoms. He continues to wear brace and use bone stimulator.  Discussed lumbar CT with Dr. Issa who was able to see patient in clinic today. Discussed options including revision vs follow up in 3 months. He is interested in continuing to monitor at this time. Will plan to follow up in 3 months with lumbar XR prior. He will also follow up with the pain clinic. He verbalized understanding and agreement.    Patient Instructions   -Follow up in clinic in 3 months with lumbar XR prior.   -Please contact the clinic if pain persists at [phone].      Ava Brown, CNP  M " [address] [address]  Tel [phone]  Fax [phone]

## 2020-01-20 NOTE — LETTER
"    1/20/2020         RE: Usama Harris  [address]  [place] [address]        Dear Colleague,    Thank you for referring your patient, Usama Harris, to the [facility]. Please see a copy of my visit note below.    [facility]  Neurosurgery Follow Up:    HPI: 12 months s/p L2-5 revision PSF. Continues to have back pain that is worse with activity, he states this is unchanged. No new neurologic symptoms. He continues to wear brace and use bone stimulator.      Medical, surgical, family, and social history unchanged since prior exam.  Exam:  Constitutional:  Alert, well nourished, NAD.  HEENT: Normocephalic, atraumatic.   Pulm:  Without shortness of breath   CV:  No pitting edema of BLE.      Vital Signs:  /77   Pulse 77   Resp 16   Ht 6' 2\" (1.88 m)   Wt 251 lb (113.9 kg)   SpO2 96%   BMI 32.23 kg/m       Neurological:  Awake  Alert  Oriented x 3  Motor exam:        IP Q DF PF EHL  R   5  5   5   5    5  L   5  5   5   5    5     Able to spontaneously move L/E bilaterally  Sensation intact throughout all L/E dermatomes     Incisions:  Healed  Imaging:     Lumbar CT 1/20/2020  IMPRESSION:    1. 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 This is unchanged.  2. Solid fusion L3-L5, unchanged.  3. Superior central deformities of the L1 and L2 vertebral bodies either from Schmorl's nodes or fractures, unchanged.     A/P: 12 months s/p L2-5 revision PSF. Continues to have back pain that is worse with activity, he states this is unchanged. No new neurologic symptoms. He continues to wear brace and use bone stimulator.  Discussed lumbar CT with Dr. Issa who was able to see patient in clinic today. Discussed options including revision vs follow up in 3 months. He is interested in continuing to monitor at this time. Will plan to follow up in 3 months with lumbar XR prior. He " will also follow up with the pain clinic. He verbalized understanding and agreement.    Patient Instructions   -Follow up in clinic in 3 months with lumbar XR prior.   -Please contact the clinic if pain persists at [phone].      Ava Brown, VEL  [facility]  [address] [place] [id number]  Tel [phone]  Fax [phone]      Again, thank you for allowing me to participate in the care of your patient.        Sincerely,        Ava Brown, NP

## 2020-01-22 RX ORDER — AMLODIPINE BESYLATE 5 MG/1
TABLET ORAL
Qty: 90 TABLET | Refills: 0 | Status: SHIPPED | OUTPATIENT
Start: 2020-01-22 | End: 2020-03-19

## 2020-01-22 NOTE — TELEPHONE ENCOUNTER
Medication is being filled for 1 time refill only due to:  Patient needs to be seen because need recheck.    Please schedule-Return in about 3 months (around 1/29/2020) for follow up appointment.

## 2020-01-24 DIAGNOSIS — M96.1 FAILED BACK SYNDROME: ICD-10-CM

## 2020-01-24 DIAGNOSIS — G89.4 CHRONIC PAIN SYNDROME: ICD-10-CM

## 2020-01-24 RX ORDER — DULOXETIN HYDROCHLORIDE 30 MG/1
30 CAPSULE, DELAYED RELEASE ORAL DAILY
Qty: 30 CAPSULE | Refills: 1 | Status: SHIPPED | OUTPATIENT
Start: 2020-01-24 | End: 2020-03-20

## 2020-01-24 NOTE — TELEPHONE ENCOUNTER
Fax received from: [facility] pharmacy Refill authorization requested    Drug:DULoxetine (CYMBALTA) 20 MG capsule  Qty: 30.0  Last filled 12/26/19  Last seen: 10/21/19  Next office visit No future appointment    Anita Us MA

## 2020-02-03 NOTE — TELEPHONE ENCOUNTER
[place] Message received from patient -     Comments:   Brooke Carty, i requested from you on my last visit recertification for cannibis with the state, still waiting to hear from the state. is the paperwork been submitted? per your nurse it was to be compleated a month ago? please advise       Called patient and he stated that he still has not heard from the state and he would like us to follow up on this. Phone #[phone]        Allie Jackson    [facility] by [facility]     no

## 2020-02-17 DIAGNOSIS — G89.4 CHRONIC PAIN SYNDROME: ICD-10-CM

## 2020-02-17 NOTE — TELEPHONE ENCOUNTER
Controlled Substance Refill Request for traMADol (ULTRAM) 50 MG tablet    Last refill:     Last clinic visit: 10-29-19     Clinic visit frequency required: None  Next appt: 2-28-20    Controlled substance agreement on file: No.    Documentation in problem list reviewed:  No    Processing:  .    [place]  [place]:  [email or web address]

## 2020-02-19 ENCOUNTER — OFFICE VISIT (OUTPATIENT)
Dept: ENDOCRINOLOGY | Facility: CLINIC | Age: 65
End: 2020-02-19
Payer: COMMERCIAL

## 2020-02-19 VITALS
DIASTOLIC BLOOD PRESSURE: 78 MMHG | HEART RATE: 82 BPM | BODY MASS INDEX: 32.08 KG/M2 | SYSTOLIC BLOOD PRESSURE: 122 MMHG | HEIGHT: 74 IN | WEIGHT: 250 LBS

## 2020-02-19 DIAGNOSIS — R74.8 ELEVATED LIVER ENZYMES: ICD-10-CM

## 2020-02-19 DIAGNOSIS — E29.1 HYPOGONADISM MALE: ICD-10-CM

## 2020-02-19 DIAGNOSIS — M81.0 OSTEOPOROSIS, UNSPECIFIED OSTEOPOROSIS TYPE, UNSPECIFIED PATHOLOGICAL FRACTURE PRESENCE: ICD-10-CM

## 2020-02-19 DIAGNOSIS — Z79.890 ENCOUNTER FOR MONITORING TESTOSTERONE REPLACEMENT THERAPY: ICD-10-CM

## 2020-02-19 DIAGNOSIS — Z51.81 ENCOUNTER FOR MONITORING TESTOSTERONE REPLACEMENT THERAPY: ICD-10-CM

## 2020-02-19 DIAGNOSIS — E29.1 HYPOGONADISM MALE: Primary | ICD-10-CM

## 2020-02-19 LAB
ALBUMIN SERPL-MCNC: 4 G/DL (ref 3.4–5)
ALP SERPL-CCNC: 74 U/L (ref 40–150)
ALT SERPL W P-5'-P-CCNC: 71 U/L (ref 0–70)
ANION GAP SERPL CALCULATED.3IONS-SCNC: 7 MMOL/L (ref 3–14)
AST SERPL W P-5'-P-CCNC: 40 U/L (ref 0–45)
BILIRUB DIRECT SERPL-MCNC: 0.1 MG/DL (ref 0–0.2)
BILIRUB SERPL-MCNC: 0.4 MG/DL (ref 0.2–1.3)
BUN SERPL-MCNC: 13 MG/DL (ref 7–30)
CALCIUM SERPL-MCNC: 8.8 MG/DL (ref 8.5–10.1)
CHLORIDE SERPL-SCNC: 106 MMOL/L (ref 94–109)
CO2 SERPL-SCNC: 26 MMOL/L (ref 20–32)
CREAT SERPL-MCNC: 0.62 MG/DL (ref 0.66–1.25)
ERYTHROCYTE [DISTWIDTH] IN BLOOD BY AUTOMATED COUNT: 13.4 % (ref 10–15)
GFR SERPL CREATININE-BSD FRML MDRD: >90 ML/MIN/{1.73_M2}
GLUCOSE SERPL-MCNC: 108 MG/DL (ref 70–99)
HCT VFR BLD AUTO: 49 % (ref 40–53)
HGB BLD-MCNC: 16.3 G/DL (ref 13.3–17.7)
MCH RBC QN AUTO: 31.4 PG (ref 26.5–33)
MCHC RBC AUTO-ENTMCNC: 33.3 G/DL (ref 31.5–36.5)
MCV RBC AUTO: 94 FL (ref 78–100)
PLATELET # BLD AUTO: 180 10E9/L (ref 150–450)
POTASSIUM SERPL-SCNC: 3.7 MMOL/L (ref 3.4–5.3)
PROT SERPL-MCNC: 7.7 G/DL (ref 6.8–8.8)
PSA SERPL-MCNC: 0.78 UG/L (ref 0–4)
RBC # BLD AUTO: 5.19 10E12/L (ref 4.4–5.9)
SODIUM SERPL-SCNC: 139 MMOL/L (ref 133–144)
WBC # BLD AUTO: 6.6 10E9/L (ref 4–11)

## 2020-02-19 RX ORDER — TRAMADOL HYDROCHLORIDE 50 MG/1
50 TABLET ORAL EVERY 6 HOURS PRN
Qty: 60 TABLET | Refills: 0 | Status: SHIPPED | OUTPATIENT
Start: 2020-02-19 | End: 2020-03-19

## 2020-02-19 ASSESSMENT — PAIN SCALES - GENERAL: PAINLEVEL: NO PAIN (0)

## 2020-02-19 ASSESSMENT — MIFFLIN-ST. JEOR: SCORE: 1998.74

## 2020-02-19 NOTE — LETTER
2/19/2020       RE: Usama Harris  [address] [place] [address]     Dear Colleague,    Thank you for referring your patient, Usama Harris, to the [facility] ENDOCRINOLOGY at [facility]. Please see a copy of my visit note below.    [facility]  Endocrinology  Ludy Langley MD  02/19/2020      Chief Complaint:   Follow Up     History of Present Illness:   Usama Harris is a 64 year old male with a history of hypogonadism and osteoporosis who presents for follow-up on endocrine concerns.    Summaries are copied forward from Dr. Friedman's last note.    Hypogonadism  In the past, testosterone and gonadotropins have been low and he's had a normal brain MRI. He was on significant amount of opioids around this time. Was on testosterone from 7518-3709 and noted improvement in libido and strength, but replacement was stopped because of mildly elevated transaminases, which eventually normalized. In 01/2019, AM testosterone was 223 (240-950). He had low libido, ED, and low energy. In 02/2019, he was started on 50 mg testosterone cypionate weekly    Interval history: he is on weekly Testosterone 50 mg injections, he takes them on Fridays. June 2019 total Testosterone 394. He feels well other than a URI which he believes he caught from his grandson.      Osteoporosis  In 03/2017 lumbar x-rays showed an incidental L1 compression fracture.  He had no trauma that he can recall. He has a history of L3-L5 fusion surgery in 2016. In 04/2017, his PCP started him on fosamax and calcitonin. Calcitonin stopped in 10/2017. Fosamax use was continued, but a pharmacist told him to stop it because of some GI symptoms (reflux, dyspepsia), so he stopped it in fall 2018.  First zoledronic acid infusion 04/2019, which he tolerated well.    Interval history: He drinks milk, eats cheese, a multivitamin, calcium vitamin D combination pill, and a vitamin D supplement. He has lost some  "height, about an inch compared to his height after his spine surgeries.    He has left hand tremors and shakiness. His right leg is weaker than the left, when lifting weights he can move about 40% of the weight on the left compared to what he uses on the right.     Review of Systems:   Pertinent items are noted in HPI, remainder of complete ROS is negative.      Active Medications:     Current Outpatient Medications:      acetaminophen (TYLENOL) 500 MG tablet, Take 500-1,000 mg by mouth every 6 hours as needed for mild pain, Disp: , Rfl:      albuterol (PROAIR HFA/PROVENTIL HFA/VENTOLIN HFA) 108 (90 Base) MCG/ACT inhaler, Inhale 2 puffs into the lungs every 6 hours, Disp: 18 g, Rfl: 0     amLODIPine (NORVASC) 5 MG tablet, TAKE 1 TABLET BY MOUTH EVERY DAY, Disp: 90 tablet, Rfl: 0     carboxymethylcellulose (REFRESH PLUS) 0.5 % SOLN, Place 1 drop into both eyes daily as needed for dry eyes, Disp: , Rfl:      DULoxetine (CYMBALTA) 30 MG capsule, Take 1 capsule (30 mg) by mouth daily, Disp: 30 capsule, Rfl: 1     DULoxetine (CYMBALTA) 30 MG capsule, TAKE 1 CAP BY MOUTH DAILY, Disp: 90 capsule, Rfl: 0     latanoprost (XALATAN) 0.005 % ophthalmic solution, Place 1 drop into both eyes At Bedtime , Disp: , Rfl: 11     methocarbamol (ROBAXIN) 500 MG tablet, Take 1 tablet (500 mg) by mouth 2 times daily as needed for muscle spasms, Disp: 60 tablet, Rfl: 1     multivitamin, therapeutic with minerals (MULTI-VITAMIN) TABS tablet, Take 1 tablet by mouth daily, Disp: , Rfl:      order for DME, Equipment being ordered: tens unit patches 12 patches/ month, Disp: 1 Box, Rfl: 11     sildenafil (VIAGRA) 50 MG tablet, Take 1 tablet (50 mg) by mouth daily as needed (ED), Disp: 40 tablet, Rfl: 11     syringe/needle, disp, 23G X 1\" 3 ML MISC, Use 1 with each testosterone injection, Disp: 50 each, Rfl: 11     testosterone cypionate (DEPOTESTOSTERONE) 200 MG/ML injection, Inject 0.25 mLs (50 mg) into the muscle once a week For additional " "refills, please schedule a follow-up appointment at [phone], Disp: 3 mL, Rfl: 1     traMADol (ULTRAM) 50 MG tablet, TAKE 1 TABLET (50 MG) BY MOUTH EVERY 6 HOURS AS NEEDED FOR SEVERE PAIN, Disp: 60 tablet, Rfl: 0    Current Facility-Administered Medications:      testosterone cypionate (DEPOTESTOSTERONE) injection 50 mg, 50 mg, Intramuscular, Q7 Days, Schem, Jeffrey Morrison MD      Allergies:   Gabapentin      Past Medical History:  Acute hepatitis C  Degenerative disk disease  Glaucoma  Hypertension   Testosterone deficiency  Osteoporosis  Low back pain     Past Surgical History:  Herniated disc  Cataracts  Decompress lumbar, one level  Discectomy  Laminectomy L3-5  Fusion lumbar 2 levels  Ligation of spermatic vein    Family History:   Mother: coronary artery disease, DVT, atrial fibrillation, hypertension, cerebrovascular disease  Father: heart failure      Social History:   Former PPD smoker for 29 years, quit 2000  Unmarried  Former nurse    Physical Exam:   /78   Pulse 82   Ht 1.88 m (6' 2\")   Wt 113.4 kg (250 lb)   BMI 32.10 kg/m      Constitutional: Pleasant no acute cardiopulmonary distress.   EYES: anicteric, normal extra-ocular movements, no lid lag or retraction, pupils are equal and reactive to light bilaterally.  HEENT: Mouth/Throat: Mucous membrane is moist. Oropharynx is clear.  Cardiovascular: RRR, S1, S2 normal.   Pulmonary/Chest: CTAB. No wheezing or rales.   Abdominal: +BS. Non tender to palpation.  Stretch marks: none.   Neurological: Alert and oriented.  No tremor and reflexes are symmetrical bilaterally and within the normal limits.    Extremities: No edema.  Psychological: appropriate mood and affect      Data:  Results for ZEN JEREZ (MRN [id number]) as of 2/22/2020 17:41   Ref. Range 2/19/2020 11:30   Sodium Latest Ref Range: 133 - 144 mmol/L 139   Potassium Latest Ref Range: 3.4 - 5.3 mmol/L 3.7   Chloride Latest Ref Range: 94 - 109 mmol/L 106   Carbon Dioxide Latest Ref " Range: 20 - 32 mmol/L 26   Urea Nitrogen Latest Ref Range: 7 - 30 mg/dL 13   Creatinine Latest Ref Range: 0.66 - 1.25 mg/dL 0.62 (L)   GFR Estimate Latest Ref Range: >60 mL/min/1.73_m2 >90   GFR Estimate If Black Latest Ref Range: >60 mL/min/1.73_m2 >90   Calcium Latest Ref Range: 8.5 - 10.1 mg/dL 8.8   Anion Gap Latest Ref Range: 3 - 14 mmol/L 7   Albumin Latest Ref Range: 3.4 - 5.0 g/dL 4.0   Protein Total Latest Ref Range: 6.8 - 8.8 g/dL 7.7   Bilirubin Total Latest Ref Range: 0.2 - 1.3 mg/dL 0.4   Alkaline Phosphatase Latest Ref Range: 40 - 150 U/L 74   ALT Latest Ref Range: 0 - 70 U/L 71 (H)   AST Latest Ref Range: 0 - 45 U/L 40   Bilirubin Direct Latest Ref Range: 0.0 - 0.2 mg/dL 0.1   PSA Latest Ref Range: 0 - 4 ug/L 0.78   Testosterone Total Latest Ref Range: 240 - 950 ng/dL 294   Vitamin D Deficiency screening Latest Ref Range: 20 - 75 ug/L 31   Glucose Latest Ref Range: 70 - 99 mg/dL 108 (H)   WBC Latest Ref Range: 4.0 - 11.0 10e9/L 6.6   Hemoglobin Latest Ref Range: 13.3 - 17.7 g/dL 16.3   Hematocrit Latest Ref Range: 40.0 - 53.0 % 49.0   Platelet Count Latest Ref Range: 150 - 450 10e9/L 180   RBC Count Latest Ref Range: 4.4 - 5.9 10e12/L 5.19   MCV Latest Ref Range: 78 - 100 fl 94   MCH Latest Ref Range: 26.5 - 33.0 pg 31.4   MCHC Latest Ref Range: 31.5 - 36.5 g/dL 33.3   RDW Latest Ref Range: 10.0 - 15.0 % 13.4     Assessment and Plan:    Hypogonadism male  Started about a year ago due to hypogonadal symptoms plus osteoporosis in a setting of documented low Testosterone level     Ref. Range 1/28/2019 09:40 6/6/2019 09:18 2/19/2020 11:30   Testosterone Total Latest Ref Range: 240 - 950 ng/dL 223 (L) 394 294     Reports interval improvement of hypogonadal symptoms. Testosterone was low at 223 in Jan 2019. On the current dose testosterone between 294-394. Low end this time because testosterone was not checked half way between injections.     Monitoring side effects:    hematocrit stable. = 49    Doesn't  report s/s of CHUYITA.     PSA stable. 0.78 similar to level 6 months ago.     ALT 71 (cutoff for normal 70). Repeat liver enzymes in 2-3 months. Order placed.       Osteoporosis, unspecified osteoporosis type, unspecified pathological fracture presence  History of atraumatic L1 compression fracture in 2017. He has been on Reclast since April 2019, previous treatment with Alendronate was poorly tolerated. Will have him continue with annual Reclast infusions with labs prior as below. Per his report he is getting enough calcium, reiterated a goal of 1200 mg daily, which he is likely over. Will check vitamin D levels with labs to see if his current dose is sufficient.   - follow up labs noted. Continue reclast therapy yearly. Increase vitamin D 1000 international unit(s) daily.   ENDO CALCIUM LABS-[facility] Latest Ref Rng & Units 2/19/2020   CALCIUM 8.5 - 10.1 mg/dL 8.8   PHOSPHOROUS 2.5 - 4.5 mg/dL    ALBUMIN 3.4 - 5.0 g/dL 4.0   BUN 7 - 30 mg/dL 13   CREATININE 0.66 - 1.25 mg/dL 0.62 (L)   PARATHYROID HORMONE INTACT 18 - 80 pg/mL    ALKPHOS 40 - 150 U/L 74   25 OH VIT D2 ug/L    25 OH VIT D3 ug/L    25 OH VIT D TOTAL 20 - 75 ug/L    VITAMIN D DEFICIENCY SCREENING 20 - 75 ug/L 31   PROTEIN, TOTAL 6.8 - 8.8 g/dL 7.7      Patient Instructions   Blood work today and we will contact you with results.   Calcium 1200 mg from all sources and Vitamin D 1000 international unit(s) daily.      Follow-up: Return in about 6 months (around 8/19/2020).      Scribe Disclosure:  I, Jalen Oh, am serving as a scribe to document services personally performed by Ludy Langley MD at this visit, based upon the provider's statements to me. All documentation has been reviewed by the aforementioned provider prior to being entered into the official medical record.     Portions of this medical record were completed by a scribe. UPON MY REVIEW AND AUTHENTICATION BY ELECTRONIC SIGNATURE, this confirms (a) I performed the applicable clinical  services, and (b) the record is accurate.      Ludy Langley MD  Staff Endocrinologist   Endocrinology and Metabolism  [facility]  Pager: [phone]

## 2020-02-19 NOTE — PATIENT INSTRUCTIONS
Blood work today and we will contact you with results.   Calcium 1200 mg from all sources and Vitamin D 1000 international unit(s) daily.

## 2020-02-19 NOTE — PROGRESS NOTES
[facility]  Endocrinology  Ludy Langley MD  02/19/2020      Chief Complaint:   Follow Up     History of Present Illness:   Usama Harris is a 64 year old male with a history of hypogonadism and osteoporosis who presents for follow-up on endocrine concerns.    Summaries are copied forward from Dr. Friedman's last note.    Hypogonadism  In the past, testosterone and gonadotropins have been low and he's had a normal brain MRI. He was on significant amount of opioids around this time. Was on testosterone from 6586-7413 and noted improvement in libido and strength, but replacement was stopped because of mildly elevated transaminases, which eventually normalized. In 01/2019, AM testosterone was 223 (240-950). He had low libido, ED, and low energy. In 02/2019, he was started on 50 mg testosterone cypionate weekly    Interval history: he is on weekly Testosterone 50 mg injections, he takes them on Fridays. June 2019 total Testosterone 394. He feels well other than a URI which he believes he caught from his grandson.      Osteoporosis  In 03/2017 lumbar x-rays showed an incidental L1 compression fracture.  He had no trauma that he can recall. He has a history of L3-L5 fusion surgery in 2016. In 04/2017, his PCP started him on fosamax and calcitonin. Calcitonin stopped in 10/2017. Fosamax use was continued, but a pharmacist told him to stop it because of some GI symptoms (reflux, dyspepsia), so he stopped it in fall 2018.  First zoledronic acid infusion 04/2019, which he tolerated well.    Interval history: He drinks milk, eats cheese, a multivitamin, calcium vitamin D combination pill, and a vitamin D supplement. He has lost some height, about an inch compared to his height after his spine surgeries.    He has left hand tremors and shakiness. His right leg is weaker than the left, when lifting weights he can move about 40% of the weight on the left compared to what he uses on the right.     Review of Systems:  "  Pertinent items are noted in HPI, remainder of complete ROS is negative.      Active Medications:     Current Outpatient Medications:      acetaminophen (TYLENOL) 500 MG tablet, Take 500-1,000 mg by mouth every 6 hours as needed for mild pain, Disp: , Rfl:      albuterol (PROAIR HFA/PROVENTIL HFA/VENTOLIN HFA) 108 (90 Base) MCG/ACT inhaler, Inhale 2 puffs into the lungs every 6 hours, Disp: 18 g, Rfl: 0     amLODIPine (NORVASC) 5 MG tablet, TAKE 1 TABLET BY MOUTH EVERY DAY, Disp: 90 tablet, Rfl: 0     carboxymethylcellulose (REFRESH PLUS) 0.5 % SOLN, Place 1 drop into both eyes daily as needed for dry eyes, Disp: , Rfl:      DULoxetine (CYMBALTA) 30 MG capsule, Take 1 capsule (30 mg) by mouth daily, Disp: 30 capsule, Rfl: 1     DULoxetine (CYMBALTA) 30 MG capsule, TAKE 1 CAP BY MOUTH DAILY, Disp: 90 capsule, Rfl: 0     latanoprost (XALATAN) 0.005 % ophthalmic solution, Place 1 drop into both eyes At Bedtime , Disp: , Rfl: 11     methocarbamol (ROBAXIN) 500 MG tablet, Take 1 tablet (500 mg) by mouth 2 times daily as needed for muscle spasms, Disp: 60 tablet, Rfl: 1     multivitamin, therapeutic with minerals (MULTI-VITAMIN) TABS tablet, Take 1 tablet by mouth daily, Disp: , Rfl:      order for DME, Equipment being ordered: tens unit patches 12 patches/ month, Disp: 1 Box, Rfl: 11     sildenafil (VIAGRA) 50 MG tablet, Take 1 tablet (50 mg) by mouth daily as needed (ED), Disp: 40 tablet, Rfl: 11     syringe/needle, disp, 23G X 1\" 3 ML MISC, Use 1 with each testosterone injection, Disp: 50 each, Rfl: 11     testosterone cypionate (DEPOTESTOSTERONE) 200 MG/ML injection, Inject 0.25 mLs (50 mg) into the muscle once a week For additional refills, please schedule a follow-up appointment at [phone], Disp: 3 mL, Rfl: 1     traMADol (ULTRAM) 50 MG tablet, TAKE 1 TABLET (50 MG) BY MOUTH EVERY 6 HOURS AS NEEDED FOR SEVERE PAIN, Disp: 60 tablet, Rfl: 0    Current Facility-Administered Medications:      testosterone " "cypionate (DEPOTESTOSTERONE) injection 50 mg, 50 mg, Intramuscular, Q7 Days, Schemf, Jeffrey Morrison MD      Allergies:   Gabapentin      Past Medical History:  Acute hepatitis C  Degenerative disk disease  Glaucoma  Hypertension   Testosterone deficiency  Osteoporosis  Low back pain     Past Surgical History:  Herniated disc  Cataracts  Decompress lumbar, one level  Discectomy  Laminectomy L3-5  Fusion lumbar 2 levels  Ligation of spermatic vein    Family History:   Mother: coronary artery disease, DVT, atrial fibrillation, hypertension, cerebrovascular disease  Father: heart failure      Social History:   Former PPD smoker for 29 years, quit 2000  Unmarried  Former nurse    Physical Exam:   /78   Pulse 82   Ht 1.88 m (6' 2\")   Wt 113.4 kg (250 lb)   BMI 32.10 kg/m     Constitutional: Pleasant no acute cardiopulmonary distress.   EYES: anicteric, normal extra-ocular movements, no lid lag or retraction, pupils are equal and reactive to light bilaterally.  HEENT: Mouth/Throat: Mucous membrane is moist. Oropharynx is clear.  Cardiovascular: RRR, S1, S2 normal.   Pulmonary/Chest: CTAB. No wheezing or rales.   Abdominal: +BS. Non tender to palpation.  Stretch marks: none.   Neurological: Alert and oriented.  No tremor and reflexes are symmetrical bilaterally and within the normal limits.    Extremities: No edema.  Psychological: appropriate mood and affect      Data:  Results for SOLITARIO ZEN OMALLEY (MRN [id number]) as of 2/22/2020 17:41   Ref. Range 2/19/2020 11:30   Sodium Latest Ref Range: 133 - 144 mmol/L 139   Potassium Latest Ref Range: 3.4 - 5.3 mmol/L 3.7   Chloride Latest Ref Range: 94 - 109 mmol/L 106   Carbon Dioxide Latest Ref Range: 20 - 32 mmol/L 26   Urea Nitrogen Latest Ref Range: 7 - 30 mg/dL 13   Creatinine Latest Ref Range: 0.66 - 1.25 mg/dL 0.62 (L)   GFR Estimate Latest Ref Range: >60 mL/min/1.73_m2 >90   GFR Estimate If Black Latest Ref Range: >60 mL/min/1.73_m2 >90   Calcium Latest Ref Range: " 8.5 - 10.1 mg/dL 8.8   Anion Gap Latest Ref Range: 3 - 14 mmol/L 7   Albumin Latest Ref Range: 3.4 - 5.0 g/dL 4.0   Protein Total Latest Ref Range: 6.8 - 8.8 g/dL 7.7   Bilirubin Total Latest Ref Range: 0.2 - 1.3 mg/dL 0.4   Alkaline Phosphatase Latest Ref Range: 40 - 150 U/L 74   ALT Latest Ref Range: 0 - 70 U/L 71 (H)   AST Latest Ref Range: 0 - 45 U/L 40   Bilirubin Direct Latest Ref Range: 0.0 - 0.2 mg/dL 0.1   PSA Latest Ref Range: 0 - 4 ug/L 0.78   Testosterone Total Latest Ref Range: 240 - 950 ng/dL 294   Vitamin D Deficiency screening Latest Ref Range: 20 - 75 ug/L 31   Glucose Latest Ref Range: 70 - 99 mg/dL 108 (H)   WBC Latest Ref Range: 4.0 - 11.0 10e9/L 6.6   Hemoglobin Latest Ref Range: 13.3 - 17.7 g/dL 16.3   Hematocrit Latest Ref Range: 40.0 - 53.0 % 49.0   Platelet Count Latest Ref Range: 150 - 450 10e9/L 180   RBC Count Latest Ref Range: 4.4 - 5.9 10e12/L 5.19   MCV Latest Ref Range: 78 - 100 fl 94   MCH Latest Ref Range: 26.5 - 33.0 pg 31.4   MCHC Latest Ref Range: 31.5 - 36.5 g/dL 33.3   RDW Latest Ref Range: 10.0 - 15.0 % 13.4     Assessment and Plan:    Hypogonadism male  Started about a year ago due to hypogonadal symptoms plus osteoporosis in a setting of documented low Testosterone level     Ref. Range 1/28/2019 09:40 6/6/2019 09:18 2/19/2020 11:30   Testosterone Total Latest Ref Range: 240 - 950 ng/dL 223 (L) 394 294     Reports interval improvement of hypogonadal symptoms. Testosterone was low at 223 in Jan 2019. On the current dose testosterone between 294-394. Low end this time because testosterone was not checked half way between injections.     Monitoring side effects:    hematocrit stable. = 49    Doesn't report s/s of CHUYITA.     PSA stable. 0.78 similar to level 6 months ago.     ALT 71 (cutoff for normal 70). Repeat liver enzymes in 2-3 months. Order placed.       Osteoporosis, unspecified osteoporosis type, unspecified pathological fracture presence  History of atraumatic L1  compression fracture in 2017. He has been on Reclast since April 2019, previous treatment with Alendronate was poorly tolerated. Will have him continue with annual Reclast infusions with labs prior as below. Per his report he is getting enough calcium, reiterated a goal of 1200 mg daily, which he is likely over. Will check vitamin D levels with labs to see if his current dose is sufficient.   - follow up labs noted. Continue reclast therapy yearly. Increase vitamin D 1000 international unit(s) daily.   ENDO CALCIUM LABS-[facility] Latest Ref Rng & Units 2/19/2020   CALCIUM 8.5 - 10.1 mg/dL 8.8   PHOSPHOROUS 2.5 - 4.5 mg/dL    ALBUMIN 3.4 - 5.0 g/dL 4.0   BUN 7 - 30 mg/dL 13   CREATININE 0.66 - 1.25 mg/dL 0.62 (L)   PARATHYROID HORMONE INTACT 18 - 80 pg/mL    ALKPHOS 40 - 150 U/L 74   25 OH VIT D2 ug/L    25 OH VIT D3 ug/L    25 OH VIT D TOTAL 20 - 75 ug/L    VITAMIN D DEFICIENCY SCREENING 20 - 75 ug/L 31   PROTEIN, TOTAL 6.8 - 8.8 g/dL 7.7      Patient Instructions   Blood work today and we will contact you with results.   Calcium 1200 mg from all sources and Vitamin D 1000 international unit(s) daily.      Follow-up: Return in about 6 months (around 8/19/2020).      Scribe Disclosure:  I, Jalen Oh, am serving as a scribe to document services personally performed by Ludy Langley MD at this visit, based upon the provider's statements to me. All documentation has been reviewed by the aforementioned provider prior to being entered into the official medical record.     Portions of this medical record were completed by a scribe. UPON MY REVIEW AND AUTHENTICATION BY ELECTRONIC SIGNATURE, this confirms (a) I performed the applicable clinical services, and (b) the record is accurate.      Ludy Langley MD  Staff Endocrinologist   Endocrinology and Metabolism  [facility]  Pager: [phone]

## 2020-02-20 LAB
DEPRECATED CALCIDIOL+CALCIFEROL SERPL-MC: 31 UG/L (ref 20–75)
TESTOST SERPL-MCNC: 294 NG/DL (ref 240–950)

## 2020-02-24 ENCOUNTER — TRANSFERRED RECORDS (OUTPATIENT)
Dept: HEALTH INFORMATION MANAGEMENT | Facility: CLINIC | Age: 65
End: 2020-02-24

## 2020-02-25 NOTE — RESULT ENCOUNTER NOTE
Bill,     #1 the testosterone level is within the desired range.  As the level was checked closer to your next injection rather than snf between injections; the testosterone level is a slightly lower than what we had 8 months ago.  #2 One of the liver enzymes which was noted to be elevated previously was slightly high.  Previously this number was 40 but on the current result it was noted to be 71.  I recommend monitoring this number closely; please recheck it again in 2 months.  Follow-up lab ordered.    #3 your kidney function test results and electrolytes are stable compared to previous results.  #4 your hematocrit number is a stable.  #5 the PSA level has been stable over the last 1 year.  #6 your vitamin D level is within the normal limits but low normal.  I recommend increasing your vitamin D supplement to target a slightly higher vitamin D level.  If you are currently taking vitamin D 1000 international units daily from all sources; please increase the dose to 2000 international units daily.  #7 your calcium level is within the normal limit.  Please make sure that you take  calcium 1200 mg daily from all sources.    Please let me know if you have any questions regarding this results.    Ludy Langley MD

## 2020-03-10 DIAGNOSIS — E34.9 TESTOSTERONE DEFICIENCY: ICD-10-CM

## 2020-03-11 RX ORDER — TESTOSTERONE CYPIONATE 200 MG/ML
50 INJECTION, SOLUTION INTRAMUSCULAR WEEKLY
Qty: 3 ML | Refills: 1 | Status: SHIPPED | OUTPATIENT
Start: 2020-03-11 | End: 2020-03-16

## 2020-03-11 NOTE — TELEPHONE ENCOUNTER
testosterone cypionate (DEPOTESTOSTERONE) 200 MG/ML injection   Last Written Prescription Date:  12/17/2019  Last Fill Quantity: 3,   # refills: 1  Last Office Visit : 2/19/2020  Future Office visit:  None    Routing refill request to provider for review/approval because:  Drug not on the [facility], [facility] or [facility] refill protocol or controlled substance      Valery Tejeda RN  Central Triage Red Flags/Med Refills

## 2020-03-16 DIAGNOSIS — E34.9 TESTOSTERONE DEFICIENCY: ICD-10-CM

## 2020-03-16 RX ORDER — TESTOSTERONE CYPIONATE 200 MG/ML
50 INJECTION, SOLUTION INTRAMUSCULAR WEEKLY
Qty: 12 VIAL | Refills: 1 | Status: SHIPPED | OUTPATIENT
Start: 2020-03-16 | End: 2020-10-14

## 2020-03-19 ENCOUNTER — TELEPHONE (OUTPATIENT)
Dept: FAMILY MEDICINE | Facility: CLINIC | Age: 65
End: 2020-03-19

## 2020-03-19 ENCOUNTER — MYC REFILL (OUTPATIENT)
Dept: PALLIATIVE MEDICINE | Facility: CLINIC | Age: 65
End: 2020-03-19

## 2020-03-19 DIAGNOSIS — M96.1 FAILED BACK SYNDROME: ICD-10-CM

## 2020-03-19 DIAGNOSIS — M79.18 MYOFASCIAL MUSCLE PAIN: ICD-10-CM

## 2020-03-19 DIAGNOSIS — G89.4 CHRONIC PAIN SYNDROME: ICD-10-CM

## 2020-03-19 RX ORDER — DULOXETIN HYDROCHLORIDE 30 MG/1
30 CAPSULE, DELAYED RELEASE ORAL DAILY
Qty: 30 CAPSULE | Refills: 1 | Status: CANCELLED | OUTPATIENT
Start: 2020-03-19

## 2020-03-19 RX ORDER — METHOCARBAMOL 500 MG/1
500 TABLET, FILM COATED ORAL 2 TIMES DAILY PRN
Qty: 60 TABLET | Refills: 1 | Status: CANCELLED | OUTPATIENT
Start: 2020-03-19

## 2020-03-19 RX ORDER — DULOXETIN HYDROCHLORIDE 30 MG/1
CAPSULE, DELAYED RELEASE ORAL
Qty: 90 CAPSULE | Refills: 0 | Status: CANCELLED | OUTPATIENT
Start: 2020-03-19

## 2020-03-19 NOTE — TELEPHONE ENCOUNTER
These pt should do a phone visit with Nikko Tang MD next Tuesday or Thursday  If he wants this done sooner, he could do a phone visit with a different provider.   But definitely needs a visit for the refills(tramadol especially-last office visit 10/29/19) and increased frequency of b/p med'  Thanks!     Zulema Ortiz RN

## 2020-03-19 NOTE — TELEPHONE ENCOUNTER
Reason for call:  Medication   If this is a refill request, has the caller requested the refill from the pharmacy already? No  Will the patient be using a [facility]? No  Name of the pharmacy and phone number for the current request:     Name of the medication requested: This is mychart request from a message sent to patient to reschedule his appointment. See below:    I was trying to make it easy, and not be seen. I need amlodipine 5mg QD increased to bid, and renewals on MDI and tramadol.  I hope this clarifies my needs and requests. Everything else going as expected Htn. well controlled on amlodipine, per discussion with Dr Tang last visit, he suggested trial of 10mg, of amlodipine of which was more effective than 5mg, personally preferring 5mg bid over 10mg QD. Thank you     Other request:     Phone number to reach patient:      Best Time:      Can we leave a detailed message on this number?      Travel screening:

## 2020-03-20 RX ORDER — DULOXETIN HYDROCHLORIDE 30 MG/1
30 CAPSULE, DELAYED RELEASE ORAL DAILY
Qty: 30 CAPSULE | Refills: 1 | Status: SHIPPED | OUTPATIENT
Start: 2020-03-20 | End: 2020-04-22

## 2020-03-20 RX ORDER — METHOCARBAMOL 500 MG/1
500 TABLET, FILM COATED ORAL 2 TIMES DAILY PRN
Qty: 60 TABLET | Refills: 1 | Status: SHIPPED | OUTPATIENT
Start: 2020-03-20 | End: 2020-05-21

## 2020-03-20 NOTE — TELEPHONE ENCOUNTER
Drug: DULoxetine (CYMBALTA) 30 MG capsule   Qty: 30.0  Last filled 01/24/2020    Drug: methocarbamol (ROBAXIN) 500 MG tablet    Qty: 60  Last filled 01/23/2020    Last seen: 10/21/2019

## 2020-03-20 NOTE — TELEPHONE ENCOUNTER
LM to call back and schedule a phone visit to address refills.    Lina GONZALEZ     [facility]

## 2020-04-06 ENCOUNTER — TRANSFERRED RECORDS (OUTPATIENT)
Dept: HEALTH INFORMATION MANAGEMENT | Facility: CLINIC | Age: 65
End: 2020-04-06

## 2020-04-22 ENCOUNTER — TELEPHONE (OUTPATIENT)
Dept: PALLIATIVE MEDICINE | Facility: CLINIC | Age: 65
End: 2020-04-22

## 2020-04-22 DIAGNOSIS — G89.4 CHRONIC PAIN SYNDROME: ICD-10-CM

## 2020-04-22 DIAGNOSIS — M96.1 FAILED BACK SYNDROME: ICD-10-CM

## 2020-04-22 RX ORDER — DULOXETIN HYDROCHLORIDE 20 MG/1
20 CAPSULE, DELAYED RELEASE ORAL DAILY
Qty: 30 CAPSULE | Refills: 3 | Status: SHIPPED | OUTPATIENT
Start: 2020-04-22

## 2020-04-22 NOTE — TELEPHONE ENCOUNTER
Fax received from: [id number] - [place], [place]   Drug: DULoxetine (CYMBALTA) 30 MG capsule   QTY 30 capsule   Sig - Route: Take 1 capsule (30 mg) by mouth daily - Oral     Pharmacy comments:     Script clarification- pt said, he supposed to take 20 mg once daily not 30 mg.     Anita Us MA

## 2020-04-29 ENCOUNTER — MYC REFILL (OUTPATIENT)
Dept: FAMILY MEDICINE | Facility: CLINIC | Age: 65
End: 2020-04-29

## 2020-04-29 DIAGNOSIS — G89.4 CHRONIC PAIN SYNDROME: ICD-10-CM

## 2020-04-29 RX ORDER — TRAMADOL HYDROCHLORIDE 50 MG/1
50 TABLET ORAL EVERY 6 HOURS PRN
Qty: 60 TABLET | Refills: 0 | Status: CANCELLED | OUTPATIENT
Start: 2020-04-29

## 2020-05-01 ENCOUNTER — E-VISIT (OUTPATIENT)
Dept: FAMILY MEDICINE | Facility: CLINIC | Age: 65
End: 2020-05-01
Payer: COMMERCIAL

## 2020-05-01 DIAGNOSIS — G89.4 CHRONIC PAIN SYNDROME: Primary | ICD-10-CM

## 2020-05-01 PROCEDURE — 99207 ZZC NO BILLABLE SERVICE THIS VISIT: CPT | Performed by: FAMILY MEDICINE

## 2020-05-21 DIAGNOSIS — M79.18 MYOFASCIAL MUSCLE PAIN: ICD-10-CM

## 2020-05-21 RX ORDER — METHOCARBAMOL 500 MG/1
500 TABLET, FILM COATED ORAL 2 TIMES DAILY PRN
Qty: 60 TABLET | Refills: 1 | Status: SHIPPED | OUTPATIENT
Start: 2020-05-21

## 2020-05-21 NOTE — TELEPHONE ENCOUNTER
Received fax request from [facility] pharmacy requesting refill(s) for methocarbamol (ROBAXIN) 500 MG tablet    Last refilled on 04/19/20    Pt last seen on 10/21/19  Next appt scheduled for : none    Will facilitate refill.

## 2020-07-01 ENCOUNTER — OFFICE VISIT - HEALTHEAST (OUTPATIENT)
Dept: INTERNAL MEDICINE | Facility: CLINIC | Age: 65
End: 2020-07-01

## 2020-07-01 DIAGNOSIS — I11.9 HYPERTENSIVE HEART DISEASE WITHOUT HEART FAILURE: ICD-10-CM

## 2020-07-01 DIAGNOSIS — Z86.19 HISTORY OF HEPATITIS C: ICD-10-CM

## 2020-07-01 DIAGNOSIS — N52.9 ERECTILE DYSFUNCTION, UNSPECIFIED ERECTILE DYSFUNCTION TYPE: ICD-10-CM

## 2020-07-01 DIAGNOSIS — H40.003 GLAUCOMA SUSPECT, BILATERAL: ICD-10-CM

## 2020-07-01 DIAGNOSIS — M51.9 LUMBAR DISC DISEASE: ICD-10-CM

## 2020-07-01 DIAGNOSIS — E29.1 HYPOGONADISM IN MALE: ICD-10-CM

## 2020-07-01 DIAGNOSIS — G89.4 CHRONIC PAIN DISORDER: ICD-10-CM

## 2020-07-01 ASSESSMENT — PATIENT HEALTH QUESTIONNAIRE - PHQ9: SUM OF ALL RESPONSES TO PHQ QUESTIONS 1-9: 13

## 2020-07-24 ENCOUNTER — COMMUNICATION - HEALTHEAST (OUTPATIENT)
Dept: INTERNAL MEDICINE | Facility: CLINIC | Age: 65
End: 2020-07-24

## 2020-07-24 DIAGNOSIS — G89.4 CHRONIC PAIN DISORDER: ICD-10-CM

## 2020-08-14 DIAGNOSIS — N52.9 ERECTILE DYSFUNCTION, UNSPECIFIED ERECTILE DYSFUNCTION TYPE: ICD-10-CM

## 2020-08-14 NOTE — LETTER
August 22, 2020      Usama Harris  [address] [place] [address]        Brooke Bagley,      We received a refill request for sildenafil (VIAGRA) 50 MG tablet. We were able to approve the request and sent it to your pharmacy; however, you are due for an annual visit to receive further refills. Dr. Tang has changed roles within [facility] and is no longer seeing patients. You are very welcome to establish care with another provider in clinic, at your convenience. Please call us at [phone] at your earliest convenience to schedule an appointment.       We greatly appreciate the opportunity to serve you and thank you for trusting us with your health care.                  Sincerely,        Your health care team at [facility]

## 2020-08-18 RX ORDER — CELECOXIB 200 MG/1
200 CAPSULE ORAL 2 TIMES DAILY
COMMUNITY
End: 2021-12-27

## 2020-08-18 RX ORDER — SILDENAFIL 50 MG/1
TABLET, FILM COATED ORAL
Qty: 40 TABLET | Refills: 0 | Status: SHIPPED | OUTPATIENT
Start: 2020-08-18

## 2020-08-18 NOTE — TELEPHONE ENCOUNTER
Pt overdue for physical  Please let him know he should make an appointment to establish with a new provider  Possibly John Dumont PA-C? Or DR Hernandez    Thanks!     Zulema Ortiz RN

## 2020-08-18 NOTE — PROGRESS NOTES
"Osteopenia  Sanjay Yeung, [facility]    Usama Harris is a 64 year old male who is being evaluated via a billable video visit.      The patient has been notified of following:     \"This video visit will be conducted via a call between you and your physician/provider. We have found that certain health care needs can be provided without the need for an in-person physical exam.  This service lets us provide the care you need with a video conversation.  If a prescription is necessary we can send it directly to your pharmacy.  If lab work is needed we can place an order for that and you can then stop by our lab to have the test done at a later time.    Video visits are billed at different rates depending on your insurance coverage.  Please reach out to your insurance provider with any questions.    If during the course of the call the physician/provider feels a video visit is not appropriate, you will not be charged for this service.\"    Patient has given verbal consent for Video visit? Yes  How would you like to obtain your AVS? Mail a copy  If you are dropped from the video visit, the video invite should be resent to: Send to e-mail at: [email or web address]  Will anyone else be joining your video visit? No        Video-Visit Details    Type of service:  Video Visit    Video Start Time: 10:50 AM  Video End Time: 11:10 AM    Originating Location (pt. Location): Home    Distant Location (provider location):  [facility] ENDOCRINOLOGY     Platform used for Video Visit: [facility]  Endocrinology  Ludy Langley MD  08/19/2020      Chief Complaint:   RECHECK     History of Present Illness:   Usama Harris is a 64 year old male with a history of hypogonadism and osteoporosis who presents for follow-up on endocrine concerns.    Summaries orginally copied forwarded from Dr. Friedman's last note.    Hypogonadism  In the past, testosterone and gonadotropins have been low and he's had a normal brain MRI. He was on " significant amount of opioids around this time. Was on testosterone from 3565-4528 and noted improvement in libido and strength, but replacement was stopped because of mildly elevated transaminases, which eventually normalized. In 01/2019, AM testosterone was 223 (240-950). He had low libido, ED, and low energy. In 02/2019, he was started on 50 mg testosterone cypionate weekly.    Interval history: continues to take 50 mg injections, injects on his own. Has reported improvements in mood, energy, libido and musculature. Says impact on libido is difficult to tell due to impact of previously prescribed cymbalta which he no longer takes, which was previously reducing libido. February 2019 total Testosterone 294.    He reports prostate cancer in his brother and father, both treated surgically. Also said he has 6 other brothers who never developed prostate cancer.     Osteoporosis  In 03/2017 lumbar x-rays showed an incidental L1 compression fracture.  He had no trauma that he can recall. He has a history of L3-L5 fusion surgery in 2016, L2-L5 revision in 2019. In 04/2017, his PCP started him on fosamax and calcitonin. Calcitonin stopped in 10/2017. Fosamax use was continued, but a pharmacist told him to stop it because of some GI symptoms (reflux, dyspepsia), so he stopped it in fall 2018 (however he now reports having H. Pylori at the time).  First zoledronic acid infusion 04/2019, which he tolerated well.    Interval history: Reports eating lots of calcium in the form of cheese. Also says that he is taking combination supplement of vitamin D 1000 and calcium 500 mg. He says that because they taste good he is often taking up to 6 a day. Is not aware of any new fractures, although he believes there may be evidence of thoracic compression fracture on recent CXR (but reports that radiologist did not identify this).     Says he has been unable to obtain zoledronic acid due to issue with insurance approval. He is planning on  "getting dental work in the near future, reports that he has 4 cavities. Says that he tolerated zoledronic acid well previously.    Has been walking at least 3 km per day and has spent time working on landscaping projects.      Review of Systems:   Pertinent items are noted in HPI, remainder of complete ROS is negative.      Active Medications:     Current Outpatient Medications:      acetaminophen (TYLENOL) 500 MG tablet, Take 500-1,000 mg by mouth every 6 hours as needed for mild pain, Disp: , Rfl:      amLODIPine (NORVASC) 5 MG tablet, Take 1 tablet (5 mg) by mouth daily (Patient taking differently: Take 10 mg by mouth daily ), Disp: 90 tablet, Rfl: 3     carboxymethylcellulose (REFRESH PLUS) 0.5 % SOLN, Place 1 drop into both eyes daily as needed for dry eyes, Disp: , Rfl:      celecoxib (CELEBREX) 200 MG capsule, Take 200 mg by mouth 2 times daily, Disp: , Rfl:      latanoprost (XALATAN) 0.005 % ophthalmic solution, Place 1 drop into both eyes At Bedtime , Disp: , Rfl: 11     methocarbamol (ROBAXIN) 500 MG tablet, Take 1 tablet (500 mg) by mouth 2 times daily as needed for muscle spasms, Disp: 60 tablet, Rfl: 1     multivitamin, therapeutic with minerals (MULTI-VITAMIN) TABS tablet, Take 1 tablet by mouth daily, Disp: , Rfl:      order for DME, Equipment being ordered: tens unit patches 12 patches/ month, Disp: 1 Box, Rfl: 11     sildenafil (VIAGRA) 50 MG tablet, TAKE ONE TABLET BY MOUTH DAILY AS NEEDED FOR ERECTILE DYSFUNCTION, Disp: 40 tablet, Rfl: 0     syringe/needle, disp, 23G X 1\" 3 ML MISC, Use 1 with each testosterone injection, Disp: 50 each, Rfl: 11     testosterone cypionate (DEPOTESTOSTERONE) 200 MG/ML injection, Inject 0.25 mLs (50 mg) into the muscle once a week, Disp: 12 vial, Rfl: 1     traMADol (ULTRAM) 50 MG tablet, Take 1 tablet (50 mg) by mouth every 6 hours as needed for severe pain, Disp: 60 tablet, Rfl: 0     albuterol (PROAIR HFA/PROVENTIL HFA/VENTOLIN HFA) 108 (90 Base) MCG/ACT inhaler, " Inhale 2 puffs into the lungs every 6 hours (Patient not taking: Reported on 8/18/2020), Disp: 18 g, Rfl: 3     DULoxetine (CYMBALTA) 20 MG capsule, Take 1 capsule (20 mg) by mouth daily (Patient not taking: Reported on 8/18/2020), Disp: 30 capsule, Rfl: 3      Allergies:   Gabapentin      Past Medical History:  Acute hepatitis C  Degenerative disk disease  Glaucoma  Hypertension   Testosterone deficiency  Osteoporosis  Low back pain     Past Surgical History:  Herniated disc  Cataracts  Decompress lumbar, one level  Discectomy  Laminectomy L3-5  Fusion lumbar 2 levels  Ligation of spermatic vein    Family History:   Mother: coronary artery disease, DVT, atrial fibrillation, hypertension, cerebrovascular disease  Father: heart failure, prostate cancer (treated with radical prostatectomy)  Brother: prostate cancer (prostatectomy)     Social History:   Former PPD smoker for 29 years, quit 2000  Unmarried  Former nurse    Physical Exam:   There were no vitals taken for this visit.   Limited by video interview.  Constitutional: awake, alert  EYES: extraoccular movements intact  HEENT: normocephalic, without obvious trauma  Cardiovascular: appears well perfused   Pulmonary/Chest: no apparent respiratory distress, speaking in complete sentences  Neurological: no tremor noted, moving arms spontaneously, responding to questions appropriately  Extremities: No apparent edema in arms  Psychological: appropriate mood and affect      Data:  Results for ZEN JEREZ (MRN [id number]) as of 2/22/2020 17:41   Ref. Range 2/19/2020 11:30   Sodium Latest Ref Range: 133 - 144 mmol/L 139   Potassium Latest Ref Range: 3.4 - 5.3 mmol/L 3.7   Chloride Latest Ref Range: 94 - 109 mmol/L 106   Carbon Dioxide Latest Ref Range: 20 - 32 mmol/L 26   Urea Nitrogen Latest Ref Range: 7 - 30 mg/dL 13   Creatinine Latest Ref Range: 0.66 - 1.25 mg/dL 0.62 (L)   GFR Estimate Latest Ref Range: >60 mL/min/1.73_m2 >90   GFR Estimate If Black Latest Ref  Range: >60 mL/min/1.73_m2 >90   Calcium Latest Ref Range: 8.5 - 10.1 mg/dL 8.8   Anion Gap Latest Ref Range: 3 - 14 mmol/L 7   Albumin Latest Ref Range: 3.4 - 5.0 g/dL 4.0   Protein Total Latest Ref Range: 6.8 - 8.8 g/dL 7.7   Bilirubin Total Latest Ref Range: 0.2 - 1.3 mg/dL 0.4   Alkaline Phosphatase Latest Ref Range: 40 - 150 U/L 74   ALT Latest Ref Range: 0 - 70 U/L 71 (H)   AST Latest Ref Range: 0 - 45 U/L 40   Bilirubin Direct Latest Ref Range: 0.0 - 0.2 mg/dL 0.1   PSA Latest Ref Range: 0 - 4 ug/L 0.78   Testosterone Total Latest Ref Range: 240 - 950 ng/dL 294   Vitamin D Deficiency screening Latest Ref Range: 20 - 75 ug/L 31   Glucose Latest Ref Range: 70 - 99 mg/dL 108 (H)   WBC Latest Ref Range: 4.0 - 11.0 10e9/L 6.6   Hemoglobin Latest Ref Range: 13.3 - 17.7 g/dL 16.3   Hematocrit Latest Ref Range: 40.0 - 53.0 % 49.0   Platelet Count Latest Ref Range: 150 - 450 10e9/L 180   RBC Count Latest Ref Range: 4.4 - 5.9 10e12/L 5.19   MCV Latest Ref Range: 78 - 100 fl 94   MCH Latest Ref Range: 26.5 - 33.0 pg 31.4   MCHC Latest Ref Range: 31.5 - 36.5 g/dL 33.3   RDW Latest Ref Range: 10.0 - 15.0 % 13.4     Dexa Scan 1/24/2019:  Conclusions:  The most negative and valid T-score of -1.2 at the level of the left femoral neck, corresponds with low bone density.    Assessment and Plan:    Hypogonadism male  Started about 1.5 years ago due to hypogonadal symptoms plus osteoporosis in a setting of documented low Testosterone level     Ref. Range 1/28/2019 09:40   Testosterone Total Latest Ref Range: 240 - 950 ng/dL 223 (L)     Reports sustained improvement of hypogonadal symptoms. Testosterone was low at 223 in Jan 2019. On the current dose testosterone between 294-394. Low end on last check likely because testosterone was not checked half way between injections. Have had no new labs since last appointment 6 months ago.    Monitoring side effects from testosterone (most recent labs from 2/19/2020, more current labs are  needed):    hematocrit stable. = 49    Doesn't report s/s of CHUYITA.     PSA stable. 0.78 similar to level 6 months ago. Does have family history of prostate cancer, declined to follow with urology at this time.    ALT 71 (cutoff for normal 70). Repeat liver enzymes in 2-3 months. Order placed.     - Continue current dose of testosterone cypionate 50 mg weekly  - Plan to obtain current labs at his convenience including testosterone, PSA, CMP, and lipid panel    Osteoporosis, unspecified osteoporosis type, unspecified pathological fracture presence  History of atraumatic L1 compression fracture in 2017, s/p L3-L5 fusion 2016 and L2-L5 revision in 2019 . Risk factors for osteoporosis include history of previously smoking and history of hypogonadism. Reclast infusion in April 2019 was well tolerated, previous treatment with Alendronate was poorly tolerated (GI symptoms, though he also reports having H. Pylori at the time). Was unable to get Reclast infusion this year so far due to insurance processes. He is planning on having dental work performed in near future, advised to complete this and get approval from dentist before resuming Reclast.    He is likely getting excessive calcium and vitamin D (lots of dietary intake in form of cheese and taking multiple doses of calcium/vitamin D supplement). Advised to reduce intake, reiterated a goal of 1200 mg daily calcium and 1000 international unit Vitamin D supplement.     - Plan for Reclast after approval from dentist, will begin insurance processes  - Advised to reduce calcium/vitamin D intake to appropriate levels  - Plan for DEXA scan in early 2021 w/ wrist and heel    ENDO CALCIUM LABS-[facility] Latest Ref Rng & Units 2/19/2020   CALCIUM 8.5 - 10.1 mg/dL 8.8   PHOSPHOROUS 2.5 - 4.5 mg/dL    ALBUMIN 3.4 - 5.0 g/dL 4.0   BUN 7 - 30 mg/dL 13   CREATININE 0.66 - 1.25 mg/dL 0.62 (L)   PARATHYROID HORMONE INTACT 18 - 80 pg/mL    ALKPHOS 40 - 150 U/L 74   25 OH VIT D2 ug/L    25 OH VIT  "D3 ug/L    25 OH VIT D TOTAL 20 - 75 ug/L    VITAMIN D DEFICIENCY SCREENING 20 - 75 ug/L 31   PROTEIN, TOTAL 6.8 - 8.8 g/dL 7.7      Patient Instructions     It was a pleasure talking to you Bill.     #1 continue your current testosterone injection.  2.  Please schedule follow-up labs- the following labs are ordered.  This DEXA scan to be scheduled after January 2021  Orders Placed This Encounter   Procedures     Dexa hip/pelvis/spine     Dexa Wrist/Heel     Comprehensive metabolic panel     CBC with platelets     PSA tumor marker     Testosterone total     Lipid panel reflex to direct LDL Fasting     #3 Reclast infusion order is placed.  However, please complete your dental procedure prior to Reclast infusion.  Please get approval from your dentist prior to getting the Reclast infusion.  To prevent flulike symptoms which are commonly seen few days after the injection-I recommend Tylenol 500 mg every 8 hours for 3 days starting from the day of the infusion.  Reclast has a rare side effect of osteonecrosis of the jaw and the typical fracture of the hip.  Calcium 1200 mg daily and vitamin D 1000 international units daily is recommended maintenance dose.     below please find additional information about Reclast.    \"What are some side effects that I need to call my doctor about right away?     Signs of an allergic reaction, like rash; hives; itching; red, swollen, blistered, or peeling skin with or without fever; wheezing; tightness in the chest or throat; trouble breathing, swallowing, or talking; unusual hoarseness; or swelling of the mouth, face, lips, tongue, or throat.    Signs of low calcium levels like muscle cramps or spasms, numbness and tingling, or seizures.    Signs of kidney problems like unable to pass urine, change in how much urine is passed, blood in the urine, or a big weight gain.    Very bad bone, joint, or muscle pain.    Any new or strange groin, hip, or thigh pain.    This drug may cause jawbone " "problems. The risk may be higher the longer you take this drug. The risk may be higher if you have cancer, dental problems, dentures that do not fit well, anemia, blood clotting problems, or an infection. The risk may also be higher if you are having dental work, get chemo or radiation, or take other drugs that may cause jawbone problems (like some steroid drugs). Talk with your doctor if any of these apply to you, if you will be having dental work, or if you take other drugs that may cause jawbone problems. There are many drugs that can do this. Ask your doctor or pharmacist if you are not sure. Call your doctor right away if you have jaw swelling or pain.     What are some other side effects of this drug?    All drugs may cause side effects. However, many people have no side effects or only have minor side effects.  Dizziness.    Upset stomach or throwing up.    Irritation where the shot is given.    Feeling tired or weak.    Belly pain.    Headache.    Flu-like signs.    Diarrhea.    Muscle or joint pain.    Back pain.    Using this drug during cancer treatment:    Not able to sleep.    Not hungry.    Constipation.    Weight loss.    Cough.       How is this drug best taken?     It is given as an infusion into a vein over a period of time.    Drink lots of noncaffeine liquids unless told to drink less liquid by your doctor.    Using this drug for bone health:    Acetaminophen may be given to lower fever and chills.    Drink at least 2 glasses of liquids a few hours before you get this drug. \"       Follow-up: Return in about 6 months (around 2/19/2021).   Severiano Garcia MS4  Scribe Disclosure:   I, Severiano Garcia MS4, am serving as a scribe; to document services personally performed by Ludy Langley MD based on data collection and the provider's statements to me.     Provider Disclosure:  I agree with above History, Review of Systems, Physical exam and Plan.  I have reviewed the content of the documentation " and have edited it as needed. I have personally performed the services documented here and the documentation accurately represents those services and the decisions I have made.    Ludy Langley MD  Staff Endocrinologist   Endocrinology and Metabolism  [facility]  Pager: [phone]

## 2020-08-19 ENCOUNTER — VIRTUAL VISIT (OUTPATIENT)
Dept: ENDOCRINOLOGY | Facility: CLINIC | Age: 65
End: 2020-08-19
Payer: COMMERCIAL

## 2020-08-19 DIAGNOSIS — R74.8 ELEVATED LIVER ENZYMES: ICD-10-CM

## 2020-08-19 DIAGNOSIS — M81.0 OSTEOPOROSIS, UNSPECIFIED OSTEOPOROSIS TYPE, UNSPECIFIED PATHOLOGICAL FRACTURE PRESENCE: ICD-10-CM

## 2020-08-19 DIAGNOSIS — Z79.890 ENCOUNTER FOR MONITORING TESTOSTERONE REPLACEMENT THERAPY: ICD-10-CM

## 2020-08-19 DIAGNOSIS — Z51.81 ENCOUNTER FOR MONITORING TESTOSTERONE REPLACEMENT THERAPY: ICD-10-CM

## 2020-08-19 DIAGNOSIS — E29.1 HYPOGONADISM MALE: Primary | ICD-10-CM

## 2020-08-19 NOTE — LETTER
"8/19/2020       RE: Usama Harris  [address]  [place] [address]     Dear Colleague,    Thank you for referring your patient, Usama Harris, to the [facility] ENDOCRINOLOGY at [facility]. Please see a copy of my visit note below.    Osteopenia  Sanjay Yeung CMA    Usama Harris is a 64 year old male who is being evaluated via a billable video visit.      The patient has been notified of following:     \"This video visit will be conducted via a call between you and your physician/provider. We have found that certain health care needs can be provided without the need for an in-person physical exam.  This service lets us provide the care you need with a video conversation.  If a prescription is necessary we can send it directly to your pharmacy.  If lab work is needed we can place an order for that and you can then stop by our lab to have the test done at a later time.    Video visits are billed at different rates depending on your insurance coverage.  Please reach out to your insurance provider with any questions.    If during the course of the call the physician/provider feels a video visit is not appropriate, you will not be charged for this service.\"    Patient has given verbal consent for Video visit? Yes  How would you like to obtain your AVS? Mail a copy  If you are dropped from the video visit, the video invite should be resent to: Send to e-mail at: [email or web address]  Will anyone else be joining your video visit? No        Video-Visit Details    Type of service:  Video Visit    Video Start Time: 10:50 AM  Video End Time: 11:10 AM    Originating Location (pt. Location): Home    Distant Location (provider location):  [facility] ENDOCRINOLOGY     Platform used for Video Visit: [facility]  Endocrinology  Ludy Langley MD  08/19/2020      Chief Complaint:   RECHECK     History of Present Illness:   Usama Harris is a 64 year old male " with a history of hypogonadism and osteoporosis who presents for follow-up on endocrine concerns.    Summaries orginally copied forwarded from Dr. Friedman's last note.    Hypogonadism  In the past, testosterone and gonadotropins have been low and he's had a normal brain MRI. He was on significant amount of opioids around this time. Was on testosterone from 0586-5504 and noted improvement in libido and strength, but replacement was stopped because of mildly elevated transaminases, which eventually normalized. In 01/2019, AM testosterone was 223 (240-950). He had low libido, ED, and low energy. In 02/2019, he was started on 50 mg testosterone cypionate weekly.    Interval history: continues to take 50 mg injections, injects on his own. Has reported improvements in mood, energy, libido and musculature. Says impact on libido is difficult to tell due to impact of previously prescribed cymbalta which he no longer takes, which was previously reducing libido. February 2019 total Testosterone 294.    He reports prostate cancer in his brother and father, both treated surgically. Also said he has 6 other brothers who never developed prostate cancer.     Osteoporosis  In 03/2017 lumbar x-rays showed an incidental L1 compression fracture.  He had no trauma that he can recall. He has a history of L3-L5 fusion surgery in 2016, L2-L5 revision in 2019. In 04/2017, his PCP started him on fosamax and calcitonin. Calcitonin stopped in 10/2017. Fosamax use was continued, but a pharmacist told him to stop it because of some GI symptoms (reflux, dyspepsia), so he stopped it in fall 2018 (however he now reports having H. Pylori at the time).  First zoledronic acid infusion 04/2019, which he tolerated well.    Interval history: Reports eating lots of calcium in the form of cheese. Also says that he is taking combination supplement of vitamin D 1000 and calcium 500 mg. He says that because they taste good he is often taking up to 6 a day. Is  "not aware of any new fractures, although he believes there may be evidence of thoracic compression fracture on recent CXR (but reports that radiologist did not identify this).     Says he has been unable to obtain zoledronic acid due to issue with insurance approval. He is planning on getting dental work in the near future, reports that he has 4 cavities. Says that he tolerated zoledronic acid well previously.    Has been walking at least 3 km per day and has spent time working on landscaping projects.      Review of Systems:   Pertinent items are noted in HPI, remainder of complete ROS is negative.      Active Medications:     Current Outpatient Medications:      acetaminophen (TYLENOL) 500 MG tablet, Take 500-1,000 mg by mouth every 6 hours as needed for mild pain, Disp: , Rfl:      amLODIPine (NORVASC) 5 MG tablet, Take 1 tablet (5 mg) by mouth daily (Patient taking differently: Take 10 mg by mouth daily ), Disp: 90 tablet, Rfl: 3     carboxymethylcellulose (REFRESH PLUS) 0.5 % SOLN, Place 1 drop into both eyes daily as needed for dry eyes, Disp: , Rfl:      celecoxib (CELEBREX) 200 MG capsule, Take 200 mg by mouth 2 times daily, Disp: , Rfl:      latanoprost (XALATAN) 0.005 % ophthalmic solution, Place 1 drop into both eyes At Bedtime , Disp: , Rfl: 11     methocarbamol (ROBAXIN) 500 MG tablet, Take 1 tablet (500 mg) by mouth 2 times daily as needed for muscle spasms, Disp: 60 tablet, Rfl: 1     multivitamin, therapeutic with minerals (MULTI-VITAMIN) TABS tablet, Take 1 tablet by mouth daily, Disp: , Rfl:      order for DME, Equipment being ordered: tens unit patches 12 patches/ month, Disp: 1 Box, Rfl: 11     sildenafil (VIAGRA) 50 MG tablet, TAKE ONE TABLET BY MOUTH DAILY AS NEEDED FOR ERECTILE DYSFUNCTION, Disp: 40 tablet, Rfl: 0     syringe/needle, disp, 23G X 1\" 3 ML MISC, Use 1 with each testosterone injection, Disp: 50 each, Rfl: 11     testosterone cypionate (DEPOTESTOSTERONE) 200 MG/ML injection, Inject " 0.25 mLs (50 mg) into the muscle once a week, Disp: 12 vial, Rfl: 1     traMADol (ULTRAM) 50 MG tablet, Take 1 tablet (50 mg) by mouth every 6 hours as needed for severe pain, Disp: 60 tablet, Rfl: 0     albuterol (PROAIR HFA/PROVENTIL HFA/VENTOLIN HFA) 108 (90 Base) MCG/ACT inhaler, Inhale 2 puffs into the lungs every 6 hours (Patient not taking: Reported on 8/18/2020), Disp: 18 g, Rfl: 3     DULoxetine (CYMBALTA) 20 MG capsule, Take 1 capsule (20 mg) by mouth daily (Patient not taking: Reported on 8/18/2020), Disp: 30 capsule, Rfl: 3      Allergies:   Gabapentin      Past Medical History:  Acute hepatitis C  Degenerative disk disease  Glaucoma  Hypertension   Testosterone deficiency  Osteoporosis  Low back pain     Past Surgical History:  Herniated disc  Cataracts  Decompress lumbar, one level  Discectomy  Laminectomy L3-5  Fusion lumbar 2 levels  Ligation of spermatic vein    Family History:   Mother: coronary artery disease, DVT, atrial fibrillation, hypertension, cerebrovascular disease  Father: heart failure, prostate cancer (treated with radical prostatectomy)  Brother: prostate cancer (prostatectomy)     Social History:   Former PPD smoker for 29 years, quit 2000  Unmarried  Former nurse    Physical Exam:   There were no vitals taken for this visit.   Limited by video interview.  Constitutional: awake, alert  EYES: extraoccular movements intact  HEENT: normocephalic, without obvious trauma  Cardiovascular: appears well perfused   Pulmonary/Chest: no apparent respiratory distress, speaking in complete sentences  Neurological: no tremor noted, moving arms spontaneously, responding to questions appropriately  Extremities: No apparent edema in arms  Psychological: appropriate mood and affect      Data:  Results for ZEN JEREZ (MRN [id number]) as of 2/22/2020 17:41   Ref. Range 2/19/2020 11:30   Sodium Latest Ref Range: 133 - 144 mmol/L 139   Potassium Latest Ref Range: 3.4 - 5.3 mmol/L 3.7   Chloride Latest  Ref Range: 94 - 109 mmol/L 106   Carbon Dioxide Latest Ref Range: 20 - 32 mmol/L 26   Urea Nitrogen Latest Ref Range: 7 - 30 mg/dL 13   Creatinine Latest Ref Range: 0.66 - 1.25 mg/dL 0.62 (L)   GFR Estimate Latest Ref Range: >60 mL/min/1.73_m2 >90   GFR Estimate If Black Latest Ref Range: >60 mL/min/1.73_m2 >90   Calcium Latest Ref Range: 8.5 - 10.1 mg/dL 8.8   Anion Gap Latest Ref Range: 3 - 14 mmol/L 7   Albumin Latest Ref Range: 3.4 - 5.0 g/dL 4.0   Protein Total Latest Ref Range: 6.8 - 8.8 g/dL 7.7   Bilirubin Total Latest Ref Range: 0.2 - 1.3 mg/dL 0.4   Alkaline Phosphatase Latest Ref Range: 40 - 150 U/L 74   ALT Latest Ref Range: 0 - 70 U/L 71 (H)   AST Latest Ref Range: 0 - 45 U/L 40   Bilirubin Direct Latest Ref Range: 0.0 - 0.2 mg/dL 0.1   PSA Latest Ref Range: 0 - 4 ug/L 0.78   Testosterone Total Latest Ref Range: 240 - 950 ng/dL 294   Vitamin D Deficiency screening Latest Ref Range: 20 - 75 ug/L 31   Glucose Latest Ref Range: 70 - 99 mg/dL 108 (H)   WBC Latest Ref Range: 4.0 - 11.0 10e9/L 6.6   Hemoglobin Latest Ref Range: 13.3 - 17.7 g/dL 16.3   Hematocrit Latest Ref Range: 40.0 - 53.0 % 49.0   Platelet Count Latest Ref Range: 150 - 450 10e9/L 180   RBC Count Latest Ref Range: 4.4 - 5.9 10e12/L 5.19   MCV Latest Ref Range: 78 - 100 fl 94   MCH Latest Ref Range: 26.5 - 33.0 pg 31.4   MCHC Latest Ref Range: 31.5 - 36.5 g/dL 33.3   RDW Latest Ref Range: 10.0 - 15.0 % 13.4     Dexa Scan 1/24/2019:  Conclusions:  The most negative and valid T-score of -1.2 at the level of the left femoral neck, corresponds with low bone density.    Assessment and Plan:    Hypogonadism male  Started about 1.5 years ago due to hypogonadal symptoms plus osteoporosis in a setting of documented low Testosterone level     Ref. Range 1/28/2019 09:40   Testosterone Total Latest Ref Range: 240 - 950 ng/dL 223 (L)     Reports sustained improvement of hypogonadal symptoms. Testosterone was low at 223 in Jan 2019. On the current dose  testosterone between 294-394. Low end on last check likely because testosterone was not checked half way between injections. Have had no new labs since last appointment 6 months ago.    Monitoring side effects from testosterone (most recent labs from 2/19/2020, more current labs are needed):    hematocrit stable. = 49    Doesn't report s/s of CHUYITA.     PSA stable. 0.78 similar to level 6 months ago. Does have family history of prostate cancer, declined to follow with urology at this time.    ALT 71 (cutoff for normal 70). Repeat liver enzymes in 2-3 months. Order placed.     - Continue current dose of testosterone cypionate 50 mg weekly  - Plan to obtain current labs at his convenience including testosterone, PSA, CMP, and lipid panel    Osteoporosis, unspecified osteoporosis type, unspecified pathological fracture presence  History of atraumatic L1 compression fracture in 2017, s/p L3-L5 fusion 2016 and L2-L5 revision in 2019 . Risk factors for osteoporosis include history of previously smoking and history of hypogonadism. Reclast infusion in April 2019 was well tolerated, previous treatment with Alendronate was poorly tolerated (GI symptoms, though he also reports having H. Pylori at the time). Was unable to get Reclast infusion this year so far due to insurance processes. He is planning on having dental work performed in near future, advised to complete this and get approval from dentist before resuming Reclast.    He is likely getting excessive calcium and vitamin D (lots of dietary intake in form of cheese and taking multiple doses of calcium/vitamin D supplement). Advised to reduce intake, reiterated a goal of 1200 mg daily calcium and 1000 international unit Vitamin D supplement.     - Plan for Reclast after approval from dentist, will begin insurance processes  - Advised to reduce calcium/vitamin D intake to appropriate levels  - Plan for DEXA scan in early 2021 w/ wrist and heel    ENDO CALCIUM LABS-[facility]  "Latest Ref Rng & Units 2/19/2020   CALCIUM 8.5 - 10.1 mg/dL 8.8   PHOSPHOROUS 2.5 - 4.5 mg/dL    ALBUMIN 3.4 - 5.0 g/dL 4.0   BUN 7 - 30 mg/dL 13   CREATININE 0.66 - 1.25 mg/dL 0.62 (L)   PARATHYROID HORMONE INTACT 18 - 80 pg/mL    ALKPHOS 40 - 150 U/L 74   25 OH VIT D2 ug/L    25 OH VIT D3 ug/L    25 OH VIT D TOTAL 20 - 75 ug/L    VITAMIN D DEFICIENCY SCREENING 20 - 75 ug/L 31   PROTEIN, TOTAL 6.8 - 8.8 g/dL 7.7      Patient Instructions     It was a pleasure talking to you Bill.     #1 continue your current testosterone injection.  2.  Please schedule follow-up labs- the following labs are ordered.  This DEXA scan to be scheduled after January 2021  Orders Placed This Encounter   Procedures     Dexa hip/pelvis/spine     Dexa Wrist/Heel     Comprehensive metabolic panel     CBC with platelets     PSA tumor marker     Testosterone total     Lipid panel reflex to direct LDL Fasting     #3 Reclast infusion order is placed.  However, please complete your dental procedure prior to Reclast infusion.  Please get approval from your dentist prior to getting the Reclast infusion.  To prevent flulike symptoms which are commonly seen few days after the injection-I recommend Tylenol 500 mg every 8 hours for 3 days starting from the day of the infusion.  Reclast has a rare side effect of osteonecrosis of the jaw and the typical fracture of the hip.  Calcium 1200 mg daily and vitamin D 1000 international units daily is recommended maintenance dose.     below please find additional information about Reclast.    \"What are some side effects that I need to call my doctor about right away?     Signs of an allergic reaction, like rash; hives; itching; red, swollen, blistered, or peeling skin with or without fever; wheezing; tightness in the chest or throat; trouble breathing, swallowing, or talking; unusual hoarseness; or swelling of the mouth, face, lips, tongue, or throat.    Signs of low calcium levels like muscle cramps or spasms, " "numbness and tingling, or seizures.    Signs of kidney problems like unable to pass urine, change in how much urine is passed, blood in the urine, or a big weight gain.    Very bad bone, joint, or muscle pain.    Any new or strange groin, hip, or thigh pain.    This drug may cause jawbone problems. The risk may be higher the longer you take this drug. The risk may be higher if you have cancer, dental problems, dentures that do not fit well, anemia, blood clotting problems, or an infection. The risk may also be higher if you are having dental work, get chemo or radiation, or take other drugs that may cause jawbone problems (like some steroid drugs). Talk with your doctor if any of these apply to you, if you will be having dental work, or if you take other drugs that may cause jawbone problems. There are many drugs that can do this. Ask your doctor or pharmacist if you are not sure. Call your doctor right away if you have jaw swelling or pain.     What are some other side effects of this drug?    All drugs may cause side effects. However, many people have no side effects or only have minor side effects.  Dizziness.    Upset stomach or throwing up.    Irritation where the shot is given.    Feeling tired or weak.    Belly pain.    Headache.    Flu-like signs.    Diarrhea.    Muscle or joint pain.    Back pain.    Using this drug during cancer treatment:    Not able to sleep.    Not hungry.    Constipation.    Weight loss.    Cough.       How is this drug best taken?     It is given as an infusion into a vein over a period of time.    Drink lots of noncaffeine liquids unless told to drink less liquid by your doctor.    Using this drug for bone health:    Acetaminophen may be given to lower fever and chills.    Drink at least 2 glasses of liquids a few hours before you get this drug. \"       Follow-up: Return in about 6 months (around 2/19/2021).   Severiano MORENO  Scribe Disclosure:   I, Severiano Garcia MS4, am serving " as a scribe; to document services personally performed by Ludy Langley MD based on data collection and the provider's statements to me.     Provider Disclosure:  I agree with above History, Review of Systems, Physical exam and Plan.  I have reviewed the content of the documentation and have edited it as needed. I have personally performed the services documented here and the documentation accurately represents those services and the decisions I have made.    Ludy Langley MD  Staff Endocrinologist   Endocrinology and Metabolism  [facility]  Pager: [phone]

## 2020-08-20 NOTE — PATIENT INSTRUCTIONS
"It was a pleasure talking to you Bill.     #1 continue your current testosterone injection.  2.  Please schedule follow-up labs- the following labs are ordered.  This DEXA scan to be scheduled after January 2021  Orders Placed This Encounter   Procedures     Dexa hip/pelvis/spine     Dexa Wrist/Heel     Comprehensive metabolic panel     CBC with platelets     PSA tumor marker     Testosterone total     Lipid panel reflex to direct LDL Fasting     #3 Reclast infusion order is placed.  However, please complete your dental procedure prior to Reclast infusion.  Please get approval from your dentist prior to getting the Reclast infusion.  To prevent flulike symptoms which are commonly seen few days after the injection-I recommend Tylenol 500 mg every 8 hours for 3 days starting from the day of the infusion.  Reclast has a rare side effect of osteonecrosis of the jaw and the typical fracture of the hip.  Calcium 1200 mg daily and vitamin D 1000 international units daily is recommended maintenance dose.     below please find additional information about Reclast.    \"What are some side effects that I need to call my doctor about right away?     Signs of an allergic reaction, like rash; hives; itching; red, swollen, blistered, or peeling skin with or without fever; wheezing; tightness in the chest or throat; trouble breathing, swallowing, or talking; unusual hoarseness; or swelling of the mouth, face, lips, tongue, or throat.    Signs of low calcium levels like muscle cramps or spasms, numbness and tingling, or seizures.    Signs of kidney problems like unable to pass urine, change in how much urine is passed, blood in the urine, or a big weight gain.    Very bad bone, joint, or muscle pain.    Any new or strange groin, hip, or thigh pain.    This drug may cause jawbone problems. The risk may be higher the longer you take this drug. The risk may be higher if you have cancer, dental problems, dentures that do not fit well, " "anemia, blood clotting problems, or an infection. The risk may also be higher if you are having dental work, get chemo or radiation, or take other drugs that may cause jawbone problems (like some steroid drugs). Talk with your doctor if any of these apply to you, if you will be having dental work, or if you take other drugs that may cause jawbone problems. There are many drugs that can do this. Ask your doctor or pharmacist if you are not sure. Call your doctor right away if you have jaw swelling or pain.     What are some other side effects of this drug?    All drugs may cause side effects. However, many people have no side effects or only have minor side effects.  Dizziness.    Upset stomach or throwing up.    Irritation where the shot is given.    Feeling tired or weak.    Belly pain.    Headache.    Flu-like signs.    Diarrhea.    Muscle or joint pain.    Back pain.    Using this drug during cancer treatment:    Not able to sleep.    Not hungry.    Constipation.    Weight loss.    Cough.       How is this drug best taken?     It is given as an infusion into a vein over a period of time.    Drink lots of noncaffeine liquids unless told to drink less liquid by your doctor.    Using this drug for bone health:    Acetaminophen may be given to lower fever and chills.    Drink at least 2 glasses of liquids a few hours before you get this drug. \"    "

## 2020-08-22 NOTE — TELEPHONE ENCOUNTER
LM to call back and Est. Care with new provider. Needs to schedule annual visit. Informed pt of approved med and sent to pharmacy. Sent [employer] message and letter.      Brenna DE LA PAZ  [employer] [facility]

## 2020-09-07 NOTE — TELEPHONE ENCOUNTER
Pt has Est. Care with someone else. Please disregard the refill request.    Brenna DE LA PAZ  [facility]

## 2020-09-25 ENCOUNTER — COMMUNICATION - HEALTHEAST (OUTPATIENT)
Dept: INTERNAL MEDICINE | Facility: CLINIC | Age: 65
End: 2020-09-25

## 2020-09-25 DIAGNOSIS — G89.4 CHRONIC PAIN DISORDER: ICD-10-CM

## 2020-10-12 DIAGNOSIS — R74.8 ELEVATED LIVER ENZYMES: ICD-10-CM

## 2020-10-12 DIAGNOSIS — E29.1 HYPOGONADISM MALE: ICD-10-CM

## 2020-10-12 DIAGNOSIS — Z79.890 ENCOUNTER FOR MONITORING TESTOSTERONE REPLACEMENT THERAPY: ICD-10-CM

## 2020-10-12 DIAGNOSIS — Z51.81 ENCOUNTER FOR MONITORING TESTOSTERONE REPLACEMENT THERAPY: ICD-10-CM

## 2020-10-12 LAB
ALBUMIN SERPL-MCNC: 3.9 G/DL (ref 3.4–5)
ALP SERPL-CCNC: 73 U/L (ref 40–150)
ALT SERPL W P-5'-P-CCNC: 41 U/L (ref 0–70)
ANION GAP SERPL CALCULATED.3IONS-SCNC: 5 MMOL/L (ref 3–14)
AST SERPL W P-5'-P-CCNC: 23 U/L (ref 0–45)
BILIRUB DIRECT SERPL-MCNC: 0.1 MG/DL (ref 0–0.2)
BILIRUB SERPL-MCNC: 0.5 MG/DL (ref 0.2–1.3)
BUN SERPL-MCNC: 23 MG/DL (ref 7–30)
CALCIUM SERPL-MCNC: 8.7 MG/DL (ref 8.5–10.1)
CHLORIDE SERPL-SCNC: 107 MMOL/L (ref 94–109)
CHOLEST SERPL-MCNC: 204 MG/DL
CO2 SERPL-SCNC: 27 MMOL/L (ref 20–32)
CREAT SERPL-MCNC: 0.73 MG/DL (ref 0.66–1.25)
ERYTHROCYTE [DISTWIDTH] IN BLOOD BY AUTOMATED COUNT: 13.6 % (ref 10–15)
GFR SERPL CREATININE-BSD FRML MDRD: >90 ML/MIN/{1.73_M2}
GLUCOSE SERPL-MCNC: 95 MG/DL (ref 70–99)
HCT VFR BLD AUTO: 52.4 % (ref 40–53)
HDLC SERPL-MCNC: 43 MG/DL
HGB BLD-MCNC: 17.4 G/DL (ref 13.3–17.7)
LDLC SERPL CALC-MCNC: 127 MG/DL
MCH RBC QN AUTO: 31.5 PG (ref 26.5–33)
MCHC RBC AUTO-ENTMCNC: 33.2 G/DL (ref 31.5–36.5)
MCV RBC AUTO: 95 FL (ref 78–100)
NONHDLC SERPL-MCNC: 162 MG/DL
PLATELET # BLD AUTO: 217 10E9/L (ref 150–450)
POTASSIUM SERPL-SCNC: 4 MMOL/L (ref 3.4–5.3)
PROT SERPL-MCNC: 7.5 G/DL (ref 6.8–8.8)
PSA SERPL-MCNC: 0.87 UG/L (ref 0–4)
RBC # BLD AUTO: 5.53 10E12/L (ref 4.4–5.9)
SODIUM SERPL-SCNC: 138 MMOL/L (ref 133–144)
TRIGL SERPL-MCNC: 173 MG/DL
WBC # BLD AUTO: 8.6 10E9/L (ref 4–11)

## 2020-10-12 PROCEDURE — 84403 ASSAY OF TOTAL TESTOSTERONE: CPT | Mod: 90 | Performed by: PATHOLOGY

## 2020-10-12 PROCEDURE — 85027 COMPLETE CBC AUTOMATED: CPT | Performed by: PATHOLOGY

## 2020-10-12 PROCEDURE — 82248 BILIRUBIN DIRECT: CPT | Performed by: PATHOLOGY

## 2020-10-12 PROCEDURE — 84153 ASSAY OF PSA TOTAL: CPT | Performed by: PATHOLOGY

## 2020-10-12 PROCEDURE — 99000 SPECIMEN HANDLING OFFICE-LAB: CPT | Performed by: PATHOLOGY

## 2020-10-12 PROCEDURE — 80053 COMPREHEN METABOLIC PANEL: CPT | Performed by: PATHOLOGY

## 2020-10-12 PROCEDURE — 80061 LIPID PANEL: CPT | Performed by: PATHOLOGY

## 2020-10-14 ENCOUNTER — TELEPHONE (OUTPATIENT)
Dept: ENDOCRINOLOGY | Facility: CLINIC | Age: 65
End: 2020-10-14

## 2020-10-14 ENCOUNTER — MYC MEDICAL ADVICE (OUTPATIENT)
Dept: ENDOCRINOLOGY | Facility: CLINIC | Age: 65
End: 2020-10-14

## 2020-10-14 DIAGNOSIS — M81.0 OSTEOPOROSIS, UNSPECIFIED OSTEOPOROSIS TYPE, UNSPECIFIED PATHOLOGICAL FRACTURE PRESENCE: Primary | ICD-10-CM

## 2020-10-14 DIAGNOSIS — Z51.81 ENCOUNTER FOR MONITORING TESTOSTERONE REPLACEMENT THERAPY: ICD-10-CM

## 2020-10-14 DIAGNOSIS — Z79.890 ENCOUNTER FOR MONITORING TESTOSTERONE REPLACEMENT THERAPY: ICD-10-CM

## 2020-10-14 LAB — TESTOST SERPL-MCNC: 610 NG/DL (ref 240–950)

## 2020-10-14 NOTE — RESULT ENCOUNTER NOTE
Kevyn,    #1 the testosterone level is slightly higher than desired.  Usually we would like to target mid range.  One of the reason why the testosterone level is high could be secondary to timing of injection.  Usually blood work is recommended to be checked half-way between injections; if it is immediately after injecting it it can be higher.  Refill for new medication-testosterone is sent to your pharmacy.  2.  The cholesterol panel shows a slightly high triglyceride level.  If you have not been fasting for 8-10 hours prior to the testing -triglycerides can be high.  Reducing simple carbohydrates containing sugars, reducing saturated fat and regular exercise is recommended as of her stay.  3.  Comprehensive metabolic panel including liver test results and kidney function test results are within the normal limits.  4.  Your PSA result is stable compared to what was a year ago.  5.  Your hematocrit level is higher than baseline.  52.4. Erythrocytosis is a common adverse effect of testosterone administration. It is of concern because the risk of venous thromboembolic disease is directly related to hematocritThe Endocrine Society guidelines suggest stopping therapy if the hematocrit increases to =54 percent.  Please check your hematocrit level in a couple of months.  If it continues to increase I recommend reducing the dose of testosterone.    Order for Reclast infusion is placed.  Please complete your dental procedure prior to proceeding with infusion.    Please let me know if you have any questions regarding the above results and plan discussed above.    Ludy Langley MD

## 2020-10-14 NOTE — TELEPHONE ENCOUNTER
[facility] Call Center    Phone Message    May a detailed message be left on voicemail: yes     Reason for Call: Order(s): Other:   Reason for requested: Pt called earlier and confirmed that there were orders placed for him to get an infusion, but he stated that when he called to schedule it that he was told that the provider had not signed the infusion order. Pt is not sure if it hasn t been signed because the provider is possibly waiting on lab results. Please review, and call Pt back once infusion order is signed.  Date needed: asap  Provider name: Korin      Action Taken: Message routed to:  [facility] endo    Travel Screening: Not Applicable

## 2020-10-14 NOTE — TELEPHONE ENCOUNTER
"Reclast therapy plan order requires update and signature. Sent to provider for review. Pt notified to provide letter of permission from dentist.   Ashlie Andrade RN on 10/14/2020 at 3:21 PM   Clinic notes:\"He is planning on having dental work performed in near future, advised to complete this and get approval from dentist before resuming Reclast.\"    [facility]     Phone Message     May a detailed message be left on voicemail: yes      Reason for Call: Order(s): Other:   Reason for requested: Pt called earlier and confirmed that there were orders placed for him to get an infusion, but he stated that when he called to schedule it that he was told that the provider had not signed the infusion order. Pt is not sure if it hasn t been signed because the provider is possibly waiting on lab results. Please review, and call Pt back once infusion order is signed.  Date needed: asap  Provider name: Korin        Action Taken: Message routed to:  [facility] endo     Travel Screening: Not Applicable        "

## 2020-10-21 NOTE — TELEPHONE ENCOUNTER
Pt scheduled for a RECLAST infusion. Per infusion center protocol COVID testing ordered. Please inform patient.

## 2020-10-23 DIAGNOSIS — M81.0 OSTEOPOROSIS, UNSPECIFIED OSTEOPOROSIS TYPE, UNSPECIFIED PATHOLOGICAL FRACTURE PRESENCE: ICD-10-CM

## 2020-10-23 LAB
SARS-COV-2 RNA SPEC QL NAA+PROBE: NOT DETECTED
SPECIMEN SOURCE: NORMAL

## 2020-10-23 PROCEDURE — 99000 SPECIMEN HANDLING OFFICE-LAB: CPT | Performed by: PATHOLOGY

## 2020-10-23 PROCEDURE — U0003 INFECTIOUS AGENT DETECTION BY NUCLEIC ACID (DNA OR RNA); SEVERE ACUTE RESPIRATORY SYNDROME CORONAVIRUS 2 (SARS-COV-2) (CORONAVIRUS DISEASE [COVID-19]), AMPLIFIED PROBE TECHNIQUE, MAKING USE OF HIGH THROUGHPUT TECHNOLOGIES AS DESCRIBED BY CMS-2020-01-R: HCPCS | Mod: 90 | Performed by: PATHOLOGY

## 2020-10-26 ENCOUNTER — INFUSION THERAPY VISIT (OUTPATIENT)
Dept: INFUSION THERAPY | Facility: CLINIC | Age: 65
End: 2020-10-26
Attending: INTERNAL MEDICINE
Payer: COMMERCIAL

## 2020-10-26 VITALS
OXYGEN SATURATION: 98 % | WEIGHT: 242.95 LBS | HEIGHT: 74 IN | DIASTOLIC BLOOD PRESSURE: 86 MMHG | HEART RATE: 82 BPM | SYSTOLIC BLOOD PRESSURE: 151 MMHG | TEMPERATURE: 99.4 F | BODY MASS INDEX: 31.18 KG/M2

## 2020-10-26 DIAGNOSIS — M81.0 OSTEOPOROSIS, UNSPECIFIED OSTEOPOROSIS TYPE, UNSPECIFIED PATHOLOGICAL FRACTURE PRESENCE: Primary | ICD-10-CM

## 2020-10-26 PROCEDURE — 96365 THER/PROPH/DIAG IV INF INIT: CPT

## 2020-10-26 PROCEDURE — 250N000011 HC RX IP 250 OP 636: Performed by: INTERNAL MEDICINE

## 2020-10-26 PROCEDURE — 258N000003 HC RX IP 258 OP 636: Performed by: INTERNAL MEDICINE

## 2020-10-26 RX ORDER — ZOLEDRONIC ACID 5 MG/100ML
5 INJECTION, SOLUTION INTRAVENOUS ONCE
Status: CANCELLED
Start: 2020-10-26 | End: 2020-10-26

## 2020-10-26 RX ORDER — ZOLEDRONIC ACID 5 MG/100ML
5 INJECTION, SOLUTION INTRAVENOUS ONCE
Status: COMPLETED | OUTPATIENT
Start: 2020-10-26 | End: 2020-10-26

## 2020-10-26 RX ORDER — HEPARIN SODIUM (PORCINE) LOCK FLUSH IV SOLN 100 UNIT/ML 100 UNIT/ML
5 SOLUTION INTRAVENOUS
Status: CANCELLED | OUTPATIENT
Start: 2020-10-26

## 2020-10-26 RX ORDER — HEPARIN SODIUM,PORCINE 10 UNIT/ML
5 VIAL (ML) INTRAVENOUS
Status: CANCELLED | OUTPATIENT
Start: 2020-10-26

## 2020-10-26 RX ADMIN — SODIUM CHLORIDE 50 ML: 9 INJECTION, SOLUTION INTRAVENOUS at 10:32

## 2020-10-26 RX ADMIN — ZOLEDRONIC ACID 5 MG: 0.05 INJECTION, SOLUTION INTRAVENOUS at 10:14

## 2020-10-26 ASSESSMENT — MIFFLIN-ST. JEOR: SCORE: 1962.78

## 2020-10-26 NOTE — LETTER
10/26/2020         RE: Usama Harris  [address] [place] [address]        Dear Colleague,    Thank you for referring your patient, Usama Harris, to the [facility]. Please see a copy of my visit note below.    Nursing Note  Usama Harris [facility] for:   Chief Complaint   Patient presents with     Infusion     ZOLEDRONIC ACID     During today's [facility] appointment, orders from Dr. Friedman were completed.  Frequency: once    Progress note:  Patient identification verified by name and date of birth.  Assessment completed.  Vitals recorded in Doc Flowsheets.  Patient was provided with education regarding medication/procedure and possible side effects.  Patient verbalized understanding.     present during visit today: Not Applicable.    Treatment Conditions: Verified that patient is taking oral Calcium Supplement and Vitamin D per order.    Premedications: were not ordered.    Drug Waste Record: No    Infusion length and rate: zoledronic acid infusion given over approximately 15 minutes    Labs: were not ordered for this appointment.    Vascular access: peripheral IV placed today.    Post Infusion Assessment:  Patient tolerated infusion without incident.  Blood return noted pre and post infusion.  Site patent and intact, free from redness, edema or discomfort.  No evidence of extravasations.  Access discontinued per protocol.     Discharge Plan:   Follow up plan of care with: ordering provider as scheduled. and after visit summary declined by patient  Discharge instructions were reviewed with patient.  Patient/representative verbalized understanding of discharge instructions and all questions answered.  [facility] discharged [facility] in stable condition.    Kat Deleon    Administrations This Visit     0.9% sodium  "chloride BOLUS     Admin Date  10/26/2020 Action  New Bag Dose  50 mL Route  Intravenous Administered By  Kat Deleon          zoledronic Acid (RECLAST) infusion 5 mg     Admin Date  10/26/2020 Action  New Bag Dose  5 mg Rate  400 mL/hr Route  Intravenous Administered By  Kat Deleon                BP (!) 151/86   Pulse 82   Temp 99.4  F (37.4  C)   Ht 1.873 m (6' 1.75\")   Wt 110.2 kg (242 lb 15.2 oz)   SpO2 98%   BMI 31.40 kg/m            Again, thank you for allowing me to participate in the care of your patient.        Sincerely,        [facility]    "

## 2020-10-26 NOTE — PROGRESS NOTES
Nursing Note  Usama Harris presents [facility] for:   Chief Complaint   Patient presents with     Infusion     ZOLEDRONIC ACID     During [facility]'s [facility] appointment, orders from Dr. Friedman were completed.  Frequency: once    Progress note:  Patient identification verified by name and date of birth.  Assessment completed.  Vitals recorded in Doc Flowsheets.  Patient was provided with education regarding medication/procedure and possible side effects.  Patient verbalized understanding.     present during visit today: Not Applicable.    Treatment Conditions: Verified that patient is taking oral Calcium Supplement and Vitamin D per order.    Premedications: were not ordered.    Drug Waste Record: No    Infusion length and rate: zoledronic acid infusion given over approximately 15 minutes    Labs: were not ordered for this appointment.    Vascular access: peripheral IV placed today.    Post Infusion Assessment:  Patient tolerated infusion without incident.  Blood return noted pre and post infusion.  Site patent and intact, free from redness, edema or discomfort.  No evidence of extravasations.  Access discontinued per protocol.     Discharge Plan:   Follow up plan of care with: ordering provider as scheduled. and after visit summary declined by patient  Discharge instructions were reviewed with patient.  Patient/representative verbalized understanding of discharge instructions and all questions answered.  [facility] discharged [facility] in stable condition.    Kat Deleon    Administrations This Visit     0.9% sodium chloride BOLUS     Admin Date  10/26/2020 Action  New Bag Dose  50 mL Route  Intravenous Administered By  Kat Deleon          zoledronic Acid (RECLAST) infusion 5 mg     Admin Date  10/26/2020 Action  New Bag Dose  5 mg Rate  400 mL/hr Route  Intravenous Administered By  Pancho  "Kat                BP (!) 151/86   Pulse 82   Temp 99.4  F (37.4  C)   Ht 1.873 m (6' 1.75\")   Wt 110.2 kg (242 lb 15.2 oz)   SpO2 98%   BMI 31.40 kg/m        "

## 2020-11-07 ENCOUNTER — COMMUNICATION - HEALTHEAST (OUTPATIENT)
Dept: INTERNAL MEDICINE | Facility: CLINIC | Age: 65
End: 2020-11-07

## 2020-11-07 DIAGNOSIS — G89.4 CHRONIC PAIN DISORDER: ICD-10-CM

## 2020-12-14 ENCOUNTER — COMMUNICATION - HEALTHEAST (OUTPATIENT)
Dept: INTERNAL MEDICINE | Facility: CLINIC | Age: 65
End: 2020-12-14

## 2020-12-14 DIAGNOSIS — G89.4 CHRONIC PAIN DISORDER: ICD-10-CM

## 2020-12-16 ENCOUNTER — COMMUNICATION - HEALTHEAST (OUTPATIENT)
Dept: INTERNAL MEDICINE | Facility: CLINIC | Age: 65
End: 2020-12-16

## 2020-12-16 DIAGNOSIS — H40.003 GLAUCOMA SUSPECT, BILATERAL: ICD-10-CM

## 2021-01-06 ENCOUNTER — COMMUNICATION - HEALTHEAST (OUTPATIENT)
Dept: INTERNAL MEDICINE | Facility: CLINIC | Age: 66
End: 2021-01-06

## 2021-01-06 DIAGNOSIS — M51.9 LUMBAR DISC DISEASE: ICD-10-CM

## 2021-01-15 ENCOUNTER — HEALTH MAINTENANCE LETTER (OUTPATIENT)
Age: 66
End: 2021-01-15

## 2021-02-17 NOTE — TELEPHONE ENCOUNTER
Pending Prescriptions:                       Disp   Refills    DULoxetine (CYMBALTA) 20 MG capsule       30 cap*3            Sig: Take 1 capsule (20 mg) by mouth daily    Last refill: 03/08/19  Last office visit; 1/14/19  Prescribed refills: 3  Next appointment: No future appointment.     Anita Us MA     Health Care Proxy (HCP)

## 2021-03-11 ENCOUNTER — TELEPHONE (OUTPATIENT)
Dept: ENDOCRINOLOGY | Facility: CLINIC | Age: 66
End: 2021-03-11

## 2021-03-11 NOTE — TELEPHONE ENCOUNTER
"The following D4Pt message sent.   \"  Bill,     This is a reminder message to schedule blood work for complete blood count to follow-up on your previously noted high normal hematocrit level.  You can schedule an appointment for blood work by calling the number below.     Current practice guideline recommends that we need to check your own testosterone production from time to time by stopping testosterone treatment.  This is particularly true for patients with no underlying cause for low testosterone.  Therefore, at your follow-up appointment we will be addressing this issue. \"       "

## 2021-03-24 ENCOUNTER — IMMUNIZATION (OUTPATIENT)
Dept: NURSING | Facility: CLINIC | Age: 66
End: 2021-03-24
Payer: COMMERCIAL

## 2021-03-24 PROCEDURE — 91300 PR COVID VAC PFIZER DIL RECON 30 MCG/0.3 ML IM: CPT

## 2021-03-24 PROCEDURE — 0001A PR COVID VAC PFIZER DIL RECON 30 MCG/0.3 ML IM: CPT

## 2021-04-08 ENCOUNTER — TELEPHONE (OUTPATIENT)
Dept: ENDOCRINOLOGY | Facility: CLINIC | Age: 66
End: 2021-04-08

## 2021-04-14 ENCOUNTER — OFFICE VISIT (OUTPATIENT)
Dept: NURSING | Facility: CLINIC | Age: 66
End: 2021-04-14
Attending: INTERNAL MEDICINE
Payer: COMMERCIAL

## 2021-04-14 PROCEDURE — 0002A PR COVID VAC PFIZER DIL RECON 30 MCG/0.3 ML IM: CPT

## 2021-04-14 PROCEDURE — 91300 PR COVID VAC PFIZER DIL RECON 30 MCG/0.3 ML IM: CPT

## 2021-05-25 DIAGNOSIS — R05.9 COUGH: ICD-10-CM

## 2021-05-26 RX ORDER — ALBUTEROL SULFATE 90 UG/1
2 AEROSOL, METERED RESPIRATORY (INHALATION) EVERY 6 HOURS
Refills: 3 | OUTPATIENT
Start: 2021-05-26

## 2021-05-26 NOTE — TELEPHONE ENCOUNTER
10/29/2019 was last OV with CHARLA GRAHAM.  Looks like pt is seen at [facility].    I called pt.He now goes to a different clinic.  Pt says he doesn't use albuterol. Told pt to discuss with his pharmacy.  TRENT Kim

## 2021-05-27 ASSESSMENT — PATIENT HEALTH QUESTIONNAIRE - PHQ9: SUM OF ALL RESPONSES TO PHQ QUESTIONS 1-9: 13

## 2021-06-09 NOTE — PROGRESS NOTES
Patient would like to be contacted via the following phone number [phone]    Usama Harris is a 64 y.o. male who is being evaluated via a billable telephone visit.      Usama Harris complains of    Chief Complaint   Patient presents with     [facility]     Assessment/Plan:    1. Lumbar disc disease  Has had discectomy times 2, and fusion and now needs hardware removal.      2. Hypogonadism in male  Longstanding and uses testosterone replacement.     3. History of hepatitis C  Says he has had treatment 3 times.  Denies history of IV drug use.  Says that he has cirrhosis.      4. Chronic pain disorder  Long standing, with apparent 20+year opioid treatment.  He is not seeking opioids now, and I indicated that I would not use opioid therapy.  He saw pain clinic of [facility] and has rejected their treatments.  He says he had very adverse effect of duloxetine.  He has found tramadol to be satisfactory, along with celecoxib, and methocarbamol.  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  He declined referral to spine or pain clinic now.  In the future we will need outside records.     - traMADoL (ULTRAM) 50 mg tablet; Take 1 tablet (50 mg total) by mouth 3 (three) times a day.  Dispense: 90 tablet; Refill: 0  - methocarbamoL (ROBAXIN) 500 MG tablet; Take 1 tablet (500 mg total) by mouth 2 (two) times a day.  Dispense: 60 tablet; Refill: 5  - celecoxib (CELEBREX) 200 MG capsule; Take 1 capsule (200 mg total) by mouth 2 (two) times a day.  Dispense: 60 capsule; Refill: 5    5. Glaucoma   Refilled.   - latanoprost (XALATAN) 0.005 % ophthalmic solution; INSTILL 1 DROP INTO BOTH EYES ONCE A DAY  Dispense: 2.5 mL; Refill: 5    6. Erectile dysfunction, unspecified erectile dysfunction type  Refille.d   - sildenafiL (VIAGRA) 50 MG tablet; Take 2 tablets (100 mg total) by mouth daily as needed for erectile dysfunction.  Dispense: 40 tablet; Refill: 5    7. hypertension  Refiled.   -  "amLODIPine (NORVASC) 10 MG tablet; Take 1 tablet (10 mg total) by mouth daily.  Dispense: 90 tablet; Refill: 5    Additional provider notes: telephone call to establish care and get prescriptions.  Doing OK, chronic back pain.  Did not like pain clinic of [facility] and had adverse reaction to duloxetine.  Does feel that tramadol is OK with celebrex.  He says that hardware is loose.  Chronic hepatitis C, with cirrhosis, says he completed treatment.  Used opioids for 20+ years.  No unusual cough or dyspnea, or fever.     I have reviewed and updated the patient's Past Medical History, Social History, Family History, Allergies and Medication List.    The patient has been notified of following:     \"This telephone visit will be conducted via a call between you and your physician/provider. We have found that certain health care needs can be provided without the need for a physical exam.  This service lets us provide the care you need with a short phone conversation.  If a prescription is necessary we can send it directly to your pharmacy.  If lab work is needed we can place an order for that and you can then stop by our lab to have the test done at a later time.    If during the course of the call the physician/provider feels a telephone visit is not appropriate, you will not be charged for this service.\"      Phone call duration: 15  minutes    CA Intake time 12 minutes    Telephone Consent was completed by  staff and confirmed by Vero Siegel MD  "

## 2021-06-10 NOTE — TELEPHONE ENCOUNTER
Controlled Substance Refill Request  Medication Name:   Requested Prescriptions     Pending Prescriptions Disp Refills     traMADoL (ULTRAM) 50 mg tablet [Pharmacy Med Name: TRAMADOL HCL 50 MG TABLET] 90 tablet 0     Sig: TAKE 1 TABLET BY MOUTH THREE TIMES A DAY     Date Last Fill: 7/1/20  Requested [facility]: [facility]  Submit electronically to pharmacy  Controlled Substance Agreement on file:   Encounter-Level CSA Scan Date:    There are no encounter-level csa scan date.        Last office visit:  7/1/20

## 2021-06-11 NOTE — TELEPHONE ENCOUNTER
Patient states he is out of medication requesting to process as  Soon as possible .  Controlled Substance Refill Request  Medication Name:   Requested Prescriptions     Pending Prescriptions Disp Refills     traMADoL (ULTRAM) 50 mg tablet 90 tablet 0     Sig: Take 1 tablet (50 mg total) by mouth 3 (three) times a day.   Date Last Fill: 07/27/20  Is patient out of medication?:  Yes  Patient notified refills processed within 3 business days:  Yes  Requested Pharmacy: [id number]  Submit electronically to pharmacy  Controlled Substance Agreement on file:   Encounter-Level CSA Scan Date:    There are no encounter-level csa scan date.        Last office visit:  07/01/20

## 2021-06-12 NOTE — TELEPHONE ENCOUNTER
Controlled Substance Refill Request  Medication Name:   Requested Prescriptions     Pending Prescriptions Disp Refills     traMADoL (ULTRAM) 50 mg tablet [Pharmacy Med Name: TRAMADOL HCL 50 MG TABLET] 90 tablet 0     Sig: TAKE 1 TABLET BY MOUTH THREE TIMES A DAY     Date Last Fill: 9/28/20  [facility]  Submit electronically to pharmacy  Controlled Substance Agreement on file:   Encounter-Level CSA Scan Date:    There are no encounter-level csa scan date.        Last office visit:  7/1/20

## 2021-06-13 NOTE — TELEPHONE ENCOUNTER
RN cannot approve Refill Request    RN can NOT refill this medication med is not covered by policy/route to provider. Last office visit: Visit date not found Last Physical: Visit date not found Last MTM visit: Visit date not found Last visit same specialty: Visit date not found.  Next visit within 3 mo: Visit date not found  Next physical within 3 mo: Visit date not found      Holly Nicole, [facility] Triage/Med Refill 12/16/2020    Requested Prescriptions   Pending Prescriptions Disp Refills     latanoprost (XALATAN) 0.005 % ophthalmic solution [Pharmacy Med Name: LATANOPROST 0.005% EYE DROPS] 2.5 mL 5     Sig: INSTILL 1 DROP INTO BOTH EYES EVERY DAY       There is no refill protocol information for this order

## 2021-06-13 NOTE — TELEPHONE ENCOUNTER
Prescription Monitoring Program activity reviewed with no discrepancies noted.      Last fill per : 11/13/20  Quantity/days supply: 90 tablets for 30 days    Controlled Substance Agreement on file: No  Date: NA    Last office visit with provider:  7/1/20    Please advise.

## 2021-06-13 NOTE — TELEPHONE ENCOUNTER
Controlled Substance Refill Request  Medication Name:   Requested Prescriptions     Pending Prescriptions Disp Refills     traMADoL (ULTRAM) 50 mg tablet [Pharmacy Med Name: TRAMADOL HCL 50 MG TABLET] 90 tablet 0     Sig: TAKE 1 TABLET BY MOUTH THREE TIMES A DAY     Date Last Fill: 11/13/20  [facility]  Submit electronically to pharmacy  Controlled Substance Agreement on file:   Encounter-Level CSA Scan Date:    There are no encounter-level csa scan date.        Last office visit:  7/1/20

## 2021-06-14 NOTE — TELEPHONE ENCOUNTER
RN cannot approve Refill Request    RN can NOT refill this medication med is not covered by policy/route to provider. Last office visit: Visit date not found Last Physical: Visit date not found Last MTM visit: Visit date not found Last visit same specialty: Visit date not found.  Next visit within 3 mo: Visit date not found  Next physical within 3 mo: Visit date not found      Clover Farfan, [facility] Triage/Med Refill 1/6/2021    Requested Prescriptions   Pending Prescriptions Disp Refills     celecoxib (CELEBREX) 200 MG capsule [Pharmacy Med Name: CELECOXIB 200 MG CAPSULE] 60 capsule 5     Sig: TAKE 1 CAPSULE BY MOUTH TWICE A DAY       There is no refill protocol information for this order

## 2021-06-17 NOTE — TELEPHONE ENCOUNTER
Telephone Encounter by Rosa Tucker LPN at 9/25/2020  3:25 PM     Author: Rosa Tucker LPN Service: -- Author Type: Licensed Nurse    Filed: 9/25/2020  3:28 PM Encounter Date: 9/25/2020 Status: Signed    : Rosa Tucker LPN (Licensed Nurse)       Medication: Tramadol  Last Date Filled 7/28/20   pulled: YES         Only PCP Prescribing? : NO  Taken as prescribed from physician notes? YES    CSA in last year: NO  Random Utox in last year: NO  (if any of the above answer NO - schedule with PCP)     Opioids + benzodiazepines? NO  (if the above answer YES - schedule with PCP every 6 months)     Is patient on the Executive Review Team? no      All responses suggest: Refilling prescription

## 2021-06-17 NOTE — TELEPHONE ENCOUNTER
Telephone Encounter by Sheryl Rowan CMA at 11/10/2020  8:47 AM     Author: Sheryl Rowan CMA Service: -- Author Type: Certified Medical Assistant    Filed: 11/10/2020  8:52 AM Encounter Date: 11/7/2020 Status: Signed    : Sheryl Rowan CMA (Certified Medical Assistant)       Medication: Tramadol   Last Date Filled 9/28/2020   pulled: YES    Only PCP Prescribing? : NO  Taken as prescribed from physician notes? YES    CSA in last year: NO  Random Utox in last year: NO  (if any of the above answer NO - schedule with PCP)     Opioids + benzodiazepines? NO  (if the above answer YES - schedule with PCP every 6 months)     Is patient on the Executive Review Team? NO      All responses suggest: PCP to review and advise

## 2021-09-05 ENCOUNTER — HEALTH MAINTENANCE LETTER (OUTPATIENT)
Age: 66
End: 2021-09-05

## 2021-12-23 NOTE — TELEPHONE ENCOUNTER
Refill Request  Medication name: Pending Prescriptions:                       Disp   Refills    celecoxib (CELEBREX) 200 MG capsule                           Sig: Take 1 capsule (200 mg) by mouth 2 times daily    Who prescribed the medication: PCP  Last refill on medication: pt reported   [facility]  Last appointment with PCP: 3/31/21  Next appointment: Not due

## 2021-12-27 DIAGNOSIS — M51.369 DDD (DEGENERATIVE DISC DISEASE), LUMBAR: Primary | ICD-10-CM

## 2021-12-27 RX ORDER — CELECOXIB 200 MG/1
200 CAPSULE ORAL 2 TIMES DAILY
OUTPATIENT
Start: 2021-12-27

## 2021-12-27 NOTE — TELEPHONE ENCOUNTER
"Routing refill request to provider for review/approval because:  Labs not current:  ALT, AST, CBC, creatinine on file in the last year.  Patient needs to be seen as it has been more than 1 year since last office visit.   Disp Refills Start End ISABELA   celecoxib (CELEBREX) 200 MG capsule 60 capsule 5 1/6/2021  No   Sig: TAKE 1 CAPSULE BY MOUTH TWICE A DAY   Sent to pharmacy as: celecoxib 200 mg capsule (CeleBREX)   E-Prescribing Status: Receipt confirmed by pharmacy (1/6/2021  4:53 PM CST)     Last Written Prescription Date:  01/06/2021  Last Fill Quantity: 60,  # refills: 5   Last office visit provider:  07/01/2020 with Dr Alford.    Requested Prescriptions   Pending Prescriptions Disp Refills     celecoxib (CELEBREX) 200 MG capsule       Sig: Take 1 capsule (200 mg) by mouth 2 times daily       NSAID Medications Failed - 12/23/2021  2:45 PM        Failed - Blood pressure under 140/90 in past 12 months     BP Readings from Last 3 Encounters:   10/26/20 (!) 151/86   02/19/20 122/78   01/20/20 134/77                 Failed - Normal ALT on file in past 12 months     Recent Labs   Lab Test 10/12/20  1541   ALT 41             Failed - Normal AST on file in past 12 months     Recent Labs   Lab Test 10/12/20  1541   AST 23             Failed - Recent (12 mo) or future (30 days) visit within the authorizing provider's specialty     Patient has had an office visit with the authorizing provider or a provider within the authorizing providers department within the previous 12 mos or has a future within next 30 days. See \"Patient Info\" tab in inbasket, or \"Choose Columns\" in Meds & Orders section of the refill encounter.              Failed - Patient is age 6-64 years        Failed - Normal CBC on file in past 12 months     Recent Labs   Lab Test 10/12/20  1541   WBC 8.6   RBC 5.53   HGB 17.4   HCT 52.4                    Failed - Normal serum creatinine on file in past 12 months     Recent Labs   Lab Test 10/12/20  1541 "   CR 0.73       Ok to refill medication if creatinine is low          Passed - Medication is active on med list             Harmony Mckoy 12/27/21 12:33 AM

## 2021-12-29 NOTE — TELEPHONE ENCOUNTER
Routing refill request to provider for review/approval because:  Drug not on the [facility] refill protocol    NSAID Medications Failed 12/27/2021 12:37 PM   Protocol Details  Blood pressure under 140/90 in past 12 months    Normal ALT on file in past 12 months    Normal AST on file in past 12 months    Recent (12 mo) or future (30 days) visit within the authorizing provider's specialty    Patient is age 6-64 years    Normal CBC on file in past 12 months    Normal serum creatinine on file in past 12 months              
yes

## 2021-12-30 RX ORDER — CELECOXIB 200 MG/1
200 CAPSULE ORAL 2 TIMES DAILY
Qty: 60 CAPSULE | Refills: 3 | Status: SHIPPED | OUTPATIENT
Start: 2021-12-30

## 2022-02-20 ENCOUNTER — HEALTH MAINTENANCE LETTER (OUTPATIENT)
Age: 67
End: 2022-02-20

## 2022-04-04 NOTE — TELEPHONE ENCOUNTER
"Eloina is a 39 year old who is being evaluated via a billable video visit.      How would you like to obtain your AVS? {AVS Preference:443769}  If the video visit is dropped, the invitation should be resent by: {video visit invitation:673523}  Will anyone else be joining your video visit? {:016503}  {If patient encounters technical issues they should call [phone] :375875}    Video Start Time: {video visit start/end time for provider to select:080530}    {PROVIDER CHARTING PREFERENCE:335501}    Subjective   Eloina is a 39 year old who presents for the following health issues {ACCOMPANIED BY STATEMENT (Optional):345773}    HPI     {SUPERLIST (Optional):979677}  {additonal problems for provider to add (Optional):964559}    Review of Systems   {ROS COMP (Optional):415440}      Objective           Vitals:  No vitals were obtained today due to virtual visit.    Physical Exam   {video visit exam brief selected:147582::\"GENERAL: Healthy, alert and no distress\",\"EYES: Eyes grossly normal to inspection.  No discharge or erythema, or obvious scleral/conjunctival abnormalities.\",\"RESP: No audible wheeze, cough, or visible cyanosis.  No visible retractions or increased work of breathing.  \",\"SKIN: Visible skin clear. No significant rash, abnormal pigmentation or lesions.\",\"NEURO: Cranial nerves grossly intact.  Mentation and speech appropriate for age.\",\"PSYCH: Mentation appears normal, affect normal/bright, judgement and insight intact, normal speech and appearance well-groomed.\"}    {Diagnostic Test Results (Optional):167131}    {AMBULATORY ATTESTATION (Optional):998951}        Video-Visit Details    Type of service:  Video Visit    Video End Time:{video visit start/end time for provider to select:819527}    Originating Location (pt. Location): {video visit patient location:355710::\"Home\"}    Distant Location (provider location):  [facility]     Platform used for Video Visit: {Virtual Visit " Telephone Encounter by Rosa Tucker LPN at 7/27/2020  4:10 PM     Author: Rosa Tucker LPN Service: -- Author Type: Licensed Nurse    Filed: 7/27/2020  4:12 PM Encounter Date: 7/24/2020 Status: Signed    : Rosa Tucker LPN (Licensed Nurse)       Medication: Tramadol  Last Date Filled 7/1/20   pulled: YES         Only PCP Prescribing? : NO  Taken as prescribed from physician notes? YES VV 7/1/20    CSA in last year: NO  Random Utox in last year: NO  (if any of the above answer NO - schedule with PCP)     Opioids + benzodiazepines? NO  (if the above answer YES - schedule with PCP every 6 months)     Is patient on the Executive Review Team? no      All responses suggest: Refilling prescription              "Platforms:467482::\"Kaleigh\"}  This patient was a no show for this scheduled appointment.  "

## 2022-10-23 ENCOUNTER — HEALTH MAINTENANCE LETTER (OUTPATIENT)
Age: 67
End: 2022-10-23

## 2022-12-19 ENCOUNTER — EXTERNAL RECORD (OUTPATIENT)
Dept: OTHER | Age: 67
End: 2022-12-19

## 2022-12-22 ENCOUNTER — HOSPITAL ENCOUNTER (EMERGENCY)
Age: 67
Discharge: HOME | End: 2022-12-22
Attending: EMERGENCY MEDICINE

## 2022-12-22 ENCOUNTER — TELEPHONE (OUTPATIENT)
Dept: TELEHEALTH | Age: 67
End: 2022-12-22

## 2022-12-22 ENCOUNTER — APPOINTMENT (OUTPATIENT)
Dept: CT IMAGING | Age: 67
End: 2022-12-22
Attending: EMERGENCY MEDICINE

## 2022-12-22 ENCOUNTER — WALK IN (OUTPATIENT)
Dept: URGENT CARE | Age: 67
End: 2022-12-22

## 2022-12-22 VITALS
OXYGEN SATURATION: 97 % | HEART RATE: 91 BPM | SYSTOLIC BLOOD PRESSURE: 194 MMHG | DIASTOLIC BLOOD PRESSURE: 102 MMHG | RESPIRATION RATE: 16 BRPM | TEMPERATURE: 99.2 F

## 2022-12-22 VITALS
SYSTOLIC BLOOD PRESSURE: 159 MMHG | DIASTOLIC BLOOD PRESSURE: 98 MMHG | TEMPERATURE: 97.8 F | HEART RATE: 71 BPM | BODY MASS INDEX: 30.27 KG/M2 | OXYGEN SATURATION: 94 % | WEIGHT: 235.89 LBS | HEIGHT: 74 IN | RESPIRATION RATE: 17 BRPM

## 2022-12-22 DIAGNOSIS — R20.0 RIGHT SIDED NUMBNESS: Primary | ICD-10-CM

## 2022-12-22 DIAGNOSIS — R20.2 PARESTHESIA: Primary | ICD-10-CM

## 2022-12-22 LAB
ALBUMIN SERPL-MCNC: 4.5 G/DL (ref 3.6–5.1)
ALBUMIN/GLOB SERPL: 1.3 {RATIO} (ref 1–2.4)
ALP SERPL-CCNC: 68 UNITS/L (ref 45–117)
ALT SERPL-CCNC: 55 UNITS/L
ANION GAP BLD CALC-SCNC: 18 MMOL/L (ref 7–19)
ANION GAP SERPL CALC-SCNC: 11 MMOL/L (ref 7–19)
AST SERPL-CCNC: 33 UNITS/L
ATRIAL RATE (BPM): 67
BASOPHILS # BLD: 0.1 K/MCL (ref 0–0.3)
BASOPHILS NFR BLD: 1 %
BILIRUB SERPL-MCNC: 0.7 MG/DL (ref 0.2–1)
BUN BLD-MCNC: 19 MG/DL (ref 6–20)
BUN SERPL-MCNC: 19 MG/DL (ref 6–20)
BUN/CREAT SERPL: 26 (ref 7–25)
CA-I BLD-SCNC: 1.19 MMOL/L (ref 1.15–1.29)
CALCIUM SERPL-MCNC: 9.4 MG/DL (ref 8.4–10.2)
CHLORIDE BLD-SCNC: 105 MMOL/L (ref 97–110)
CHLORIDE SERPL-SCNC: 104 MMOL/L (ref 97–110)
CO2 BLD-SCNC: 24 MMOL/L (ref 19–24)
CO2 SERPL-SCNC: 29 MMOL/L (ref 21–32)
CREAT SERPL-MCNC: 0.6 MG/DL (ref 0.67–1.17)
CREAT SERPL-MCNC: 0.72 MG/DL (ref 0.67–1.17)
D DIMER PPP FEU-MCNC: 0.32 MG/L (FEU)
DEPRECATED RDW RBC: 48.8 FL (ref 39–50)
DIASTOLIC BLOOD PRESSURE: 107
EOSINOPHIL # BLD: 0.2 K/MCL (ref 0–0.5)
EOSINOPHIL NFR BLD: 3 %
ERYTHROCYTE [DISTWIDTH] IN BLOOD: 13.7 % (ref 11–15)
FASTING DURATION TIME PATIENT: ABNORMAL H
GFR SERPLBLD BASED ON 1.73 SQ M-ARVRAT: >90 ML/MIN
GFR SERPLBLD BASED ON 1.73 SQ M-ARVRAT: >90 ML/MIN
GLOBULIN SER-MCNC: 3.5 G/DL (ref 2–4)
GLUCOSE BLD-MCNC: 116 MG/DL (ref 70–99)
GLUCOSE BLDC GLUCOMTR-MCNC: 121 MG/DL (ref 70–99)
GLUCOSE SERPL-MCNC: 126 MG/DL (ref 70–99)
HCT VFR BLD CALC: 45 % (ref 39–51)
HCT VFR BLD CALC: 49.3 % (ref 39–51)
HGB BLD CALC-MCNC: 15.3 G/DL (ref 13–17)
HGB BLD-MCNC: 16.5 G/DL (ref 13–17)
IMM GRANULOCYTES # BLD AUTO: 0.1 K/MCL (ref 0–0.2)
IMM GRANULOCYTES # BLD: 1 %
LYMPHOCYTES # BLD: 1.9 K/MCL (ref 1–4)
LYMPHOCYTES NFR BLD: 21 %
MCH RBC QN AUTO: 32.2 PG (ref 26–34)
MCHC RBC AUTO-ENTMCNC: 33.5 G/DL (ref 32–36.5)
MCV RBC AUTO: 96.3 FL (ref 78–100)
MONOCYTES # BLD: 0.6 K/MCL (ref 0.3–0.9)
MONOCYTES NFR BLD: 6 %
NEUTROPHILS # BLD: 6.4 K/MCL (ref 1.8–7.7)
NEUTROPHILS NFR BLD: 68 %
NRBC BLD MANUAL-RTO: 0 /100 WBC
NT-PROBNP SERPL-MCNC: 83 PG/ML
P AXIS (DEGREES): 43
PLATELET # BLD AUTO: 258 K/MCL (ref 140–450)
POTASSIUM BLD-SCNC: 3.8 MMOL/L (ref 3.4–5.1)
POTASSIUM SERPL-SCNC: 3.9 MMOL/L (ref 3.4–5.1)
PR-INTERVAL (MSEC): 142
PROT SERPL-MCNC: 8 G/DL (ref 6.4–8.2)
QRS-INTERVAL (MSEC): 100
QT-INTERVAL (MSEC): 406
QTC: 429
R AXIS (DEGREES): -12
RAINBOW EXTRA TUBES HOLD SPECIMEN: NORMAL
RBC # BLD: 5.12 MIL/MCL (ref 4.5–5.9)
REPORT TEXT: NORMAL
SODIUM BLDC-SCNC: 142 MMOL/L (ref 135–145)
SODIUM SERPL-SCNC: 140 MMOL/L (ref 135–145)
SYSTOLIC BLOOD PRESSURE: 220
T AXIS (DEGREES): 15
TROPONIN I SERPL DL<=0.01 NG/ML-MCNC: 7 NG/L
VENTRICULAR RATE EKG/MIN (BPM): 67
WBC # BLD: 9.2 K/MCL (ref 4.2–11)

## 2022-12-22 PROCEDURE — 70450 CT HEAD/BRAIN W/O DYE: CPT

## 2022-12-22 PROCEDURE — 99203 OFFICE O/P NEW LOW 30 MIN: CPT | Performed by: NURSE PRACTITIONER

## 2022-12-22 PROCEDURE — 84484 ASSAY OF TROPONIN QUANT: CPT | Performed by: EMERGENCY MEDICINE

## 2022-12-22 PROCEDURE — 74174 CTA ABD&PLVS W/CONTRAST: CPT | Performed by: RADIOLOGY

## 2022-12-22 PROCEDURE — 70498 CT ANGIOGRAPHY NECK: CPT

## 2022-12-22 PROCEDURE — 99285 EMERGENCY DEPT VISIT HI MDM: CPT | Performed by: EMERGENCY MEDICINE

## 2022-12-22 PROCEDURE — 93005 ELECTROCARDIOGRAM TRACING: CPT | Performed by: EMERGENCY MEDICINE

## 2022-12-22 PROCEDURE — 83880 ASSAY OF NATRIURETIC PEPTIDE: CPT | Performed by: EMERGENCY MEDICINE

## 2022-12-22 PROCEDURE — 36415 COLL VENOUS BLD VENIPUNCTURE: CPT

## 2022-12-22 PROCEDURE — 10002805 HB CONTRAST AGENT: Performed by: EMERGENCY MEDICINE

## 2022-12-22 PROCEDURE — G1004 CDSM NDSC: HCPCS | Performed by: RADIOLOGY

## 2022-12-22 PROCEDURE — G1004 CDSM NDSC: HCPCS

## 2022-12-22 PROCEDURE — 82962 GLUCOSE BLOOD TEST: CPT

## 2022-12-22 PROCEDURE — 93010 ELECTROCARDIOGRAM REPORT: CPT | Performed by: EMERGENCY MEDICINE

## 2022-12-22 PROCEDURE — 71275 CT ANGIOGRAPHY CHEST: CPT | Performed by: RADIOLOGY

## 2022-12-22 PROCEDURE — 85014 HEMATOCRIT: CPT

## 2022-12-22 PROCEDURE — 80053 COMPREHEN METABOLIC PANEL: CPT | Performed by: EMERGENCY MEDICINE

## 2022-12-22 PROCEDURE — 85025 COMPLETE CBC W/AUTO DIFF WBC: CPT | Performed by: EMERGENCY MEDICINE

## 2022-12-22 PROCEDURE — 99284 EMERGENCY DEPT VISIT MOD MDM: CPT

## 2022-12-22 PROCEDURE — 70498 CT ANGIOGRAPHY NECK: CPT | Performed by: RADIOLOGY

## 2022-12-22 PROCEDURE — 70496 CT ANGIOGRAPHY HEAD: CPT | Performed by: RADIOLOGY

## 2022-12-22 PROCEDURE — 85379 FIBRIN DEGRADATION QUANT: CPT | Performed by: EMERGENCY MEDICINE

## 2022-12-22 PROCEDURE — 71275 CT ANGIOGRAPHY CHEST: CPT

## 2022-12-22 RX ORDER — 0.9 % SODIUM CHLORIDE 0.9 %
2 VIAL (ML) INJECTION EVERY 12 HOURS SCHEDULED
Status: DISCONTINUED | OUTPATIENT
Start: 2022-12-22 | End: 2022-12-22 | Stop reason: HOSPADM

## 2022-12-22 RX ORDER — ASPIRIN 81 MG/1
324 TABLET, CHEWABLE ORAL ONCE
Status: DISCONTINUED | OUTPATIENT
Start: 2022-12-22 | End: 2022-12-22

## 2022-12-22 RX ADMIN — IOHEXOL 75 ML: 350 INJECTION, SOLUTION INTRAVENOUS at 12:03

## 2022-12-22 RX ADMIN — IOHEXOL 125 ML: 350 INJECTION, SOLUTION INTRAVENOUS at 11:54

## 2022-12-22 RX ADMIN — IOHEXOL 75 ML: 350 INJECTION, SOLUTION INTRAVENOUS at 11:52

## 2022-12-22 ASSESSMENT — HEART SCORE
RISK FACTORS: 1-2 RISK FACTORS
HISTORY: SLIGHTLY SUSPICIOUS
HEART SCORE: 3
TROPONIN: EQUAL OR LESS THAN NORMAL LIMIT
EKG: NORMAL
AGE: EQUAL OR GREATER THAN 65

## 2022-12-22 ASSESSMENT — PAIN SCALES - GENERAL: PAINLEVEL_OUTOF10: 0

## 2022-12-23 ENCOUNTER — TELEPHONE (OUTPATIENT)
Dept: NEUROLOGY | Age: 67
End: 2022-12-23

## 2022-12-23 DIAGNOSIS — R20.2 PARESTHESIA: Primary | ICD-10-CM

## 2023-01-11 ENCOUNTER — TELEPHONE (OUTPATIENT)
Dept: NEUROLOGY | Age: 68
End: 2023-01-11

## 2023-01-13 ENCOUNTER — HOSPITAL ENCOUNTER (OUTPATIENT)
Dept: MRI IMAGING | Age: 68
Discharge: HOME | End: 2023-01-13
Attending: PSYCHIATRY & NEUROLOGY

## 2023-01-13 DIAGNOSIS — R20.2 PARESTHESIA: ICD-10-CM

## 2023-01-13 PROCEDURE — 70551 MRI BRAIN STEM W/O DYE: CPT | Performed by: RADIOLOGY

## 2023-01-13 PROCEDURE — 70551 MRI BRAIN STEM W/O DYE: CPT

## 2023-01-13 PROCEDURE — G1004 CDSM NDSC: HCPCS | Performed by: RADIOLOGY

## 2023-01-13 PROCEDURE — G1004 CDSM NDSC: HCPCS

## 2023-02-14 ENCOUNTER — OFFICE VISIT (OUTPATIENT)
Dept: NEUROLOGY | Age: 68
End: 2023-02-14

## 2023-02-14 VITALS
WEIGHT: 241.5 LBS | DIASTOLIC BLOOD PRESSURE: 82 MMHG | HEART RATE: 84 BPM | SYSTOLIC BLOOD PRESSURE: 134 MMHG | BODY MASS INDEX: 30.99 KG/M2 | HEIGHT: 74 IN

## 2023-02-14 DIAGNOSIS — I63.30 CEREBRAL THROMBOSIS WITH CEREBRAL INFARCTION (CMD): Primary | ICD-10-CM

## 2023-02-14 PROCEDURE — 99205 OFFICE O/P NEW HI 60 MIN: CPT | Performed by: PSYCHIATRY & NEUROLOGY

## 2023-02-14 RX ORDER — TRAMADOL HYDROCHLORIDE 50 MG/1
50 TABLET ORAL EVERY 12 HOURS PRN
COMMUNITY
Start: 2023-01-19

## 2023-02-14 RX ORDER — OXYCODONE HYDROCHLORIDE 5 MG/1
5 TABLET ORAL PRN
COMMUNITY
Start: 2023-01-19

## 2023-02-14 RX ORDER — NAPROXEN SODIUM 220 MG
440 TABLET ORAL 2 TIMES DAILY WITH MEALS
COMMUNITY

## 2023-02-14 RX ORDER — TESTOSTERONE CYPIONATE 200 MG/ML
INJECTION, SOLUTION INTRAMUSCULAR
COMMUNITY
Start: 2023-01-19

## 2023-02-14 RX ORDER — LATANOPROST 50 UG/ML
1 SOLUTION/ DROPS OPHTHALMIC NIGHTLY
COMMUNITY
Start: 2020-01-02

## 2023-02-14 RX ORDER — ASPIRIN 325 MG
325 TABLET ORAL DAILY
COMMUNITY
End: 2023-03-03

## 2023-02-27 ENCOUNTER — TELEPHONE (OUTPATIENT)
Dept: INTERNAL MEDICINE | Age: 68
End: 2023-02-27

## 2023-03-03 ENCOUNTER — NURSE ONLY (OUTPATIENT)
Dept: FAMILY MEDICINE | Age: 68
End: 2023-03-03

## 2023-03-03 ENCOUNTER — OFFICE VISIT (OUTPATIENT)
Dept: INTERNAL MEDICINE | Age: 68
End: 2023-03-03

## 2023-03-03 VITALS — BODY MASS INDEX: 30.93 KG/M2 | OXYGEN SATURATION: 98 % | HEART RATE: 104 BPM | HEIGHT: 74 IN | WEIGHT: 241 LBS

## 2023-03-03 DIAGNOSIS — K83.8 COMMON BILE DUCT DILATATION: ICD-10-CM

## 2023-03-03 DIAGNOSIS — Z23 NEED FOR VACCINATION: ICD-10-CM

## 2023-03-03 DIAGNOSIS — I77.9 MILD CAROTID ARTERY DISEASE (CMD): ICD-10-CM

## 2023-03-03 DIAGNOSIS — I77.810 ASCENDING AORTA DILATATION (CMD): ICD-10-CM

## 2023-03-03 DIAGNOSIS — I10 HTN (HYPERTENSION), BENIGN: ICD-10-CM

## 2023-03-03 DIAGNOSIS — E27.8 ADRENAL NODULE (CMD): ICD-10-CM

## 2023-03-03 DIAGNOSIS — I74.11 EMBOLISM AND THROMBOSIS OF THORACIC AORTA (CMD): ICD-10-CM

## 2023-03-03 DIAGNOSIS — E29.1 HYPOGONADISM, MALE: ICD-10-CM

## 2023-03-03 DIAGNOSIS — Z86.73 OLD CEREBROVASCULAR ACCIDENT (CVA) WITHOUT LATE EFFECT: Primary | ICD-10-CM

## 2023-03-03 DIAGNOSIS — E04.1 THYROID NODULE: ICD-10-CM

## 2023-03-03 DIAGNOSIS — Z23 NEED FOR VACCINATION: Primary | ICD-10-CM

## 2023-03-03 PROCEDURE — 91312 COVID PFIZER BIVALENT BOOSTER 12Y+: CPT | Performed by: INTERNAL MEDICINE

## 2023-03-03 PROCEDURE — G0008 ADMIN INFLUENZA VIRUS VAC: HCPCS | Performed by: INTERNAL MEDICINE

## 2023-03-03 PROCEDURE — 0124A COVID PFIZER BIVALENT BOOSTER 12Y+: CPT | Performed by: INTERNAL MEDICINE

## 2023-03-03 PROCEDURE — 90662 IIV NO PRSV INCREASED AG IM: CPT | Performed by: INTERNAL MEDICINE

## 2023-03-03 PROCEDURE — 99214 OFFICE O/P EST MOD 30 MIN: CPT | Performed by: INTERNAL MEDICINE

## 2023-03-03 RX ORDER — ASPIRIN 81 MG/1
81 TABLET ORAL DAILY
Status: SHIPPED | COMMUNITY
Start: 2023-03-03

## 2023-03-03 RX ORDER — IRBESARTAN 300 MG/1
150 TABLET ORAL NIGHTLY
Qty: 45 TABLET | Refills: 3 | Status: SHIPPED | OUTPATIENT
Start: 2023-03-03

## 2023-03-09 PROBLEM — K83.8 COMMON BILE DUCT DILATATION: Status: ACTIVE | Noted: 2023-03-09

## 2023-03-09 PROBLEM — I77.9 MILD CAROTID ARTERY DISEASE (CMD): Status: ACTIVE | Noted: 2023-03-09

## 2023-03-09 PROBLEM — E27.8 ADRENAL NODULE (CMD): Status: ACTIVE | Noted: 2023-03-09

## 2023-03-16 ENCOUNTER — HOSPITAL ENCOUNTER (OUTPATIENT)
Dept: ULTRASOUND IMAGING | Age: 68
Discharge: HOME | End: 2023-03-16
Attending: INTERNAL MEDICINE

## 2023-03-16 DIAGNOSIS — E04.1 THYROID NODULE: ICD-10-CM

## 2023-04-02 ENCOUNTER — HEALTH MAINTENANCE LETTER (OUTPATIENT)
Age: 68
End: 2023-04-02

## 2023-04-04 ENCOUNTER — APPOINTMENT (OUTPATIENT)
Dept: ULTRASOUND IMAGING | Age: 68
End: 2023-04-04
Attending: INTERNAL MEDICINE

## 2023-04-11 ENCOUNTER — OFFICE VISIT (OUTPATIENT)
Dept: GASTROENTEROLOGY | Age: 68
End: 2023-04-11
Attending: INTERNAL MEDICINE

## 2023-04-11 VITALS
HEART RATE: 112 BPM | DIASTOLIC BLOOD PRESSURE: 82 MMHG | WEIGHT: 244 LBS | SYSTOLIC BLOOD PRESSURE: 136 MMHG | BODY MASS INDEX: 31.32 KG/M2 | HEIGHT: 74 IN

## 2023-04-11 DIAGNOSIS — K83.8 DILATED CBD, ACQUIRED: Primary | ICD-10-CM

## 2023-04-11 PROCEDURE — 99203 OFFICE O/P NEW LOW 30 MIN: CPT | Performed by: INTERNAL MEDICINE

## 2023-06-08 ENCOUNTER — APPOINTMENT (OUTPATIENT)
Dept: INTERNAL MEDICINE | Age: 68
End: 2023-06-08

## 2024-05-17 ENCOUNTER — TELEPHONE (OUTPATIENT)
Dept: INTERNAL MEDICINE | Age: 69
End: 2024-05-17

## 2024-06-02 ENCOUNTER — HEALTH MAINTENANCE LETTER (OUTPATIENT)
Age: 69
End: 2024-06-02

## 2024-08-01 NOTE — RESULT ENCOUNTER NOTE
Dear Kevyn,   Here are your recent results. Liver labs are normal.    Jeffrey Friedman MD
[facility]

## 2024-08-16 ENCOUNTER — TELEPHONE (OUTPATIENT)
Dept: INTERNAL MEDICINE | Age: 69
End: 2024-08-16

## 2024-09-29 NOTE — TELEPHONE ENCOUNTER
[facility]    Phone Message    May a detailed message be left on voicemail: yes     Reason for Call: Other: Dr. Llanos from [facility] reached out to attempt facilitate transferring records for pt.  She stated she is unable to see on care everywhere.  Pt signed forms at clinic, but is following up.  Please let her know if there is anything hindering record release on our end of things.  Call [phone].     Writer forwarded  to provider to provider line to further discuss questions for pt care.     Action Taken: Message routed to:  [facility] jeanine    Travel Screening: Not Applicable                                                                                                                                 
Opened up Care everywhere for [facility] to view.  
Admission

## (undated) DEVICE — PAD POSITIONING KIT CHEST SHEARGUARD DISP LF 5844-13

## (undated) DEVICE — LINEN TOWEL PACK X5 5464

## (undated) DEVICE — DRAIN JACKSON PRATT 07MM FLAT 3/4 PERF

## (undated) DEVICE — SU ETHILON 2-0 FS 18" 664H

## (undated) DEVICE — NDL BX BONE MARROW 11GA 6" JAMSHIDI DJ6011X

## (undated) DEVICE — MIDAS REX DISSECTING TOOL  14MH30

## (undated) DEVICE — DRAPE MICROSCOPE EQUIP 48X120" 6130VL2

## (undated) DEVICE — DRAIN JACKSON PRATT RESERVOIR 100ML SU130-1305

## (undated) DEVICE — STPL SKIN PROXIMATE 35 WIDE PMW35

## (undated) DEVICE — GLOVE PROTEXIS BLUE W/NEU-THERA 8.5  2D73EB85

## (undated) DEVICE — ESU GROUND PAD UNIVERSAL W/O CORD

## (undated) DEVICE — CUSHION INSERT LG PRONE VIEW JACKSON TABLE

## (undated) DEVICE — MANIFOLD NEPTUNE 4 PORT 700-20

## (undated) DEVICE — SPONGE SURGIFOAM 100 1974

## (undated) DEVICE — GLOVE PROTEXIS BLUE W/NEU-THERA 8.0  2D73EB80

## (undated) DEVICE — GLOVE PROTEXIS W/NEU-THERA 6.5  2D73TE65

## (undated) DEVICE — SYR BULB IRRIG DOVER 60 ML LATEX FREE 67000

## (undated) DEVICE — SU VICRYL 0 CT-1 CR 8X18" J740D

## (undated) DEVICE — GLOVE PROTEXIS W/NEU-THERA 8.0  2D73TE80

## (undated) DEVICE — GLOVE PROTEXIS W/NEU-THERA 7.5  2D73TE75

## (undated) DEVICE — DRAPE COVER C-ARM SEAMLESS SNAP-KAP 03-KP26 LATEX FREE

## (undated) DEVICE — SU VICRYL 2-0 CT-2 CR 8X18" J726D

## (undated) DEVICE — GLOVE PROTEXIS W/NEU-THERA 8.5  2D73TE85

## (undated) DEVICE — GLOVE PROTEXIS BLUE W/NEU-THERA 6.5  2D73EB65

## (undated) DEVICE — SYR 10ML LL W/O NDL 302995

## (undated) DEVICE — ESU ELEC BLADE 4" COATED

## (undated) DEVICE — DRSG KERLIX FLUFFS X5

## (undated) DEVICE — NDL 20GA 1.5"

## (undated) DEVICE — RX SURGIFLO HEMOSTATIC MATRIX W/THROMBIN 8ML 2994

## (undated) DEVICE — SYR EAR BULB 3OZ 0035830

## (undated) DEVICE — TUBING SUCTION SOFT 20'X3/16" 0036570

## (undated) DEVICE — PAD POSITIONING KIT HIP DISP 5844-17

## (undated) DEVICE — DRAPE SHEET REV FOLD 3/4 9349

## (undated) DEVICE — PREP DURAPREP 26ML APL 8630

## (undated) DEVICE — SOL WATER IRRIG 1000ML BOTTLE 2F7114

## (undated) DEVICE — ESU BIPOLAR SEALER AQUAMANTYS 6MM 23-112-1

## (undated) DEVICE — MARKER SPHERES PASSIVE MEDT PACK 5 8801075

## (undated) DEVICE — SOL NACL 0.9% INJ 250ML BAG 2B1322Q

## (undated) DEVICE — DRAPE MAYO STAND 23X54 8337

## (undated) DEVICE — PACK UNIVERSAL SPLIT 29131

## (undated) DEVICE — PACK LARGE SPINE SNE15LSFSE

## (undated) RX ORDER — PROPOFOL 10 MG/ML
INJECTION, EMULSION INTRAVENOUS
Status: DISPENSED
Start: 2019-02-11

## (undated) RX ORDER — VECURONIUM BROMIDE 1 MG/ML
INJECTION, POWDER, LYOPHILIZED, FOR SOLUTION INTRAVENOUS
Status: DISPENSED
Start: 2019-02-12

## (undated) RX ORDER — KETAMINE HCL IN NACL, ISO-OSM 100MG/10ML
SYRINGE (ML) INJECTION
Status: DISPENSED
Start: 2019-02-11

## (undated) RX ORDER — LIDOCAINE HYDROCHLORIDE 20 MG/ML
INJECTION, SOLUTION EPIDURAL; INFILTRATION; INTRACAUDAL; PERINEURAL
Status: DISPENSED
Start: 2019-02-11

## (undated) RX ORDER — FENTANYL CITRATE 50 UG/ML
INJECTION, SOLUTION INTRAMUSCULAR; INTRAVENOUS
Status: DISPENSED
Start: 2019-02-11

## (undated) RX ORDER — KETAMINE HYDROCHLORIDE 10 MG/ML
INJECTION, SOLUTION INTRAMUSCULAR; INTRAVENOUS
Status: DISPENSED
Start: 2019-02-12

## (undated) RX ORDER — PROPOFOL 10 MG/ML
INJECTION, EMULSION INTRAVENOUS
Status: DISPENSED
Start: 2019-02-12

## (undated) RX ORDER — KETAMINE HCL IN NACL, ISO-OSM 100MG/10ML
SYRINGE (ML) INJECTION
Status: DISPENSED
Start: 2019-02-12

## (undated) RX ORDER — LIDOCAINE HYDROCHLORIDE 20 MG/ML
INJECTION, SOLUTION EPIDURAL; INFILTRATION; INTRACAUDAL; PERINEURAL
Status: DISPENSED
Start: 2019-02-12

## (undated) RX ORDER — BUPIVACAINE HYDROCHLORIDE AND EPINEPHRINE 5; 5 MG/ML; UG/ML
INJECTION, SOLUTION EPIDURAL; INTRACAUDAL; PERINEURAL
Status: DISPENSED
Start: 2019-02-12

## (undated) RX ORDER — BUPIVACAINE HYDROCHLORIDE AND EPINEPHRINE 5; 5 MG/ML; UG/ML
INJECTION, SOLUTION EPIDURAL; INTRACAUDAL; PERINEURAL
Status: DISPENSED
Start: 2019-02-11

## (undated) RX ORDER — HYDROMORPHONE HYDROCHLORIDE 1 MG/ML
INJECTION, SOLUTION INTRAMUSCULAR; INTRAVENOUS; SUBCUTANEOUS
Status: DISPENSED
Start: 2019-02-12

## (undated) RX ORDER — ACETAMINOPHEN 325 MG/1
TABLET ORAL
Status: DISPENSED
Start: 2019-02-11

## (undated) RX ORDER — ONDANSETRON 2 MG/ML
INJECTION INTRAMUSCULAR; INTRAVENOUS
Status: DISPENSED
Start: 2019-02-12

## (undated) RX ORDER — NEOSTIGMINE METHYLSULFATE 1 MG/ML
VIAL (ML) INJECTION
Status: DISPENSED
Start: 2019-02-12

## (undated) RX ORDER — GLYCOPYRROLATE 0.2 MG/ML
INJECTION, SOLUTION INTRAMUSCULAR; INTRAVENOUS
Status: DISPENSED
Start: 2019-02-12

## (undated) RX ORDER — GINSENG 100 MG
CAPSULE ORAL
Status: DISPENSED
Start: 2019-02-12

## (undated) RX ORDER — CEFAZOLIN SODIUM 2 G/100ML
INJECTION, SOLUTION INTRAVENOUS
Status: DISPENSED
Start: 2019-02-11

## (undated) RX ORDER — FENTANYL CITRATE 50 UG/ML
INJECTION, SOLUTION INTRAMUSCULAR; INTRAVENOUS
Status: DISPENSED
Start: 2019-02-12